# Patient Record
Sex: FEMALE | Race: WHITE | NOT HISPANIC OR LATINO | Employment: FULL TIME | URBAN - NONMETROPOLITAN AREA
[De-identification: names, ages, dates, MRNs, and addresses within clinical notes are randomized per-mention and may not be internally consistent; named-entity substitution may affect disease eponyms.]

---

## 2017-03-27 ENCOUNTER — OFFICE VISIT (OUTPATIENT)
Dept: RETAIL CLINIC | Facility: CLINIC | Age: 47
End: 2017-03-27

## 2017-03-27 VITALS
OXYGEN SATURATION: 99 % | RESPIRATION RATE: 16 BRPM | HEIGHT: 63 IN | TEMPERATURE: 98.6 F | BODY MASS INDEX: 40.22 KG/M2 | HEART RATE: 92 BPM | WEIGHT: 227 LBS

## 2017-03-27 DIAGNOSIS — Z20.828 EXPOSURE TO INFLUENZA: ICD-10-CM

## 2017-03-27 DIAGNOSIS — J01.00 ACUTE MAXILLARY SINUSITIS, RECURRENCE NOT SPECIFIED: Primary | ICD-10-CM

## 2017-03-27 DIAGNOSIS — R05.9 COUGH: ICD-10-CM

## 2017-03-27 DIAGNOSIS — J10.1 INFLUENZA A: ICD-10-CM

## 2017-03-27 LAB
EXPIRATION DATE: ABNORMAL
FLUAV AG NPH QL: POSITIVE
FLUBV AG NPH QL: NEGATIVE
INTERNAL CONTROL: ABNORMAL
Lab: ABNORMAL

## 2017-03-27 PROCEDURE — 87804 INFLUENZA ASSAY W/OPTIC: CPT | Performed by: NURSE PRACTITIONER

## 2017-03-27 PROCEDURE — 99203 OFFICE O/P NEW LOW 30 MIN: CPT | Performed by: NURSE PRACTITIONER

## 2017-03-27 RX ORDER — TRAMADOL HYDROCHLORIDE 50 MG/1
50 TABLET ORAL EVERY 12 HOURS
COMMUNITY
End: 2019-05-01

## 2017-03-27 RX ORDER — NADOLOL 20 MG/1
20 TABLET ORAL DAILY
COMMUNITY
End: 2018-04-20 | Stop reason: SDUPTHER

## 2017-03-27 RX ORDER — FLUCONAZOLE 150 MG/1
150 TABLET ORAL ONCE
Qty: 2 TABLET | Refills: 0 | Status: SHIPPED | OUTPATIENT
Start: 2017-03-27 | End: 2017-03-27

## 2017-03-27 RX ORDER — DEXTROMETHORPHAN HYDROBROMIDE AND PROMETHAZINE HYDROCHLORIDE 15; 6.25 MG/5ML; MG/5ML
5 SYRUP ORAL 2 TIMES DAILY PRN
Qty: 120 ML | Refills: 0 | OUTPATIENT
Start: 2017-03-27 | End: 2019-04-05

## 2017-03-27 RX ORDER — BENZONATATE 100 MG/1
200 CAPSULE ORAL 3 TIMES DAILY PRN
Qty: 20 CAPSULE | Refills: 0 | OUTPATIENT
Start: 2017-03-27 | End: 2019-04-05

## 2017-03-27 RX ORDER — DULOXETIN HYDROCHLORIDE 60 MG/1
60 CAPSULE, DELAYED RELEASE ORAL DAILY
COMMUNITY
End: 2018-04-20 | Stop reason: SDUPTHER

## 2017-03-27 RX ORDER — AMOXICILLIN AND CLAVULANATE POTASSIUM 875; 125 MG/1; MG/1
1 TABLET, FILM COATED ORAL 2 TIMES DAILY
Qty: 20 TABLET | Refills: 0 | OUTPATIENT
Start: 2017-03-27 | End: 2017-04-06

## 2017-03-27 NOTE — PROGRESS NOTES
HPI Comments: Mone presents to the clinic today c/o congestion which started three days ago. Associated symptoms include fever, chills and body aches.  She has tried Ibuprofen without adequate relief. She does report she was exposed to Influenza A two days prior to her symptoms beginning. She has had her seasonal Influenza vaccination. Refer to ROS for additional information.    URI    The current episode started in the past 7 days. The problem has been gradually worsening. Maximum temperature: Tactile. The fever has been present for 1 to 2 days. Associated symptoms include congestion, coughing, ear pain, headaches, rhinorrhea, sinus pain and a sore throat. Pertinent negatives include no chest pain, nausea, rash, vomiting or wheezing. She has tried NSAIDs for the symptoms. The treatment provided mild relief.      The following portions of the patient's history were reviewed and updated as appropriate: allergies, current medications, past family history, past medical history, past social history, past surgical history and problem list.    Review of Systems   Constitutional: Positive for appetite change, chills, fatigue and fever.   HENT: Positive for congestion, ear pain, postnasal drip, rhinorrhea, sinus pressure and sore throat.    Eyes: Negative for discharge and redness.   Respiratory: Positive for cough. Negative for chest tightness, shortness of breath and wheezing.    Cardiovascular: Negative for chest pain.   Gastrointestinal: Negative for nausea and vomiting.   Musculoskeletal: Positive for myalgias.   Skin: Negative for rash.   Neurological: Positive for headaches.   Hematological: Negative for adenopathy.   Psychiatric/Behavioral: Positive for sleep disturbance.   All other systems reviewed and are negative.    Physical Exam   Constitutional: She is oriented to person, place, and time. She appears well-developed and well-nourished. She appears ill. No distress.   HENT:   Head: Normocephalic.   Right  Ear: There is tenderness. Tympanic membrane is erythematous and bulging. A middle ear effusion is present.   Left Ear: There is tenderness. Tympanic membrane is erythematous and bulging.  No middle ear effusion.   Nose: Mucosal edema and rhinorrhea present. Right sinus exhibits maxillary sinus tenderness. Left sinus exhibits maxillary sinus tenderness.   Mouth/Throat: Posterior oropharyngeal erythema present. No oropharyngeal exudate or posterior oropharyngeal edema.   Eyes: Conjunctivae and lids are normal. Pupils are equal, round, and reactive to light. Right eye exhibits no discharge. Left eye exhibits no discharge. No scleral icterus. Right eye exhibits normal extraocular motion and no nystagmus. Left eye exhibits normal extraocular motion and no nystagmus.   Neck: Neck supple. No tracheal tenderness present. No thyromegaly present.   Cardiovascular: Normal rate, regular rhythm, normal heart sounds and intact distal pulses.    No murmur heard.  Pulmonary/Chest: Effort normal and breath sounds normal. She has no wheezes. She has no rales.   Abdominal: There is no hepatosplenomegaly.   Lymphadenopathy:     She has no cervical adenopathy.   Neurological: She is alert and oriented to person, place, and time. She has normal strength. She displays no tremor. No sensory deficit. Gait normal.   Skin: Skin is warm and dry.   Psychiatric: She has a normal mood and affect. Her behavior is normal. Judgment and thought content normal.   Vitals reviewed.    Assessment/Plan   Mone was seen today for uri and flu symptoms.    Diagnoses and all orders for this visit:    Acute maxillary sinusitis, recurrence not specified  Comments:  Findings and recommendations discussed with Mone. Supportive care measures reviewed,  Orders:  -     amoxicillin-clavulanate (AUGMENTIN) 875-125 MG per tablet; Take 1 tablet by mouth 2 (Two) Times a Day for 10 days.    Exposure to influenza  Comments:  Reviewed results of ther Influenza A&B tests  which was positive for Influenza A.  Orders:  -     POC Influenza A / B    Cough  Comments:  Supportive care measures reviewed.   Orders:  -     benzonatate (TESSALON PERLES) 100 MG capsule; Take 2 capsules by mouth 3 (Three) Times a Day As Needed for Cough for up to 20 doses.  -     promethazine-dextromethorphan (PROMETHAZINE-DM) 6.25-15 MG/5ML syrup; Take 5 mL by mouth 2 (Two) Times a Day As Needed for Cough for up to 20 doses.    Influenza A  Comments:  Reviewed supportive care and infection control measures with Mone.     Other orders  -     fluconazole (DIFLUCAN) 150 MG tablet; Take 1 tablet by mouth 1 (One) Time for 1 dose.    Findings and recommendations discussed with Mone. Supportive care measures reviewed,Encouraged Mone to seek further medical evaluation if symptoms worsen or if symptoms do not improve within 48-72 hours.  Lab Results   Component Value Date    RAPFLUA Positive 03/27/2017    RAPFLUB Negative 03/27/2017

## 2018-01-09 ENCOUNTER — TRANSCRIBE ORDERS (OUTPATIENT)
Dept: ADMINISTRATIVE | Facility: HOSPITAL | Age: 48
End: 2018-01-09

## 2018-01-09 DIAGNOSIS — Z12.31 VISIT FOR SCREENING MAMMOGRAM: Primary | ICD-10-CM

## 2018-02-02 ENCOUNTER — HOSPITAL ENCOUNTER (OUTPATIENT)
Dept: MAMMOGRAPHY | Facility: HOSPITAL | Age: 48
Discharge: HOME | End: 2018-02-02
Attending: FAMILY MEDICINE | Admitting: FAMILY MEDICINE

## 2018-02-02 DIAGNOSIS — Z12.31 VISIT FOR SCREENING MAMMOGRAM: ICD-10-CM

## 2018-02-02 PROCEDURE — 77067 SCR MAMMO BI INCL CAD: CPT | Performed by: RADIOLOGY

## 2018-02-02 PROCEDURE — 77063 BREAST TOMOSYNTHESIS BI: CPT

## 2018-02-02 PROCEDURE — 77063 BREAST TOMOSYNTHESIS BI: CPT | Performed by: RADIOLOGY

## 2018-02-02 PROCEDURE — 77067 SCR MAMMO BI INCL CAD: CPT

## 2018-03-19 ENCOUNTER — TRANSCRIBE ORDERS (OUTPATIENT)
Dept: ADMINISTRATIVE | Facility: HOSPITAL | Age: 48
End: 2018-03-19

## 2018-03-19 DIAGNOSIS — R20.0 RIGHT FACIAL NUMBNESS: Primary | ICD-10-CM

## 2018-03-21 ENCOUNTER — HOSPITAL ENCOUNTER (OUTPATIENT)
Dept: MRI IMAGING | Facility: HOSPITAL | Age: 48
Discharge: HOME | End: 2018-03-21
Admitting: FAMILY MEDICINE

## 2018-03-21 DIAGNOSIS — R20.0 RIGHT FACIAL NUMBNESS: ICD-10-CM

## 2018-03-21 PROCEDURE — 70551 MRI BRAIN STEM W/O DYE: CPT | Performed by: RADIOLOGY

## 2018-03-21 PROCEDURE — 70551 MRI BRAIN STEM W/O DYE: CPT

## 2018-04-20 ENCOUNTER — OFFICE VISIT (OUTPATIENT)
Dept: FAMILY MEDICINE CLINIC | Facility: CLINIC | Age: 48
End: 2018-04-20

## 2018-04-20 VITALS
OXYGEN SATURATION: 96 % | SYSTOLIC BLOOD PRESSURE: 93 MMHG | HEIGHT: 63 IN | HEART RATE: 89 BPM | DIASTOLIC BLOOD PRESSURE: 69 MMHG | WEIGHT: 219 LBS | BODY MASS INDEX: 38.8 KG/M2

## 2018-04-20 DIAGNOSIS — F41.9 ANXIETY: ICD-10-CM

## 2018-04-20 DIAGNOSIS — M79.7 FIBROMYALGIA: Primary | ICD-10-CM

## 2018-04-20 DIAGNOSIS — I10 ESSENTIAL HYPERTENSION: ICD-10-CM

## 2018-04-20 DIAGNOSIS — F51.01 PRIMARY INSOMNIA: ICD-10-CM

## 2018-04-20 LAB
ALBUMIN SERPL-MCNC: 4.6 G/DL (ref 3.5–5)
ALBUMIN/GLOB SERPL: 1.3 G/DL (ref 1.5–2.5)
ALP SERPL-CCNC: 76 U/L (ref 35–104)
ALT SERPL W P-5'-P-CCNC: 36 U/L (ref 10–36)
ANION GAP SERPL CALCULATED.3IONS-SCNC: 10.1 MMOL/L (ref 3.6–11.2)
AST SERPL-CCNC: 30 U/L (ref 10–30)
BASOPHILS # BLD AUTO: 0.02 10*3/MM3 (ref 0–0.3)
BASOPHILS NFR BLD AUTO: 0.3 % (ref 0–2)
BILIRUB SERPL-MCNC: 0.5 MG/DL (ref 0.2–1.8)
BUN BLD-MCNC: 19 MG/DL (ref 7–21)
BUN/CREAT SERPL: 19.4 (ref 7–25)
CALCIUM SPEC-SCNC: 10.2 MG/DL (ref 7.7–10)
CHLORIDE SERPL-SCNC: 98 MMOL/L (ref 99–112)
CO2 SERPL-SCNC: 28.9 MMOL/L (ref 24.3–31.9)
CREAT BLD-MCNC: 0.98 MG/DL (ref 0.43–1.29)
CRP SERPL-MCNC: <0.5 MG/DL (ref 0–0.99)
DEPRECATED RDW RBC AUTO: 42 FL (ref 37–54)
EOSINOPHIL # BLD AUTO: 0.05 10*3/MM3 (ref 0–0.7)
EOSINOPHIL NFR BLD AUTO: 0.7 % (ref 0–5)
ERYTHROCYTE [DISTWIDTH] IN BLOOD BY AUTOMATED COUNT: 13.4 % (ref 11.5–14.5)
ERYTHROCYTE [SEDIMENTATION RATE] IN BLOOD: 3 MM/HR (ref 0–20)
GFR SERPL CREATININE-BSD FRML MDRD: 61 ML/MIN/1.73
GLOBULIN UR ELPH-MCNC: 3.5 GM/DL
GLUCOSE BLD-MCNC: 106 MG/DL (ref 70–110)
HCT VFR BLD AUTO: 46.2 % (ref 37–47)
HGB BLD-MCNC: 15.9 G/DL (ref 12–16)
IMM GRANULOCYTES # BLD: 0.01 10*3/MM3 (ref 0–0.03)
IMM GRANULOCYTES NFR BLD: 0.1 % (ref 0–0.5)
LYMPHOCYTES # BLD AUTO: 2.1 10*3/MM3 (ref 1–3)
LYMPHOCYTES NFR BLD AUTO: 28.3 % (ref 21–51)
MCH RBC QN AUTO: 29.8 PG (ref 27–33)
MCHC RBC AUTO-ENTMCNC: 34.4 G/DL (ref 33–37)
MCV RBC AUTO: 86.7 FL (ref 80–94)
MONOCYTES # BLD AUTO: 0.46 10*3/MM3 (ref 0.1–0.9)
MONOCYTES NFR BLD AUTO: 6.2 % (ref 0–10)
NEUTROPHILS # BLD AUTO: 4.78 10*3/MM3 (ref 1.4–6.5)
NEUTROPHILS NFR BLD AUTO: 64.4 % (ref 30–70)
OSMOLALITY SERPL CALC.SUM OF ELEC: 276.5 MOSM/KG (ref 273–305)
PLATELET # BLD AUTO: 388 10*3/MM3 (ref 130–400)
PMV BLD AUTO: 10.4 FL (ref 6–10)
POTASSIUM BLD-SCNC: 3.6 MMOL/L (ref 3.5–5.3)
PROT SERPL-MCNC: 8.1 G/DL (ref 6–8)
RBC # BLD AUTO: 5.33 10*6/MM3 (ref 4.2–5.4)
SODIUM BLD-SCNC: 137 MMOL/L (ref 135–153)
TSH SERPL DL<=0.05 MIU/L-ACNC: 1.66 MIU/ML (ref 0.55–4.78)
WBC NRBC COR # BLD: 7.42 10*3/MM3 (ref 4.5–12.5)

## 2018-04-20 PROCEDURE — 80053 COMPREHEN METABOLIC PANEL: CPT | Performed by: FAMILY MEDICINE

## 2018-04-20 PROCEDURE — 86140 C-REACTIVE PROTEIN: CPT | Performed by: FAMILY MEDICINE

## 2018-04-20 PROCEDURE — 85025 COMPLETE CBC W/AUTO DIFF WBC: CPT | Performed by: FAMILY MEDICINE

## 2018-04-20 PROCEDURE — 93000 ELECTROCARDIOGRAM COMPLETE: CPT | Performed by: FAMILY MEDICINE

## 2018-04-20 PROCEDURE — 85652 RBC SED RATE AUTOMATED: CPT | Performed by: FAMILY MEDICINE

## 2018-04-20 PROCEDURE — 84443 ASSAY THYROID STIM HORMONE: CPT | Performed by: FAMILY MEDICINE

## 2018-04-20 PROCEDURE — 36415 COLL VENOUS BLD VENIPUNCTURE: CPT | Performed by: FAMILY MEDICINE

## 2018-04-20 PROCEDURE — 99204 OFFICE O/P NEW MOD 45 MIN: CPT | Performed by: FAMILY MEDICINE

## 2018-04-20 RX ORDER — ESZOPICLONE 1 MG/1
1 TABLET, FILM COATED ORAL NIGHTLY
Qty: 30 TABLET | Refills: 0 | Status: SHIPPED | OUTPATIENT
Start: 2018-04-20 | End: 2019-05-01

## 2018-04-23 ENCOUNTER — TELEPHONE (OUTPATIENT)
Dept: FAMILY MEDICINE CLINIC | Facility: CLINIC | Age: 48
End: 2018-04-23

## 2018-04-23 NOTE — TELEPHONE ENCOUNTER
----- Message from Loyda Washington MD sent at 4/23/2018  8:43 AM EDT -----  Labs stable. Okay to either call or send letter to patient. Thanks. Will monitor calcium.      Stable letter mailed.

## 2018-05-04 NOTE — PROGRESS NOTES
"Mone Amador     VITALS: Blood pressure 93/69, pulse 89, height 160 cm (63\"), weight 99.3 kg (219 lb), SpO2 96 %.    Subjective  Chief Complaint:   Chief Complaint   Patient presents with   • Establish Care        History of Present Illness:  Patient is a 47 year old female with a medical history significant for hypertension and insomnia who presents to clinic secondary to establishment of care. Used to see Dr. Wu. Denies any new or acute concerns.     Patient has hypertension and is on HCTZ 25 mg daily and nadolol 40 mg orally BID along with lasix 20 mg orally daily. Denies any side effects of the medications. Denies any dizziness, lightheadedness, blurry vision, chest pain, or edema.   Home blood pressure: No  Low salt diet: Yes    Patient has insomnia with trouble falling asleep and also staying asleep. Patient states that she usually goes to bed at approximately 10 PM and will take several hours to fall asleep, sleep for several hours, but will suddenly wake up and will be unable to fall back asleep. Patient continues to then have fatigue during the day. She denies any inability to get out of bed and do daily activities. Patient is currently on ambien 10 mg at night and lorazepam 0.5 mg orally BID.    Patient has depression with anxiety and is currently on cymbalta 60 mg orally BID and lorazepam 0.5 mg orally BID as needed. Patient states that these medications are not working. Denies any side effects of the medication. Patient states that she is sleeping fairly well and can get out of bed daily to accomplish daily activities. Patient has no anhedonia. Mood is stable. Has no feelings of guilt. Has fair concentration and memory ability. Has no energy. Is able to make goals. Is not crying a lot. Appetite is fair. Denies any suicidal or homicidal ideations. Does not have any auditory or visual hallucinations.    Patient has chronic pain that has been diagnosed as fibromyalgia and is currently on duloxetine 60 " mg orally BID and tramadol 50 mg orally BID. Patient states that these medications are working. Denies any side effects of the medication. States that the medications control the pain enough so that she can accomplish daily activities. Movement and ambulation are improved. Denies any sedation from the medications. Some controlled medication seeking behavior. TOÑO has no record of controlled medication seeking behavior. (Toño number: [id number]) . Last UDS was done today.    The following portions of the patient's history were reviewed and updated as appropriate: allergies, current medications, past family history, past medical history, past social history, past surgical history and problem list.    Past Medical History  Past Medical History:   Diagnosis Date   • Anxiety    • Cholecystolithiasis    • Fibromyalgia    • Headache    • Hypertension        Review of Systems  Constitutional: Denies any recent history of HAs, dizziness, fevers, chills, itching.  Eyes: Denies any changes in vision. Denies any blurry vision or diplopia.  Ears, Nose, Mouth, Throat: Denies any sore throat, rhinorrhea, or cough.  Cardiovascular: Denies any chest pain, pressure, or palpitations.  Respiratory: Denies any shortness of breath or wheezing.  Gastrointestinal: Denies any abdominal pain, nausea, vomiting, diarrhea, or constipation.  Genitourinary: Denies any changes in urination.  Skin and/or breasts: Denies any rashes.  Neurological: Denies any changes in balance or gait.  Psychiatric: Denies any anxiety, depression, or insomnia. Denies any suicidal or homicidal ideations.  Endocrine: Denies any heat or cold intolerance. Denies any voice changes, polydipsia, or polyuria.  Hematologic/Lymphatic: Denies any anemia or easy bruising.    Surgical History  Past Surgical History:   Procedure Laterality Date   •  SECTION     • CHOLECYSTECTOMY     • HYSTERECTOMY      32       Family History  Family History   Problem Relation Age of  Onset   • Heart disease Mother    • Hypertension Mother    • Stroke Mother    • Anxiety disorder Mother    • Thyroid disease Father    • Glaucoma Father    • Multiple sclerosis Sister    • No Known Problems Brother    • No Known Problems Son    • No Known Problems Sister    • No Known Problems Son    • Breast cancer Neg Hx        Social History  Social History     Social History   • Marital status:      Spouse name: N/A   • Number of children: N/A   • Years of education: N/A     Occupational History   • Not on file.     Social History Main Topics   • Smoking status: Never Smoker   • Smokeless tobacco: Never Used   • Alcohol use No   • Drug use: No   • Sexual activity: Not on file     Other Topics Concern   • Not on file     Social History Narrative   • No narrative on file       Objective  Physical Exam  Gen: Patient in NAD. Pleasant and answers appropriately. A&Ox3.    Skin: Warm and dry with normal turgor. No purpura, rashes, or unusual pigmentation noted. Hair is normal in appearance and distribution.    HEENT: NC/AT. No lesions noted. Conjunctiva clear, sclera nonicteric. PERRL. EOMI without nystagmus or strabismus. Fundi appear benign. No hemorrhages or exudates of eyes. Auditory canals are patent bilaterally without lesions. TMs intact,  nonerythematous, nonbulging without lesions. Nasal mucosa pink, nonerythematous, and nonedematous. Frontal and maxillary sinuses are nontender. O/P nonerythematous and moist without exudate.    Neck: Supple without lymph nodes palpated. FROM.     Lungs: CTA B/L without rales, rhonchi, crackles, or wheezes.    Heart: RRR. S1 and S2 normal. No S3 or S4. No MRGT.    Abd: Soft, nontender,nondistended. (+)BSx4 quadrants.     Extrem: No CCE. Radial pulses 2+/4 and equal B/L. FROMx4. No bone, joint, or muscle tenderness noted.    Neuro: No focal motor/sensory deficits.      ECG 12 Lead  Date/Time: 4/20/2018 4:52 PM  Performed by: NELSON ANDRADE  Authorized by: LUPE  NELSON XIE   Comparison: not compared with previous ECG   Previous ECG: no previous ECG available  Rhythm: sinus rhythm  Rate: normal  Conduction: conduction normal  ST Segments: ST segments normal  T Waves: T waves normal  QRS axis: normal  Other: no other findings  Clinical impression: non-specific ECG  Comments: NSR without any ST or T acute changes. Low QRS voltage.             Assessment/Plan  Mone Amador is a 47 y.o. here for establishment of care.  Diagnoses and all orders for this visit:    Fibromyalgia  -     CBC & Differential  -     Comprehensive Metabolic Panel  -     TSH  -     Sedimentation Rate  -     C-reactive Protein  -     ECG 12 Lead  -     CBC Auto Differential  -     Osmolality, Calculated; Future  -     Osmolality, Calculated  Labs today. Continue on cymbalta 60 mg orally BID. Discussed with patient that we will not be filling tramadol, but we would work with her on finding a more appropriate fibromyalgia medication for her pain.     Insomnia  -     eszopiclone (LUNESTA) 1 MG tablet; Take 1 tablet by mouth Every Night. Take immediately before bedtime  Discussed with patient that secondary to recent discoveries about ambien, there are better medications than ambien to utilize for sleep. Will try lunesta 1 mg orally at night.     Hypertension   Stable. Continue HCTZ 25 mg daily and nadolol 40 mg orally BID along with lasix 20 mg orally daily.    Depression with anxiety   Continue cymbalta 60 mg orally BID. We will be planning on discontinuing lorazepam 0.5 mg orally BID. Patient declines any current alternatives.     Preventative Medicine  Last mammogram 2/2018. Will need to discuss pap. Will need to discuss lipids.     Findings and plans discussed with patient who verbalizes understanding and agreement. Will followup with patient once results are in. Patient to followup at clinic PRN or in one month for further medical followup.    Nelson Washington MD

## 2018-06-11 ENCOUNTER — TRANSCRIBE ORDERS (OUTPATIENT)
Dept: ADMINISTRATIVE | Facility: HOSPITAL | Age: 48
End: 2018-06-11

## 2018-06-11 DIAGNOSIS — M54.42 LOW BACK PAIN WITH BILATERAL SCIATICA, UNSPECIFIED BACK PAIN LATERALITY, UNSPECIFIED CHRONICITY: Primary | ICD-10-CM

## 2018-06-11 DIAGNOSIS — M54.41 LOW BACK PAIN WITH BILATERAL SCIATICA, UNSPECIFIED BACK PAIN LATERALITY, UNSPECIFIED CHRONICITY: Primary | ICD-10-CM

## 2018-06-13 ENCOUNTER — HOSPITAL ENCOUNTER (OUTPATIENT)
Dept: MRI IMAGING | Facility: HOSPITAL | Age: 48
Discharge: HOME | End: 2018-06-13
Admitting: FAMILY MEDICINE

## 2018-06-13 DIAGNOSIS — M54.42 LOW BACK PAIN WITH BILATERAL SCIATICA, UNSPECIFIED BACK PAIN LATERALITY, UNSPECIFIED CHRONICITY: ICD-10-CM

## 2018-06-13 DIAGNOSIS — M54.41 LOW BACK PAIN WITH BILATERAL SCIATICA, UNSPECIFIED BACK PAIN LATERALITY, UNSPECIFIED CHRONICITY: ICD-10-CM

## 2018-06-13 PROCEDURE — 72148 MRI LUMBAR SPINE W/O DYE: CPT | Performed by: RADIOLOGY

## 2018-06-13 PROCEDURE — 72148 MRI LUMBAR SPINE W/O DYE: CPT

## 2018-07-17 ENCOUNTER — OFFICE VISIT (OUTPATIENT)
Dept: NEUROSURGERY | Facility: CLINIC | Age: 48
End: 2018-07-17

## 2018-07-17 VITALS — HEIGHT: 63 IN

## 2018-07-17 DIAGNOSIS — M47.816 FACET ARTHRITIS OF LUMBAR REGION: Primary | ICD-10-CM

## 2018-07-17 PROCEDURE — 99243 OFF/OP CNSLTJ NEW/EST LOW 30: CPT | Performed by: NEUROLOGICAL SURGERY

## 2018-07-17 NOTE — PROGRESS NOTES
Subjective   Patient ID: Mone Amador is a 47 y.o. 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  There was no trauma at onset.  It was worsened by walking or standing for long periods, as well as bending.  She has continued to work.  She had some chiropractic and physical therapy.  She finds her right leg jerks at night when she is trying to rest, even though resting is what gives her the best relief.  She has a past history of fibromyalgia.  She has listed medications that include metal all 40 mg twice a day, lorazepam 0.5 mg twice a day, Cymbalta 60 mg twice a day, and tramadol 50 mg every 12 hours when necessary.    Review of Radiographic Studies:  Lumbar MRI scan shows facet joint widening at L4-5 as the only abnormal finding.  There is no spondylolisthesis or stenosis, and no evidence of a herniated disc.    The following portions of the patient's history were reviewed, updated as appropriate and approved: allergies, current medications, past family history, past medical history, past social history, past surgical history, review of systems and problem list.  Review of Systems   Constitutional: Negative for activity change, appetite change, chills, diaphoresis, fatigue, fever and unexpected weight change.   HENT: Negative for congestion, dental problem, drooling, ear discharge, ear pain, facial swelling, hearing loss, mouth sores, nosebleeds, postnasal drip, rhinorrhea, sinus pressure, sneezing, sore throat, tinnitus, trouble swallowing and voice change.    Eyes: Negative for photophobia, pain, discharge, redness, itching and visual disturbance.   Respiratory: Negative for apnea, cough, choking, chest tightness, shortness  of breath, wheezing and stridor.    Cardiovascular: Negative for chest pain, palpitations and leg swelling.   Gastrointestinal: Negative for abdominal distention, abdominal pain, anal bleeding, blood in stool, constipation, diarrhea, nausea, rectal pain and vomiting.   Endocrine: Negative for cold intolerance, heat intolerance, polydipsia, polyphagia and polyuria.   Genitourinary: Negative for decreased urine volume, difficulty urinating, dysuria, enuresis, flank pain, frequency, genital sores, hematuria and urgency.   Musculoskeletal: Positive for back pain. Negative for arthralgias, gait problem, joint swelling, myalgias, neck pain and neck stiffness.   Skin: Negative for color change, pallor, rash and wound.   Allergic/Immunologic: Negative for environmental allergies, food allergies and immunocompromised state.   Neurological: Positive for headaches. Negative for dizziness, tremors, seizures, syncope, facial asymmetry, speech difficulty, weakness, light-headedness and numbness.   Hematological: Negative for adenopathy. Does not bruise/bleed easily.   Psychiatric/Behavioral: Negative for agitation, behavioral problems, confusion, decreased concentration, dysphoric mood, hallucinations, self-injury, sleep disturbance and suicidal ideas. The patient is not nervous/anxious and is not hyperactive.        Objective     NEUROLOGICAL EXAMINATION:      MENTAL STATUS:  Alert and oriented.  Speech intact.  Recent and remote memory intact.      CRANIAL NERVES:  Cranial nerve II:  Visual fields are full.  Cranial nerves III, IV and VI:  PERRLADC.  Extraocular movements are intact.  Nystagmus is not present.  Cranial nerve V:  Facial sensation is intact.  Cranial nerve VII:  Muscles of facial expression reveal no asymmetry.  Cranial nerve VIII:  Hearing is intact.  Cranial nerves IX and X:  Palate elevates symmetrically.  Cranial nerve XI:  Shoulder shrug is intact.  Cranial nerve XII:  Tongue is midline without evidence of  atrophy or fasciculation.    MUSCULOSKELETAL:  SLR negative. Pastor's test negative.    MOTOR:  Normal motor strength in knee extension, ankle dorsiflexion, and plantar flexion.    SENSATION: No sensory loss, although there has been some sensory alteration over the right anterior knee and medial shin.    REFLEXES:  DTR 1+ and equal bilaterally in the lower extremities.    Assessment   Right lumbar radicular pain syndrome associated with facet joint widening at L4-5, no stenosis or spondylolisthesis.       Plan   No different treatment recommended at this point, other than perhaps a muscle relaxant at night if she keeps having leg jerking symptoms.  The facet joint widening at L4-5 may be a precursor to later spondylolisthesis, but she has excellent alignment at present, and therefore conservative management, just as she has received, it is the best choice.       Leobardo Albarado MD

## 2018-08-20 ENCOUNTER — LAB (OUTPATIENT)
Dept: UROLOGY | Facility: CLINIC | Age: 48
End: 2018-08-20

## 2018-08-20 DIAGNOSIS — I10 HYPERTENSION, UNSPECIFIED TYPE: Primary | ICD-10-CM

## 2018-08-20 LAB
ALBUMIN SERPL-MCNC: 4.2 G/DL (ref 3.5–5)
ALBUMIN/GLOB SERPL: 1.5 G/DL (ref 1.5–2.5)
ALP SERPL-CCNC: 65 U/L (ref 35–104)
ALT SERPL W P-5'-P-CCNC: 28 U/L (ref 10–36)
ANION GAP SERPL CALCULATED.3IONS-SCNC: 5.6 MMOL/L (ref 3.6–11.2)
AST SERPL-CCNC: 24 U/L (ref 10–30)
BILIRUB SERPL-MCNC: 0.4 MG/DL (ref 0.2–1.8)
BUN BLD-MCNC: 12 MG/DL (ref 7–21)
BUN/CREAT SERPL: 16.4 (ref 7–25)
CALCIUM SPEC-SCNC: 9.5 MG/DL (ref 7.7–10)
CHLORIDE SERPL-SCNC: 109 MMOL/L (ref 99–112)
CHOLEST SERPL-MCNC: 230 MG/DL (ref 0–200)
CO2 SERPL-SCNC: 25.4 MMOL/L (ref 24.3–31.9)
CREAT BLD-MCNC: 0.73 MG/DL (ref 0.43–1.29)
DEPRECATED RDW RBC AUTO: 46.1 FL (ref 37–54)
ERYTHROCYTE [DISTWIDTH] IN BLOOD BY AUTOMATED COUNT: 14 % (ref 11.5–14.5)
GFR SERPL CREATININE-BSD FRML MDRD: 85 ML/MIN/1.73
GLOBULIN UR ELPH-MCNC: 2.8 GM/DL
GLUCOSE BLD-MCNC: 93 MG/DL (ref 70–110)
HCT VFR BLD AUTO: 40 % (ref 37–47)
HDLC SERPL-MCNC: 44 MG/DL (ref 60–100)
HGB BLD-MCNC: 13.1 G/DL (ref 12–16)
LDLC SERPL CALC-MCNC: 156 MG/DL (ref 0–100)
LDLC/HDLC SERPL: 3.55 {RATIO}
MCH RBC QN AUTO: 29.8 PG (ref 27–33)
MCHC RBC AUTO-ENTMCNC: 32.8 G/DL (ref 33–37)
MCV RBC AUTO: 91.1 FL (ref 80–94)
OSMOLALITY SERPL CALC.SUM OF ELEC: 278.9 MOSM/KG (ref 273–305)
PLATELET # BLD AUTO: 344 10*3/MM3 (ref 130–400)
PMV BLD AUTO: 10.3 FL (ref 6–10)
POTASSIUM BLD-SCNC: 4.2 MMOL/L (ref 3.5–5.3)
PROT SERPL-MCNC: 7 G/DL (ref 6–8)
RBC # BLD AUTO: 4.39 10*6/MM3 (ref 4.2–5.4)
SODIUM BLD-SCNC: 140 MMOL/L (ref 135–153)
TRIGL SERPL-MCNC: 148 MG/DL (ref 0–150)
VLDLC SERPL-MCNC: 29.6 MG/DL
WBC NRBC COR # BLD: 5.54 10*3/MM3 (ref 4.5–12.5)

## 2018-08-20 PROCEDURE — 80061 LIPID PANEL: CPT | Performed by: UROLOGY

## 2018-08-20 PROCEDURE — 80053 COMPREHEN METABOLIC PANEL: CPT | Performed by: UROLOGY

## 2018-08-20 PROCEDURE — 85027 COMPLETE CBC AUTOMATED: CPT | Performed by: UROLOGY

## 2018-08-20 PROCEDURE — 36415 COLL VENOUS BLD VENIPUNCTURE: CPT | Performed by: UROLOGY

## 2018-10-04 ENCOUNTER — LAB (OUTPATIENT)
Dept: UROLOGY | Facility: CLINIC | Age: 48
End: 2018-10-04

## 2018-10-04 DIAGNOSIS — I10 HYPERTENSION, UNSPECIFIED TYPE: ICD-10-CM

## 2018-10-04 DIAGNOSIS — Z83.49 FAMILY HISTORY OF THYROID DISEASE: Primary | ICD-10-CM

## 2018-10-04 DIAGNOSIS — R61 NIGHT SWEATS: ICD-10-CM

## 2018-10-04 LAB
ALBUMIN SERPL-MCNC: 4.5 G/DL (ref 3.5–5)
ALBUMIN/GLOB SERPL: 1.4 G/DL (ref 1.5–2.5)
ALP SERPL-CCNC: 87 U/L (ref 35–104)
ALT SERPL W P-5'-P-CCNC: 24 U/L (ref 10–36)
ANION GAP SERPL CALCULATED.3IONS-SCNC: 17.5 MMOL/L (ref 3.6–11.2)
AST SERPL-CCNC: 19 U/L (ref 10–30)
BASOPHILS # BLD AUTO: 0.03 10*3/MM3 (ref 0–0.3)
BASOPHILS NFR BLD AUTO: 0.4 % (ref 0–2)
BILIRUB SERPL-MCNC: 0.4 MG/DL (ref 0.2–1.8)
BUN BLD-MCNC: 22 MG/DL (ref 7–21)
BUN/CREAT SERPL: 20.8 (ref 7–25)
CALCIUM SPEC-SCNC: 9.8 MG/DL (ref 7.7–10)
CHLORIDE SERPL-SCNC: 103 MMOL/L (ref 99–112)
CO2 SERPL-SCNC: 15.5 MMOL/L (ref 24.3–31.9)
CREAT BLD-MCNC: 1.06 MG/DL (ref 0.43–1.29)
DEPRECATED RDW RBC AUTO: 43 FL (ref 37–54)
EOSINOPHIL # BLD AUTO: 0.12 10*3/MM3 (ref 0–0.7)
EOSINOPHIL NFR BLD AUTO: 1.7 % (ref 0–5)
ERYTHROCYTE [DISTWIDTH] IN BLOOD BY AUTOMATED COUNT: 13.5 % (ref 11.5–14.5)
GFR SERPL CREATININE-BSD FRML MDRD: 56 ML/MIN/1.73
GLOBULIN UR ELPH-MCNC: 3.3 GM/DL
GLUCOSE BLD-MCNC: 110 MG/DL (ref 70–110)
HCT VFR BLD AUTO: 45.2 % (ref 37–47)
HGB BLD-MCNC: 15 G/DL (ref 12–16)
IMM GRANULOCYTES # BLD: 0.03 10*3/MM3 (ref 0–0.03)
IMM GRANULOCYTES NFR BLD: 0.4 % (ref 0–0.5)
LYMPHOCYTES # BLD AUTO: 1.39 10*3/MM3 (ref 1–3)
LYMPHOCYTES NFR BLD AUTO: 19.2 % (ref 21–51)
MCH RBC QN AUTO: 29.7 PG (ref 27–33)
MCHC RBC AUTO-ENTMCNC: 33.2 G/DL (ref 33–37)
MCV RBC AUTO: 89.5 FL (ref 80–94)
MONOCYTES # BLD AUTO: 0.28 10*3/MM3 (ref 0.1–0.9)
MONOCYTES NFR BLD AUTO: 3.9 % (ref 0–10)
NEUTROPHILS # BLD AUTO: 5.4 10*3/MM3 (ref 1.4–6.5)
NEUTROPHILS NFR BLD AUTO: 74.4 % (ref 30–70)
OSMOLALITY SERPL CALC.SUM OF ELEC: 275.9 MOSM/KG (ref 273–305)
PLATELET # BLD AUTO: 386 10*3/MM3 (ref 130–400)
PMV BLD AUTO: 10 FL (ref 6–10)
POTASSIUM BLD-SCNC: 3.5 MMOL/L (ref 3.5–5.3)
PROT SERPL-MCNC: 7.8 G/DL (ref 6–8)
RBC # BLD AUTO: 5.05 10*6/MM3 (ref 4.2–5.4)
SODIUM BLD-SCNC: 136 MMOL/L (ref 135–153)
T4 SERPL-MCNC: 8.8 MCG/DL (ref 4.5–10.9)
TSH SERPL DL<=0.05 MIU/L-ACNC: 2.92 MIU/ML (ref 0.55–4.78)
WBC NRBC COR # BLD: 7.25 10*3/MM3 (ref 4.5–12.5)

## 2018-10-04 PROCEDURE — 36415 COLL VENOUS BLD VENIPUNCTURE: CPT | Performed by: UROLOGY

## 2018-10-04 PROCEDURE — 84443 ASSAY THYROID STIM HORMONE: CPT | Performed by: UROLOGY

## 2018-10-04 PROCEDURE — 85025 COMPLETE CBC W/AUTO DIFF WBC: CPT | Performed by: UROLOGY

## 2018-10-04 PROCEDURE — 84436 ASSAY OF TOTAL THYROXINE: CPT | Performed by: UROLOGY

## 2018-10-04 PROCEDURE — 80053 COMPREHEN METABOLIC PANEL: CPT | Performed by: UROLOGY

## 2018-10-29 PROCEDURE — 83036 HEMOGLOBIN GLYCOSYLATED A1C: CPT | Performed by: UROLOGY

## 2019-02-04 ENCOUNTER — TRANSCRIBE ORDERS (OUTPATIENT)
Dept: PAIN MEDICINE | Facility: CLINIC | Age: 49
End: 2019-02-04

## 2019-02-04 DIAGNOSIS — M54.5 LOW BACK PAIN, UNSPECIFIED BACK PAIN LATERALITY, UNSPECIFIED CHRONICITY, WITH SCIATICA PRESENCE UNSPECIFIED: Primary | ICD-10-CM

## 2019-02-15 ENCOUNTER — TELEPHONE (OUTPATIENT)
Dept: PAIN MEDICINE | Facility: CLINIC | Age: 49
End: 2019-02-15

## 2019-02-15 NOTE — TELEPHONE ENCOUNTER
[facility] Report #[id number]  MRI LUMBAR SPINE 06/13/18    FINDINGS:   Vertebral body height and alignment are preserved throughout.     Marrow signal is unremarkable.     Spinal CORD terminates appropriately at the L1 level with normal signal  present within the conus.     Shortening of the pedicles of mainly of the lower levels contributes to  mild generalized stenosis.     The visualized paravertebral soft tissues are unremarkable.     L1/2: Unremarkable.     L2/3: Unremarkable.     L3/4: Unremarkable.     L4/5: Advanced hypertrophic facet arthropathy with periarticular  inflammation noted. Underlying annular type disc bulge eccentric to  left. There is mild central canal and lateral recess stenosis. There is  severe left and moderate right neural foraminal stenosis with mass  effect on exiting left L4 nerve root laterally.     L5/S1: Mild disc bulge. Moderate hypertrophic facet arthropathy. Mild  left-sided lateral recess stenosis. Moderate left neural foraminal  stenosis and mild right neural foraminal stenosis.

## 2019-02-15 NOTE — PROGRESS NOTES
"Chief Complaint: \"Pain in my lower back.\"       History of Present Illness:   Patient: Ms. Mone Amador, 48 y.o. female   Referring physician: Dr. Christopher Wu   Reason for referral: Consultation for intractable chronic lower back pain.   Pain history: Patient reports a 9-month history of lower back pain, which began without incident. Pain has progressed in intensity over the past 6 months.   Pain description: constant pain with intermittent exacerbation, described as stabbing and stinging sensation.   Radiation of pain: The lower back pain radiates on occassions into her calves. The back pain is more constant and severe.   Pain intensity today: 5/10  Average pain intensity last week: 7/10  Pain intensity ranges from: 2/10 to 10/10  Aggravating factors: Pain increases with bending and ambulating more than 100 yards  Alleviating factors: Pain decreases with sittting and lying down.     Associated symptoms:   Patient reports pain but denies numbness or weakness in the right lower extremities  Patient reports any new bladder or bowel problems.   Patient denies difficulties with her balance or recent falls.      Review of previous therapies and additional medical records:  Mone Amador has already failed the following measures, including:   Conservative measures: oral analgesics, physical therapy and chiropractic therapy   Interventional measures: None  Surgical measures: No history of lumbar spine surgery  Patient underwent neurosurgical consultation with Dr Leobardo Albarado on 07/17/2018 and was found not to be a surgical candidate at that time  Mone Amador presents with significant comorbidities including anxiety, depression and insomnia, engaged in treatment, fibromyalgia, migraine headaches, moderate obesity, engaged in treatment.  In terms of current analgesics, Mone Amador takes: Cymbalta, Norco, tramadol, Topamax. Patient also takes lorazepam and Ambien  I have reviewed [facility] Report #[id number] consistent " to medication reconciliation.     Global Pain Scale 01-21 2019                  Pain  13                 Feelings  4                 Clinical outcomes  12                 Activities  14                 GPS Total:  43                    Review of Diagnostic Studies:    MRI LUMBAR SPINE 06/13/18: Vertebral body height and alignment are preserved throughout. Marrow signal is unremarkable. Spinal cord terminates appropriately at the L1 level with normal signal present within the conus. Shortening of the pedicles of mainly of the lower levels contributes to mild generalized stenosis. The visualized paravertebral soft tissues are unremarkable.  L1/L2: Unremarkable.  L2/L3: Unremarkable.  L3/L4: Unremarkable.  L4/L5: Advanced hypertrophic facet arthropathy with periarticular inflammation. Underlying annular type disc bulge eccentric to left. There is mild central canal and lateral recess stenosis. There is severe left and moderate right neural foraminal stenosis with mass effect on exiting left L4 nerve root laterally.   L5/S1: Mild disc bulge. Moderate hypertrophic facet arthropathy. Mild left-sided lateral recess stenosis. Moderate left neural foraminal stenosis and mild right neural foraminal stenosis.    Review of Systems   Constitutional: Positive for fatigue. Negative for fever.   Cardiovascular: Negative for chest pain.   Gastrointestinal: Positive for constipation. Negative for abdominal pain.   Endocrine: Positive for cold intolerance and polydipsia.   Genitourinary: Negative for dysuria and pelvic pain.   Musculoskeletal: Positive for arthralgias, back pain, myalgias and neck pain.   Neurological: Positive for headaches. Negative for weakness and numbness.   Psychiatric/Behavioral: The patient is nervous/anxious.    All other systems reviewed and are negative.        Patient Active Problem List   Diagnosis   • Anxiety   • Fibromyalgia   • Hypertension   • Facet arthritis of lumbar region (CMS/HCC)   •  Intractable chronic migraine without aura and without status migrainosus   • Morbid obesity due to excess calories (CMS/HCC)   • Anxiety and depression   • Lumbar stenosis with neurogenic claudication       Past Medical History:   Diagnosis Date   • Anxiety    • Cholecystolithiasis    • Fibromyalgia    • Headache    • Hypertension          Past Surgical History:   Procedure Laterality Date   •  SECTION      ,    • CHOLECYSTECTOMY     • HYSTERECTOMY      32         Family History   Problem Relation Age of Onset   • Heart disease Mother    • Hypertension Mother    • Stroke Mother    • Anxiety disorder Mother    • Thyroid disease Father    • Glaucoma Father    • Multiple sclerosis Sister    • No Known Problems Brother    • No Known Problems Son    • No Known Problems Sister    • No Known Problems Son    • Breast cancer Neg Hx          Social History     Socioeconomic History   • Marital status:      Spouse name: Not on file   • Number of children: Not on file   • Years of education: Not on file   • Highest education level: Not on file   Tobacco Use   • Smoking status: Never Smoker   • Smokeless tobacco: Never Used   Substance and Sexual Activity   • Alcohol use: No   • Drug use: No           Current Outpatient Medications:   •  benzonatate (TESSALON PERLES) 100 MG capsule, Take 2 capsules by mouth 3 (Three) Times a Day As Needed for Cough for up to 20 doses., Disp: 20 capsule, Rfl: 0  •  DULoxetine (CYMBALTA) 60 MG capsule, Take 1 capsule by mouth 2 (Two) Times a Day., Disp: 60 capsule, Rfl: 6  •  eszopiclone (LUNESTA) 1 MG tablet, Take 1 tablet by mouth Every Night. Take immediately before bedtime, Disp: 30 tablet, Rfl: 0  •  furosemide (LASIX) 20 MG tablet, Take 1 tablet by mouth Every Morning as needed for swelling., Disp: 30 tablet, Rfl: 1  •  hydrochlorothiazide (HYDRODIURIL) 25 MG tablet, Take 1 tablet by mouth Daily., Disp: 30 tablet, Rfl: 5  •  HYDROcodone-acetaminophen (NORCO)  "5-325 MG per tablet, Take 1 tablet by mouth Every 4 (Four) Hours As Needed for pain., Disp: 15 tablet, Rfl: 0  •  LORazepam (ATIVAN) 0.5 MG tablet, Take 1 tablet by mouth 2 (Two) Times a Day As Needed., Disp: 60 tablet, Rfl: 1  •  MethylPREDNISolone (MEDROL, VLADISLAV,) 4 MG tablet, Take as directed on package, Disp: 21 tablet, Rfl: 0  •  nadolol (CORGARD) 40 MG tablet, Take 1 tablet by mouth 2 times daily, Disp: 60 tablet, Rfl: 5  •  promethazine-dextromethorphan (PROMETHAZINE-DM) 6.25-15 MG/5ML syrup, Take 5 mL by mouth 2 (Two) Times a Day As Needed for Cough for up to 20 doses., Disp: 120 mL, Rfl: 0  •  topiramate (TOPAMAX) 200 MG tablet, Take 1 tablet by mouth 2 (Two) Times a Day., Disp: 60 tablet, Rfl: 4  •  traMADol (ULTRAM) 50 MG tablet, Take 50 mg by mouth Every 12 (Twelve) Hours., Disp: , Rfl:   •  traMADol (ULTRAM) 50 MG tablet, Take 1 tablet by mouth Every 12 (Twelve) Hours As Needed., Disp: 60 tablet, Rfl: 2  •  zolpidem (AMBIEN) 10 MG tablet, Take 1 tablet by mouth every night at bedtime., Disp: 30 tablet, Rfl: 1      Allergies   Allergen Reactions   • Morphine And Related Itching and Nausea Only   • Latex Itching and Rash         /90 (BP Location: Right arm, Patient Position: Sitting)   Temp 99.8 °F (37.7 °C) (Temporal)   Ht 160 cm (62.99\")   Wt 103 kg (228 lb)   BMI 40.40 kg/m²       Physical Exam:  Constitutional: Patient is oriented to person, place, and time. Patient appears well-developed and well-nourished.   HEENT: Head: Normocephalic and atraumatic. Eyes: Conjunctivae and lids are normal. Pupils: Equal, round, reactive to light.   Neck: Trachea normal. Neck supple. No JVD present.   Pulmonary Respiratory effort: No increased work of breathing or signs of respiratory distress. Auscultation of lungs: Clear to auscultation.   Cardiovascular Auscultation of heart: Normal rate and rhythm, normal S1 and S2, no murmurs.   Gait and station: Gait evaluation demonstrated a normal gait   Lumbar spine: " Passive and active range of motion are limited secondary to pain. Extension, lateral flexion, rotation of the lumbar spine increased and reproduced pain. Lumbar facet joint loading maneuvers are positive.  Pastor and Gaenslen's tests are negative   Piriformis maneuvers are negative   Palpation of the bilateral ischial tuberosities, unrevealing   Palpation of the bilateral greater trochanter, unrevealing   Examination of the Iliotibial band: unrevealing   Hip joints: The range of motion of the hip joints is full and without pain   Neurological: Patient is alert and oriented to person, place, and time. Speech: speech is normal. Cortical function: Normal mental status.   Cranial nerves: Cranial nerves 2-12 intact.   Reflex Scores:  Right patellar: 1+  Left patellar: 1+  Right Achilles: 1+  Left Achilles: 1+  Motor strength: 5/5  Motor Tone: normal tone.   Involuntary movements: none.   Superficial/Primitive Reflexes: primitive reflexes were absent.   Right Sands: absent  Left Sands: absent  Right ankle clonus: absent  Left ankle clonus: absent   Negative long tract signs. Straight leg raising test is equivocal. Femoral stretch sign is negative.   Sensation: No sensory loss. Sensory exam: intact to light touch, intact pain and temperature sensation, intact vibration sensation and normal proprioception.   Coordination: Normal finger to nose and heel to shin. Normal balance and negative Romberg's sign   Skin and subcutaneous tissue: Skin is warm and intact. No rash noted. No cyanosis.   Psychiatric: Judgment and insight: Normal. Orientation to person, place and time: Normal. Recent and remote memory: Intact. Mood and affect: Normal.     ASSESSMENT:   1. Lumbar stenosis with neurogenic claudication    2. Facet arthritis of lumbar region (CMS/HCC)    3. Fibromyalgia    4. Intractable chronic migraine without aura and without status migrainosus    5. Morbid obesity due to excess calories (CMS/[facility])    6. Anxiety and  depression        PLAN/MEDICAL DECISION MAKING: I had a lengthy conversation with Ms. Mone Amador regarding her chronic pain condition and potential therapeutic options including risks, benefits, alternative therapies, to name a few. Patient presents with unrelenting lower back pain. Lumbar MRI revealed facet joint widening at L4-L5 with some stenosis. Patient has failed to obtain pain relief with conservative measures, as referenced above. I have reviewed all available patient's medical records as well as previous therapies as referenced above.  Therefore, I have proposed the following plan:  1. Diagnostic studies: Flexion and extension X-rays of the lumbar spine  2. Pharmacological measures: Reviewed. Discussed.   A. Patient takes Cymbalta, Norco, tramadol, Topamax. Patient also takes lorazepam and Ambien  B. Trial with Rheumate one tablet twice daily (samples), then, continue once daily  C. Start pyridoxine 25 mg one tablet by mouth three times daily  3. Interventional pain management measures: Patient will be scheduled for diagnostic and therapeutic bilateral L4-L5 transforaminal epidural steroid injections. We may repeat another epidural depending on patient's outcome. We have also discussed the possibility of diagnostic and therapeutic lumbar facet joint injections bilateral L4-L5, bilateral L5-S1 versus diagnostic bilateral lumbar medial branch blocks at L3, L4, L5; for bilateral lumbar facet joints at L4-L5, L5-S1 to clarify the origin of chronic refractory mechanical lower back pain. If patient experiences more than 80% relief along with significant improvement the range of motion of the lumbar spine, then, patient will be scheduled for a second set of diagnostic bilateral lumbar medial branch blocks, to then, proceed with bilateral lumbar medial branch rhizotomies. Patient will follow-up with Dr. Albarado thereafter.  4. Long-term rehabilitation efforts:  A. The patient does not have a history of falls. I  did complete a risk assessment for falls.   B. Patient will start a comprehensive physical therapy program for water therapy, therapeutic exercise, core strengthening, gait and balance training, neurodynamics, myofascial release, cupping and dry needling   C. Start an exercise program such as yoga and water therapy  D. Contrast therapy: Apply ice-packs for 15-20 minutes, followed by heating pads for 15-20 minutes to affected area   E. Patient has declined a referral to Dr. Regina Merino for cognitive behavioral therapy.  F. Referral to [facility]  5.  The patient and her family have been instructed to contact my office with any questions or difficulties. The patient understands the plan and agrees to proceed accordingly.         Patient Care Team:  Christopher Wu MD as PCP - General (Family Medicine)  Loyda Washington MD as Consulting Physician (Family Medicine)  Leobardo Albarado MD as Consulting Physician (Neurosurgery)  Santos Betancourt MD as Consulting Physician (Urology)     No orders of the defined types were placed in this encounter.        No future appointments.      Dinh Amador MD     EMR Dragon/Transcription disclaimer:  Much of this encounter note is an electronic transcription of spoken language to printed text. Electronic transcription of spoken language may permit erroneous, or at times, nonsensical words or phrases to be inadvertently transcribed. Although I have reviewed the note for such errors, some may still exist.

## 2019-02-19 PROBLEM — G43.719 INTRACTABLE CHRONIC MIGRAINE WITHOUT AURA AND WITHOUT STATUS MIGRAINOSUS: Status: ACTIVE | Noted: 2019-02-19

## 2019-02-19 PROBLEM — M47.26 OSTEOARTHRITIS OF SPINE WITH RADICULOPATHY, LUMBAR REGION: Status: ACTIVE | Noted: 2019-02-19

## 2019-02-21 ENCOUNTER — OFFICE VISIT (OUTPATIENT)
Dept: PAIN MEDICINE | Facility: CLINIC | Age: 49
End: 2019-02-21

## 2019-02-21 VITALS
WEIGHT: 228 LBS | SYSTOLIC BLOOD PRESSURE: 124 MMHG | DIASTOLIC BLOOD PRESSURE: 90 MMHG | HEIGHT: 63 IN | BODY MASS INDEX: 40.4 KG/M2 | TEMPERATURE: 99.8 F

## 2019-02-21 DIAGNOSIS — M79.7 FIBROMYALGIA: ICD-10-CM

## 2019-02-21 DIAGNOSIS — F41.9 ANXIETY AND DEPRESSION: ICD-10-CM

## 2019-02-21 DIAGNOSIS — F32.A ANXIETY AND DEPRESSION: ICD-10-CM

## 2019-02-21 DIAGNOSIS — M48.062 LUMBAR STENOSIS WITH NEUROGENIC CLAUDICATION: Primary | ICD-10-CM

## 2019-02-21 DIAGNOSIS — M47.816 FACET ARTHRITIS OF LUMBAR REGION: ICD-10-CM

## 2019-02-21 DIAGNOSIS — E66.01 MORBID OBESITY DUE TO EXCESS CALORIES (HCC): ICD-10-CM

## 2019-02-21 DIAGNOSIS — M48.062 LUMBAR STENOSIS WITH NEUROGENIC CLAUDICATION: ICD-10-CM

## 2019-02-21 DIAGNOSIS — G43.719 INTRACTABLE CHRONIC MIGRAINE WITHOUT AURA AND WITHOUT STATUS MIGRAINOSUS: ICD-10-CM

## 2019-02-21 PROCEDURE — 99204 OFFICE O/P NEW MOD 45 MIN: CPT | Performed by: ANESTHESIOLOGY

## 2019-02-21 RX ORDER — PYRIDOXINE HCL (VITAMIN B6) 25 MG
25 TABLET ORAL 3 TIMES DAILY
Qty: 90 TABLET | Refills: 1 | Status: SHIPPED | OUTPATIENT
Start: 2019-02-21 | End: 2019-08-09 | Stop reason: SDUPTHER

## 2019-02-21 RX ORDER — ME-TETRAHYDROFOLATE/B12/HRB236 1-1-500 MG
1 CAPSULE ORAL DAILY
Qty: 180 CAPSULE | Refills: 4 | Status: SHIPPED | OUTPATIENT
Start: 2019-02-21 | End: 2020-02-26

## 2019-02-25 ENCOUNTER — HOSPITAL ENCOUNTER (OUTPATIENT)
Dept: GENERAL RADIOLOGY | Facility: HOSPITAL | Age: 49
Discharge: HOME | End: 2019-02-25
Admitting: ANESTHESIOLOGY

## 2019-02-25 DIAGNOSIS — M48.062 LUMBAR STENOSIS WITH NEUROGENIC CLAUDICATION: ICD-10-CM

## 2019-02-25 PROCEDURE — 72114 X-RAY EXAM L-S SPINE BENDING: CPT | Performed by: RADIOLOGY

## 2019-02-25 PROCEDURE — 72114 X-RAY EXAM L-S SPINE BENDING: CPT

## 2019-03-12 DIAGNOSIS — M48.062 LUMBAR STENOSIS WITH NEUROGENIC CLAUDICATION: Primary | ICD-10-CM

## 2019-03-12 DIAGNOSIS — E66.01 MORBID OBESITY DUE TO EXCESS CALORIES (HCC): ICD-10-CM

## 2019-03-12 DIAGNOSIS — M47.816 FACET ARTHRITIS OF LUMBAR REGION: ICD-10-CM

## 2019-03-12 DIAGNOSIS — M79.7 FIBROMYALGIA: ICD-10-CM

## 2019-03-12 DIAGNOSIS — G43.719 INTRACTABLE CHRONIC MIGRAINE WITHOUT AURA AND WITHOUT STATUS MIGRAINOSUS: ICD-10-CM

## 2019-03-13 ENCOUNTER — OUTSIDE FACILITY SERVICE (OUTPATIENT)
Dept: PAIN MEDICINE | Facility: CLINIC | Age: 49
End: 2019-03-13

## 2019-03-13 PROCEDURE — 99152 MOD SED SAME PHYS/QHP 5/>YRS: CPT | Performed by: ANESTHESIOLOGY

## 2019-03-13 PROCEDURE — 64483 NJX AA&/STRD TFRM EPI L/S 1: CPT | Performed by: ANESTHESIOLOGY

## 2019-03-14 ENCOUNTER — TELEPHONE (OUTPATIENT)
Dept: PAIN MEDICINE | Facility: CLINIC | Age: 49
End: 2019-03-14

## 2019-03-14 NOTE — TELEPHONE ENCOUNTER
Patient had dx/tx bilateral L4-L5 TFESI on 03/13/19. Called patient to f/u post procedure. Reached voicemail. Left message for return call

## 2019-03-31 RX ORDER — NADOLOL 40 MG/1
40 TABLET ORAL
Qty: 60 TABLET | Refills: 5 | Status: CANCELLED | OUTPATIENT
Start: 2019-03-31

## 2019-04-03 NOTE — PROGRESS NOTES
"Chief Complaint: \"The injections helped.\"     History of Present Illness:   Patient: Ms. Mone Amador, 48 y.o. female, with a history of chronic lower back pain, which began without incident.  She returns today for post-procedure follow-up.  Patient was last seen on 03/13/2019 for bilateral L4-L5 transforaminal epidural steroid injection and experienced 100% relief that is ongoing.  Patient did not participate in Physical Therapy, as medically advised.  Pain description: Previously constant pain with intermittent exacerbation, described as stabbing and stinging sensation.   Radiation of pain: The lower back pain radiated on occassions into her calves.  The back pain was more constant and severe.   Pain intensity today: 1/10  Average pain intensity last week: 1/10  Pain intensity ranges from: 1/10 to 7/10  Aggravating factors: Pain increased with bending and ambulating more than 100 yards  Alleviating factors: Pain decreases with sittting and lying down.     Associated symptoms:   Patient reports pain but denies numbness or weakness in the right lower extremities  Patient denies any new bladder or bowel problems.   Patient denies difficulties with her balance or recent falls.      Review of previous therapies and additional medical records:  Mone Amador has already failed the following measures, including:   Conservative measures: oral analgesics, physical therapy and chiropractic therapy   Interventional measures: As referenced above.  Surgical measures: No history of lumbar spine surgery  Patient underwent neurosurgical consultation with Dr Leobardo Albarado on 07/17/2018 and was found not to be a surgical candidate at that time.  Mone Amador presents with significant comorbidities including anxiety, depression and insomnia, engaged in treatment, fibromyalgia, migraine headaches, moderate obesity, engaged in treatment.  In terms of current analgesics, Mone Amador takes: Cymbalta, Norco, tramadol  I have " reviewed [facility] Report #[id number] consistent to medication reconciliation.     Global Pain Scale                Pain  13  6               Feelings  4  4               Clinical outcomes  12  4               Activities  14  0               GPS Total:  43  14                  Review of Diagnostic Studies:    XR SPINE LUMBAR COMPLETE W FLEX EXT- 2019: Flexion and extension views show stable posterior spondylolisthesis of L2 on L3 vertebral body and grossly stable anterior spondylolisthesis of L4 vertebral body. Advanced facet arthropathy noted. L1/L2 advanced disc desiccation and endplate change.  MRI LUMBAR SPINE- 18:   L4/L5: Advanced hypertrophic facet arthropathy with periarticular inflammation. Underlying annular type disc bulge eccentric to left. There is mild central canal and lateral recess stenosis. There is severe left and moderate right neural foraminal stenosis with mass effect on exiting left L4 nerve root laterally.   L5/S1: Mild disc bulge. Moderate hypertrophic facet arthropathy. Mild left-sided lateral recess stenosis. Moderate left neural foraminal stenosis and mild right neural foraminal stenosis.    Review of Systems   Constitutional: Positive for fever.   Musculoskeletal: Positive for back pain, myalgias and neck pain.   All other systems reviewed and are negative.        Patient Active Problem List   Diagnosis   • Anxiety   • Fibromyalgia   • Hypertension   • Facet arthritis of lumbar region (CMS/HCC)   • Intractable chronic migraine without aura and without status migrainosus   • Morbid obesity due to excess calories (CMS/HCC)   • Anxiety and depression   • Lumbar stenosis with neurogenic claudication       Past Medical History:   Diagnosis Date   • Anxiety    • Cholecystolithiasis    • Fibromyalgia    • Headache    • Hypertension          Past Surgical History:   Procedure Laterality Date   •  SECTION      ,    • CHOLECYSTECTOMY     •  HYSTERECTOMY  2003    32         Family History   Problem Relation Age of Onset   • Heart disease Mother    • Hypertension Mother    • Stroke Mother    • Anxiety disorder Mother    • Thyroid disease Father    • Glaucoma Father    • Multiple sclerosis Sister    • No Known Problems Brother    • No Known Problems Son    • No Known Problems Sister    • No Known Problems Son    • Breast cancer Neg Hx          Social History     Socioeconomic History   • Marital status:      Spouse name: Not on file   • Number of children: Not on file   • Years of education: Not on file   • Highest education level: Not on file   Tobacco Use   • Smoking status: Never Smoker   • Smokeless tobacco: Never Used   Substance and Sexual Activity   • Alcohol use: No   • Drug use: No           Current Outpatient Medications:   •  Dietary Management Product (RHEUMATE) capsule, Take 1 tablet by mouth Daily., Disp: 180 capsule, Rfl: 4  •  DULoxetine (CYMBALTA) 60 MG capsule, Take 1 capsule by mouth 2 (Two) Times a Day., Disp: 60 capsule, Rfl: 6  •  eszopiclone (LUNESTA) 1 MG tablet, Take 1 tablet by mouth Every Night. Take immediately before bedtime, Disp: 30 tablet, Rfl: 0  •  furosemide (LASIX) 20 MG tablet, Take 1 tablet by mouth Every Morning as needed for swelling., Disp: 30 tablet, Rfl: 1  •  hydrochlorothiazide (HYDRODIURIL) 25 MG tablet, Take 1 tablet by mouth Daily., Disp: 30 tablet, Rfl: 5  •  HYDROcodone-acetaminophen (NORCO) 5-325 MG per tablet, Take 1 tablet by mouth Every 4 (Four) Hours As Needed for pain., Disp: 15 tablet, Rfl: 0  •  LORazepam (ATIVAN) 0.5 MG tablet, Take 1 tablet by mouth 2 (Two) Times a Day As Needed., Disp: 60 tablet, Rfl: 1  •  nadolol (CORGARD) 40 MG tablet, Take 1 tablet by mouth 2 times daily, Disp: 60 tablet, Rfl: 5  •  pyridoxine (VITAMIN B-6) 25 MG tablet, Take 1 tablet by mouth 3 (Three) Times a Day., Disp: 90 tablet, Rfl: 1  •  topiramate (TOPAMAX) 200 MG tablet, Take 1 tablet by mouth 2 (Two) Times a  "Day., Disp: 60 tablet, Rfl: 4  •  traMADol (ULTRAM) 50 MG tablet, Take 50 mg by mouth Every 12 (Twelve) Hours., Disp: , Rfl:   •  traMADol (ULTRAM) 50 MG tablet, Take 1 tablet by mouth Every 12 (Twelve) Hours As Needed., Disp: 60 tablet, Rfl: 2  •  zolpidem (AMBIEN) 10 MG tablet, Take 1 tablet by mouth every night at bedtime., Disp: 30 tablet, Rfl: 1      Allergies   Allergen Reactions   • Morphine And Related Itching and Nausea Only   • Latex Itching and Rash         /80   Pulse 69   Temp 98.3 °F (36.8 °C) (Temporal)   Resp 18   Ht 160 cm (63\")   Wt 104 kg (230 lb)   SpO2 98%   BMI 40.74 kg/m²       Physical Exam:  Constitutional: Patient is oriented to person, place, and time.  Appears well-developed and well-nourished.  Obese.   Head: Normocephalic and atraumatic. Eyes: Conjunctivae and lids are normal. Pupils: Equal, round, and reactive to light.   Neck: Trachea normal. Neck supple. No JVD present.   Pulmonary: No increased work of breathing or signs of respiratory distress.  Clear to auscultation bilaterally.   Cardiovascular: Normal rate and rhythm, normal S1 and S2, no murmurs.   Gait and station: Normal  Lumbar spine: The range of motion of the lumbar spine is full and without pain. Extension, flexion, lateral flexion, rotation of the lumbar spine did not increase or reproduce pain. Lumbar facet joint loading maneuvers are negative.   Pastor and Gaenslen's tests are negative   Hip joints: The range of motion of the hip joints is full and without pain   Neurological: Patient is alert and oriented to person, place, and time. Speech: speech is normal. Cortical function: Normal mental status.   Cranial nerves: Cranial nerves 2-12 intact.   Reflex Scores:  patellar: 1+ bilaterally  Achilles: 1+ bilaterally  Motor strength: 5/5  Motor Tone: normal tone.   Involuntary movements: none.   Right ankle clonus: absent  Left ankle clonus: absent   Negative long tract signs. Straight leg raising test is " negative.   Sensation: No sensory loss. Sensory exam: intact to light touch, intact pain and temperature sensation, intact vibration sensation and normal proprioception.   Coordination: Normal finger to nose and heel to shin. Normal balance and negative Romberg's sign   Skin and subcutaneous tissue: Skin is warm and intact. No rash noted. No cyanosis.   Psychiatric: Judgment and insight: Normal. Orientation to person, place and time: Normal. Recent and remote memory: Intact. Mood and affect: Normal.     ASSESSMENT:   1. Lumbar stenosis with neurogenic claudication    2. Facet arthritis of lumbar region (CMS/HCC)    3. Fibromyalgia    4. Intractable chronic migraine without aura and without status migrainosus    5. Morbid obesity due to excess calories (CMS/[facility])    6. Anxiety and depression        PLAN/MEDICAL DECISION MAKING: I have reviewed all available medical records as well as previous therapies as referenced above, and discussed the patient with Dr. Amador.  1. Interventional pain management measures: None indicated.  Patient could be scheduled for repeat bilateral L4-L5 transforaminal epidural steroid injections if pain recurs.  We may repeat another epidural depending on patient outcome.  Dr. Amador previously discussed the possibility of diagnostic and therapeutic lumbar facet joint injections bilateral L4-L5, bilateral L5-S1 versus diagnostic bilateral lumbar medial branch blocks at L3, L4, L5; for bilateral lumbar facet joints at L4-L5, L5-S1 to clarify the origin of chronic refractory mechanical lower back pain.  If patient experiences more than 80% relief along with significant improvement the range of motion of the lumbar spine, then she would be scheduled for a second set of diagnostic bilateral lumbar medial branch blocks in order to proceed with bilateral lumbar medial branch rhizotomies.  Patient will follow-up with Dr. Albarado thereafter.  2. Pharmacological measures: Reviewed and discussed.   JOSE L  Continue Rheumate one tablet twice daily (samples), then, continue once daily  B. Continue pyridoxine 25 mg one tablet by mouth three times daily  3. Long-term rehabilitation efforts:  A. Patient will start a comprehensive physical therapy program for water therapy, therapeutic exercise, core strengthening, gait and balance training, neurodynamics, myofascial release, cupping and dry needling   B. Start an exercise program such as yoga and water therapy  C. Contrast therapy: Apply ice-packs for 15-20 minutes, followed by heating pads for 15-20 minutes to affected area   4.  The patient and her family have been instructed to contact my office with any questions or difficulties. The patient understands the plan and agrees to proceed accordingly.         Patient Care Team:  Christopher Wu MD as PCP - General (Family Medicine)  Loyda Washington MD as Consulting Physician (Family Medicine)  Leobardo Albarado MD as Consulting Physician (Neurosurgery)  Santos Betancourt MD as Consulting Physician (Urology)     No orders of the defined types were placed in this encounter.        No future appointments.      TAWNYA Weaver/Transcription disclaimer:  Much of this encounter note is an electronic transcription of spoken language to printed text. Electronic transcription of spoken language may permit erroneous, or at times, nonsensical words or phrases to be inadvertently transcribed. Although I have reviewed the note for such errors, some may still exist.

## 2019-04-05 ENCOUNTER — OFFICE VISIT (OUTPATIENT)
Dept: PAIN MEDICINE | Facility: CLINIC | Age: 49
End: 2019-04-05

## 2019-04-05 VITALS
SYSTOLIC BLOOD PRESSURE: 120 MMHG | TEMPERATURE: 98.3 F | BODY MASS INDEX: 40.75 KG/M2 | HEART RATE: 69 BPM | WEIGHT: 230 LBS | HEIGHT: 63 IN | OXYGEN SATURATION: 98 % | RESPIRATION RATE: 18 BRPM | DIASTOLIC BLOOD PRESSURE: 80 MMHG

## 2019-04-05 DIAGNOSIS — M79.7 FIBROMYALGIA: ICD-10-CM

## 2019-04-05 DIAGNOSIS — E66.01 MORBID OBESITY DUE TO EXCESS CALORIES (HCC): ICD-10-CM

## 2019-04-05 DIAGNOSIS — F32.A ANXIETY AND DEPRESSION: ICD-10-CM

## 2019-04-05 DIAGNOSIS — G43.719 INTRACTABLE CHRONIC MIGRAINE WITHOUT AURA AND WITHOUT STATUS MIGRAINOSUS: ICD-10-CM

## 2019-04-05 DIAGNOSIS — F41.9 ANXIETY AND DEPRESSION: ICD-10-CM

## 2019-04-05 DIAGNOSIS — M47.816 FACET ARTHRITIS OF LUMBAR REGION: ICD-10-CM

## 2019-04-05 DIAGNOSIS — M48.062 LUMBAR STENOSIS WITH NEUROGENIC CLAUDICATION: Primary | ICD-10-CM

## 2019-04-05 PROCEDURE — 99213 OFFICE O/P EST LOW 20 MIN: CPT | Performed by: PHYSICIAN ASSISTANT

## 2019-05-01 ENCOUNTER — OFFICE VISIT (OUTPATIENT)
Dept: PAIN MEDICINE | Facility: CLINIC | Age: 49
End: 2019-05-01

## 2019-05-01 VITALS
OXYGEN SATURATION: 99 % | RESPIRATION RATE: 20 BRPM | HEIGHT: 63 IN | HEART RATE: 84 BPM | SYSTOLIC BLOOD PRESSURE: 144 MMHG | DIASTOLIC BLOOD PRESSURE: 84 MMHG | WEIGHT: 230 LBS | BODY MASS INDEX: 40.75 KG/M2 | TEMPERATURE: 97.8 F

## 2019-05-01 DIAGNOSIS — M47.816 FACET ARTHRITIS OF LUMBAR REGION: ICD-10-CM

## 2019-05-01 DIAGNOSIS — M48.062 LUMBAR STENOSIS WITH NEUROGENIC CLAUDICATION: Primary | ICD-10-CM

## 2019-05-01 DIAGNOSIS — F32.A ANXIETY AND DEPRESSION: ICD-10-CM

## 2019-05-01 DIAGNOSIS — E66.01 MORBID OBESITY DUE TO EXCESS CALORIES (HCC): ICD-10-CM

## 2019-05-01 DIAGNOSIS — F41.9 ANXIETY AND DEPRESSION: ICD-10-CM

## 2019-05-01 DIAGNOSIS — M79.7 FIBROMYALGIA: ICD-10-CM

## 2019-05-01 PROCEDURE — 99214 OFFICE O/P EST MOD 30 MIN: CPT | Performed by: PHYSICIAN ASSISTANT

## 2019-05-07 ENCOUNTER — TELEPHONE (OUTPATIENT)
Dept: PAIN MEDICINE | Facility: CLINIC | Age: 49
End: 2019-05-07

## 2019-05-07 NOTE — TELEPHONE ENCOUNTER
Returned call to patient. Advised patient that [facility] papers were complete and that she could  after the paperwork fee was paid. Patient verbalized understanding and will call back tomorrow to pay as she doesn't have her debit card today.

## 2019-05-08 ENCOUNTER — TELEPHONE (OUTPATIENT)
Dept: PAIN MEDICINE | Facility: CLINIC | Age: 49
End: 2019-05-08

## 2019-05-08 NOTE — TELEPHONE ENCOUNTER
[facility] papers faxed, success received. Copy of receipt for payment, [facility] papers and fax success sheet mailed to patient.

## 2019-05-22 ENCOUNTER — OUTSIDE FACILITY SERVICE (OUTPATIENT)
Dept: PAIN MEDICINE | Facility: CLINIC | Age: 49
End: 2019-05-22

## 2019-05-22 PROCEDURE — 99152 MOD SED SAME PHYS/QHP 5/>YRS: CPT | Performed by: ANESTHESIOLOGY

## 2019-05-22 PROCEDURE — 64483 NJX AA&/STRD TFRM EPI L/S 1: CPT | Performed by: ANESTHESIOLOGY

## 2019-05-23 ENCOUNTER — TELEPHONE (OUTPATIENT)
Dept: PAIN MEDICINE | Facility: CLINIC | Age: 49
End: 2019-05-23

## 2019-05-23 NOTE — TELEPHONE ENCOUNTER
Patient had repeat bilateral L4-L5 TFESI on 05/22/19. Called patient to f/u post procedure. Reached voicemail. Left message for return call

## 2019-05-29 RX ORDER — LORAZEPAM 0.5 MG/1
0.5 TABLET ORAL 2 TIMES DAILY PRN
Qty: 60 TABLET | Refills: 1 | Status: CANCELLED | OUTPATIENT
Start: 2019-05-29

## 2019-06-06 ENCOUNTER — HOSPITAL ENCOUNTER (OUTPATIENT)
Dept: PHYSICAL THERAPY | Facility: HOSPITAL | Age: 49
Setting detail: THERAPIES SERIES
Discharge: HOME | End: 2019-06-06

## 2019-06-06 DIAGNOSIS — M48.062 LUMBAR STENOSIS WITH NEUROGENIC CLAUDICATION: Primary | ICD-10-CM

## 2019-06-06 PROCEDURE — 97010 HOT OR COLD PACKS THERAPY: CPT

## 2019-06-06 PROCEDURE — G0283 ELEC STIM OTHER THAN WOUND: HCPCS

## 2019-06-06 PROCEDURE — 97163 PT EVAL HIGH COMPLEX 45 MIN: CPT

## 2019-06-06 NOTE — THERAPY EVALUATION
Outpatient Physical Therapy Ortho Initial Evaluation   [place]     Patient Name: Mone Amador  : 1970  MRN: [id number]  Today's Date: 2019      Visit Date: 2019    Patient Active Problem List   Diagnosis   • Anxiety   • Fibromyalgia   • Hypertension   • Facet arthritis of lumbar region (CMS/HCC)   • Intractable chronic migraine without aura and without status migrainosus   • Morbid obesity due to excess calories (CMS/HCC)   • Anxiety and depression   • Lumbar stenosis with neurogenic claudication        Past Medical History:   Diagnosis Date   • Anxiety    • Cholecystolithiasis    • Fibromyalgia    • Headache    • Hypertension         Past Surgical History:   Procedure Laterality Date   •  SECTION      ,    • CHOLECYSTECTOMY     • HYSTERECTOMY      32       Visit Dx:     ICD-10-CM ICD-9-CM   1. Lumbar stenosis with neurogenic claudication M48.062 724.03         Patient History     Row Name 19 0900             History    Chief Complaint  Pain;Difficulty with daily activities;Joint stiffness;Difficulty Walking  -RT      Type of Pain  Back pain;Hip pain left  -RT      Date Current Problem(s) Began  -- 7 months  -RT      Brief Description of Current Complaint  Pt reports around seven months ago she began to suffer from increased back pain with no known cause.  The pain later began to radiate into the left hip.  The patient was referred to the [facility] in 2019.  The patient has received two injections with little improvement.  The patient has now been referred to therapy for treatment of symptoms.  -RT      Patient/Caregiver Goals  Relieve pain;Return to prior level of function;Return to work;Improve mobility;Improve strength  -RT      Current Tobacco Use  no  -RT      Smoking Status  no  -RT      Patient's Rating of General Health  Good  -RT      Hand Dominance  right-handed  -RT      Patient seeing anyone else for  problem(s)?  no  -RT      How has patient tried to help current problem?  rest, ice, heat, meds  -RT      What clinical tests have you had for this problem?  MRI  -RT      Results of Clinical Tests  DDD at L4-L5 and L5-S1  -RT         Pain     Pain Location  Back;Hip  -RT      Pain at Present  3  -RT      Pain at Best  2  -RT      Pain at Worst  8  -RT      Pain Frequency  Constant/continuous  -RT      Pain Description  Aching;Stabbing  -RT      What Performance Factors Make the Current Problem(s) WORSE?  walking, standing, lifting,  -RT         Fall Risk Assessment    Any falls in the past year:  No  -RT         Daily Activities    Primary Language  English  -RT      Are you able to read  Yes  -RT      Are you able to write  Yes  -RT      How does patient learn best?  Listening;Reading;Demonstration;Pictures/Video  -RT         Safety    Are you being hurt, hit, or frightened by anyone at home or in your life?  No  -RT      Are you being neglected by a caregiver  No  -RT        User Key  (r) = Recorded By, (t) = Taken By, (c) = Cosigned By    Initials Name Provider Type    RT Jose Bar, PT Physical Therapist          PT Ortho     Row Name 06/06/19 0900       Posture/Observations    Posture/Observations Comments  slumped posture  -RT       DTR- Lower Quarter Clearing    Patellar tendon (L2-4)  Bilateral:;1- Minimal response  -RT       Neural Tension Signs- Lower Quarter Clearing    Slump  Left:;Positive  -RT       Sensory Screen for Light Touch- Lower Quarter Clearing    L1 (inguinal area)  Bilateral:;Intact  -RT    L2 (anterior mid thigh)  Bilateral:;Intact  -RT    L3 (distal anterior thigh)  Bilateral:;Intact  -RT    L4 (medial lower leg/foot)  Bilateral:;Intact  -RT    L5 (lateral lower leg/great toe)  Bilateral:;Intact  -RT    S1 (bottom of foot)  Bilateral:;Intact  -RT       Myotomal Screen- Lower Quarter Clearing    Hip flexion (L2)  Right:;4+ (Good +);Left:;4- (Good -)  -RT    Knee extension (L3)   Right:;4+ (Good +);Left:;4 (Good)  -RT    Ankle DF (L4)  Right:;4+ (Good +);Left:;4 (Good)  -RT    Ankle PF (S1)  Right:;4+ (Good +);Left:;4- (Good -)  -RT    Knee flexion (S2)  Right:;4+ (Good +);Left:;4 (Good)  -RT       Lumbar ROM Screen- Lower Quarter Clearing    Lumbar Flexion  Normal  -RT    Lumbar Extension  Normal  -RT    Lumbar Lateral Flexion  Normal  -RT    Lumbar Rotation  Normal  -RT       Lumbosacral Palpation    Hamstring  Left:;Tender  -RT    Greater Tuberosity  Left:;Tender  -RT       General ROM    GENERAL ROM COMMENTS  repeated lumbar flexion caused left hip and HS pain; repeated lumbar ext back pain only  -RT      User Key  (r) = Recorded By, (t) = Taken By, (c) = Cosigned By    Initials Name Provider Type    RT Jose Bar, PT Physical Therapist                      Therapy Education  Given: HEP, Symptoms/condition management, Pain management, Posture/body mechanics, Mobility training  Program: New  How Provided: Verbal, Demonstration, Written  Provided to: Patient  Level of Understanding: Teach back education performed, Verbalized, Demonstrated     PT OP Goals     Row Name 06/06/19 1500          PT Short Term Goals    STG Date to Achieve  06/20/19  -RT     STG 1  Pt will be instructed in a HEP.  -RT     STG 1 Progress  New  -RT     STG 2  Pt will be instructed in lifting mechanics.  -RT     STG 2 Progress  New  -RT        Long Term Goals    LTG Date to Achieve  07/04/19  -RT     LTG 1  Pt will report pain no greater than 2/10.  -RT     LTG 1 Progress  New  -RT     LTG 2  Pt will report no radicular symptoms.  -RT     LTG 2 Progress  New  -RT     LTG 3  Pt will demonstrate bilateral LE strength 4/5.  -RT     LTG 3 Progress  New  -RT        Time Calculation    PT Goal Re-Cert Due Date  07/04/19  -RT       User Key  (r) = Recorded By, (t) = Taken By, (c) = Cosigned By    Initials Name Provider Type    RT Jose Bar, PT Physical Therapist          PT Assessment/Plan     Row Name 06/06/19  1607          PT Assessment    Functional Limitations  Performance in work activities;Performance in self-care ADL  -RT     Impairments  Pain;Range of motion;Posture;Muscle strength;Poor body mechanics;Gait  -RT     Assessment Comments  Pt is a 49 y/o female referred to therapy for treatment of lumbar stenosis.  Pt presents with decreased mobility and reported peripheralization of pain into the hip with lumbar flexion activities.  Pt responded well with extension exercises today and will benefit from therapy services for improved mobility and function.  -RT     Please refer to paper survey for additional self-reported information  Yes  -RT     Rehab Potential  Good  -RT     Patient/caregiver participated in establishment of treatment plan and goals  Yes  -RT     Patient would benefit from skilled therapy intervention  Yes  -RT        PT Plan    PT Frequency  2x/week  -RT     Predicted Duration of Therapy Intervention (Therapy Eval)  4 weeks  -RT     Planned CPT's?  PT EVAL HIGH COMPLEXITY: 13211;PT RE-EVAL: 86312;PT THER PROC EA 15 MIN: 56013;PT THER ACT EA 15 MIN: 38545;PT MANUAL THERAPY EA 15 MIN: 89256;PT NEUROMUSC RE-EDUCATION EA 15 MIN: 94864;PT GAIT TRAINING EA 15 MIN: 95394;PT ELECTRICAL STIM UNATTEND: ;PT ULTRASOUND EA 15 MIN: 89716;PT TRACTION LUMBAR: 81075;PT HOT/COLD PACK WC NONMCARE: 95267  -RT     Physical Therapy Interventions (Optional Details)  bed mobility training;home exercise program;joint mobilization;lumbar stabilization;manual therapy techniques;modalities;neuromuscular re-education;patient/family education;postural re-education;ROM (Range of Motion);strengthening;stretching;transfer training  -RT     PT Plan Comments  Will follow for optimal gains.  -RT       User Key  (r) = Recorded By, (t) = Taken By, (c) = Cosigned By    Initials Name Provider Type    RT Jose Bar, PT Physical Therapist          Modalities     Row Name 06/06/19 1500             Moist Heat    MH Applied  Yes   -RT      Location  lumbar region  -RT      Rx Minutes  10 mins  -RT      MH S/P Rx  Yes  -RT         ELECTRICAL STIMULATION    Attended/Unattended  Unattended  -RT      Stimulation Type  IFC  -RT      Location/Electrode Placement/Other  lumbar region  -RT       PT E-Stim Unattended (Manual) Minutes  10  -RT        User Key  (r) = Recorded By, (t) = Taken By, (c) = Cosigned By    Initials Name Provider Type    RT Jose Bar, PT Physical Therapist                          Outcome Measure Options: Modifed Owestry  Modified Oswestry  Modified Oswestry Score/Comments: 20      Time Calculation:     Start Time: 0900  Stop Time: 1000  Time Calculation (min): 60 min     Therapy Charges for Today     Code Description Service Date Service Provider Modifiers Qty    00701112202 HC PT EVAL HIGH COMPLEXITY 3 6/6/2019 Jose Bar, PT GP 1    70868060377 HC PT HOT/COLD PACK WC NONMCARE 6/6/2019 Jose Bar, PT GP 1    89432656250 HC PT ELECTRICAL STIM UNATTENDED 6/6/2019 Jose Bar, PT  1          PT G-Codes  Outcome Measure Options: Modifed Owestry  Modified Oswestry Score/Comments: 20         Jose Bar, PT  6/6/2019

## 2019-06-10 ENCOUNTER — HOSPITAL ENCOUNTER (OUTPATIENT)
Dept: PHYSICAL THERAPY | Facility: HOSPITAL | Age: 49
Setting detail: THERAPIES SERIES
Discharge: HOME | End: 2019-06-10

## 2019-06-10 DIAGNOSIS — M48.062 LUMBAR STENOSIS WITH NEUROGENIC CLAUDICATION: Primary | ICD-10-CM

## 2019-06-10 PROCEDURE — G0283 ELEC STIM OTHER THAN WOUND: HCPCS

## 2019-06-10 PROCEDURE — 97110 THERAPEUTIC EXERCISES: CPT

## 2019-06-10 NOTE — THERAPY TREATMENT NOTE
Outpatient Physical Therapy Ortho Treatment Note   [facility]     Patient Name: Mone Amador  : 1970  MRN: [id number]  Today's Date: 6/10/2019      Visit Date: 06/10/2019    Visit Dx:    ICD-10-CM ICD-9-CM   1. Lumbar stenosis with neurogenic claudication M48.062 724.03       Patient Active Problem List   Diagnosis   • Anxiety   • Fibromyalgia   • Hypertension   • Facet arthritis of lumbar region (CMS/HCC)   • Intractable chronic migraine without aura and without status migrainosus   • Morbid obesity due to excess calories (CMS/HCC)   • Anxiety and depression   • Lumbar stenosis with neurogenic claudication        Past Medical History:   Diagnosis Date   • Anxiety    • Cholecystolithiasis    • Fibromyalgia    • Headache    • Hypertension         Past Surgical History:   Procedure Laterality Date   •  SECTION      ,    • CHOLECYSTECTOMY     • HYSTERECTOMY  2003       PT Ortho     Row Name 06/10/19 6888       Subjective Comments    Subjective Comments  Patient reports that she is having pain on the left side of her low back. Patient states that the home exercises went good.  -AC       Subjective Pain    Able to rate subjective pain?  yes  -AC    Pre-Treatment Pain Level  5  -AC    Post-Treatment Pain Level  4  -AC      User Key  (r) = Recorded By, (t) = Taken By, (c) = Cosigned By    Initials Name Provider Type    Carol Hardin, PTA Physical Therapy Assistant                      PT Assessment/Plan     Row Name 06/10/19 0024          PT Assessment    Assessment Comments  New ther-ex added per the patient's tolerance, patient demonstrated and understood new ther-ex with no increase in pain noted. Patient tolerated treatment session well with rest breaks taken as needed by the patient. Educated patient to perform ther-ex per her tolerance, patient verbalized understanding. No adverse reactions with modalities or treatment session. Patient responded good to extension  based exercises. Decrease in pain noted following treatment session.  -AC        PT Plan    PT Plan Comments  Continue per PT's POC, progress per the patient's tolerance.  -AC       User Key  (r) = Recorded By, (t) = Taken By, (c) = Cosigned By    Initials Name Provider Type    Carol Hardin PTA Physical Therapy Assistant          Modalities     Row Name 06/10/19 1700             Moist Heat    MH Applied  Yes No redness noted following moist heat  -AC      Location  lumbar region  -AC      Rx Minutes  15 mins  -AC      MH Prior to Rx  Yes  -AC         Ultrasound 62619    Location  L) lumbar musculature prone  -AC      Duty Cycle  50  -AC      Frequency  -- 3.3MHz  -AC      Intensity - Wts/cm  1.2  -AC      85600 - PT Ultrasound Minutes  8  -AC         ELECTRICAL STIMULATION    Attended/Unattended  Unattended No irritation noted following estim  -AC      Stimulation Type  IFC  -AC      Max mAmp  -- per the patient's tolerance, 15 minutes with MH  -AC      Location/Electrode Placement/Other  lumbar region  -AC       PT E-Stim Unattended (Manual) Minutes  15  -AC        User Key  (r) = Recorded By, (t) = Taken By, (c) = Cosigned By    Initials Name Provider Type    Carol Hardin PTA Physical Therapy Assistant        Exercises     Row Name 06/10/19 1700             Subjective Comments    Subjective Comments  Patient reports that she is having pain on the left side of her low back. Patient states that the home exercises went good.  -AC         Subjective Pain    Able to rate subjective pain?  yes  -AC      Pre-Treatment Pain Level  5  -AC      Post-Treatment Pain Level  4  -AC         Total Minutes    70382 - PT Therapeutic Exercise Minutes  25  -AC         Exercise 1    Exercise Name 1  LTR x15, PPT x10, supine clams x15, prone lying x4 min, prone press up on elbows x10, scap squeeze 10x2, st. back bends x15  -AC      Cueing 1  Verbal;Tactile;Demo  -AC      Time 1  25 minutes  -AC         User Key  (r) = Recorded By, (t) = Taken By, (c) = Cosigned By    Initials Name Provider Type    AC Carol Peters PTA Physical Therapy Assistant                           Therapy Education  Given: HEP, Symptoms/condition management, Pain management, Posture/body mechanics  Program: Reinforced, New  How Provided: Verbal, Demonstration  Provided to: Patient  Level of Understanding: Verbalized, Demonstrated              Time Calculation:   Start Time: 1555  Stop Time: 1655  Time Calculation (min): 60 min  Therapy Charges for Today     Code Description Service Date Service Provider Modifiers Qty    94460444034 HC PT THER PROC EA 15 MIN 6/10/2019 Carol Peters PTA GP 2    [id number]  PT ELECTRICAL STIM UNATTENDED 6/10/2019 Carol Peters PTA  1                    Carol Peters PTA  6/10/2019

## 2019-06-13 ENCOUNTER — HOSPITAL ENCOUNTER (OUTPATIENT)
Dept: PHYSICAL THERAPY | Facility: HOSPITAL | Age: 49
Setting detail: THERAPIES SERIES
Discharge: HOME | End: 2019-06-13

## 2019-06-13 DIAGNOSIS — M48.062 LUMBAR STENOSIS WITH NEUROGENIC CLAUDICATION: Primary | ICD-10-CM

## 2019-06-13 PROCEDURE — G0283 ELEC STIM OTHER THAN WOUND: HCPCS | Performed by: PHYSICAL THERAPIST

## 2019-06-13 PROCEDURE — 97140 MANUAL THERAPY 1/> REGIONS: CPT | Performed by: PHYSICAL THERAPIST

## 2019-06-13 PROCEDURE — 97010 HOT OR COLD PACKS THERAPY: CPT | Performed by: PHYSICAL THERAPIST

## 2019-06-13 PROCEDURE — 97110 THERAPEUTIC EXERCISES: CPT | Performed by: PHYSICAL THERAPIST

## 2019-06-13 NOTE — THERAPY TREATMENT NOTE
Outpatient Physical Therapy Ortho Treatment Note   [facility]     Patient Name: Mone Amador  : 1970  MRN: [id number]  Today's Date: 2019      Visit Date: 2019    Visit Dx:    ICD-10-CM ICD-9-CM   1. Lumbar stenosis with neurogenic claudication M48.062 724.03       Patient Active Problem List   Diagnosis   • Anxiety   • Fibromyalgia   • Hypertension   • Facet arthritis of lumbar region (CMS/HCC)   • Intractable chronic migraine without aura and without status migrainosus   • Morbid obesity due to excess calories (CMS/HCC)   • Anxiety and depression   • Lumbar stenosis with neurogenic claudication        Past Medical History:   Diagnosis Date   • Anxiety    • Cholecystolithiasis    • Fibromyalgia    • Headache    • Hypertension         Past Surgical History:   Procedure Laterality Date   •  SECTION      ,    • CHOLECYSTECTOMY     • HYSTERECTOMY  2003       PT Ortho     Row Name 19 0800       Subjective Comments    Subjective Comments  Pt reports improvement in low back and left hip pain prior to today's session.  -AD       Subjective Pain    Able to rate subjective pain?  yes  -AD    Pre-Treatment Pain Level  2  -AD    Post-Treatment Pain Level  0  -AD    Row Name 06/10/19 1700       Subjective Comments    Subjective Comments  Patient reports that she is having pain on the left side of her low back. Patient states that the home exercises went good.  -AC       Subjective Pain    Able to rate subjective pain?  yes  -AC    Pre-Treatment Pain Level  5  -AC    Post-Treatment Pain Level  4  -AC      User Key  (r) = Recorded By, (t) = Taken By, (c) = Cosigned By    Initials Name Provider Type    AD Dalton, Ashley Claudene, PT Physical Therapist    AC Carol Peters, PTA Physical Therapy Assistant                      PT Assessment/Plan     Row Name 19 0886          PT Assessment    Assessment Comments  Today's session was initiated with moist heat  combined with IFC to the lumbar and left gluteal region in the seated position. Therapeutic exercises were performed to address lumbar ROM, lumbar stability, and strength. She tolerated therapeutic exercises well, with no reports of increased pain. The session concluded with STM to bilateral lumbar paraspinals followed by therapeutic ultrasound with biofreeze to left lumbar paraspinals. No skin irritation was observed following modalities. She reported 0/10 pain following the treatment session. She will be progressed as tolerated.  -AD        PT Plan    PT Plan Comments  Progress astolerated per POC and MD order.  -AD       User Key  (r) = Recorded By, (t) = Taken By, (c) = Cosigned By    Initials Name Provider Type    AD Dalton, Ashley Claudene, PT Physical Therapist          Modalities     Row Name 06/13/19 0800             Moist Heat    MH Applied  Yes With IFC to left gluteal region, no skin irritation observed  -AD      Location  Lumbar  -AD      Rx Minutes  15 mins  -AD      MH Prior to Rx  Yes  -AD         Ultrasound 96646    Location  Left lumbar musculature With biofreeze, no skin irritation observed.  -AD      Duty Cycle  50  -AD      Frequency  1.0 MHz  -AD      Intensity - Wts/cm  1.2  -AD      13509 - PT Ultrasound Minutes  8  -AD         ELECTRICAL STIMULATION    Attended/Unattended  Unattended With MH, no skin irritation observed  -AD      Stimulation Type  IFC  -AD      Max mAmp  -- No skin irritation observed.  -AD      Location/Electrode Placement/Other  lumbar region  -AD       PT E-Stim Unattended (Manual) Minutes  15  -AD        User Key  (r) = Recorded By, (t) = Taken By, (c) = Cosigned By    Initials Name Provider Type    AD Dalton, Ashley Claudene, PT Physical Therapist        Exercises     Row Name 06/13/19 0800             Subjective Comments    Subjective Comments  Pt reports improvement in low back and left hip pain prior to today's session.  -AD         Subjective Pain    Able to  rate subjective pain?  yes  -AD      Pre-Treatment Pain Level  2  -AD      Post-Treatment Pain Level  0  -AD         Total Minutes    11881 - PT Therapeutic Exercise Minutes  15  -AD         Exercise 1    Exercise Name 1  LTR, PPT, supine clams, prone lying, prone press ups, scapular squeeze, standing back bends, bridges, SLR.  -AD      Cueing 1  Verbal;Tactile;Demo  -AD      Time 1  15 minutes  -AD        User Key  (r) = Recorded By, (t) = Taken By, (c) = Cosigned By    Initials Name Provider Type    AD Dalton, Ashley Claudene, PT Physical Therapist                      Manual Rx (last 36 hours)      Manual Treatments     Row Name 06/13/19 0700             Manual Rx 1    Manual Rx 1 Location  STM lumbar paraspinals  -AD      Manual Rx 1 Type  STM  -AD      Manual Rx 1 Grade  Per pt tolerance  -AD      Manual Rx 1 Duration  10  -AD        User Key  (r) = Recorded By, (t) = Taken By, (c) = Cosigned By    Initials Name Provider Type    AD Dalton, Ashley Claudene, PT Physical Therapist              Therapy Education  Given: HEP, Symptoms/condition management, Pain management, Posture/body mechanics  Program: Reinforced  How Provided: Verbal, Demonstration  Provided to: Patient  Level of Understanding: Verbalized, Demonstrated              Time Calculation:   Start Time: 0730  Stop Time: 0820  Time Calculation (min): 50 min  Therapy Charges for Today     Code Description Service Date Service Provider Modifiers Qty    33211986064 HC PT THER PROC EA 15 MIN 6/13/2019 Dalton, Ashley Claudene, PT GP 1    75861325111 HC PT ELECTRICAL STIM UNATTENDED 6/13/2019 Dalton, Ashley Claudene, PT  1    80342756816 HC PT MANUAL THERAPY EA 15 MIN 6/13/2019 Dalton, Ashley Claudene, PT GP 1    [id number]  PT HOT/COLD PACK WC NONMCARE 6/13/2019 Dalton, Ashley Claudene, PT GP 1                    Ashley Claudene Dalton, PT  6/13/2019

## 2019-06-17 NOTE — PROGRESS NOTES
"Chief Complaint: \"The injection decreased the pain.\"     History of Present Illness:   Patient: Ms. Mone Amador, 48 y.o. female, with a history of chronic lower back pain, which began without incident.  She returns today for post-procedure follow-up.  Patient was last seen on 05/22/2019 for repeat bilateral L4-L5 transforaminal epidural steroid injection and experienced 75% relief that is ongoing.  Patient is participating in Physical Therapy.  Pain description: previously constant pain with intermittent exacerbation, described as stabbing and stinging sensation.   Radiation of pain: The lower back pain radiates on occassions into her calves.     Pain intensity today: 3/10  Average pain intensity last week: 3/10  Pain intensity ranges from: 3/10 to 5/10  Aggravating factors: Pain increased with bending and ambulating more than 100 yards.  Alleviating factors: Pain decreases with sitting and lying down.     Associated symptoms:   Patient reports pain but denies numbness or weakness in the lower extremities  Patient denies any new bladder or bowel problems.   Patient denies difficulties with her balance or recent falls.      Review of previous therapies and additional medical records:  Mone Amador has already failed the following measures, including:   Conservative measures: oral analgesics, physical therapy and chiropractic therapy   Interventional measures: As referenced above.  Bilateral L4-L5 TESI on 03/13/2019.  Surgical measures: No history of lumbar spine surgery  Patient underwent neurosurgical consultation with Dr Leobardo Albarado on 07/17/2018 and was found not to be a surgical candidate at that time.  Mone Amador presents with significant comorbidities including anxiety, depression and insomnia, engaged in treatment, fibromyalgia, migraine headaches, moderate obesity, engaged in treatment.  In terms of current analgesics, Mone Amador takes: Cymbalta  I have reviewed [facility] Report #[id number], " consistent to medication reconciliation.     Global Pain Scale            Pain  13  6  21  10           Feelings  4  4  5  6           Clinical outcomes  12  4  18  7           Activities  14  0  24  13           GPS Total:  43  14  68  36              Review of Diagnostic Studies:    XR SPINE LUMBAR COMPLETE W FLEX EXT- 2019: Flexion and extension views show stable posterior spondylolisthesis of L2 on L3 vertebral body and grossly stable anterior spondylolisthesis of L4 vertebral body. Advanced facet arthropathy noted. L1/L2 advanced disc desiccation and endplate change.  MRI LUMBAR SPINE- 18:   L4/L5: Advanced hypertrophic facet arthropathy with periarticular inflammation. Underlying annular type disc bulge eccentric to left. There is mild central canal and lateral recess stenosis. There is severe left and moderate right neural foraminal stenosis with mass effect on exiting left L4 nerve root laterally.   L5/S1: Mild disc bulge. Moderate hypertrophic facet arthropathy. Mild left-sided lateral recess stenosis. Moderate left neural foraminal stenosis and mild right neural foraminal stenosis.    Review of Systems   Musculoskeletal: Positive for arthralgias and back pain.   All other systems reviewed and are negative.        Patient Active Problem List   Diagnosis   • Anxiety   • Fibromyalgia   • Hypertension   • Facet arthritis of lumbar region (CMS/HCC)   • Intractable chronic migraine without aura and without status migrainosus   • Morbid obesity due to excess calories (CMS/HCC)   • Anxiety and depression   • Lumbar stenosis with neurogenic claudication       Past Medical History:   Diagnosis Date   • Anxiety    • Cholecystolithiasis    • Fibromyalgia    • Headache    • Hypertension          Past Surgical History:   Procedure Laterality Date   •  SECTION      ,    • CHOLECYSTECTOMY     • HYSTERECTOMY      32         Family  History   Problem Relation Age of Onset   • Heart disease Mother    • Hypertension Mother    • Stroke Mother    • Anxiety disorder Mother    • Thyroid disease Father    • Glaucoma Father    • Multiple sclerosis Sister    • No Known Problems Brother    • No Known Problems Son    • No Known Problems Sister    • No Known Problems Son    • Breast cancer Neg Hx          Social History     Socioeconomic History   • Marital status:      Spouse name: Not on file   • Number of children: Not on file   • Years of education: Not on file   • Highest education level: Not on file   Tobacco Use   • Smoking status: Never Smoker   • Smokeless tobacco: Never Used   Substance and Sexual Activity   • Alcohol use: No   • Drug use: No           Current Outpatient Medications:   •  Dietary Management Product (RHEUMATE) capsule, Take 1 tablet by mouth Daily., Disp: 180 capsule, Rfl: 4  •  DULoxetine (CYMBALTA) 60 MG capsule, Take 1 capsule by mouth 2 (Two) Times a Day., Disp: 60 capsule, Rfl: 6  •  hydrochlorothiazide (HYDRODIURIL) 25 MG tablet, Take 1 tablet by mouth Daily., Disp: 30 tablet, Rfl: 5  •  LORazepam (ATIVAN) 0.5 MG tablet, Take 1 tablet by mouth 2 (Two) Times a Day As Needed., Disp: 60 tablet, Rfl: 1  •  nadolol (CORGARD) 40 MG tablet, Take 1 tablet by mouth 2 times daily, Disp: 60 tablet, Rfl: 5  •  nadolol (CORGARD) 40 MG tablet, Take 1 tablet by mouth twice daily., Disp: 60 tablet, Rfl: 6  •  pyridoxine (VITAMIN B-6) 25 MG tablet, Take 1 tablet by mouth 3 (Three) Times a Day., Disp: 90 tablet, Rfl: 1  •  traMADol (ULTRAM) 50 MG tablet, Take 1 tablet by mouth Every 12 (Twelve) Hours As Needed., Disp: 60 tablet, Rfl: 2  •  zolpidem (AMBIEN) 10 MG tablet, Take 1 tablet by mouth every night at bedtime., Disp: 30 tablet, Rfl: 1  •  zolpidem (AMBIEN) 10 MG tablet, TAKE ONE TABLET BY MOUTH EVERY NIGHT AT BEDTIME., Disp: 30 tablet, Rfl: 2      Allergies   Allergen Reactions   • Morphine And Related Itching and Nausea Only  "  • Latex Itching and Rash         /68   Pulse 84   Temp 99 °F (37.2 °C) (Temporal)   Resp 16   Ht 160 cm (63\")   Wt 107 kg (235 lb)   SpO2 99%   BMI 41.63 kg/m²       Physical Exam:  Constitutional: Patient is oriented to person, place, and time.  Appears well-developed and well-nourished.  Obese.   Head: Normocephalic and atraumatic. Eyes: Conjunctivae and lids are normal. Pupils: Equal, round, and reactive to light.   Neck: Trachea normal. Neck supple. No JVD present.   Pulmonary: No increased work of breathing or signs of respiratory distress.  Clear to auscultation bilaterally.   Cardiovascular: Normal rate and rhythm, normal S1 and S2, no murmurs.   Gait and station: Normal  Lumbar spine: Passive and active range of motion are almost full. Extension and rotation of the lumbar spine reproduced pain. Lumbar facet joint loading maneuvers are positive.  Pastor and Gaenslen's tests are negative   Hip joints: The range of motion of the hip joints is full and without pain   Neurological: Patient is alert and oriented to person, place, and time. Speech: speech is normal. Cortical function: Normal mental status.   Cranial nerves: Cranial nerves 2-12 intact.   Reflex Scores:  patellar: 1+ bilaterally  Achilles: 1+ bilaterally  Motor strength: 5/5  Motor Tone: normal tone.   Involuntary movements: none.   Right ankle clonus: absent  Left ankle clonus: absent   Negative long tract signs. Straight leg raising test is negative.   Sensation: No sensory loss. Sensory exam: intact to light touch, intact pain and temperature sensation, intact vibration sensation and normal proprioception.   Coordination: Normal finger to nose and heel to shin. Normal balance and negative Romberg's sign   Skin and subcutaneous tissue: Skin is warm and intact. No rash noted. No cyanosis.   Psychiatric: Judgment and insight: Normal. Orientation to person, place and time: Normal. Recent and remote memory: Intact. Mood and affect: " Normal.     ASSESSMENT:   1. Lumbar stenosis with neurogenic claudication    2. Facet arthritis of lumbar region (CMS/HCC)    3. Fibromyalgia    4. Morbid obesity due to excess calories (CMS/HCC)    5. Anxiety and depression        PLAN/MEDICAL DECISION MAKING: I have reviewed all available medical records as well as previous therapies as referenced above, and discussed the patient with Dr. Amador.  1. Interventional pain management measures: None indicated.  Patient could be scheduled for repeat bilateral L4-L5 transforaminal epidural steroid injections if pain recurs.  Dr. Amador previously discussed the possibility of diagnostic and therapeutic lumbar facet joint injections bilateral L4-L5, bilateral L5-S1 versus diagnostic bilateral lumbar medial branch blocks at L3, L4, L5; for bilateral lumbar facet joints at L4-L5, L5-S1 to clarify the origin of chronic refractory mechanical lower back pain.  If patient experiences more than 80% relief along with significant improvement the range of motion of the lumbar spine, then she would be scheduled for a second set of diagnostic bilateral lumbar medial branch blocks in order to proceed with bilateral lumbar medial branch rhizotomies.  Patient will follow-up with Dr. Albarado thereafter.  2. Pharmacological measures: Reviewed and discussed.   A. Continue Rheumate one tablet daily   B. Continue pyridoxine 25 mg one tablet by mouth three times daily  3. Long-term rehabilitation efforts:  A. Patient will continue a comprehensive physical therapy program for water therapy, therapeutic exercise, core strengthening, gait and balance training, neurodynamics, myofascial release, cupping and dry needling   B. Start an exercise program such as yoga and water therapy  C. Contrast therapy: Apply ice-packs for 15-20 minutes, followed by heating pads for 15-20 minutes to affected area   4.  The patient and her family have been instructed to contact my office with any questions or  difficulties. The patient understands the plan and agrees to proceed accordingly.         Patient Care Team:  Christopher Wu MD as PCP - General (Family Medicine)  Loyda Washington MD as Consulting Physician (Family Medicine)  Leobardo Albarado MD as Consulting Physician (Neurosurgery)  Santos Betancourt MD as Consulting Physician (Urology)  Dinh Amador MD as Consulting Physician (Pain Medicine)  Kg Lynne PA-C as Physician Assistant (Physician Assistant)     No orders of the defined types were placed in this encounter.        Future Appointments   Date Time Provider Department Center   6/20/2019  7:30 AM Dalton, Ashley Claudene, PT [facility]         Kg Lynne PA-C     EMR Dragon/Transcription disclaimer:  Much of this encounter note is an electronic transcription of spoken language to printed text. Electronic transcription of spoken language may permit erroneous, or at times, nonsensical words or phrases to be inadvertently transcribed. Although I have reviewed the note for such errors, some may still exist.

## 2019-06-19 ENCOUNTER — OFFICE VISIT (OUTPATIENT)
Dept: PAIN MEDICINE | Facility: CLINIC | Age: 49
End: 2019-06-19

## 2019-06-19 VITALS
WEIGHT: 235 LBS | SYSTOLIC BLOOD PRESSURE: 118 MMHG | OXYGEN SATURATION: 99 % | HEIGHT: 63 IN | TEMPERATURE: 99 F | HEART RATE: 84 BPM | BODY MASS INDEX: 41.64 KG/M2 | DIASTOLIC BLOOD PRESSURE: 68 MMHG | RESPIRATION RATE: 16 BRPM

## 2019-06-19 DIAGNOSIS — M47.816 FACET ARTHRITIS OF LUMBAR REGION: ICD-10-CM

## 2019-06-19 DIAGNOSIS — M79.7 FIBROMYALGIA: ICD-10-CM

## 2019-06-19 DIAGNOSIS — M48.062 LUMBAR STENOSIS WITH NEUROGENIC CLAUDICATION: Primary | ICD-10-CM

## 2019-06-19 DIAGNOSIS — F41.9 ANXIETY AND DEPRESSION: ICD-10-CM

## 2019-06-19 DIAGNOSIS — F32.A ANXIETY AND DEPRESSION: ICD-10-CM

## 2019-06-19 DIAGNOSIS — E66.01 MORBID OBESITY DUE TO EXCESS CALORIES (HCC): ICD-10-CM

## 2019-06-19 PROCEDURE — 99213 OFFICE O/P EST LOW 20 MIN: CPT | Performed by: PHYSICIAN ASSISTANT

## 2019-07-11 PROCEDURE — 87077 CULTURE AEROBIC IDENTIFY: CPT | Performed by: UROLOGY

## 2019-07-11 PROCEDURE — 87086 URINE CULTURE/COLONY COUNT: CPT | Performed by: UROLOGY

## 2019-07-11 PROCEDURE — 87186 SC STD MICRODIL/AGAR DIL: CPT | Performed by: UROLOGY

## 2019-07-12 ENCOUNTER — HOSPITAL ENCOUNTER (OUTPATIENT)
Dept: GENERAL RADIOLOGY | Facility: HOSPITAL | Age: 49
Discharge: HOME | End: 2019-07-12
Admitting: UROLOGY

## 2019-07-12 DIAGNOSIS — R82.79 POSITIVE URINE CULTURE: Primary | ICD-10-CM

## 2019-07-12 PROCEDURE — 74018 RADEX ABDOMEN 1 VIEW: CPT

## 2019-07-12 PROCEDURE — 74018 RADEX ABDOMEN 1 VIEW: CPT | Performed by: RADIOLOGY

## 2019-07-12 RX ORDER — NITROFURANTOIN 25; 75 MG/1; MG/1
100 CAPSULE ORAL 2 TIMES DAILY
Qty: 20 CAPSULE | Refills: 0 | Status: SHIPPED | OUTPATIENT
Start: 2019-07-12 | End: 2019-07-22

## 2019-07-12 RX ORDER — PHENAZOPYRIDINE HYDROCHLORIDE 100 MG/1
100 TABLET, FILM COATED ORAL 3 TIMES DAILY PRN
Qty: 20 TABLET | Refills: 0 | Status: SHIPPED | OUTPATIENT
Start: 2019-07-12 | End: 2020-02-26

## 2019-08-08 ENCOUNTER — DOCUMENTATION (OUTPATIENT)
Dept: PHYSICAL THERAPY | Facility: HOSPITAL | Age: 49
End: 2019-08-08

## 2019-08-08 DIAGNOSIS — M48.062 LUMBAR STENOSIS WITH NEUROGENIC CLAUDICATION: Primary | ICD-10-CM

## 2019-08-08 NOTE — THERAPY DISCHARGE NOTE
Outpatient Physical Therapy Discharge Summary         Patient Name: Mone Amador  : 1970  MRN: [id number]    Today's Date: 2019    Visit Dx:    ICD-10-CM ICD-9-CM   1. Lumbar stenosis with neurogenic claudication M48.062 724.03           OP PT Discharge Summary  Date of Discharge: 19  Reason for Discharge: other (comment)  Outcomes Achieved: Other(unknown)  Discharge Destination: Unknown  Discharge Instructions/Additional Comments: Patient attended 3 visits including eval from 19 to 19. Patient no showed on 19 and was attempted to be called on 19 to schedule more appointments with no answer, patient didn't call back. Outcomes achieved and discharge destination unknown secondary to patient not attending last treatment session. Thank you for your referral.       Time Calculation:                    Carol Peters, PTA  2019

## 2019-08-09 DIAGNOSIS — M79.7 FIBROMYALGIA: ICD-10-CM

## 2019-08-12 RX ORDER — PYRIDOXINE HCL (VITAMIN B6) 25 MG
25 TABLET ORAL 3 TIMES DAILY
Qty: 90 TABLET | Refills: 1 | Status: SHIPPED | OUTPATIENT
Start: 2019-08-12 | End: 2020-02-26

## 2019-08-20 PROCEDURE — 86200 CCP ANTIBODY: CPT | Performed by: UROLOGY

## 2019-08-20 PROCEDURE — 86431 RHEUMATOID FACTOR QUANT: CPT | Performed by: UROLOGY

## 2019-08-20 PROCEDURE — 85027 COMPLETE CBC AUTOMATED: CPT | Performed by: UROLOGY

## 2019-08-20 PROCEDURE — 80053 COMPREHEN METABOLIC PANEL: CPT | Performed by: UROLOGY

## 2019-08-20 PROCEDURE — 86038 ANTINUCLEAR ANTIBODIES: CPT | Performed by: UROLOGY

## 2019-09-06 ENCOUNTER — TRANSCRIBE ORDERS (OUTPATIENT)
Dept: OTHER | Facility: OTHER | Age: 49
End: 2019-09-06

## 2019-09-06 ENCOUNTER — HOSPITAL ENCOUNTER (OUTPATIENT)
Dept: GENERAL RADIOLOGY | Facility: HOSPITAL | Age: 49
Discharge: HOME | End: 2019-09-06
Admitting: FAMILY MEDICINE

## 2019-09-06 DIAGNOSIS — M25.562 ACUTE BILATERAL KNEE PAIN: Primary | ICD-10-CM

## 2019-09-06 DIAGNOSIS — M25.562 ACUTE BILATERAL KNEE PAIN: ICD-10-CM

## 2019-09-06 DIAGNOSIS — M25.561 ACUTE BILATERAL KNEE PAIN: Primary | ICD-10-CM

## 2019-09-06 DIAGNOSIS — M25.561 ACUTE BILATERAL KNEE PAIN: ICD-10-CM

## 2019-09-06 PROCEDURE — 73560 X-RAY EXAM OF KNEE 1 OR 2: CPT

## 2019-09-06 PROCEDURE — 73560 X-RAY EXAM OF KNEE 1 OR 2: CPT | Performed by: RADIOLOGY

## 2019-09-12 PROCEDURE — 83970 ASSAY OF PARATHORMONE: CPT | Performed by: UROLOGY

## 2019-09-12 PROCEDURE — 84479 ASSAY OF THYROID (T3 OR T4): CPT | Performed by: UROLOGY

## 2019-09-12 PROCEDURE — 83001 ASSAY OF GONADOTROPIN (FSH): CPT | Performed by: UROLOGY

## 2019-09-12 PROCEDURE — 83002 ASSAY OF GONADOTROPIN (LH): CPT | Performed by: UROLOGY

## 2019-09-12 PROCEDURE — 84436 ASSAY OF TOTAL THYROXINE: CPT | Performed by: UROLOGY

## 2019-09-12 PROCEDURE — 84443 ASSAY THYROID STIM HORMONE: CPT | Performed by: UROLOGY

## 2019-09-12 PROCEDURE — 84403 ASSAY OF TOTAL TESTOSTERONE: CPT | Performed by: UROLOGY

## 2019-09-12 PROCEDURE — 82670 ASSAY OF TOTAL ESTRADIOL: CPT | Performed by: UROLOGY

## 2019-09-23 ENCOUNTER — TELEPHONE (OUTPATIENT)
Dept: INTERNAL MEDICINE | Facility: CLINIC | Age: 49
End: 2019-09-23

## 2019-10-30 DIAGNOSIS — N30.00 ACUTE CYSTITIS WITHOUT HEMATURIA: Primary | ICD-10-CM

## 2019-10-30 PROCEDURE — 87086 URINE CULTURE/COLONY COUNT: CPT | Performed by: UROLOGY

## 2019-10-30 RX ORDER — NITROFURANTOIN 25; 75 MG/1; MG/1
100 CAPSULE ORAL 2 TIMES DAILY
Qty: 14 CAPSULE | Refills: 0 | Status: SHIPPED | OUTPATIENT
Start: 2019-10-30 | End: 2020-02-26

## 2019-11-04 DIAGNOSIS — R11.0 NAUSEA WITHOUT VOMITING: Primary | ICD-10-CM

## 2019-11-04 RX ORDER — ONDANSETRON 4 MG/1
4 TABLET, FILM COATED ORAL DAILY PRN
Qty: 30 TABLET | Refills: 3 | Status: SHIPPED | OUTPATIENT
Start: 2019-11-04 | End: 2020-02-26

## 2019-11-21 ENCOUNTER — PATIENT MESSAGE (OUTPATIENT)
Dept: INTERNAL MEDICINE | Facility: CLINIC | Age: 49
End: 2019-11-21

## 2020-01-06 DIAGNOSIS — R11.0 NAUSEA WITHOUT VOMITING: Primary | ICD-10-CM

## 2020-01-06 RX ORDER — ONDANSETRON 4 MG/1
4 TABLET, FILM COATED ORAL DAILY PRN
Qty: 30 TABLET | Refills: 3 | Status: SHIPPED | OUTPATIENT
Start: 2020-01-06 | End: 2020-01-20 | Stop reason: SDUPTHER

## 2020-01-20 DIAGNOSIS — R11.0 NAUSEA WITHOUT VOMITING: ICD-10-CM

## 2020-01-20 RX ORDER — ONDANSETRON 4 MG/1
4 TABLET, FILM COATED ORAL DAILY PRN
Qty: 30 TABLET | Refills: 3 | Status: ON HOLD | OUTPATIENT
Start: 2020-01-20 | End: 2021-01-18

## 2020-01-29 PROCEDURE — 84443 ASSAY THYROID STIM HORMONE: CPT | Performed by: UROLOGY

## 2020-01-29 PROCEDURE — 85027 COMPLETE CBC AUTOMATED: CPT | Performed by: UROLOGY

## 2020-01-29 PROCEDURE — 82607 VITAMIN B-12: CPT | Performed by: UROLOGY

## 2020-01-29 PROCEDURE — 80053 COMPREHEN METABOLIC PANEL: CPT | Performed by: UROLOGY

## 2020-01-29 PROCEDURE — 82652 VIT D 1 25-DIHYDROXY: CPT | Performed by: UROLOGY

## 2020-01-29 PROCEDURE — 84439 ASSAY OF FREE THYROXINE: CPT | Performed by: UROLOGY

## 2020-02-03 DIAGNOSIS — E66.01 CLASS 3 SEVERE OBESITY DUE TO EXCESS CALORIES IN ADULT, UNSPECIFIED BMI, UNSPECIFIED WHETHER SERIOUS COMORBIDITY PRESENT (HCC): Primary | ICD-10-CM

## 2020-02-03 RX ORDER — PEN NEEDLE, DIABETIC 30 GX3/16"
30 NEEDLE, DISPOSABLE MISCELLANEOUS DAILY
Qty: 31 EACH | Refills: 6 | Status: SHIPPED | OUTPATIENT
Start: 2020-02-03 | End: 2020-02-26

## 2020-02-26 ENCOUNTER — OFFICE VISIT (OUTPATIENT)
Dept: PAIN MEDICINE | Facility: CLINIC | Age: 50
End: 2020-02-26

## 2020-02-26 VITALS — WEIGHT: 235 LBS | BODY MASS INDEX: 41.64 KG/M2 | HEIGHT: 63 IN

## 2020-02-26 DIAGNOSIS — E66.01 MORBID OBESITY DUE TO EXCESS CALORIES (HCC): ICD-10-CM

## 2020-02-26 DIAGNOSIS — F32.A ANXIETY AND DEPRESSION: ICD-10-CM

## 2020-02-26 DIAGNOSIS — M47.816 FACET ARTHRITIS OF LUMBAR REGION: ICD-10-CM

## 2020-02-26 DIAGNOSIS — G43.719 INTRACTABLE CHRONIC MIGRAINE WITHOUT AURA AND WITHOUT STATUS MIGRAINOSUS: ICD-10-CM

## 2020-02-26 DIAGNOSIS — F41.9 ANXIETY AND DEPRESSION: ICD-10-CM

## 2020-02-26 DIAGNOSIS — M48.062 LUMBAR STENOSIS WITH NEUROGENIC CLAUDICATION: ICD-10-CM

## 2020-02-26 DIAGNOSIS — M48.062 LUMBAR STENOSIS WITH NEUROGENIC CLAUDICATION: Primary | ICD-10-CM

## 2020-02-26 DIAGNOSIS — M47.816 SPONDYLOSIS OF LUMBAR REGION WITHOUT MYELOPATHY OR RADICULOPATHY: ICD-10-CM

## 2020-02-26 PROCEDURE — 99214 OFFICE O/P EST MOD 30 MIN: CPT | Performed by: NURSE PRACTITIONER

## 2020-02-26 RX ORDER — CYCLOBENZAPRINE HCL 10 MG
10 TABLET ORAL 3 TIMES DAILY PRN
COMMUNITY
Start: 2020-02-17 | End: 2020-07-27

## 2020-02-26 RX ORDER — HYDROCODONE BITARTRATE AND ACETAMINOPHEN 5; 325 MG/1; MG/1
1 TABLET ORAL EVERY 12 HOURS PRN
Status: ON HOLD | COMMUNITY
Start: 2020-02-17 | End: 2021-01-18

## 2020-02-26 NOTE — PROGRESS NOTES
"Chief Complaint: \"Pain in my lower back.\"       History of Present Illness:   Patient: Ms. Mone Amador, 49 y.o. female originally referred by Dr. Christopher Wu in consultation for intractable chronic lower back pain. Patient reports a now almost 2 year history of lower back pain, which began without incident.  She was last seen on June 19, 2019, by Kg Lynne PA-C, for postprocedure follow-up and evaluation, from undergoing on 05/22/2019 bilateral L4-L5 transforaminal epidural steroid injection.  At the time of her follow-up visit she reported experiencing 75% relief that was ongoing. The pain began recurring around October .  She did participate in physical therapy thereafter.  She returns today for postprocedure follow-up and evaluation.  She denies any pertinent changes in her medical history since she was last seen.  She has not had any recent imaging of her lower back.  Pain description: constant pain with intermittent exacerbation, described as stabbing and stinging sensation.   Radiation of pain: The lower back pain radiated on occassions into her calves, today she reports her lower back pain does not radiate. The back pain is more constant and severe.   Pain intensity today: 7/10  Average pain intensity last week: 7/10  Pain intensity ranges from: 3/10 to 10/10  Aggravating factors: Pain increases with bending and ambulating more than 100 yards, standing longer 5 minutes.  Alleviating factors: Pain decreases with sittting and lying down.     Associated symptoms:   Patient reports pain but denies numbness or weakness in the lower extremities. She does report some \"tingling\" to her anterior thighs associated with standing for long periods.  Patient denies any new bladder or bowel problems.   Patient denies difficulties with her balance or recent falls.      Review of previous therapies and additional medical records:  Mone Amador has already failed the following measures, including:   Conservative " measures: oral analgesics, physical therapy and chiropractic therapy   Interventional measures: 03/13/2019: DxTx bilateral L4-L5 TESI  05/22/2019: bilateral L4-L5 TESI  Surgical measures: No history of lumbar spine surgery  Patient underwent neurosurgical consultation with Dr Leobardo Albarado on 07/17/2018 and was found not to be a surgical candidate at that time.   Mone Amador presents with significant comorbidities including anxiety, depression and insomnia, engaged in treatment, fibromyalgia, migraine headaches, moderate obesity, engaged in treatment.  In terms of current analgesics, Mone Amador takes: Cymbalta, Norco (PRN), Flexeril, ibuprofen, and Maxalt. Patient also takes trazodone, lorazepam and Ambien  I have reviewed [facility] Report #[id number] consistent to medication reconciliation.    Global Pain Scale 01-21 2019 04-04 2019 05-01 2019 06-19 2019 02-26 2020         Pain  13  6  21  10  17         Feelings  4  4  5  6  5         Clinical outcomes  12  4  18  7  11         Activities  14  0  24  13  5         GPS Total:  43  14  68  36  38              Review of Diagnostic Studies:  XR SPINE LUMBAR COMPLETE W FLEX EXT- 02/25/2019: Flexion and extension views show stable posterior spondylolisthesis of L2 on L3 vertebral body and grossly stable anterior spondylolisthesis of L4 vertebral body. Advanced facet arthropathy noted. L1/L2 advanced disc desiccation and endplate change.  MRI LUMBAR SPINE 06/13/18: Vertebral body height and alignment are preserved throughout. Marrow signal is unremarkable. Spinal cord terminates appropriately at the L1 level with normal signal present within the conus. Shortening of the pedicles of mainly of the lower levels contributes to mild generalized stenosis. The visualized paravertebral soft tissues are unremarkable.  L1/L2: Unremarkable.  L2/L3: Unremarkable.  L3/L4: Unremarkable.  L4/L5: Advanced hypertrophic facet arthropathy with periarticular inflammation.  Underlying annular type disc bulge eccentric to left. There is mild central canal and lateral recess stenosis. There is severe left and moderate right neural foraminal stenosis with mass effect on exiting left L4 nerve root laterally.   L5/S1: Mild disc bulge. Moderate hypertrophic facet arthropathy. Mild left-sided lateral recess stenosis. Moderate left neural foraminal stenosis and mild right neural foraminal stenosis.    Review of Systems   Musculoskeletal: Positive for back pain.   All other systems reviewed and are negative.        Patient Active Problem List   Diagnosis   • Anxiety   • Fibromyalgia   • Hypertension   • Facet arthritis of lumbar region   • Intractable chronic migraine without aura and without status migrainosus   • Morbid obesity due to excess calories (CMS/[facility])   • Anxiety and depression   • Lumbar stenosis with neurogenic claudication       Past Medical History:   Diagnosis Date   • Anxiety    • Cholecystolithiasis    • Fibromyalgia    • Headache    • Hypertension          Past Surgical History:   Procedure Laterality Date   •  SECTION      ,    • CHOLECYSTECTOMY     • HYSTERECTOMY      32         Family History   Problem Relation Age of Onset   • Heart disease Mother    • Hypertension Mother    • Stroke Mother    • Anxiety disorder Mother    • Thyroid disease Father    • Glaucoma Father    • Multiple sclerosis Sister    • No Known Problems Brother    • No Known Problems Son    • No Known Problems Sister    • No Known Problems Son    • Breast cancer Neg Hx          Social History     Socioeconomic History   • Marital status:      Spouse name: Not on file   • Number of children: Not on file   • Years of education: Not on file   • Highest education level: Not on file   Tobacco Use   • Smoking status: Never Smoker   • Smokeless tobacco: Never Used   Substance and Sexual Activity   • Alcohol use: No   • Drug use: No           Current Outpatient Medications:   •   "DULoxetine (CYMBALTA) 60 MG capsule, Take 1 capsule by mouth 2 (Two) Times a Day., Disp: 60 capsule, Rfl: 6  •  hydroCHLOROthiazide (HYDRODIURIL) 25 MG tablet, Take 1 tablet by mouth Daily., Disp: 30 tablet, Rfl: 5  •  ibuprofen (ADVIL,MOTRIN) 800 MG tablet, Take 1 tablet by mouth 3 (Three) Times a Day With Meals. (Patient taking differently: Take 800 mg by mouth 3 (Three) Times a Day As Needed.), Disp: 90 tablet, Rfl: 2  •  loratadine (CLARITIN) 10 MG tablet, Take 1 tablet by mouth Daily., Disp: 30 tablet, Rfl: 3  •  LORazepam (ATIVAN) 0.5 MG tablet, Take 1 tablet by mouth 2 (Two) Times a Day As Needed., Disp: 60 tablet, Rfl: 0  •  nadolol (CORGARD) 40 MG tablet, Take 1 tablet by mouth twice daily., Disp: 60 tablet, Rfl: 6  •  ondansetron (ZOFRAN) 4 MG tablet, Take 1 tablet by mouth Daily As Needed for Nausea or Vomiting., Disp: 30 tablet, Rfl: 3  •  rizatriptan (MAXALT) 10 MG tablet, Take 1 tablet by mouth once, may repeat at 2 hour intervals; do not exceed 30 mg in 24 hours, Disp: 12 tablet, Rfl: 5  •  traZODone (DESYREL) 100 MG tablet, Take 1 tablet by mouth every Evening after meal for Sleep., Disp: 30 tablet, Rfl: 3  •  zolpidem (AMBIEN) 10 MG tablet, Take 1 tablet by mouth every night at bedtime., Disp: 30 tablet, Rfl: 2  •  cyclobenzaprine (FLEXERIL) 10 MG tablet, Take 10 mg by mouth 3 (Three) Times a Day As Needed., Disp: , Rfl:   •  HYDROcodone-acetaminophen (NORCO) 5-325 MG per tablet, Take 1 tablet by mouth Every 12 (Twelve) Hours As Needed., Disp: , Rfl:       Allergies   Allergen Reactions   • Morphine And Related Itching and Nausea Only   • Latex Itching and Rash         Ht 160 cm (63\")   Wt 107 kg (235 lb)   BMI 41.63 kg/m²       Physical Exam:  Constitutional: Patient is oriented to person, place, and time. Patient appears well-developed and well-nourished.   HEENT: Head: Normocephalic and atraumatic. Eyes: Conjunctivae and lids are normal. Pupils: Equal, round, reactive to light.   Neck: Trachea " normal. Neck supple. No JVD present.   Pulmonary Respiratory effort: No increased work of breathing or signs of respiratory distress. Auscultation of lungs: Clear to auscultation.   Cardiovascular Auscultation of heart: Normal rate and rhythm, normal S1 and S2, no murmurs.   Gait and station: Gait evaluation demonstrated a normal gait   Lumbar spine: Passive and active range of motion are limited secondary to pain. Extension, lateral flexion, flexion, and rotation of the lumbar spine increased and reproduced pain. Lumbar facet joint loading maneuvers are positive.  Pastor and Gaenslen's tests are negative   Piriformis maneuvers are negative   Palpation of the bilateral ischial tuberosities, unrevealing   Palpation of the bilateral greater trochanter, unrevealing   Examination of the Iliotibial band: unrevealing   Hip joints: The range of motion of the hip joints is full and without pain   Neurological: Patient is alert and oriented to person, place, and time. Speech: speech is normal. Cortical function: Normal mental status.   Cranial nerves: Cranial nerves 2-12 intact.   Reflex Scores:  Right patellar: 1+  Left patellar: 1+  Right Achilles: 1+  Left Achilles: 1+  Motor strength: 5/5  Motor Tone: normal tone.   Involuntary movements: none.   Superficial/Primitive Reflexes: primitive reflexes were absent.   Right Sands: absent  Left Sands: absent  Right ankle clonus: absent  Left ankle clonus: absent   Negative long tract signs. Straight leg raising test equivocal. Femoral stretch sign is negative.   Sensation: No sensory loss. Sensory exam: intact to light touch, intact pain and temperature sensation, intact vibration sensation and normal proprioception.   Coordination: Normal finger to nose and heel to shin. Normal balance and negative Romberg's sign   Skin and subcutaneous tissue: Skin is warm and intact. No rash noted. No cyanosis.   Psychiatric: Judgment and insight: Normal. Orientation to person, place and  time: Normal. Recent and remote memory: Intact. Mood and affect: Normal.     ASSESSMENT:   1. Spondylosis of lumbar region without myelopathy or radiculopathy    2. Lumbar stenosis with neurogenic claudication    3. Facet arthritis of lumbar region    4. Intractable chronic migraine without aura and without status migrainosus    5. Morbid obesity due to excess calories (CMS/[facility])    6. Anxiety and depression        PLAN/MEDICAL DECISION MAKING: I had a lengthy conversation with Ms. Mone Amador regarding her chronic pain condition and potential therapeutic options including risks, benefits, alternative therapies, to name a few. Patient presents with unrelenting lower back pain. Lumbar MRI revealed facet joint widening at L4-L5 with some stenosis. Patient has failed to obtain pain relief with conservative measures, as referenced above. I have reviewed all available patient's medical records as well as previous therapies as referenced above.  Therefore, I have proposed the following plan:  1. Pharmacological measures: Reviewed. Discussed.   A. Patient takes Cymbalta, Norco (PRN), Flexeril, ibuprofen, and Maxalt. Patient also takes trazodone, lorazepam and Ambien  2. Interventional pain management measures: Patient will be scheduled for bilateral lumbar medial branch blocks at L3, L4, L5; for bilateral lumbar facet joints at L4-L5, L5-S1 to clarify the origin of chronic refractory mechanical lower back pain. If patient experiences more than 80% relief along with significant improvement the range of motion of the lumbar spine, then, patient will be scheduled for a second set of diagnostic bilateral lumbar medial branch blocks, to then, proceed with bilateral lumbar medial branch rhizotomies. Patient will follow-up with Dr. Albarado thereafter.  3. Diagnostics: I have discussed with the patient if she continues to struggle with pain we could possibly order a new MRI of the lumbar spine  4. Long-term rehabilitation  efforts:  A. The patient does not have a history of falls. I did complete a risk assessment for falls.   B. Patient will start a comprehensive physical therapy program for water therapy, therapeutic exercise, core strengthening, gait and balance training, neurodynamics, myofascial release, cupping and dry needling   C. Start an exercise program such as yoga and water therapy  D. Contrast therapy: Apply ice-packs for 15-20 minutes, followed by heating pads for 15-20 minutes to affected area   5.  The patient and her family have been instructed to contact my office with any questions or difficulties. The patient understands the plan and agrees to proceed accordingly.         Patient Care Team:  Christopher Wu MD as PCP - General (Family Medicine)  Loyda Washington MD as Consulting Physician (Family Medicine)  Leobardo Albarado MD as Consulting Physician (Neurosurgery)  Santos Betancourt MD as Consulting Physician (Urology)  Dinh Amador MD as Consulting Physician (Pain Medicine)  Maryann Saldana APRN as Nurse Practitioner (Nurse Practitioner)     No orders of the defined types were placed in this encounter.        Future Appointments   Date Time Provider Department Center   3/18/2020 11:30 AM Dinh Amador MD [facility] None         [facility]     EMR Dragon/Transcription disclaimer:  Much of this encounter note is an electronic transcription of spoken language to printed text. Electronic transcription of spoken language may permit erroneous, or at times, nonsensical words or phrases to be inadvertently transcribed. Although I have reviewed the note for such errors, some may still exist.

## 2020-03-11 DIAGNOSIS — M47.816 SPONDYLOSIS OF LUMBAR REGION WITHOUT MYELOPATHY OR RADICULOPATHY: Primary | ICD-10-CM

## 2020-03-18 ENCOUNTER — OUTSIDE FACILITY SERVICE (OUTPATIENT)
Dept: PAIN MEDICINE | Facility: CLINIC | Age: 50
End: 2020-03-18

## 2020-03-18 PROCEDURE — 64493 INJ PARAVERT F JNT L/S 1 LEV: CPT | Performed by: ANESTHESIOLOGY

## 2020-03-18 PROCEDURE — 99152 MOD SED SAME PHYS/QHP 5/>YRS: CPT | Performed by: ANESTHESIOLOGY

## 2020-03-18 PROCEDURE — 64494 INJ PARAVERT F JNT L/S 2 LEV: CPT | Performed by: ANESTHESIOLOGY

## 2020-03-19 ENCOUNTER — TELEPHONE (OUTPATIENT)
Dept: PAIN MEDICINE | Facility: CLINIC | Age: 50
End: 2020-03-19

## 2020-03-19 NOTE — TELEPHONE ENCOUNTER
1ST SET DX [facility] LUMBAR [facility] 03/18/2020.    Spoke with patient and she reports she is doing good. No questions/concerns at this time.  Patient aware of office visit scheduled for 04/16/2020

## 2020-04-30 DIAGNOSIS — H66.90 EAR INFECTION: Primary | ICD-10-CM

## 2020-04-30 RX ORDER — AZITHROMYCIN 250 MG/1
TABLET, FILM COATED ORAL
Qty: 7 TABLET | Refills: 3 | Status: SHIPPED | OUTPATIENT
Start: 2020-04-30 | End: 2020-05-05

## 2020-05-19 NOTE — PROGRESS NOTES
"This consultation was provided with the use of interactive audio and video telecommunications system that permits real time communication with the patient, as patient is unable to attend an in-office appointment due to the COVID-19 crisis. Consent for assessment and treatment was provided by the patient.    You have chosen to receive care through a telehealth visit.  Do you consent to use a video/audio connection for your medical care today?    Yes     Chief Complaint: \"Pain in my lower back.\"     History of Present Illness:   Patient: Ms. Mone Amador, 49 y.o. female originally referred by Dr. Christopher Wu in consultation for intractable chronic lower back pain. Patient reports a now almost 2 year history of lower back pain, which began without incident.  Patient was last seen on March 18, 2020, when she underwent diagnostic bilateral lumbar medial branch blocks at L3, L4, and L5, from which she reports experiencing almost complete pain relief lasting for one day.  She requests consultation today for postprocedure follow-up and evaluation and to move forward with her next procedure.  She denies any pertinent changes in her medical history since she was last seen.  She has not had any recent imaging of her lower back.  Pain description: constant pain with intermittent exacerbation, described as stabbing and stinging sensation.   Radiation of pain: The lower back pain radiated on occassions into her calves, today she reports her lower back pain does not radiate. The back pain is more constant and severe.   Pain intensity today: 7/10  Average pain intensity last week: 7/10  Pain intensity ranges from: 3/10 to 10/10  Aggravating factors: Pain increases with bending and ambulating more than 100 yards, standing longer 5 minutes.  Alleviating factors: Pain decreases with sittting and lying down.     Associated symptoms:   Patient reports pain but denies numbness or weakness in the lower extremities. She does report " "some \"tingling\" to her anterior thighs associated with standing for long periods.  Patient denies any new bladder or bowel problems.   Patient denies difficulties with her balance or recent falls.      Review of previous therapies and additional medical records:  Mone Amador has already failed the following measures, including:   Conservative measures: oral analgesics, physical therapy and chiropractic therapy   Interventional measures: 03/13/2019: DxTx bilateral L4-L5 TESI  05/22/2019: bilateral L4-L5 TESI  Surgical measures: No history of lumbar spine surgery  Patient underwent neurosurgical consultation with Dr Leobardo Albarado on 07/17/2018 and was found not to be a surgical candidate at that time.   Mone Amador presents with significant comorbidities including anxiety, depression and insomnia, engaged in treatment, fibromyalgia, migraine headaches, moderate obesity, engaged in treatment.  In terms of current analgesics, Mone Amador takes: Cymbalta, Norco (PRN), Flexeril, ibuprofen, and Maxalt. Patient also takes trazodone, lorazepam and Ambien  I have reviewed [facility] Report #[id number] consistent to medication reconciliation.    Global Pain Scale 01-21 2019 04-04 2019 05-01 2019 06-19 2019 02-26 2020         Pain  13  6  21  10  17         Feelings  4  4  5  6  5         Clinical outcomes  12  4  18  7  11         Activities  14  0  24  13  5         GPS Total:  43  14  68  36  38           Review of Diagnostic Studies:  XR SPINE LUMBAR COMPLETE W FLEX EXT- 02/25/2019: Flexion and extension views show stable posterior spondylolisthesis of L2 on L3 vertebral body and grossly stable anterior spondylolisthesis of L4 vertebral body. Advanced facet arthropathy noted. L1/L2 advanced disc desiccation and endplate change.  MRI LUMBAR SPINE 06/13/18: Vertebral body height and alignment are preserved throughout. Marrow signal is unremarkable. Spinal cord terminates appropriately at the L1 level with normal " signal present within the conus. Shortening of the pedicles of mainly of the lower levels contributes to mild generalized stenosis. The visualized paravertebral soft tissues are unremarkable.  L1/L2: Unremarkable.  L2/L3: Unremarkable.  L3/L4: Unremarkable.  L4/L5: Advanced hypertrophic facet arthropathy with periarticular inflammation. Underlying annular type disc bulge eccentric to left. There is mild central canal and lateral recess stenosis. There is severe left and moderate right neural foraminal stenosis with mass effect on exiting left L4 nerve root laterally.   L5/S1: Mild disc bulge. Moderate hypertrophic facet arthropathy. Mild left-sided lateral recess stenosis. Moderate left neural foraminal stenosis and mild right neural foraminal stenosis.    Review of Systems   Musculoskeletal: Positive for back pain.   All other systems reviewed and are negative.        Patient Active Problem List   Diagnosis   • Anxiety   • Fibromyalgia   • Hypertension   • Facet arthritis of lumbar region   • Intractable chronic migraine without aura and without status migrainosus   • Morbid obesity due to excess calories (CMS/HCC)   • Anxiety and depression   • Lumbar stenosis with neurogenic claudication       Past Medical History:   Diagnosis Date   • Anxiety    • Cholecystolithiasis    • Fibromyalgia    • Headache    • Hypertension          Past Surgical History:   Procedure Laterality Date   •  SECTION      ,    • CHOLECYSTECTOMY     • HYSTERECTOMY      32         Family History   Problem Relation Age of Onset   • Heart disease Mother    • Hypertension Mother    • Stroke Mother    • Anxiety disorder Mother    • Thyroid disease Father    • Glaucoma Father    • Multiple sclerosis Sister    • No Known Problems Brother    • No Known Problems Son    • No Known Problems Sister    • No Known Problems Son    • Breast cancer Neg Hx          Social History     Socioeconomic History   • Marital status:       Spouse name: Not on file   • Number of children: Not on file   • Years of education: Not on file   • Highest education level: Not on file   Tobacco Use   • Smoking status: Never Smoker   • Smokeless tobacco: Never Used   Substance and Sexual Activity   • Alcohol use: No   • Drug use: No           Current Outpatient Medications:   •  cyclobenzaprine (FLEXERIL) 10 MG tablet, Take 10 mg by mouth 3 (Three) Times a Day As Needed., Disp: , Rfl:   •  DULoxetine (CYMBALTA) 60 MG capsule, Take 1 capsule by mouth 2 (Two) Times a Day., Disp: 60 capsule, Rfl: 6  •  hydroCHLOROthiazide (HYDRODIURIL) 25 MG tablet, Take 1 tablet by mouth Daily., Disp: 30 tablet, Rfl: 5  •  HYDROcodone-acetaminophen (NORCO) 5-325 MG per tablet, Take 1 tablet by mouth Every 12 (Twelve) Hours As Needed., Disp: , Rfl:   •  ibuprofen (ADVIL,MOTRIN) 800 MG tablet, Take 1 tablet by mouth 3 (Three) Times a Day With Meals. (Patient taking differently: Take 800 mg by mouth 3 (Three) Times a Day As Needed.), Disp: 90 tablet, Rfl: 2  •  loratadine (CLARITIN) 10 MG tablet, Take 1 tablet by mouth Daily., Disp: 30 tablet, Rfl: 3  •  LORazepam (ATIVAN) 0.5 MG tablet, Take 1 tablet by mouth 2 (Two) Times a Day As Needed., Disp: 60 tablet, Rfl: 0  •  nadolol (CORGARD) 40 MG tablet, Take 1 tablet by mouth twice daily., Disp: 60 tablet, Rfl: 6  •  ondansetron (Zofran) 4 MG tablet, Take 1 tablet by mouth Daily As Needed for Nausea or Vomiting., Disp: 30 tablet, Rfl: 3  •  rizatriptan (MAXALT) 10 MG tablet, Take 1 tablet by mouth once, may repeat at 2 hour intervals; do not exceed 30 mg in 24 hours, Disp: 12 tablet, Rfl: 5  •  traZODone (DESYREL) 100 MG tablet, Take 1 tablet by mouth every Evening after meal for Sleep., Disp: 30 tablet, Rfl: 3  •  zolpidem (AMBIEN) 10 MG tablet, Take 1 tablet by mouth every night at bedtime., Disp: 30 tablet, Rfl: 2      Allergies   Allergen Reactions   • Morphine And Related Itching and Nausea Only   • Latex Itching and Rash          There were no vitals taken for this visit.     Due to the constraints of the telemedicine format, no physical exam was performed   Constitutional: Patient is oriented to person, place, and time. Patient appears well-developed and well-nourished.     Physical Exam: (Previous PE from last office visit)  HEENT: Head: Normocephalic and atraumatic. Eyes: Conjunctivae and lids are normal. Pupils: Equal, round, reactive to light.   Neck: Trachea normal. Neck supple. No JVD present.   Pulmonary Respiratory effort: No increased work of breathing or signs of respiratory distress. Auscultation of lungs: Clear to auscultation.   Cardiovascular Auscultation of heart: Normal rate and rhythm, normal S1 and S2, no murmurs.   Gait and station: Gait evaluation demonstrated a normal gait   Lumbar spine: Passive and active range of motion are limited secondary to pain. Extension, lateral flexion, flexion, and rotation of the lumbar spine increased and reproduced pain. Lumbar facet joint loading maneuvers are positive.  Pastor and Gaenslen's tests are negative   Piriformis maneuvers are negative   Palpation of the bilateral ischial tuberosities, unrevealing   Palpation of the bilateral greater trochanter, unrevealing   Examination of the Iliotibial band: unrevealing   Hip joints: The range of motion of the hip joints is full and without pain   Neurological: Patient is alert and oriented to person, place, and time. Speech: speech is normal. Cortical function: Normal mental status.   Cranial nerves: Cranial nerves 2-12 intact.   Reflex Scores:  Right patellar: 1+  Left patellar: 1+  Right Achilles: 1+  Left Achilles: 1+  Motor strength: 5/5  Motor Tone: normal tone.   Involuntary movements: none.   Superficial/Primitive Reflexes: primitive reflexes were absent.   Right Sands: absent  Left Sands: absent  Right ankle clonus: absent  Left ankle clonus: absent   Negative long tract signs. Straight leg raising test equivocal. Femoral  stretch sign is negative.   Sensation: No sensory loss. Sensory exam: intact to light touch, intact pain and temperature sensation, intact vibration sensation and normal proprioception.   Coordination: Normal finger to nose and heel to shin. Normal balance and negative Romberg's sign   Skin and subcutaneous tissue: Skin is warm and intact. No rash noted. No cyanosis.   Psychiatric: Judgment and insight: Normal. Orientation to person, place and time: Normal. Recent and remote memory: Intact. Mood and affect: Normal.     ASSESSMENT:   1. Spondylosis of lumbar region without myelopathy or radiculopathy    2. Facet arthritis of lumbar region    3. Lumbar stenosis with neurogenic claudication    4. Fibromyalgia    5. Morbid obesity due to excess calories (CMS/[facility])    6. Anxiety and depression        PLAN/MEDICAL DECISION MAKING: I had a lengthy conversation with Ms. Mone Amador regarding her chronic pain condition and potential therapeutic options including risks, benefits, alternative therapies, to name a few. Patient presents with unrelenting lower back pain. Lumbar MRI revealed facet joint widening at L4-L5 with some stenosis. Patient has failed to obtain pain relief with conservative measures, as referenced above. I have reviewed all available patient's medical records as well as previous therapies as referenced above.  Therefore, I have proposed the following plan:  1. Pharmacological measures: Reviewed. Discussed.   A. Patient takes Cymbalta, Norco (PRN), Flexeril, ibuprofen, and Maxalt. Patient also takes trazodone, lorazepam and Ambien  2. Interventional pain management measures: Patient will be scheduled for a second set of bilateral lumbar medial branch blocks at L3, L4, L5; for bilateral lumbar facet joints at L4-L5, L5-S1 to clarify the origin of chronic refractory mechanical lower back pain. If patient experiences more than 80% relief along with significant improvement the range of motion of the lumbar  spine, then, we will proceed with bilateral lumbar medial branch rhizotomies.  Otherwise, we may repeat bilateral L4-L5 transforaminal epidural steroid injections.  We may repeat epidural depending on patient's outcome.  Patient will follow-up with Dr. Albarado thereafter.  3. Diagnostics: I have discussed with the patient if she continues to struggle with pain we could possibly order a new MRI of the lumbar spine  4. Long-term rehabilitation efforts:  A. The patient does not have a history of falls. I did complete a risk assessment for falls.   B. Patient will start a comprehensive physical therapy program for water therapy, therapeutic exercise, core strengthening, gait and balance training, neurodynamics, myofascial release, cupping and dry needling   C. Start an exercise program such as yoga and water therapy  D. Contrast therapy: Apply ice-packs for 15-20 minutes, followed by heating pads for 15-20 minutes to affected area   E. Referral to [facility]  5.  The patient has been instructed to contact my office with any questions or difficulties. The patient understands the plan and agrees to proceed accordingly.    Patient has been made aware that patient will continue to have access to telephone call, tele-health or e-visit, or in office visit if an emergent or urgent issue arises.     This visit was performed via Telehealth. Patient was advised to call us back or contact a primary care provider, Urgent Care or the Emergency Department if symptoms worsen or treatment provided does not resolve symptoms. Patient verbalizes understanding of medication dosage, comfort measures, instructions for treatment, and follow-up.    I spent 15 minutes with the patient, of which 15 minutes of the time were spent counseling regarding evaluation, diagnosis, risk and benefits of different interventions and alternative therapies        Patient Care Team:  Christopher Wu MD as PCP - General (Family Medicine)  Padmini  Loyda XIE MD as Consulting Physician (Family Medicine)  Leobardo Albarado MD as Consulting Physician (Neurosurgery)  Santos Betancourt MD as Consulting Physician (Urology)  Dinh Amador MD as Consulting Physician (Pain Medicine)  Maryann Saldana APRN as Nurse Practitioner (Nurse Practitioner)     No orders of the defined types were placed in this encounter.        Future Appointments   Date Time Provider Department Center   5/20/2020  2:00 PM Maryann Saldana APRN [facility] None         [facility]     EMR Dragon/Transcription disclaimer:  Much of this encounter note is an electronic transcription of spoken language to printed text. Electronic transcription of spoken language may permit erroneous, or at times, nonsensical words or phrases to be inadvertently transcribed. Although I have reviewed the note for such errors, some may still exist.

## 2020-05-20 ENCOUNTER — TELEMEDICINE (OUTPATIENT)
Dept: PAIN MEDICINE | Facility: CLINIC | Age: 50
End: 2020-05-20

## 2020-05-20 DIAGNOSIS — M48.062 LUMBAR STENOSIS WITH NEUROGENIC CLAUDICATION: ICD-10-CM

## 2020-05-20 DIAGNOSIS — F41.9 ANXIETY AND DEPRESSION: ICD-10-CM

## 2020-05-20 DIAGNOSIS — M47.816 SPONDYLOSIS OF LUMBAR REGION WITHOUT MYELOPATHY OR RADICULOPATHY: ICD-10-CM

## 2020-05-20 DIAGNOSIS — F32.A ANXIETY AND DEPRESSION: ICD-10-CM

## 2020-05-20 DIAGNOSIS — M79.7 FIBROMYALGIA: ICD-10-CM

## 2020-05-20 DIAGNOSIS — M47.816 FACET ARTHRITIS OF LUMBAR REGION: ICD-10-CM

## 2020-05-20 DIAGNOSIS — E66.01 MORBID OBESITY DUE TO EXCESS CALORIES (HCC): ICD-10-CM

## 2020-05-20 PROCEDURE — 99213 OFFICE O/P EST LOW 20 MIN: CPT | Performed by: NURSE PRACTITIONER

## 2020-05-26 DIAGNOSIS — M47.816 SPONDYLOSIS OF LUMBAR REGION WITHOUT MYELOPATHY OR RADICULOPATHY: Primary | ICD-10-CM

## 2020-05-28 RX ORDER — ZOLPIDEM TARTRATE 10 MG/1
10 TABLET ORAL
Qty: 30 TABLET | Refills: 2 | Status: CANCELLED | OUTPATIENT
Start: 2020-05-27

## 2020-06-04 ENCOUNTER — TELEPHONE (OUTPATIENT)
Dept: PAIN MEDICINE | Facility: CLINIC | Age: 50
End: 2020-06-04

## 2020-07-01 ENCOUNTER — OFFICE VISIT (OUTPATIENT)
Dept: PAIN MEDICINE | Facility: CLINIC | Age: 50
End: 2020-07-01

## 2020-07-01 DIAGNOSIS — M47.816 SPONDYLOSIS OF LUMBAR REGION WITHOUT MYELOPATHY OR RADICULOPATHY: ICD-10-CM

## 2020-07-01 DIAGNOSIS — M48.062 LUMBAR STENOSIS WITH NEUROGENIC CLAUDICATION: ICD-10-CM

## 2020-07-01 DIAGNOSIS — F32.A ANXIETY AND DEPRESSION: ICD-10-CM

## 2020-07-01 DIAGNOSIS — M79.7 FIBROMYALGIA: ICD-10-CM

## 2020-07-01 DIAGNOSIS — M47.816 FACET ARTHRITIS OF LUMBAR REGION: ICD-10-CM

## 2020-07-01 DIAGNOSIS — E66.01 MORBID OBESITY DUE TO EXCESS CALORIES (HCC): ICD-10-CM

## 2020-07-01 DIAGNOSIS — F41.9 ANXIETY AND DEPRESSION: ICD-10-CM

## 2020-07-01 PROCEDURE — 99441 PR PHYS/QHP TELEPHONE EVALUATION 5-10 MIN: CPT | Performed by: NURSE PRACTITIONER

## 2020-07-08 ENCOUNTER — TRANSCRIBE ORDERS (OUTPATIENT)
Dept: ADMINISTRATIVE | Facility: HOSPITAL | Age: 50
End: 2020-07-08

## 2020-07-08 DIAGNOSIS — Z01.818 PRE-OPERATIVE CLEARANCE: Primary | ICD-10-CM

## 2020-07-09 DIAGNOSIS — M47.816 SPONDYLOSIS OF LUMBAR REGION WITHOUT MYELOPATHY OR RADICULOPATHY: Primary | ICD-10-CM

## 2020-07-10 ENCOUNTER — LAB (OUTPATIENT)
Dept: LAB | Facility: HOSPITAL | Age: 50
End: 2020-07-10

## 2020-07-10 DIAGNOSIS — Z01.818 PRE-OPERATIVE CLEARANCE: ICD-10-CM

## 2020-07-10 PROCEDURE — C9803 HOPD COVID-19 SPEC COLLECT: HCPCS

## 2020-07-10 PROCEDURE — U0002 COVID-19 LAB TEST NON-CDC: HCPCS

## 2020-07-10 PROCEDURE — U0004 COV-19 TEST NON-CDC HGH THRU: HCPCS

## 2020-07-11 LAB
REF LAB TEST METHOD: NORMAL
SARS-COV-2 RNA RESP QL NAA+PROBE: NOT DETECTED

## 2020-07-13 ENCOUNTER — OUTSIDE FACILITY SERVICE (OUTPATIENT)
Dept: PAIN MEDICINE | Facility: CLINIC | Age: 50
End: 2020-07-13

## 2020-07-13 DIAGNOSIS — M47.816 SPONDYLOSIS OF LUMBAR REGION WITHOUT MYELOPATHY OR RADICULOPATHY: Primary | ICD-10-CM

## 2020-07-13 PROCEDURE — 64494 INJ PARAVERT F JNT L/S 2 LEV: CPT | Performed by: ANESTHESIOLOGY

## 2020-07-13 PROCEDURE — 64493 INJ PARAVERT F JNT L/S 1 LEV: CPT | Performed by: ANESTHESIOLOGY

## 2020-07-13 PROCEDURE — 99152 MOD SED SAME PHYS/QHP 5/>YRS: CPT | Performed by: ANESTHESIOLOGY

## 2020-07-14 ENCOUNTER — TELEPHONE (OUTPATIENT)
Dept: PAIN MEDICINE | Facility: CLINIC | Age: 50
End: 2020-07-14

## 2020-07-24 NOTE — PROGRESS NOTES
"This consultation was provided with the use of interactive through telephone that permits real time communication with the patient, as patient is unable to attend an in-office appointment due to the COVID-19 crisis. Consent for assessment and treatment was provided by the patient.    You have chosen to receive care through a telephone visit today. Do you consent to use a telephone visit for your medical care today?   Yes     Chief Complaint: \"Pain in my lower back. My last block gave me great relief.\"     History of Present Illness:   Patient: Ms. Mone Amador, 49 y.o. female originally referred by Dr. Christopher Wu in consultation for intractable chronic lower back pain. Patient reports a now almost 2 year history of lower back pain, which began without incident. Patient was last seen on July 13, 2020 when she underwent a second set of diagnostic bilateral lumbar medial branch blocks at L3, L4, and L5, from which she reported experiencing almost complete pain relief lasting for several days.  She requests consultation today for post procedure follow up and evaluation.   Pain description: constant pain with intermittent exacerbation, described as stabbing and stinging sensation.   Radiation of pain: The lower back pain radiated on occassions into her calves, today she reports her lower back pain does not radiate. The back pain is more constant and severe.   Pain intensity today: 5/10  Average pain intensity last week: 7/10  Pain intensity ranges from: 3/10 to 10/10  Aggravating factors: Pain increases with bending and ambulating more than 100 yards, standing longer 5 minutes.  Alleviating factors: Pain decreases with sittting and lying down.     Associated symptoms:   Patient reports pain but denies numbness or weakness in the lower extremities. She does report some \"tingling\" to her anterior thighs associated with standing for long periods.  Patient denies any new bladder or bowel problems.   Patient denies " difficulties with her balance or recent falls.      Review of previous therapies and additional medical records:  Mone Amador has already failed the following measures, including:   Conservative measures: oral analgesics, physical therapy and chiropractic therapy   Interventional measures: 03/13/2019: DxTx bilateral L4-L5 TESI  05/22/2019: bilateral L4-L5 TESI  Surgical measures: No history of lumbar spine surgery  Patient underwent neurosurgical consultation with Dr Leobardo Albarado on 07/17/2018 and was found not to be a surgical candidate at that time.   Mone Amador presents with significant comorbidities including anxiety, depression and insomnia, engaged in treatment, fibromyalgia, migraine headaches, moderate obesity, engaged in treatment.  In terms of current analgesics, Mone Amador takes: Cymbalta, Norco (PRN), Flexeril, ibuprofen, and Maxalt. Patient also takes trazodone, lorazepam and Ambien  I have reviewed [facility] Report #[id number] consistent to medication reconciliation.    Global Pain Scale 01-21 2019 04-04 2019 05-01 2019 06-19 2019 02-26 2020         Pain  13  6  21  10  17         Feelings  4  4  5  6  5         Clinical outcomes  12  4  18  7  11         Activities  14  0  24  13  5         GPS Total:  43  14  68  36  38           Review of Diagnostic Studies:  XR SPINE LUMBAR COMPLETE W FLEX EXT- 02/25/2019: Flexion and extension views show stable posterior spondylolisthesis of L2 on L3 vertebral body and grossly stable anterior spondylolisthesis of L4 vertebral body. Advanced facet arthropathy noted. L1/L2 advanced disc desiccation and endplate change.  MRI LUMBAR SPINE 06/13/18: Vertebral body height and alignment are preserved throughout. Marrow signal is unremarkable. Spinal cord terminates appropriately at the L1 level with normal signal present within the conus. Shortening of the pedicles of mainly of the lower levels contributes to mild generalized stenosis. The visualized  paravertebral soft tissues are unremarkable.  L1/L2: Unremarkable.  L2/L3: Unremarkable.  L3/L4: Unremarkable.  L4/L5: Advanced hypertrophic facet arthropathy with periarticular inflammation. Underlying annular type disc bulge eccentric to left. There is mild central canal and lateral recess stenosis. There is severe left and moderate right neural foraminal stenosis with mass effect on exiting left L4 nerve root laterally.   L5/S1: Mild disc bulge. Moderate hypertrophic facet arthropathy. Mild left-sided lateral recess stenosis. Moderate left neural foraminal stenosis and mild right neural foraminal stenosis.    Review of Systems   Musculoskeletal: Positive for back pain.   All other systems reviewed and are negative.        Patient Active Problem List   Diagnosis   • Anxiety   • Fibromyalgia   • Hypertension   • Facet arthritis of lumbar region   • Intractable chronic migraine without aura and without status migrainosus   • Morbid obesity due to excess calories (CMS/HCC)   • Anxiety and depression   • Lumbar stenosis with neurogenic claudication       Past Medical History:   Diagnosis Date   • Anxiety    • Cholecystolithiasis    • Fibromyalgia    • Headache    • Hypertension          Past Surgical History:   Procedure Laterality Date   •  SECTION      ,    • CHOLECYSTECTOMY     • HYSTERECTOMY      32         Family History   Problem Relation Age of Onset   • Heart disease Mother    • Hypertension Mother    • Stroke Mother    • Anxiety disorder Mother    • Thyroid disease Father    • Glaucoma Father    • Multiple sclerosis Sister    • No Known Problems Brother    • No Known Problems Son    • No Known Problems Sister    • No Known Problems Son    • Breast cancer Neg Hx          Social History     Socioeconomic History   • Marital status:      Spouse name: Not on file   • Number of children: Not on file   • Years of education: Not on file   • Highest education level: Not on file    Tobacco Use   • Smoking status: Never Smoker   • Smokeless tobacco: Never Used   Substance and Sexual Activity   • Alcohol use: No   • Drug use: No           Current Outpatient Medications:   •  cyclobenzaprine (FLEXERIL) 10 MG tablet, Take 10 mg by mouth 3 (Three) Times a Day As Needed., Disp: , Rfl:   •  DULoxetine (CYMBALTA) 60 MG capsule, Take 1 capsule by mouth 2 (Two) Times a Day., Disp: 60 capsule, Rfl: 6  •  DULoxetine (CYMBALTA) 60 MG capsule, Take 1 capsule by mouth 2 (two) times a day., Disp: 60 capsule, Rfl: 7  •  furosemide (LASIX) 20 MG tablet, Take 1 tablet by mouth Every Morning as needed for Swelling., Disp: 30 tablet, Rfl: 1  •  hydroCHLOROthiazide (HYDRODIURIL) 25 MG tablet, Take 1 tablet by mouth Daily., Disp: 30 tablet, Rfl: 6  •  HYDROcodone-acetaminophen (NORCO) 5-325 MG per tablet, Take 1 tablet by mouth Every 12 (Twelve) Hours As Needed., Disp: , Rfl:   •  ibuprofen (ADVIL,MOTRIN) 800 MG tablet, Take 1 tablet by mouth 3 (Three) Times a Day With Meals., Disp: 90 tablet, Rfl: 2  •  loratadine (CLARITIN) 10 MG tablet, Take 1 tablet by mouth Daily., Disp: 30 tablet, Rfl: 3  •  loratadine (CLARITIN) 10 MG tablet, Take 1 tablet by mouth Daily., Disp: 30 tablet, Rfl: 3  •  LORazepam (ATIVAN) 0.5 MG tablet, Take 1 tablet by mouth 2 (Two) Times a Day As Needed., Disp: 60 tablet, Rfl: 0  •  nadolol (CORGARD) 40 MG tablet, Take 1 tablet by mouth twice daily., Disp: 60 tablet, Rfl: 6  •  nadolol (CORGARD) 40 MG tablet, Take 1 tablet by mouth Twice a Day., Disp: 60 tablet, Rfl: 6  •  ondansetron (Zofran) 4 MG tablet, Take 1 tablet by mouth Daily As Needed for Nausea or Vomiting., Disp: 30 tablet, Rfl: 3  •  rizatriptan (MAXALT) 10 MG tablet, Take 1 tablet by mouth once, may repeat at 2 hour intervals; do not exceed 30 mg in 24 hours, Disp: 12 tablet, Rfl: 5  •  rizatriptan (MAXALT) 10 MG tablet, Take 1 tablet by mouth at the Onset of Headache. May repeat in 2 hours if needed. Do not exceed Three  tablets in 24 Hours., Disp: 12 tablet, Rfl: 5  •  traZODone (DESYREL) 100 MG tablet, Take 1 tablet by mouth Every Night after Meal for Sleep., Disp: 30 tablet, Rfl: 3  •  zolpidem (AMBIEN) 10 MG tablet, Take 1 tablet by mouth every night at bedtime, Disp: 30 tablet, Rfl: 1      Allergies   Allergen Reactions   • Morphine And Related Itching and Nausea Only   • Latex Itching and Rash         There were no vitals taken for this visit.     Due to the constraints of the telemedicine format, no physical exam was performed     Physical Exam: (Previous PE from last office visit)  Constitutional: Patient is oriented to person, place, and time. Patient appears well-developed and well-nourished.   HEENT: Head: Normocephalic and atraumatic. Eyes: Conjunctivae and lids are normal. Pupils: Equal, round, reactive to light.   Neck: Trachea normal. Neck supple. No JVD present.   Pulmonary Respiratory effort: No increased work of breathing or signs of respiratory distress. Auscultation of lungs: Clear to auscultation.   Cardiovascular Auscultation of heart: Normal rate and rhythm, normal S1 and S2, no murmurs.   Gait and station: Gait evaluation demonstrated a normal gait   Lumbar spine: Passive and active range of motion are limited secondary to pain. Extension, lateral flexion, flexion, and rotation of the lumbar spine increased and reproduced pain. Lumbar facet joint loading maneuvers are positive.  Pastor and Gaenslen's tests are negative   Piriformis maneuvers are negative   Palpation of the bilateral ischial tuberosities, unrevealing   Palpation of the bilateral greater trochanter, unrevealing   Examination of the Iliotibial band: unrevealing   Hip joints: The range of motion of the hip joints is full and without pain   Neurological: Patient is alert and oriented to person, place, and time. Speech: speech is normal. Cortical function: Normal mental status.   Cranial nerves: Cranial nerves 2-12 intact.   Reflex Scores:  Right  patellar: 1+  Left patellar: 1+  Right Achilles: 1+  Left Achilles: 1+  Motor strength: 5/5  Motor Tone: normal tone.   Involuntary movements: none.   Superficial/Primitive Reflexes: primitive reflexes were absent.   Right Sands: absent  Left Sands: absent  Right ankle clonus: absent  Left ankle clonus: absent   Negative long tract signs. Straight leg raising test equivocal. Femoral stretch sign is negative.   Sensation: No sensory loss. Sensory exam: intact to light touch, intact pain and temperature sensation, intact vibration sensation and normal proprioception.   Coordination: Normal finger to nose and heel to shin. Normal balance and negative Romberg's sign   Skin and subcutaneous tissue: Skin is warm and intact. No rash noted. No cyanosis.   Psychiatric: Judgment and insight: Normal. Orientation to person, place and time: Normal. Recent and remote memory: Intact. Mood and affect: Normal.     ASSESSMENT:   1. Spondylosis of lumbar region without myelopathy or radiculopathy    2. Lumbar stenosis with neurogenic claudication    3. Morbid obesity due to excess calories (CMS/HCC)    4. Fibromyalgia    5. Anxiety and depression    6. Intractable chronic migraine without aura and without status migrainosus        PLAN/MEDICAL DECISION MAKING: I had a lengthy conversation with Ms. Mone Amador regarding her chronic pain condition and potential therapeutic options including risks, benefits, alternative therapies, to name a few. Patient presents with unrelenting lower back pain. Lumbar MRI revealed facet joint widening at L4-L5 with some stenosis. Patient has failed to obtain pain relief with conservative measures, as referenced above. I have reviewed all available patient's medical records as well as previous therapies as referenced above.  Therefore, I have proposed the following plan:  1. Pharmacological measures: Reviewed. Discussed.   A. Patient takes Cymbalta, Norco (PRN), Flexeril, ibuprofen, and Maxalt.  Patient also takes trazodone, lorazepam and Ambien  2. Interventional pain management measures: Patient will be scheduled for bilateral lumbar medial branch rhizotomies at L3, L4, L5; for bilateral lumbar facet joints at L4-L5, L5-S1.  Otherwise, we may repeat bilateral L4-L5 transforaminal epidural steroid injections. We may repeat epidural depending on patient's outcome.  Patient will follow-up with Dr. Albarado thereafter.  3. Diagnostics: I have discussed with the patient if she continues to struggle with pain we could possibly order a new MRI of the lumbar spine  4. Long-term rehabilitation efforts:  A. The patient does not have a history of falls. I did complete a risk assessment for falls.   B. Patient will start a comprehensive physical therapy program for water therapy, therapeutic exercise, core strengthening, gait and balance training, neurodynamics, myofascial release, cupping and dry needling   C. Start an exercise program such as yoga and water therapy  D. Contrast therapy: Apply ice-packs for 15-20 minutes, followed by heating pads for 15-20 minutes to affected area   E. Referral to [facility]  5.  The patient has been instructed to contact my office with any questions or difficulties. The patient understands the plan and agrees to proceed accordingly.    Patient has been made aware that patient will continue to have access to telephone call, tele-health or e-visit, or in office visit if an emergent or urgent issue arises.     This visit was performed via Telehealth. Patient was advised to call us back or contact a primary care provider, Urgent Care or the Emergency Department if symptoms worsen or treatment provided does not resolve symptoms. Patient verbalizes understanding of medication dosage, comfort measures, instructions for treatment, and follow-up.    This visit has been scheduled as a phone visit to comply with patient safety concerns in accordance with CDC recommendations. Total  time of discussion was 5 minutes.         Patient Care Team:  Christopher Wu MD as PCP - General (Family Medicine)  Loyda Washington MD as Consulting Physician (Family Medicine)  Leobardo Albarado MD as Consulting Physician (Neurosurgery)  Santos Betancourt MD as Consulting Physician (Urology)  Dinh Amador MD as Consulting Physician (Pain Medicine)  Maryann Saldana APRN as Nurse Practitioner (Nurse Practitioner)     No orders of the defined types were placed in this encounter.        Future Appointments   Date Time Provider Department Center   7/27/2020  8:30 AM Maryann Saldana APRN [facility] None   8/3/2020 11:15 AM Dinh Amador MD [facility] None         [facility]     EMR Dragon/Transcription disclaimer:  Much of this encounter note is an electronic transcription of spoken language to printed text. Electronic transcription of spoken language may permit erroneous, or at times, nonsensical words or phrases to be inadvertently transcribed. Although I have reviewed the note for such errors, some may still exist.

## 2020-07-27 ENCOUNTER — OFFICE VISIT (OUTPATIENT)
Dept: PAIN MEDICINE | Facility: CLINIC | Age: 50
End: 2020-07-27

## 2020-07-27 DIAGNOSIS — M48.062 LUMBAR STENOSIS WITH NEUROGENIC CLAUDICATION: ICD-10-CM

## 2020-07-27 DIAGNOSIS — F41.9 ANXIETY AND DEPRESSION: ICD-10-CM

## 2020-07-27 DIAGNOSIS — F32.A ANXIETY AND DEPRESSION: ICD-10-CM

## 2020-07-27 DIAGNOSIS — M79.7 FIBROMYALGIA: ICD-10-CM

## 2020-07-27 DIAGNOSIS — E66.01 MORBID OBESITY DUE TO EXCESS CALORIES (HCC): ICD-10-CM

## 2020-07-27 DIAGNOSIS — M47.816 SPONDYLOSIS OF LUMBAR REGION WITHOUT MYELOPATHY OR RADICULOPATHY: ICD-10-CM

## 2020-07-27 DIAGNOSIS — G43.719 INTRACTABLE CHRONIC MIGRAINE WITHOUT AURA AND WITHOUT STATUS MIGRAINOSUS: ICD-10-CM

## 2020-07-27 PROCEDURE — 99441 PR PHYS/QHP TELEPHONE EVALUATION 5-10 MIN: CPT | Performed by: NURSE PRACTITIONER

## 2020-07-29 ENCOUNTER — TRANSCRIBE ORDERS (OUTPATIENT)
Dept: PAIN MEDICINE | Facility: CLINIC | Age: 50
End: 2020-07-29

## 2020-07-29 DIAGNOSIS — Z01.818 PRE-OPERATIVE CLEARANCE: Primary | ICD-10-CM

## 2020-07-30 DIAGNOSIS — M47.816 SPONDYLOSIS OF LUMBAR REGION WITHOUT MYELOPATHY OR RADICULOPATHY: Primary | ICD-10-CM

## 2020-07-31 ENCOUNTER — LAB (OUTPATIENT)
Dept: LAB | Facility: HOSPITAL | Age: 50
End: 2020-07-31

## 2020-07-31 DIAGNOSIS — Z01.818 PRE-OPERATIVE CLEARANCE: ICD-10-CM

## 2020-07-31 PROCEDURE — U0002 COVID-19 LAB TEST NON-CDC: HCPCS

## 2020-07-31 PROCEDURE — U0004 COV-19 TEST NON-CDC HGH THRU: HCPCS

## 2020-07-31 PROCEDURE — C9803 HOPD COVID-19 SPEC COLLECT: HCPCS

## 2020-08-01 LAB
REF LAB TEST METHOD: NORMAL
SARS-COV-2 RNA RESP QL NAA+PROBE: NOT DETECTED

## 2020-08-03 ENCOUNTER — OUTSIDE FACILITY SERVICE (OUTPATIENT)
Dept: PAIN MEDICINE | Facility: CLINIC | Age: 50
End: 2020-08-03

## 2020-08-03 PROCEDURE — 64636 DESTROY L/S FACET JNT ADDL: CPT | Performed by: ANESTHESIOLOGY

## 2020-08-03 PROCEDURE — 64635 DESTROY LUMB/SAC FACET JNT: CPT | Performed by: ANESTHESIOLOGY

## 2020-08-03 PROCEDURE — 99152 MOD SED SAME PHYS/QHP 5/>YRS: CPT | Performed by: ANESTHESIOLOGY

## 2020-08-04 ENCOUNTER — TELEPHONE (OUTPATIENT)
Dept: PAIN MEDICINE | Facility: CLINIC | Age: 50
End: 2020-08-04

## 2020-08-04 NOTE — TELEPHONE ENCOUNTER
bilateral lumbar medial branch rhizotomies at L3, L4, L5 08/03/2020.    Attempted to contact patient, no answer.   Left message for patient to return my call if she had any questions or concerns.   Appointment card, PT order placed in outgoing mail.

## 2020-08-20 ENCOUNTER — PATIENT MESSAGE (OUTPATIENT)
Dept: PAIN MEDICINE | Facility: CLINIC | Age: 50
End: 2020-08-20

## 2020-11-06 ENCOUNTER — APPOINTMENT (OUTPATIENT)
Dept: MAMMOGRAPHY | Facility: HOSPITAL | Age: 50
End: 2020-11-06

## 2020-12-08 ENCOUNTER — TELEPHONE (OUTPATIENT)
Dept: GASTROENTEROLOGY | Facility: CLINIC | Age: 50
End: 2020-12-08

## 2020-12-08 DIAGNOSIS — Z01.818 PREOPERATIVE CLEARANCE: Primary | ICD-10-CM

## 2020-12-08 DIAGNOSIS — K21.9 GASTROESOPHAGEAL REFLUX DISEASE, UNSPECIFIED WHETHER ESOPHAGITIS PRESENT: ICD-10-CM

## 2020-12-08 DIAGNOSIS — K21.9 GASTROESOPHAGEAL REFLUX DISEASE, UNSPECIFIED WHETHER ESOPHAGITIS PRESENT: Primary | ICD-10-CM

## 2020-12-08 DIAGNOSIS — Z12.12 ENCOUNTER FOR COLORECTAL CANCER SCREENING: ICD-10-CM

## 2020-12-08 DIAGNOSIS — Z12.11 ENCOUNTER FOR COLORECTAL CANCER SCREENING: ICD-10-CM

## 2020-12-08 NOTE — TELEPHONE ENCOUNTER
Hortensia, please give pt prep instructions for Plenvue and arrange E/C with Dr. Car. I already gave her plenvue sample. CR has been placed. Thanks.

## 2020-12-09 ENCOUNTER — LAB (OUTPATIENT)
Dept: UROLOGY | Facility: CLINIC | Age: 50
End: 2020-12-09

## 2020-12-09 DIAGNOSIS — M25.50 ARTHRALGIA, UNSPECIFIED JOINT: ICD-10-CM

## 2020-12-09 DIAGNOSIS — R63.5 WEIGHT GAIN: Primary | ICD-10-CM

## 2020-12-09 PROCEDURE — 86431 RHEUMATOID FACTOR QUANT: CPT | Performed by: NURSE PRACTITIONER

## 2020-12-09 PROCEDURE — 84443 ASSAY THYROID STIM HORMONE: CPT | Performed by: NURSE PRACTITIONER

## 2020-12-09 PROCEDURE — 86140 C-REACTIVE PROTEIN: CPT | Performed by: NURSE PRACTITIONER

## 2020-12-09 PROCEDURE — 85027 COMPLETE CBC AUTOMATED: CPT | Performed by: NURSE PRACTITIONER

## 2020-12-09 PROCEDURE — 80053 COMPREHEN METABOLIC PANEL: CPT | Performed by: NURSE PRACTITIONER

## 2020-12-09 PROCEDURE — 86038 ANTINUCLEAR ANTIBODIES: CPT | Performed by: NURSE PRACTITIONER

## 2020-12-09 PROCEDURE — 36415 COLL VENOUS BLD VENIPUNCTURE: CPT | Performed by: NURSE PRACTITIONER

## 2020-12-09 PROCEDURE — 86200 CCP ANTIBODY: CPT | Performed by: NURSE PRACTITIONER

## 2020-12-09 PROCEDURE — 82550 ASSAY OF CK (CPK): CPT | Performed by: NURSE PRACTITIONER

## 2020-12-10 LAB
ALBUMIN SERPL-MCNC: 4.2 G/DL (ref 3.5–5.2)
ALBUMIN/GLOB SERPL: 1.3 G/DL
ALP SERPL-CCNC: 75 U/L (ref 39–117)
ALT SERPL W P-5'-P-CCNC: 25 U/L (ref 1–33)
ANION GAP SERPL CALCULATED.3IONS-SCNC: 8.7 MMOL/L (ref 5–15)
AST SERPL-CCNC: 23 U/L (ref 1–32)
BILIRUB SERPL-MCNC: 0.4 MG/DL (ref 0–1.2)
BUN SERPL-MCNC: 9 MG/DL (ref 6–20)
BUN/CREAT SERPL: 10.5 (ref 7–25)
CALCIUM SPEC-SCNC: 9.7 MG/DL (ref 8.6–10.5)
CHLORIDE SERPL-SCNC: 96 MMOL/L (ref 98–107)
CHROMATIN AB SERPL-ACNC: <10 IU/ML (ref 0–14)
CK SERPL-CCNC: 86 U/L (ref 20–180)
CO2 SERPL-SCNC: 26.3 MMOL/L (ref 22–29)
CREAT SERPL-MCNC: 0.86 MG/DL (ref 0.57–1)
CRP SERPL-MCNC: 0.54 MG/DL (ref 0–0.5)
DEPRECATED RDW RBC AUTO: 40.5 FL (ref 37–54)
ERYTHROCYTE [DISTWIDTH] IN BLOOD BY AUTOMATED COUNT: 13.1 % (ref 12.3–15.4)
GFR SERPL CREATININE-BSD FRML MDRD: 70 ML/MIN/1.73
GLOBULIN UR ELPH-MCNC: 3.2 GM/DL
GLUCOSE SERPL-MCNC: 97 MG/DL (ref 65–99)
HCT VFR BLD AUTO: 42.9 % (ref 34–46.6)
HGB BLD-MCNC: 14.6 G/DL (ref 12–15.9)
MCH RBC QN AUTO: 29.1 PG (ref 26.6–33)
MCHC RBC AUTO-ENTMCNC: 34 G/DL (ref 31.5–35.7)
MCV RBC AUTO: 85.6 FL (ref 79–97)
PLATELET # BLD AUTO: 380 10*3/MM3 (ref 140–450)
PMV BLD AUTO: 10.4 FL (ref 6–12)
POTASSIUM SERPL-SCNC: 3.7 MMOL/L (ref 3.5–5.2)
PROT SERPL-MCNC: 7.4 G/DL (ref 6–8.5)
RBC # BLD AUTO: 5.01 10*6/MM3 (ref 3.77–5.28)
SODIUM SERPL-SCNC: 131 MMOL/L (ref 136–145)
TSH SERPL DL<=0.05 MIU/L-ACNC: 2.35 UIU/ML (ref 0.27–4.2)
WBC # BLD AUTO: 7.76 10*3/MM3 (ref 3.4–10.8)

## 2020-12-11 LAB — ANA SER QL: NEGATIVE

## 2020-12-12 LAB — CCP IGA+IGG SERPL IA-ACNC: 12 UNITS (ref 0–19)

## 2021-01-12 PROBLEM — K21.9 GASTROESOPHAGEAL REFLUX DISEASE: Status: ACTIVE | Noted: 2021-01-12

## 2021-01-12 PROBLEM — Z12.11 ENCOUNTER FOR COLORECTAL CANCER SCREENING: Status: ACTIVE | Noted: 2021-01-12

## 2021-01-12 PROBLEM — Z12.12 ENCOUNTER FOR COLORECTAL CANCER SCREENING: Status: ACTIVE | Noted: 2021-01-12

## 2021-01-15 ENCOUNTER — LAB (OUTPATIENT)
Dept: LAB | Facility: HOSPITAL | Age: 51
End: 2021-01-15

## 2021-01-15 DIAGNOSIS — Z12.11 ENCOUNTER FOR COLORECTAL CANCER SCREENING: ICD-10-CM

## 2021-01-15 DIAGNOSIS — K21.9 GASTROESOPHAGEAL REFLUX DISEASE, UNSPECIFIED WHETHER ESOPHAGITIS PRESENT: ICD-10-CM

## 2021-01-15 DIAGNOSIS — Z12.12 ENCOUNTER FOR COLORECTAL CANCER SCREENING: ICD-10-CM

## 2021-01-15 DIAGNOSIS — Z01.818 PREOPERATIVE CLEARANCE: ICD-10-CM

## 2021-01-15 PROCEDURE — C9803 HOPD COVID-19 SPEC COLLECT: HCPCS

## 2021-01-15 PROCEDURE — U0004 COV-19 TEST NON-CDC HGH THRU: HCPCS | Performed by: PHYSICIAN ASSISTANT

## 2021-01-17 LAB — SARS-COV-2 RNA RESP QL NAA+PROBE: NOT DETECTED

## 2021-01-18 ENCOUNTER — HOSPITAL ENCOUNTER (OUTPATIENT)
Facility: HOSPITAL | Age: 51
Setting detail: HOSPITAL OUTPATIENT SURGERY
Discharge: HOME | End: 2021-01-18
Attending: INTERNAL MEDICINE | Admitting: INTERNAL MEDICINE

## 2021-01-18 ENCOUNTER — ANESTHESIA EVENT (OUTPATIENT)
Dept: PERIOP | Facility: HOSPITAL | Age: 51
End: 2021-01-18

## 2021-01-18 ENCOUNTER — ANESTHESIA (OUTPATIENT)
Dept: PERIOP | Facility: HOSPITAL | Age: 51
End: 2021-01-18

## 2021-01-18 VITALS
HEIGHT: 62 IN | DIASTOLIC BLOOD PRESSURE: 82 MMHG | HEART RATE: 70 BPM | BODY MASS INDEX: 43.43 KG/M2 | TEMPERATURE: 97.2 F | WEIGHT: 236 LBS | RESPIRATION RATE: 18 BRPM | SYSTOLIC BLOOD PRESSURE: 126 MMHG | OXYGEN SATURATION: 96 %

## 2021-01-18 DIAGNOSIS — K21.9 GASTROESOPHAGEAL REFLUX DISEASE, UNSPECIFIED WHETHER ESOPHAGITIS PRESENT: ICD-10-CM

## 2021-01-18 DIAGNOSIS — Z12.12 ENCOUNTER FOR COLORECTAL CANCER SCREENING: ICD-10-CM

## 2021-01-18 DIAGNOSIS — Z12.11 ENCOUNTER FOR COLORECTAL CANCER SCREENING: ICD-10-CM

## 2021-01-18 PROCEDURE — 25010000002 PROPOFOL 10 MG/ML EMULSION: Performed by: NURSE ANESTHETIST, CERTIFIED REGISTERED

## 2021-01-18 PROCEDURE — 25010000002 MIDAZOLAM PER 1 MG: Performed by: NURSE ANESTHETIST, CERTIFIED REGISTERED

## 2021-01-18 PROCEDURE — 43239 EGD BIOPSY SINGLE/MULTIPLE: CPT | Performed by: INTERNAL MEDICINE

## 2021-01-18 PROCEDURE — 25010000002 FENTANYL CITRATE (PF) 100 MCG/2ML SOLUTION: Performed by: NURSE ANESTHETIST, CERTIFIED REGISTERED

## 2021-01-18 PROCEDURE — 45385 COLONOSCOPY W/LESION REMOVAL: CPT | Performed by: INTERNAL MEDICINE

## 2021-01-18 RX ORDER — PROPOFOL 10 MG/ML
VIAL (ML) INTRAVENOUS AS NEEDED
Status: DISCONTINUED | OUTPATIENT
Start: 2021-01-18 | End: 2021-01-18 | Stop reason: SURG

## 2021-01-18 RX ORDER — SODIUM CHLORIDE 0.9 % (FLUSH) 0.9 %
10 SYRINGE (ML) INJECTION AS NEEDED
Status: DISCONTINUED | OUTPATIENT
Start: 2021-01-18 | End: 2021-01-18 | Stop reason: HOSPADM

## 2021-01-18 RX ORDER — DROPERIDOL 2.5 MG/ML
0.62 INJECTION, SOLUTION INTRAMUSCULAR; INTRAVENOUS ONCE AS NEEDED
Status: CANCELLED | OUTPATIENT
Start: 2021-01-18

## 2021-01-18 RX ORDER — IPRATROPIUM BROMIDE AND ALBUTEROL SULFATE 2.5; .5 MG/3ML; MG/3ML
3 SOLUTION RESPIRATORY (INHALATION) ONCE AS NEEDED
Status: CANCELLED | OUTPATIENT
Start: 2021-01-18

## 2021-01-18 RX ORDER — MIDAZOLAM HYDROCHLORIDE 1 MG/ML
1 INJECTION INTRAMUSCULAR; INTRAVENOUS
Status: DISCONTINUED | OUTPATIENT
Start: 2021-01-18 | End: 2021-01-18 | Stop reason: HOSPADM

## 2021-01-18 RX ORDER — FENTANYL CITRATE 50 UG/ML
INJECTION, SOLUTION INTRAMUSCULAR; INTRAVENOUS AS NEEDED
Status: DISCONTINUED | OUTPATIENT
Start: 2021-01-18 | End: 2021-01-18 | Stop reason: SURG

## 2021-01-18 RX ORDER — SODIUM CHLORIDE 0.9 % (FLUSH) 0.9 %
10 SYRINGE (ML) INJECTION EVERY 12 HOURS SCHEDULED
Status: DISCONTINUED | OUTPATIENT
Start: 2021-01-18 | End: 2021-01-18 | Stop reason: HOSPADM

## 2021-01-18 RX ORDER — SODIUM CHLORIDE, SODIUM LACTATE, POTASSIUM CHLORIDE, CALCIUM CHLORIDE 600; 310; 30; 20 MG/100ML; MG/100ML; MG/100ML; MG/100ML
100 INJECTION, SOLUTION INTRAVENOUS ONCE AS NEEDED
Status: CANCELLED | OUTPATIENT
Start: 2021-01-18

## 2021-01-18 RX ORDER — ONDANSETRON 2 MG/ML
4 INJECTION INTRAMUSCULAR; INTRAVENOUS AS NEEDED
Status: CANCELLED | OUTPATIENT
Start: 2021-01-18

## 2021-01-18 RX ORDER — SODIUM CHLORIDE, SODIUM LACTATE, POTASSIUM CHLORIDE, CALCIUM CHLORIDE 600; 310; 30; 20 MG/100ML; MG/100ML; MG/100ML; MG/100ML
125 INJECTION, SOLUTION INTRAVENOUS ONCE
Status: COMPLETED | OUTPATIENT
Start: 2021-01-18 | End: 2021-01-18

## 2021-01-18 RX ORDER — MIDAZOLAM HYDROCHLORIDE 1 MG/ML
INJECTION INTRAMUSCULAR; INTRAVENOUS AS NEEDED
Status: DISCONTINUED | OUTPATIENT
Start: 2021-01-18 | End: 2021-01-18 | Stop reason: SURG

## 2021-01-18 RX ORDER — MEPERIDINE HYDROCHLORIDE 25 MG/ML
12.5 INJECTION INTRAMUSCULAR; INTRAVENOUS; SUBCUTANEOUS
Status: CANCELLED | OUTPATIENT
Start: 2021-01-18 | End: 2021-01-19

## 2021-01-18 RX ORDER — FENTANYL CITRATE 50 UG/ML
50 INJECTION, SOLUTION INTRAMUSCULAR; INTRAVENOUS
Status: CANCELLED | OUTPATIENT
Start: 2021-01-18

## 2021-01-18 RX ADMIN — PROPOFOL 160 MCG/KG/MIN: 10 INJECTION, EMULSION INTRAVENOUS at 09:59

## 2021-01-18 RX ADMIN — SODIUM CHLORIDE, POTASSIUM CHLORIDE, SODIUM LACTATE AND CALCIUM CHLORIDE: 600; 310; 30; 20 INJECTION, SOLUTION INTRAVENOUS at 09:27

## 2021-01-18 RX ADMIN — PROPOFOL 50 MG: 10 INJECTION, EMULSION INTRAVENOUS at 09:59

## 2021-01-18 RX ADMIN — MIDAZOLAM HYDROCHLORIDE 2 MG: 1 INJECTION, SOLUTION INTRAMUSCULAR; INTRAVENOUS at 09:56

## 2021-01-18 RX ADMIN — FENTANYL CITRATE 100 MCG: 50 INJECTION INTRAMUSCULAR; INTRAVENOUS at 09:56

## 2021-01-18 NOTE — ANESTHESIA PREPROCEDURE EVALUATION
Anesthesia Evaluation     no history of anesthetic complications:  NPO Solid Status: > 8 hours  NPO Liquid Status: > 8 hours           Airway   Mallampati: II  TM distance: >3 FB  Neck ROM: full  No difficulty expected  Dental - normal exam     Pulmonary - normal exam   Cardiovascular - normal exam    (+) hypertension,       Neuro/Psych  (+) headaches,     GI/Hepatic/Renal/Endo    (+) obesity,  GERD,      Musculoskeletal     Abdominal  - normal exam    Bowel sounds: normal.   Substance History      OB/GYN          Other        ROS/Med Hx Other: Fibromyalgia                  Anesthesia Plan    ASA 3     general     intravenous induction     Anesthetic plan, all risks, benefits, and alternatives have been provided, discussed and informed consent has been obtained with: patient.

## 2021-01-18 NOTE — OP NOTE
COLONOSCOPY PROCEDURE NOTE    Mone Amador  1/18/2021    GASTROENTEROLOGIST:  Jaron Car MD    PRE-PROCEDURE DIAGNOSIS:   Gastroesophageal reflux disease, unspecified whether esophagitis present [K21.9]  Encounter for colorectal cancer screening [Z12.11, Z12.12]    POST-PROCEDURE DIAGNOSIS:  1.-Normal terminal ileum  2.-Status post cold snare polypectomy of three ascending colon polyps measuring 3 to 7 mm, one 6 mm transverse colon polyp and another 6 mm sigmoid colon polyp  3.-Minimal left-sided diverticulosis    INDICATION:  Colon cancer screening    PROCEDURE:  COLONOSCOPY with cold snare polypectomy    ANESTHESIA:  Propofol administered by anesthesia.  See anesthesia notes for ASA classification    STAFF  Circulator: Albania Holland, RN  Endo Technician: Shashi Perez    Findings:  As noted in the post procedure diagnosis    OPERATIVE PROCEDURE   After proper informed consent was obtained, the patient was taken the operating suite and placed in left lateral decubitus position.  An Olympus video colonoscope 180 series was inserted in the rectum and advanced to the terminal ileum under direct visualization.  Cecum and terminal ileum were identified by visualization of the appendiceal orifice and ileocecal valve.  The colonoscope was then slowly withdrawn from the cecum to the rectum and passed a second time from rectum to cecum.  The colonoscope was retroflexed in the cecum and rectum. Scope was then withdrawn. Patient tolerated the procedure well. There were no immediate complications. Cecal withdrawal time was 13 minutes.    ESTIMATED BLOOD LOSS  None    SPECIMENS  1.-Transverse, sigmoid and ascending colon polyps.  Pathology pending             COMPLICATIONS  None    RECOMMENDATIONS:  1.-Await pathology report  2.-Repeat colonoscopy in 3 years    Jaron Car MD  01/18/21 10:07 EST

## 2021-01-18 NOTE — ANESTHESIA POSTPROCEDURE EVALUATION
Patient: Mone Amador    Procedure Summary     Date: 01/18/21 Room / Location:  [facility]  /  [facility]    Anesthesia Start: 0956 Anesthesia Stop: 1037    Procedures:       ESOPHAGOGASTRODUODENOSCOPY WITH BIOPSY CPT CODE: 70589 (N/A Esophagus)      COLONOSCOPY FOR SCREENING CPT CODE:  (N/A ) Diagnosis:       Gastroesophageal reflux disease, unspecified whether esophagitis present      Encounter for colorectal cancer screening      (Gastroesophageal reflux disease, unspecified whether esophagitis present [K21.9])      (Encounter for colorectal cancer screening [Z12.11, Z12.12])    Surgeon: Jaron Car MD Provider: Santos Gonzales MD    Anesthesia Type: general ASA Status: 3          Anesthesia Type: general    Vitals  Vitals Value Taken Time   /82 01/18/21 1109   Temp 97.2 °F (36.2 °C) 01/18/21 1039   Pulse 70 01/18/21 1109   Resp 18 01/18/21 1109   SpO2 96 % 01/18/21 1109           Post Anesthesia Care and Evaluation    Patient location during evaluation: PHASE II  Patient participation: complete - patient participated  Level of consciousness: awake and alert  Pain score: 0  Pain management: adequate  Airway patency: patent  Anesthetic complications: No anesthetic complications  PONV Status: controlled  Cardiovascular status: acceptable  Respiratory status: acceptable and room air  Hydration status: euvolemic  No anesthesia care post op

## 2021-01-18 NOTE — OP NOTE
ESOPHAGOGASTRODUODENOSCOPY PROCEDURE REPORT    Mone Amador  1/18/2021    GASTROENTEROLOGIST:  Jaron Car MD    PRE-PROCEDURE DIAGNOSIS:  Epigastric pain    POST-PROCEDURE DIAGNOSIS:  1.-Normal esophagus.  EG junction at 36 cm  2.-Normal stomach.  Antral biopsies taken to rule out H. pylori  3.-Normal duodenum.  Duodenal biopsies taken to rule out celiac disease    INDICATION:  Epigastric pain  GERD    Procedure(s):  ESOPHAGOGASTRODUODENOSCOPY WITH BIOPSY CPT CODE: 40013    ANESTHESIA:  Propofol administered by anesthesia.  See anesthesia notes for ASA classification    STAFF:  Circulator: Albania Holland RN  Endo Technician: Shashi Perez    FINDINGS:  As noted in the post procedure adenosis    OPERATIVE PROCEDURE:  After proper informed consent was obtained, patient was transferred to the OR/endoscopy suite.  Patient was then placed in left lateral decubitus position. The Olympus 180 series video gastroscope was inserted orally under direct visualization.  Esophagus, stomach, and duodenum were inspected.  The endoscope was passed to the third portion of the duodenum.  Scope was retroflexed for visualization of the cardia and incisuraThe endoscope was then withdrawn. Patient tolerated the procedure well. There were no immediate complications.    ESTIMATED BLOOD LOSS:  None    SPECIMENS:  1.-Gastric antral biopsies rule out H. pylori  2.-Duodenal biopsies rule out celiac disease               COMPLICATIONS;  None    RECOMMENDATIONS/ PLAN:  1.-Await pathology report  2.-GI clinic follow-up  3.-Proceed with screening colonoscopy    Jaron Car MD     01/18/21 10:06 EST

## 2021-01-18 NOTE — H&P
Chief complaint  Abdominal pain    Subjective     Patient is a 50 y.o. female who presents today for an EGD and colonoscopy.  She reports that for a long time she has intermittent abdominal pain and once she withholds sodas the pain goes away.  Patient also reports constipation.  She denies all other GI symptoms, including rectal bleeding.  Family history is positive for two paternal uncles dying from colon cancer.  She has never had an EGD or colonoscopy before.  Medical, surgical, and social histories were reviewed and are listed below.      Review of Systems  Review of Systems - General ROS: negative for - weight loss  Psychological ROS: negative for - behavioral disorder  Ophthalmic ROS: negative for - dry eyes  ENT ROS: negative for - vertigo or vocal changes  Hematological and Lymphatic ROS: negative for - jaundice or swollen lymph nodes  Respiratory ROS: negative for - sputum changes or stridor  Cardiovascular ROS: negative for - irregular heartbeat or murmur  Gastrointestinal ROS: positive for-epigastric pain, constipation ;negative for - blood in stools or change in stools  Genito-Urinary ROS: negative for - hematuria or incontinence  Musculoskeletal ROS: negative for - gait disturbance      History  Past Medical History:   Diagnosis Date   • Anxiety    • Arthritis    • Cholecystolithiasis    • Fibromyalgia    • Headache    • Hypertension      Past Surgical History:   Procedure Laterality Date   •  SECTION      ,    • CHOLECYSTECTOMY     • HYSTERECTOMY      32     Family History   Problem Relation Age of Onset   • Heart disease Mother    • Hypertension Mother    • Stroke Mother    • Anxiety disorder Mother    • Thyroid disease Father    • Glaucoma Father    • Multiple sclerosis Sister    • No Known Problems Brother    • No Known Problems Son    • No Known Problems Sister    • No Known Problems Son    • Breast cancer Neg Hx      Social History     Tobacco Use   • Smoking status:  Never Smoker   • Smokeless tobacco: Never Used   Substance Use Topics   • Alcohol use: No   • Drug use: No     Medications Prior to Admission   Medication Sig Dispense Refill Last Dose   • Diclofenac Sodium (VOLTAREN) 1 % gel gel Apply 2 grams topically to the appropriate area as directed 2 (two) times a day. 100 g 0 1/17/2021 at Unknown time   • DULoxetine (CYMBALTA) 60 MG capsule Take 1 capsule by mouth 2 (Two) Times a Day. 60 capsule 6 1/17/2021 at Unknown time   • DULoxetine (CYMBALTA) 60 MG capsule Take 1 capsule by mouth 2 (two) times a day. 60 capsule 7 1/17/2021 at Unknown time   • hydroCHLOROthiazide (HYDRODIURIL) 25 MG tablet Take 1 tablet by mouth Daily. 30 tablet 6 1/17/2021 at Unknown time   • hydroCHLOROthiazide (HYDRODIURIL) 25 MG tablet Take 1 tablet by mouth Daily. 30 tablet 5 1/17/2021 at Unknown time   • HYDROcodone-acetaminophen (NORCO) 5-325 MG per tablet Take 1 tablet by mouth every 12 hours as needed for pain 30 tablet 0 1/17/2021 at Unknown time   • ibuprofen (ADVIL,MOTRIN) 800 MG tablet Take 1 tablet by mouth 3 (Three) Times a Day with food. 90 tablet 2 1/17/2021 at Unknown time   • LORazepam (ATIVAN) 0.5 MG tablet Take 1 tablet by mouth 2 times every day as needed 60 tablet 2 1/17/2021 at Unknown time   • nadolol (CORGARD) 40 MG tablet Take 1 tablet by mouth Twice a Day. 60 tablet 6 1/18/2021 at 0700   • ondansetron (ZOFRAN) 4 MG tablet Take 1 tablet by mouth 3 (Three) Times a Day As Needed for Nausea 30 tablet 0 1/17/2021 at Unknown time   • rizatriptan (MAXALT) 10 MG tablet Take 1 tablet by mouth at the Onset of Headache. May repeat in 2 hours if needed. Do not exceed Three tablets in 24 Hours. 12 tablet 5 1/17/2021 at Unknown time   • rizatriptan (MAXALT) 10 MG tablet Take 1 tablet by mouth once, may repeat at 2 hour intervals; do not exceed 30 mg in 24 hours 12 tablet 5 1/17/2021 at Unknown time   • traZODone (DESYREL) 100 MG tablet Take 1 tablet by mouth Every Night after Meal for  Sleep. 30 tablet 2 1/17/2021 at Unknown time   • zolpidem (AMBIEN) 10 MG tablet TAKE ONE TABLET BY MOUTH EVERY NIGHT AT BEDTIME 30 tablet 2 1/17/2021 at Unknown time     Allergies:  Morphine and related and Latex    Objective     Vital Signs       Physical Exam  General:  This is a WD pleasant female in no acute distress  Vital signs stable, afebrile  HEENT exam:  WNL. Sclera are anicteric.  EOMI  Neck:  supple, FROM.  No JVD.  Trachea midline  Lungs:  Respiratory effort normal. Auscultation: Very slight wheeze in CARRIE; without rhonchi, rales  Heart:  Regular rate and rhythm, without murmur, gallop, rub.  No pedal edema  Abdomen:  Minimal tenderness in her epigastric region; no palpable masses;  Bowel sounds normal  Musculoskeletal:  muscle strength/tone is normal.    Psyc:  alert, oriented x 3.  Mood and affect are appropriate  skin:  Warm with good turgor.  Without rash or lesion  extremities:  Examination of the extremities revealed no cyanosis, clubbing or edema.    Results Review:                     Invalid input(s): PROTCrCl cannot be calculated (Patient's most recent lab result is older than the maximum 30 days allowed.).  No results found for: AMMONIA      No results found for: BLOODCX  No results found for: URINECX  No results found for: WOUNDCX  No results found for: STOOLCX    Imaging:  Imaging Results (Last 24 Hours)     ** No results found for the last 24 hours. **                Impression:  Patient Active Problem List   Diagnosis Code   • Anxiety F41.9   • Fibromyalgia M79.7   • Hypertension I10   • Facet arthritis of lumbar region M47.816   • Intractable chronic migraine without aura and without status migrainosus G43.719   • Morbid obesity due to excess calories (CMS/[facility], located within [facility]) E66.01   • Anxiety and depression F41.9, F32.9   • Lumbar stenosis with neurogenic claudication M48.062   • Gastroesophageal reflux disease K21.9   • Encounter for colorectal cancer screening Z12.11, Z12.12       Assessment:  1.   Epigastric pain  2.  Constipation  3.  Family history of colon cancer (2 paternal uncles)    Plan:  The patient will undergo an EGD and colonoscopy.  Procedures were discussed with the patient who voiced understanding and agreement.    MOMO Garcia  01/18/21  07:53 EST

## 2021-01-20 LAB
LAB AP CASE REPORT: NORMAL
PATH REPORT.FINAL DX SPEC: NORMAL

## 2021-01-22 ENCOUNTER — APPOINTMENT (OUTPATIENT)
Dept: MAMMOGRAPHY | Facility: HOSPITAL | Age: 51
End: 2021-01-22

## 2021-02-23 ENCOUNTER — LAB (OUTPATIENT)
Dept: LAB | Facility: HOSPITAL | Age: 51
End: 2021-02-23

## 2021-02-23 DIAGNOSIS — R50.9 FEVER, UNSPECIFIED FEVER CAUSE: ICD-10-CM

## 2021-02-23 DIAGNOSIS — R50.9 FEVER, UNSPECIFIED FEVER CAUSE: Primary | ICD-10-CM

## 2021-02-23 PROCEDURE — C9803 HOPD COVID-19 SPEC COLLECT: HCPCS

## 2021-02-23 PROCEDURE — U0004 COV-19 TEST NON-CDC HGH THRU: HCPCS | Performed by: UROLOGY

## 2021-02-24 LAB — SARS-COV-2 RNA RESP QL NAA+PROBE: NOT DETECTED

## 2021-03-04 ENCOUNTER — IMMUNIZATION (OUTPATIENT)
Dept: VACCINE CLINIC | Facility: HOSPITAL | Age: 51
End: 2021-03-04

## 2021-03-04 PROCEDURE — 0001A: CPT | Performed by: INTERNAL MEDICINE

## 2021-03-04 PROCEDURE — 91300 HC SARSCOV02 VAC 30MCG/0.3ML IM: CPT | Performed by: INTERNAL MEDICINE

## 2021-03-08 ENCOUNTER — TELEPHONE (OUTPATIENT)
Dept: PAIN MEDICINE | Facility: CLINIC | Age: 51
End: 2021-03-08

## 2021-03-08 NOTE — TELEPHONE ENCOUNTER
Caller: BRANDT ORLANDO     Relationship to patient: PATIENT     Best call back number: [phone]    Patient is needing:PT REQUESTING A F/U  APPT FOR 03/30 - UNABLE TO SCHED W/ [facility] FOR THAT DAY- PT ASKED TO BE SCHEDULED W/ DR GRIDER, PER HUB WORK FLOW  FOLLOW UP APPTS GO TO [facility] UNLESS  OKAYED BY DR GRIDER- PLEASE CALL PATIENT BACK [phone]

## 2021-03-14 NOTE — PROGRESS NOTES
"Chief Complaint: \"Pain in my lower back, but only on the right side.\"     History of Present Illness:   Patient: Ms. Mone Amador, 50 y.o. female originally referred by Dr. Christopher Wu in consultation for intractable chronic lower back pain. Patient reports a now almost 2 year history of lower back pain, which began without incident. Patient was last seen on August 3, 2020 when she underwent bilateral lumbar medial branch rhizotoimes at L3, L4, and L5, from which she reports experiencing almost complete pain relief lasting almost seven months.  Additionally, she reports continued complete relief of her left sided lower back pain, she reports a recurrence of her lower back pain confined to her right side.  She denies leg pain.  She did not participate in physical therapy as medically advised. She returns today for post procedure follow up and evaluation.   Pain description: constant pain with intermittent exacerbation, described as tingling and aching sensation.   Radiation of pain: The lower back pain  is confined to her right side, with some occasional radiation into her right hip.  She continues to report complete relief of her left-sided lower back pain.    Pain intensity today: 7/10  Average pain intensity last week: 7/10  Pain intensity ranges from: 7/10 to 10/10  Aggravating factors: Pain increases with bending and ambulating more than 100 yards, standing longer 5 minutes.  Alleviating factors: Pain decreases with sittting and lying down.     Associated symptoms:   Patient denies pain, numbness or weakness in the lower extremities. She does report some \"tingling\" to her anterior thighs associated with standing for long periods.  Patient denies any new bladder or bowel problems.   Patient denies difficulties with her balance or recent falls.      Review of previous therapies and additional medical records:  Mone Amador has already failed the following measures, including:   Conservative measures: oral " analgesics, physical therapy and chiropractic therapy   Interventional measures: 03/13/2019: DxTx bilateral L4-L5 TESI  05/22/2019: bilateral L4-L5 TESI  08/03/2020: Bilateral lumbar RFTC  Surgical measures: No history of lumbar spine surgery  Patient underwent neurosurgical consultation with Dr Leobardo Albarado on 07/17/2018 and was found not to be a surgical candidate at that time.   Mone Amador presents with significant comorbidities including anxiety, depression and insomnia, engaged in treatment, fibromyalgia, migraine headaches, moderate obesity, engaged in treatment.  In terms of current analgesics, Mone Amador takes: Cymbalta, Norco (PRN), Flexeril, ibuprofen, and Maxalt. Patient also takes trazodone, lorazepam and Ambien  I have reviewed [facility] Report #[id number] consistent to medication reconciliation.    Global Pain Scale 01-21 2019 04-04 2019 05-01 2019 06-19 2019 02-26 2020 03-17 2021       Pain  13  6  21  10  17  19       Feelings  4  4  5  6  5  0       Clinical outcomes  12  4  18  7  11  14       Activities  14  0  24  13  5  23       GPS Total:  43  14  68  36  38  56         Review of Diagnostic Studies:  XR SPINE LUMBAR COMPLETE W FLEX EXT- 02/25/2019: Flexion and extension views show stable posterior spondylolisthesis of L2 on L3 vertebral body and grossly stable anterior spondylolisthesis of L4 vertebral body. Advanced facet arthropathy noted. L1/L2 advanced disc desiccation and endplate change.  MRI LUMBAR SPINE 06/13/18: Vertebral body height and alignment are preserved throughout. Marrow signal is unremarkable. Spinal cord terminates appropriately at the L1 level with normal signal present within the conus. Shortening of the pedicles of mainly of the lower levels contributes to mild generalized stenosis. The visualized paravertebral soft tissues are unremarkable.  L1/L2: Unremarkable.  L2/L3: Unremarkable.  L3/L4: Unremarkable.  L4/L5: Advanced hypertrophic facet arthropathy  with periarticular inflammation. Underlying annular type disc bulge eccentric to left. There is mild central canal and lateral recess stenosis. There is severe left and moderate right neural foraminal stenosis with mass effect on exiting left L4 nerve root laterally.   L5/S1: Mild disc bulge. Moderate hypertrophic facet arthropathy. Mild left-sided lateral recess stenosis. Moderate left neural foraminal stenosis and mild right neural foraminal stenosis.    Review of Systems   Musculoskeletal: Positive for back pain.   All other systems reviewed and are negative.        Patient Active Problem List   Diagnosis   • Anxiety   • Fibromyalgia   • Hypertension   • Facet arthritis of lumbar region   • Intractable chronic migraine without aura and without status migrainosus   • Morbid obesity due to excess calories (CMS/HCC)   • Anxiety and depression   • Lumbar stenosis with neurogenic claudication   • Gastroesophageal reflux disease   • Encounter for colorectal cancer screening       Past Medical History:   Diagnosis Date   • Anxiety    • Arthritis    • Cholecystolithiasis    • Fibromyalgia    • Headache    • Hypertension          Past Surgical History:   Procedure Laterality Date   •  SECTION      ,    • CHOLECYSTECTOMY     • COLONOSCOPY N/A 2021    Procedure: COLONOSCOPY FOR SCREENING CPT CODE: ;  Surgeon: Jaron Car MD;  Location: [facility];  Service: Gastroenterology;  Laterality: N/A;   • ENDOSCOPY N/A 2021    Procedure: ESOPHAGOGASTRODUODENOSCOPY WITH BIOPSY CPT CODE: 69457;  Surgeon: Jaron Car MD;  Location: [facility];  Service: Gastroenterology;  Laterality: N/A;   • HYSTERECTOMY      32         Family History   Problem Relation Age of Onset   • Heart disease Mother    • Hypertension Mother    • Stroke Mother    • Anxiety disorder Mother    • Thyroid disease Father    • Glaucoma Father    • Multiple sclerosis Sister    • No Known Problems  Brother    • No Known Problems Son    • No Known Problems Sister    • No Known Problems Son    • Breast cancer Neg Hx          Social History     Socioeconomic History   • Marital status:      Spouse name: Not on file   • Number of children: Not on file   • Years of education: Not on file   • Highest education level: Not on file   Tobacco Use   • Smoking status: Never Smoker   • Smokeless tobacco: Never Used   Vaping Use   • Vaping Use: Never used   Substance and Sexual Activity   • Alcohol use: No   • Drug use: No   • Sexual activity: Defer           Current Outpatient Medications:   •  Diclofenac Sodium (VOLTAREN) 1 % gel gel, Apply 2 grams topically to the appropriate area as directed 2 (two) times a day., Disp: 100 g, Rfl: 0  •  DULoxetine (CYMBALTA) 60 MG capsule, Take 1 capsule by mouth 2 (two) times a day., Disp: 60 capsule, Rfl: 7  •  hydroCHLOROthiazide (HYDRODIURIL) 25 MG tablet, Take 1 tablet by mouth Daily., Disp: 30 tablet, Rfl: 5  •  ibuprofen (ADVIL,MOTRIN) 800 MG tablet, Take 1 tablet by mouth 3 (Three) Times a Day with food., Disp: 90 tablet, Rfl: 2  •  LORazepam (ATIVAN) 0.5 MG tablet, Take 1 tablet by mouth 2 (Two) Times a Day As Needed., Disp: 60 tablet, Rfl: 2  •  nadolol (CORGARD) 40 MG tablet, Take 1 tablet by mouth Twice a Day., Disp: 60 tablet, Rfl: 6  •  rizatriptan (MAXALT) 10 MG tablet, Take 1 tablet by mouth once, may repeat at 2 hour intervals; do not exceed 30 mg in 24 hours, Disp: 12 tablet, Rfl: 5  •  traZODone (DESYREL) 100 MG tablet, take 1 tablet by mouth  every day after meals, Disp: 30 tablet, Rfl: 2  •  DULoxetine (CYMBALTA) 60 MG capsule, Take 1 capsule by mouth 2 (Two) Times a Day., Disp: 60 capsule, Rfl: 6  •  hydroCHLOROthiazide (HYDRODIURIL) 25 MG tablet, Take 1 tablet by mouth Daily., Disp: 30 tablet, Rfl: 6  •  hydroCHLOROthiazide (HYDRODIURIL) 25 MG tablet, Take 1 tablet by mouth Daily., Disp: 30 tablet, Rfl: 5  •  HYDROcodone-acetaminophen (NORCO) 5-325 MG per  "tablet, Take 1 tablet by mouth every 12 hours as needed for pain, Disp: 30 tablet, Rfl: 0  •  ibuprofen (ADVIL,MOTRIN) 800 MG tablet, Take 1 tablet by mouth 3 (Three) Times a Day with food., Disp: 90 tablet, Rfl: 2  •  ondansetron (ZOFRAN) 4 MG tablet, Take 1 tablet by mouth 3 (Three) Times a Day As Needed for Nausea, Disp: 30 tablet, Rfl: 0  •  rizatriptan (MAXALT) 10 MG tablet, Take 1 tablet by mouth at the Onset of Headache. May repeat in 2 hours if needed. Do not exceed Three tablets in 24 Hours., Disp: 12 tablet, Rfl: 5  •  rizatriptan (MAXALT) 10 MG tablet, Take 1 tablet by mouth once, may repeat at 2 hour intervals; do not exceed 30 mg in 24 hours, Disp: 12 tablet, Rfl: 5  •  traZODone (DESYREL) 100 MG tablet, Take 1 tablet by mouth Every Night after Meal for Sleep., Disp: 30 tablet, Rfl: 2  •  zolpidem (AMBIEN) 10 MG tablet, TAKE ONE TABLET BY MOUTH EVERY NIGHT AT BEDTIME, Disp: 30 tablet, Rfl: 2      Allergies   Allergen Reactions   • Morphine And Related Itching and Nausea Only   • Latex Itching and Rash         /89   Pulse 72   Temp 98.7 °F (37.1 °C)   Resp 14   Ht 157.5 cm (62\")   Wt 102 kg (224 lb 3.2 oz)   SpO2 98%   BMI 41.01 kg/m²        Physical Exam:   Constitutional: Patient is oriented to person, place, and time. Patient appears well-developed and well-nourished.   HEENT: Head: Normocephalic and atraumatic. Eyes: Conjunctivae and lids are normal. Pupils: Equal, round, reactive to light.   Neck: Trachea normal. Neck supple. No JVD present.   Pulmonary Respiratory effort: No increased work of breathing or signs of respiratory distress. Auscultation of lungs: Clear to auscultation.   Cardiovascular Auscultation of heart: Normal rate and rhythm, normal S1 and S2, no murmurs.   Gait and station: Gait evaluation demonstrated a normal gait   Lumbar spine: Passive and active range of motion are improved with some pain to the right. Extension, lateral flexion, and flexion of the lumbar spine " increased and reproduced her right sided pain. Lumbar facet joint loading maneuvers are positive on the right.  Pastor and Gaenslen's tests are negative   Piriformis maneuvers are negative   Palpation of the bilateral ischial tuberosities, unrevealing   Palpation of the bilateral greater trochanter, unrevealing   Examination of the Iliotibial band: unrevealing   Hip joints: The range of motion of the hip joints is full and without pain   Neurological: Patient is alert and oriented to person, place, and time. Speech: speech is normal. Cortical function: Normal mental status.   Cranial nerves: Cranial nerves 2-12 intact.   Reflex Scores:  Right patellar: 1+  Left patellar: 1+  Right Achilles: 1+  Left Achilles: 1+  Motor strength: 5/5  Motor Tone: normal tone.   Involuntary movements: none.   Superficial/Primitive Reflexes: primitive reflexes were absent.   Right Sands: absent  Left Sands: absent  Right ankle clonus: absent  Left ankle clonus: absent   Negative long tract signs. Straight leg raising test negative. Femoral stretch sign is negative.   Sensation: No sensory loss. Sensory exam: intact to light touch, intact pain and temperature sensation, intact vibration sensation and normal proprioception.   Coordination: Normal finger to nose and heel to shin. Normal balance and negative Romberg's sign   Skin and subcutaneous tissue: Skin is warm and intact. No rash noted. No cyanosis.   Psychiatric: Judgment and insight: Normal. Orientation to person, place and time: Normal. Recent and remote memory: Intact. Mood and affect: Normal.     ASSESSMENT:   1. Spondylosis of lumbar region without myelopathy or radiculopathy    2. Lumbar stenosis with neurogenic claudication    3. Morbid obesity due to excess calories (CMS/HCC)    4. Fibromyalgia    5. Anxiety and depression        PLAN/MEDICAL DECISION MAKING: I had a lengthy conversation with Ms. Mone Amador regarding her chronic pain condition and potential  therapeutic options including risks, benefits, alternative therapies, to name a few.  Patient was last seen on August 3, 2020 when she underwent bilateral lumbar medial branch rhizotoimes at L3, L4, and L5, from which she reports experiencing almost complete pain relief lasting almost seven months.  Additionally, she reports continued complete relief of her left sided lower back pain, she reports a recurrence of her lower back pain confined to her right side.  She denies leg pain. Lumbar MRI revealed facet joint widening at L4-L5 with some stenosis. Patient has failed to obtain pain relief with conservative measures, as referenced above. I have reviewed all available patient's medical records as well as previous therapies as referenced above.  Therefore, I have proposed the following plan:  1. Pharmacological measures: Reviewed. Discussed.   A. Patient takes Cymbalta, Norco (PRN), Flexeril, ibuprofen, and Maxalt. Patient also takes trazodone, lorazepam and Ambien  2. Interventional pain management measures: Patient will be scheduled for right lumbar medial branch rhizotomies at L3, L4, L5; for right lumbar facet joints at L4-L5, L5-S1.  Otherwise, we may repeat bilateral L4-L5 transforaminal epidural steroid injections. We may repeat epidural depending on patient's outcome.  Patient will follow-up with Dr. Albarado thereafter.  3. Diagnostics: I have discussed with the patient if she continues to struggle with pain we could possibly order a new MRI of the lumbar spine  4. Long-term rehabilitation efforts:  A. The patient does not have a history of falls. I did complete a risk assessment for falls.   B. Patient will start a comprehensive physical therapy program for water therapy, therapeutic exercise, core strengthening, gait and balance training, neurodynamics, myofascial release, cupping and dry needling   C. Start an exercise program such as yoga and water therapy  D. Contrast therapy: Apply ice-packs for 15-20  minutes, followed by heating pads for 15-20 minutes to affected area   E. Referral to [facility]  5.  The patient has been instructed to contact my office with any questions or difficulties. The patient understands the plan and agrees to proceed accordingly.           Patient Care Team:  Christopher Wu MD as PCP - General (Family Medicine)  Loyda Washington MD as Consulting Physician (Family Medicine)  Leobardo Albarado MD as Consulting Physician (Neurosurgery)  Santos Betancourt MD as Consulting Physician (Urology)  Dinh Amador MD as Consulting Physician (Pain Medicine)  Maryann Saldana APRN as Nurse Practitioner (Nurse Practitioner)     No orders of the defined types were placed in this encounter.        Future Appointments   Date Time Provider Department Center   3/26/2021  3:25 PM [facility]         [facility]     EMR Dragon/Transcription disclaimer:  Much of this encounter note is an electronic transcription of spoken language to printed text. Electronic transcription of spoken language may permit erroneous, or at times, nonsensical words or phrases to be inadvertently transcribed. Although I have reviewed the note for such errors, some may still exist.

## 2021-03-17 ENCOUNTER — OFFICE VISIT (OUTPATIENT)
Dept: PAIN MEDICINE | Facility: CLINIC | Age: 51
End: 2021-03-17

## 2021-03-17 VITALS
WEIGHT: 224.2 LBS | HEIGHT: 62 IN | OXYGEN SATURATION: 98 % | HEART RATE: 72 BPM | SYSTOLIC BLOOD PRESSURE: 130 MMHG | BODY MASS INDEX: 41.26 KG/M2 | DIASTOLIC BLOOD PRESSURE: 89 MMHG | TEMPERATURE: 98.7 F | RESPIRATION RATE: 14 BRPM

## 2021-03-17 DIAGNOSIS — M79.7 FIBROMYALGIA: ICD-10-CM

## 2021-03-17 DIAGNOSIS — F41.9 ANXIETY AND DEPRESSION: ICD-10-CM

## 2021-03-17 DIAGNOSIS — M48.062 LUMBAR STENOSIS WITH NEUROGENIC CLAUDICATION: ICD-10-CM

## 2021-03-17 DIAGNOSIS — E66.01 MORBID OBESITY DUE TO EXCESS CALORIES (HCC): ICD-10-CM

## 2021-03-17 DIAGNOSIS — F32.A ANXIETY AND DEPRESSION: ICD-10-CM

## 2021-03-17 DIAGNOSIS — M47.816 SPONDYLOSIS OF LUMBAR REGION WITHOUT MYELOPATHY OR RADICULOPATHY: ICD-10-CM

## 2021-03-17 DIAGNOSIS — M47.816 SPONDYLOSIS OF LUMBAR REGION WITHOUT MYELOPATHY OR RADICULOPATHY: Primary | ICD-10-CM

## 2021-03-17 PROCEDURE — 99213 OFFICE O/P EST LOW 20 MIN: CPT | Performed by: NURSE PRACTITIONER

## 2021-03-26 ENCOUNTER — IMMUNIZATION (OUTPATIENT)
Dept: VACCINE CLINIC | Facility: HOSPITAL | Age: 51
End: 2021-03-26

## 2021-03-26 PROCEDURE — 91300 HC SARSCOV02 VAC 30MCG/0.3ML IM: CPT | Performed by: INTERNAL MEDICINE

## 2021-03-26 PROCEDURE — 0002A: CPT | Performed by: INTERNAL MEDICINE

## 2021-03-31 DIAGNOSIS — M47.816 SPONDYLOSIS OF LUMBAR REGION WITHOUT MYELOPATHY OR RADICULOPATHY: Primary | ICD-10-CM

## 2021-04-01 ENCOUNTER — TRANSCRIBE ORDERS (OUTPATIENT)
Dept: ADMINISTRATIVE | Facility: HOSPITAL | Age: 51
End: 2021-04-01

## 2021-04-01 DIAGNOSIS — Z01.818 PRE-OPERATIVE CLEARANCE: Primary | ICD-10-CM

## 2021-04-05 ENCOUNTER — LAB (OUTPATIENT)
Dept: LAB | Facility: HOSPITAL | Age: 51
End: 2021-04-05

## 2021-04-05 DIAGNOSIS — Z01.818 PRE-OPERATIVE CLEARANCE: ICD-10-CM

## 2021-04-05 PROCEDURE — U0004 COV-19 TEST NON-CDC HGH THRU: HCPCS | Performed by: ANESTHESIOLOGY

## 2021-04-05 PROCEDURE — C9803 HOPD COVID-19 SPEC COLLECT: HCPCS

## 2021-04-06 LAB — SARS-COV-2 RNA NOSE QL NAA+PROBE: NOT DETECTED

## 2021-04-07 ENCOUNTER — OUTSIDE FACILITY SERVICE (OUTPATIENT)
Dept: PAIN MEDICINE | Facility: CLINIC | Age: 51
End: 2021-04-07

## 2021-04-07 PROCEDURE — 64635 DESTROY LUMB/SAC FACET JNT: CPT | Performed by: ANESTHESIOLOGY

## 2021-04-07 PROCEDURE — 64636 DESTROY L/S FACET JNT ADDL: CPT | Performed by: ANESTHESIOLOGY

## 2021-04-07 PROCEDURE — 99152 MOD SED SAME PHYS/QHP 5/>YRS: CPT | Performed by: ANESTHESIOLOGY

## 2021-04-08 ENCOUNTER — TELEPHONE (OUTPATIENT)
Dept: PAIN MEDICINE | Facility: CLINIC | Age: 51
End: 2021-04-08

## 2021-04-08 NOTE — TELEPHONE ENCOUNTER
CARMELA for Mone at [phone] to inquire about how she is doing after her procedure yesterday with Dr. Amador. Office number was provided in case of any questions or concerns. Also advised her that we were sending out her reminder card for her follow up in the mail and to call if she doesn't receive this.

## 2021-05-06 ENCOUNTER — LAB (OUTPATIENT)
Dept: UROLOGY | Facility: CLINIC | Age: 51
End: 2021-05-06

## 2021-05-06 DIAGNOSIS — R53.83 FATIGUE, UNSPECIFIED TYPE: Primary | ICD-10-CM

## 2021-05-06 DIAGNOSIS — M25.50 ARTHRALGIA, UNSPECIFIED JOINT: ICD-10-CM

## 2021-05-06 LAB
25(OH)D3 SERPL-MCNC: 15.6 NG/ML
ALBUMIN SERPL-MCNC: 4 G/DL (ref 3.5–5.2)
ALBUMIN/GLOB SERPL: 1.4 G/DL
ALP SERPL-CCNC: 65 U/L (ref 39–117)
ALT SERPL W P-5'-P-CCNC: 24 U/L (ref 1–33)
ANION GAP SERPL CALCULATED.3IONS-SCNC: 8.2 MMOL/L (ref 5–15)
AST SERPL-CCNC: 15 U/L (ref 1–32)
BILIRUB SERPL-MCNC: 0.5 MG/DL (ref 0–1.2)
BUN SERPL-MCNC: 12 MG/DL (ref 6–20)
BUN/CREAT SERPL: 14.6 (ref 7–25)
CALCIUM SPEC-SCNC: 9.6 MG/DL (ref 8.6–10.5)
CHLORIDE SERPL-SCNC: 97 MMOL/L (ref 98–107)
CO2 SERPL-SCNC: 31.8 MMOL/L (ref 22–29)
CREAT SERPL-MCNC: 0.82 MG/DL (ref 0.57–1)
DEPRECATED RDW RBC AUTO: 42.9 FL (ref 37–54)
ERYTHROCYTE [DISTWIDTH] IN BLOOD BY AUTOMATED COUNT: 13.7 % (ref 12.3–15.4)
FOLATE SERPL-MCNC: 10.1 NG/ML (ref 4.78–24.2)
GFR SERPL CREATININE-BSD FRML MDRD: 74 ML/MIN/1.73
GLOBULIN UR ELPH-MCNC: 2.8 GM/DL
GLUCOSE SERPL-MCNC: 86 MG/DL (ref 65–99)
HCT VFR BLD AUTO: 43.6 % (ref 34–46.6)
HGB BLD-MCNC: 14.6 G/DL (ref 12–15.9)
IRON 24H UR-MRATE: 68 MCG/DL (ref 37–145)
IRON SATN MFR SERPL: 18 % (ref 20–50)
MCH RBC QN AUTO: 28.7 PG (ref 26.6–33)
MCHC RBC AUTO-ENTMCNC: 33.5 G/DL (ref 31.5–35.7)
MCV RBC AUTO: 85.8 FL (ref 79–97)
PLATELET # BLD AUTO: 403 10*3/MM3 (ref 140–450)
PMV BLD AUTO: 10.1 FL (ref 6–12)
POTASSIUM SERPL-SCNC: 4 MMOL/L (ref 3.5–5.2)
PROT SERPL-MCNC: 6.8 G/DL (ref 6–8.5)
RBC # BLD AUTO: 5.08 10*6/MM3 (ref 3.77–5.28)
SODIUM SERPL-SCNC: 137 MMOL/L (ref 136–145)
TIBC SERPL-MCNC: 375 MCG/DL (ref 298–536)
TRANSFERRIN SERPL-MCNC: 252 MG/DL (ref 200–360)
TSH SERPL DL<=0.05 MIU/L-ACNC: 3.83 UIU/ML (ref 0.27–4.2)
VIT B12 BLD-MCNC: 290 PG/ML (ref 211–946)
WBC # BLD AUTO: 5.81 10*3/MM3 (ref 3.4–10.8)

## 2021-05-06 PROCEDURE — 82607 VITAMIN B-12: CPT | Performed by: UROLOGY

## 2021-05-06 PROCEDURE — 82306 VITAMIN D 25 HYDROXY: CPT | Performed by: UROLOGY

## 2021-05-06 PROCEDURE — 36415 COLL VENOUS BLD VENIPUNCTURE: CPT | Performed by: NURSE PRACTITIONER

## 2021-05-06 PROCEDURE — 83540 ASSAY OF IRON: CPT | Performed by: UROLOGY

## 2021-05-06 PROCEDURE — 80053 COMPREHEN METABOLIC PANEL: CPT | Performed by: UROLOGY

## 2021-05-06 PROCEDURE — 84443 ASSAY THYROID STIM HORMONE: CPT | Performed by: UROLOGY

## 2021-05-06 PROCEDURE — 85027 COMPLETE CBC AUTOMATED: CPT | Performed by: UROLOGY

## 2021-05-06 PROCEDURE — 82746 ASSAY OF FOLIC ACID SERUM: CPT | Performed by: UROLOGY

## 2021-05-06 PROCEDURE — 84466 ASSAY OF TRANSFERRIN: CPT | Performed by: UROLOGY

## 2021-05-07 DIAGNOSIS — R11.0 NAUSEA: Primary | ICD-10-CM

## 2021-05-07 RX ORDER — ONDANSETRON 4 MG/1
4 TABLET, FILM COATED ORAL DAILY PRN
Qty: 30 TABLET | Refills: 6 | Status: SHIPPED | OUTPATIENT
Start: 2021-05-07 | End: 2021-08-18

## 2021-05-07 RX ORDER — SEMAGLUTIDE 1.34 MG/ML
0.25 INJECTION, SOLUTION SUBCUTANEOUS WEEKLY
Qty: 6 ML | Refills: 2 | Status: SHIPPED | OUTPATIENT
Start: 2021-05-07 | End: 2021-08-18

## 2021-05-27 RX ORDER — NADOLOL 40 MG/1
40 TABLET ORAL
Qty: 60 TABLET | Refills: 6 | Status: CANCELLED | OUTPATIENT
Start: 2021-05-27

## 2021-06-28 DIAGNOSIS — L05.91 PILONIDAL CYST OF NATAL CLEFT: Primary | ICD-10-CM

## 2021-06-28 RX ORDER — ERYTHROMYCIN STEARATE 250 MG
250 TABLET ORAL 3 TIMES DAILY
Qty: 42 TABLET | Refills: 0 | Status: SHIPPED | OUTPATIENT
Start: 2021-06-28 | End: 2021-06-29

## 2021-06-29 DIAGNOSIS — L05.91 PILONIDAL CYST OF NATAL CLEFT: Primary | ICD-10-CM

## 2021-06-29 RX ORDER — FLUCONAZOLE 100 MG/1
100 TABLET ORAL DAILY
Qty: 2 TABLET | Refills: 0 | Status: SHIPPED | OUTPATIENT
Start: 2021-06-29 | End: 2021-08-18

## 2021-06-29 RX ORDER — AMOXICILLIN AND CLAVULANATE POTASSIUM 875; 125 MG/1; MG/1
1 TABLET, FILM COATED ORAL 2 TIMES DAILY
Qty: 28 TABLET | Refills: 0 | Status: SHIPPED | OUTPATIENT
Start: 2021-06-29 | End: 2021-07-13

## 2021-06-30 DIAGNOSIS — M48.062 LUMBAR STENOSIS WITH NEUROGENIC CLAUDICATION: Primary | ICD-10-CM

## 2021-07-11 RX ORDER — DULOXETIN HYDROCHLORIDE 60 MG/1
60 CAPSULE, DELAYED RELEASE ORAL 2 TIMES DAILY
Qty: 60 CAPSULE | Refills: 7 | Status: CANCELLED | OUTPATIENT
Start: 2021-07-11

## 2021-07-13 ENCOUNTER — HOSPITAL ENCOUNTER (OUTPATIENT)
Dept: MRI IMAGING | Facility: HOSPITAL | Age: 51
Discharge: HOME | End: 2021-07-13

## 2021-07-13 ENCOUNTER — HOSPITAL ENCOUNTER (OUTPATIENT)
Dept: GENERAL RADIOLOGY | Facility: HOSPITAL | Age: 51
Discharge: HOME | End: 2021-07-13

## 2021-07-13 DIAGNOSIS — M48.062 LUMBAR STENOSIS WITH NEUROGENIC CLAUDICATION: ICD-10-CM

## 2021-07-13 PROCEDURE — 72114 X-RAY EXAM L-S SPINE BENDING: CPT

## 2021-07-13 PROCEDURE — 72148 MRI LUMBAR SPINE W/O DYE: CPT

## 2021-07-13 PROCEDURE — 72114 X-RAY EXAM L-S SPINE BENDING: CPT | Performed by: RADIOLOGY

## 2021-07-13 PROCEDURE — 72148 MRI LUMBAR SPINE W/O DYE: CPT | Performed by: RADIOLOGY

## 2021-07-14 DIAGNOSIS — M48.062 LUMBAR STENOSIS WITH NEUROGENIC CLAUDICATION: Primary | ICD-10-CM

## 2021-08-18 ENCOUNTER — OFFICE VISIT (OUTPATIENT)
Dept: NEUROSURGERY | Facility: CLINIC | Age: 51
End: 2021-08-18

## 2021-08-18 VITALS
HEIGHT: 62 IN | DIASTOLIC BLOOD PRESSURE: 82 MMHG | RESPIRATION RATE: 20 BRPM | WEIGHT: 212.6 LBS | TEMPERATURE: 96.9 F | BODY MASS INDEX: 39.12 KG/M2 | SYSTOLIC BLOOD PRESSURE: 122 MMHG

## 2021-08-18 DIAGNOSIS — G89.29 CHRONIC RIGHT-SIDED LOW BACK PAIN WITH RIGHT-SIDED SCIATICA: ICD-10-CM

## 2021-08-18 DIAGNOSIS — M43.10 ACQUIRED SPONDYLOLISTHESIS: Primary | ICD-10-CM

## 2021-08-18 DIAGNOSIS — M54.41 CHRONIC RIGHT-SIDED LOW BACK PAIN WITH RIGHT-SIDED SCIATICA: ICD-10-CM

## 2021-08-18 PROCEDURE — 99214 OFFICE O/P EST MOD 30 MIN: CPT | Performed by: PHYSICIAN ASSISTANT

## 2021-08-18 RX ORDER — METHOCARBAMOL 750 MG/1
750 TABLET, FILM COATED ORAL 3 TIMES DAILY
Qty: 60 TABLET | Refills: 1 | Status: SHIPPED | OUTPATIENT
Start: 2021-08-18 | End: 2021-12-01 | Stop reason: HOSPADM

## 2021-08-18 RX ORDER — IBUPROFEN 800 MG/1
TABLET ORAL EVERY 8 HOURS
COMMUNITY
Start: 2021-07-12 | End: 2021-08-18

## 2021-08-18 RX ORDER — NABUMETONE 750 MG/1
750 TABLET, FILM COATED ORAL 2 TIMES DAILY
Qty: 60 TABLET | Refills: 0 | Status: SHIPPED | OUTPATIENT
Start: 2021-08-18 | End: 2021-09-29 | Stop reason: SDUPTHER

## 2021-08-18 NOTE — PROGRESS NOTES
"NAME: BRANDT AMADOR   DOS: 2021  : 1970  PCP: Christopher Wu MD    Chief Complaint:  Back Pain      History of Present Illness: Ms. Amador is a 50 y.o. female who is seen today with an exacerbation of chronic low back pain with right leg radiculopathy.  Patient notes she has had ongoing back pain for the last several years that has continued to worsen describes as a \"stabbing\" pain..  She notes over the last year she has had onset of right leg numbness/tingliness that extends in her anterior/medial aspect of her thigh that increases in intensity on her anterior shin and radiates to the top of her foot into her big toe.  Patient notes that she has only able to walk approximately 2 miles in the store until she needs to return to car for rest.  She does note tendency to lean on cart when walking.  Patient has undergone physical therapy and injections.  She underwent a rhizotomy that helped with her left leg pain, however, the right side risotto me has not relieved her symptoms.  She is taking ibuprofen 800 mg multiple times a day and tramadol.  Is unable to tolerate gabapentin.  Has not attempted muscle relaxer in several years.  Patient has lost approximately 15 pounds in the last several months.  She has denies any urinary retention/incontinence or saddle anesthesia.    She is seen today in evaluation.      Past Medical History:   Diagnosis Date   • Anxiety    • Arthritis    • Cholecystolithiasis    • Fibromyalgia    • Headache    • Hypertension    • Low back pain        Past Surgical History:   Procedure Laterality Date   •  SECTION      ,    • CHOLECYSTECTOMY     • COLONOSCOPY N/A 2021    Procedure: COLONOSCOPY FOR SCREENING CPT CODE: ;  Surgeon: Jaron Car MD;  Location: [facility];  Service: Gastroenterology;  Laterality: N/A;   • ENDOSCOPY N/A 2021    Procedure: ESOPHAGOGASTRODUODENOSCOPY WITH BIOPSY CPT CODE: 63744;  Surgeon: Joceline" Jaron Kidd MD;  Location: [facility];  Service: Gastroenterology;  Laterality: N/A;   • HYSTERECTOMY  2003 32             Review of Systems   Constitutional: Positive for fatigue. Negative for activity change, appetite change, chills, diaphoresis, fever and unexpected weight change.   HENT: Negative for congestion, dental problem, drooling, ear discharge, ear pain, facial swelling, hearing loss, mouth sores, nosebleeds, postnasal drip, rhinorrhea, sinus pressure, sinus pain, sneezing, sore throat, tinnitus, trouble swallowing and voice change.    Eyes: Negative for photophobia, pain, discharge, redness, itching and visual disturbance.   Respiratory: Negative for apnea, cough, choking, chest tightness, shortness of breath, wheezing and stridor.    Cardiovascular: Negative for chest pain, palpitations and leg swelling.   Gastrointestinal: Negative for abdominal distention, abdominal pain, anal bleeding, blood in stool, constipation, diarrhea, nausea, rectal pain and vomiting.   Endocrine: Negative for cold intolerance, heat intolerance, polydipsia, polyphagia and polyuria.   Genitourinary: Negative for decreased urine volume, difficulty urinating, dysuria, enuresis, flank pain, frequency, genital sores, hematuria and urgency.   Musculoskeletal: Positive for back pain. Negative for arthralgias, gait problem, joint swelling, myalgias, neck pain and neck stiffness.   Skin: Negative for color change, pallor, rash and wound.   Allergic/Immunologic: Negative for environmental allergies, food allergies and immunocompromised state.   Neurological: Positive for headaches. Negative for dizziness, tremors, seizures, syncope, facial asymmetry, speech difficulty, weakness, light-headedness and numbness.   Hematological: Negative for adenopathy. Does not bruise/bleed easily.   Psychiatric/Behavioral: Negative for agitation, behavioral problems, confusion, decreased concentration, dysphoric mood, hallucinations, self-injury,  sleep disturbance and suicidal ideas. The patient is not nervous/anxious and is not hyperactive.             Medications:    Current Outpatient Medications:   •  hydroCHLOROthiazide (HYDRODIURIL) 25 MG tablet, Take 1 tablet by mouth Daily., Disp: 30 tablet, Rfl: 5  •  LORazepam (ATIVAN) 0.5 MG tablet, Take 1 tablet by mouth 2 (Two) Times a Day As Needed., Disp: 60 tablet, Rfl: 2  •  nadolol (CORGARD) 40 MG tablet, Take 1 tablet by mouth Twice a Day., Disp: 60 tablet, Rfl: 6  •  rizatriptan (MAXALT) 10 MG tablet, Take 1 tablet by mouth once, may repeat at 2 hour intervals; do not exceed 30 mg in 24 hours, Disp: 12 tablet, Rfl: 5  •  traZODone (DESYREL) 100 MG tablet, Take 1 tablet by mouth Daily after meals, Disp: 30 tablet, Rfl: 4  •  zolpidem (AMBIEN) 10 MG tablet, Take 1 tablet by mouth every night at bedtime., Disp: 30 tablet, Rfl: 2  •  DULoxetine (CYMBALTA) 60 MG capsule, Take 1 capsule by mouth 2 (Two) Times a Day., Disp: 60 capsule, Rfl: 6  •  hydroCHLOROthiazide (HYDRODIURIL) 25 MG tablet, Take 1 tablet by mouth Daily., Disp: 30 tablet, Rfl: 6  •  methocarbamol (ROBAXIN) 750 MG tablet, Take 1 tablet by mouth 3 (Three) Times a Day., Disp: 60 tablet, Rfl: 1  •  nabumetone (RELAFEN) 750 MG tablet, Take 1 tablet by mouth 2 (Two) Times a Day., Disp: 60 tablet, Rfl: 0  •  rizatriptan (MAXALT) 10 MG tablet, Take 1 tablet by mouth at the Onset of Headache. May repeat in 2 hours if needed. Do not exceed Three tablets in 24 Hours., Disp: 12 tablet, Rfl: 5  •  rizatriptan (MAXALT) 10 MG tablet, Take 1 tablet by mouth once, may repeat at 2 hour intervals; do not exceed 30 mg in 24 hours, Disp: 12 tablet, Rfl: 5    Allergies:  Allergies   Allergen Reactions   • Morphine And Related Itching and Nausea Only   • Latex Itching and Rash       Social History     Tobacco Use   • Smoking status: Never Smoker   • Smokeless tobacco: Never Used   Vaping Use   • Vaping Use: Never used   Substance Use Topics   • Alcohol use: No   •  Drug use: No       Family History   Problem Relation Age of Onset   • Heart disease Mother    • Hypertension Mother    • Stroke Mother    • Anxiety disorder Mother    • Thyroid disease Father    • Glaucoma Father    • Multiple sclerosis Sister    • No Known Problems Brother    • No Known Problems Son    • No Known Problems Sister    • No Known Problems Son    • Breast cancer Neg Hx        Review of Imaging:  MRI of the lumbar spine was reviewed along with the corresponding radiology report.  Study demonstrates a grade 1 anterolisthesis of L4 on L1.  There is also demonstration of disc bulge causing moderate bilateral foraminal narrowing.    X-ray of the lumbar spine that was performed on 2021 was reviewed along with the corresponding radiology report.  Study demonstrates grade 1 anterolisthesis of L4 on L5.    Vitals:    21 1048   BP: 122/82   Resp: 20   Temp: 96.9 °F (36.1 °C)     Body mass index is 38.89 kg/m².    Physical Exam  Neurological:      Mental Status: She is oriented to person, place, and time.      Coordination: Romberg Test normal.      Gait: Gait is intact.      Deep Tendon Reflexes:      Reflex Scores:       Patellar reflexes are 2+ on the right side and 2+ on the left side.       Achilles reflexes are 2+ on the right side and 2+ on the left side.  Psychiatric:         Speech: Speech normal.       Neurologic Exam     Mental Status   Oriented to person, place, and time.   Speech: speech is normal     Motor Exam   Muscle bulk: normal  Overall muscle tone: normal    Strength   Right iliopsoas: 5/5  Left iliopsoas: 5/5  Right quadriceps: 5/5  Left quadriceps: 5/5  Right hamstrin/5  Left hamstrin/5  Left glutei: 5/5  Right anterior tibial: 4/5  Left anterior tibial: 5/5  Right gastroc: 5/5  Left gastroc: 5/5    Sensory Exam   Right leg light touch: Decreased throughout compared to left.  Left leg light touch: normal    Gait, Coordination, and Reflexes     Gait  Gait:  normal    Coordination   Romberg: negative    Reflexes   Right patellar: 2+  Left patellar: 2+  Right achilles: 2+  Left achilles: 2+  Right Sands: absent  Left Sands: absent  Right ankle clonus: absent  Left ankle clonus: absent      Diagnoses/Plan:    Ms. Amador is a 50 y.o. female who is seen today with exacerbation of low back pain with right leg radiculopathy in an L4 pattern.  After reviewing the studies, patient has evidence of a grade 1 spondylolisthesis at L4-L5 that moves (8 mm - 11 mm) in flexion and extension.  Therefore, after discussing with Dr. Boucher, we recommend patient to undergo a PLIF L4-5 for her spondylolisthesis and for her right leg radiculopathy.  We will have patient follow-up next week via telephone with Dr. Boucher to discuss the surgery in further detail.  It is noted to patient that she is at increased risk of complication with this surgery due to her obesity and fibromyalgia.  Patient notes understanding and willing to proceed.      Patient's Body mass index is 38.89 kg/m². indicating that she is obese (BMI >30). Obesity-related health conditions include the following: hypertension and osteoarthritis.            Kari Bay PA-C

## 2021-08-20 ENCOUNTER — TELEPHONE (OUTPATIENT)
Dept: NEUROSURGERY | Facility: CLINIC | Age: 51
End: 2021-08-20

## 2021-08-20 NOTE — TELEPHONE ENCOUNTER
Discussed with Sander.  Please set patient up for a telephone visit with him in the upcoming week to discuss surgery.

## 2021-08-20 NOTE — TELEPHONE ENCOUNTER
Patient is requesting an appointment to see Dr. Boucher. Patient states that Kari was going to discuss surgery/follow-up with with . Please see note from Adrienne for more information.     Patient's phone #: [phone]   Last OV: 08/18/2021  Office Visit with Kari Bay PA-C (08/18/2021)

## 2021-08-20 NOTE — TELEPHONE ENCOUNTER
Caller: Mone Amador    Relationship: Self    Best call back number: [phone]  What is the best time to reach you: ANY    Who are you requesting to speak with (clinical staff, provider,  specific staff member): CLINICAL    Do you know the name of the person who called: NA    What was the call regarding: POSSIBLE FOLLOW UP WITH DR. ARNOLD    Do you require a callback: YES    PATIENT STATES SHE RECEIVED A CALL FROM THE OFFICE BUT NO VM.  PATIENT STATES THAT RICHY WAS GOING TO DISCUSS WITH DR. ARNOLD REGARDING PATIENT HAVING SURGERY OR OTHER FOLLOW UP.    PLEASE CALL THE PATIENT WITH QUESTIONS AND CONCERNS @ [phone]    THANK YOU

## 2021-08-23 ENCOUNTER — OFFICE VISIT (OUTPATIENT)
Dept: NEUROSURGERY | Facility: CLINIC | Age: 51
End: 2021-08-23

## 2021-08-23 VITALS — HEIGHT: 62 IN | WEIGHT: 212.9 LBS | BODY MASS INDEX: 39.18 KG/M2

## 2021-08-23 DIAGNOSIS — M79.7 FIBROMYALGIA: ICD-10-CM

## 2021-08-23 DIAGNOSIS — M54.17 LUMBOSACRAL RADICULOPATHY AT L5: ICD-10-CM

## 2021-08-23 DIAGNOSIS — M43.16 SPONDYLOLISTHESIS OF LUMBAR REGION: Primary | ICD-10-CM

## 2021-08-23 DIAGNOSIS — E66.01 CLASS 2 SEVERE OBESITY DUE TO EXCESS CALORIES WITH SERIOUS COMORBIDITY AND BODY MASS INDEX (BMI) OF 38.0 TO 38.9 IN ADULT (HCC): ICD-10-CM

## 2021-08-23 PROBLEM — E66.812 CLASS 2 SEVERE OBESITY DUE TO EXCESS CALORIES WITH SERIOUS COMORBIDITY AND BODY MASS INDEX (BMI) OF 38.0 TO 38.9 IN ADULT: Status: ACTIVE | Noted: 2019-02-21

## 2021-08-23 PROCEDURE — 99442 PR PHYS/QHP TELEPHONE EVALUATION 11-20 MIN: CPT | Performed by: NEUROLOGICAL SURGERY

## 2021-08-23 RX ORDER — OXYCODONE HCL 10 MG/1
10 TABLET, FILM COATED, EXTENDED RELEASE ORAL ONCE
Status: CANCELLED | OUTPATIENT
Start: 2021-08-23 | End: 2021-08-23

## 2021-08-23 RX ORDER — SODIUM CHLORIDE 0.9 % (FLUSH) 0.9 %
10 SYRINGE (ML) INJECTION EVERY 12 HOURS SCHEDULED
Status: CANCELLED | OUTPATIENT
Start: 2021-08-23

## 2021-08-23 RX ORDER — SODIUM CHLORIDE 0.9 % (FLUSH) 0.9 %
10 SYRINGE (ML) INJECTION AS NEEDED
Status: CANCELLED | OUTPATIENT
Start: 2021-08-23

## 2021-08-23 RX ORDER — IBUPROFEN 200 MG
800 TABLET ORAL ONCE
Status: CANCELLED | OUTPATIENT
Start: 2021-08-23 | End: 2021-08-23

## 2021-08-23 RX ORDER — CHLORHEXIDINE GLUCONATE 4 G/100ML
SOLUTION TOPICAL
Qty: 120 ML | Refills: 0 | Status: SHIPPED | OUTPATIENT
Start: 2021-08-23 | End: 2021-12-01 | Stop reason: HOSPADM

## 2021-08-23 RX ORDER — PREGABALIN 75 MG/1
150 CAPSULE ORAL ONCE
Status: CANCELLED | OUTPATIENT
Start: 2021-08-23 | End: 2021-08-23

## 2021-08-23 RX ORDER — ACETAMINOPHEN 325 MG/1
1000 TABLET ORAL ONCE
Status: CANCELLED | OUTPATIENT
Start: 2021-08-23 | End: 2021-08-23

## 2021-08-23 NOTE — H&P
Subjective     Chief Complaint: Low back pain    Patient ID: Mone Amador is a 50 y.o. female is here today for follow-up.    History of Present Illness    This is a 50-year-old woman who was seen by one of our physician assistants last week.  She has back pain and bilateral leg pain coinciding with L5 radiculopathies.  She is a candidate for an L4-5 PLIF.  Telephone consultation was established today to obtain informed consent for this procedure and to answer any questions she might have regarding her upcoming surgery.  She reports no significant change in her symptoms since her last clinic visit with Kari Bay.    The following portions of the patient's history were reviewed and updated as appropriate: allergies, current medications, past family history, past medical history, past social history, past surgical history and problem list.    Family history:   Family History   Problem Relation Age of Onset   • Heart disease Mother    • Hypertension Mother    • Stroke Mother    • Anxiety disorder Mother    • Thyroid disease Father    • Glaucoma Father    • Multiple sclerosis Sister    • No Known Problems Brother    • No Known Problems Son    • No Known Problems Sister    • No Known Problems Son    • Breast cancer Neg Hx        Social history:   Social History     Socioeconomic History   • Marital status:      Spouse name: Not on file   • Number of children: Not on file   • Years of education: Not on file   • Highest education level: Not on file   Tobacco Use   • Smoking status: Never Smoker   • Smokeless tobacco: Never Used   Vaping Use   • Vaping Use: Never used   Substance and Sexual Activity   • Alcohol use: No   • Drug use: No   • Sexual activity: Defer       Review of Systems   Constitutional: Positive for fatigue. Negative for activity change, appetite change, chills, diaphoresis, fever and unexpected weight change.   HENT: Negative for congestion, dental problem, drooling, ear discharge, ear  "pain, facial swelling, hearing loss, mouth sores, nosebleeds, postnasal drip, rhinorrhea, sinus pressure, sinus pain, sneezing, sore throat, tinnitus, trouble swallowing and voice change.    Eyes: Negative for photophobia, pain, discharge, redness, itching and visual disturbance.   Respiratory: Negative for apnea, cough, choking, chest tightness, shortness of breath, wheezing and stridor.    Cardiovascular: Negative for chest pain, palpitations and leg swelling.   Gastrointestinal: Negative for abdominal distention, abdominal pain, anal bleeding, blood in stool, constipation, diarrhea, nausea, rectal pain and vomiting.   Endocrine: Negative for cold intolerance, heat intolerance, polydipsia, polyphagia and polyuria.   Genitourinary: Negative for decreased urine volume, difficulty urinating, dysuria, enuresis, flank pain, frequency, genital sores, hematuria and urgency.   Musculoskeletal: Positive for back pain. Negative for arthralgias, gait problem, joint swelling, myalgias, neck pain and neck stiffness.   Skin: Negative for color change, pallor, rash and wound.   Allergic/Immunologic: Negative for environmental allergies, food allergies and immunocompromised state.   Neurological: Positive for headaches. Negative for dizziness, tremors, seizures, syncope, facial asymmetry, speech difficulty, weakness, light-headedness and numbness.   Hematological: Negative for adenopathy. Does not bruise/bleed easily.   Psychiatric/Behavioral: Negative for agitation, behavioral problems, confusion, decreased concentration, dysphoric mood, hallucinations, self-injury, sleep disturbance and suicidal ideas. The patient is not nervous/anxious and is not hyperactive.        Objective   Height 157.5 cm (62\"), weight 96.6 kg (212 lb 14.4 oz).  Body mass index is 38.94 kg/m².    Physical Exam    Assessment/Plan     Independent Review of Radiographic Studies:      Available for my review is a MRI of the lumbar spine which was performed on " July 13, 2021.  This demonstrates a grade 1 spondylolisthesis of L4 on L5 measuring approximately 5 mm.  There is widening of the facet joints at L4-5 and there is severe, bilateral lateral recess stenosis, likely contributing to bilateral L5 radiculopathies.  Available also for my review are flexion-extension films of the lumbar spine.  The interpreting radiologist does not comment on the obvious exacerbation of the spondylolisthesis in the flexed position, indicating pathological motion.  There is clear radiographic evidence of a mobile spondylolisthesis at L4-5.    Medical Decision Making:      You have chosen to receive care through a telephone visit. Do you consent to use a telephone visit for your medical care today? Yes    Approximately 12 minutes was spent on today's call including reviewing her prior records and obtaining informed consent for her L4-5 PLIF.    All questions were answered.  No guarantees were given or implied.  She acknowledges the attendant risks and benefits of her procedure.  She acknowledges that she is at increased risk of periprocedural complications due to her morbid obesity.  We will schedule her on an elective basis for an L4-5 PLIF with use of bone morphogenic protein which I also discussed with her.    Diagnoses and all orders for this visit:    1. Spondylolisthesis of lumbar region (Primary)    2. Lumbosacral radiculopathy at L5    3. Class 2 severe obesity due to excess calories with serious comorbidity and body mass index (BMI) of 38.0 to 38.9 in adult (CMS/[facility])    4. Fibromyalgia        No follow-ups on file.           This document signed by HARISH Boucher MD August 23, 2021 09:26 EDT

## 2021-08-23 NOTE — PROGRESS NOTES
Subjective     Chief Complaint: Low back pain    Patient ID: Mone Amador is a 50 y.o. female is here today for follow-up.    History of Present Illness    This is a 50-year-old woman who was seen by one of our physician assistants last week.  She has back pain and bilateral leg pain coinciding with L5 radiculopathies.  She is a candidate for an L4-5 PLIF.  Telephone consultation was established today to obtain informed consent for this procedure and to answer any questions she might have regarding her upcoming surgery.  She reports no significant change in her symptoms since her last clinic visit with Kari Bay.    The following portions of the patient's history were reviewed and updated as appropriate: allergies, current medications, past family history, past medical history, past social history, past surgical history and problem list.    Family history:   Family History   Problem Relation Age of Onset   • Heart disease Mother    • Hypertension Mother    • Stroke Mother    • Anxiety disorder Mother    • Thyroid disease Father    • Glaucoma Father    • Multiple sclerosis Sister    • No Known Problems Brother    • No Known Problems Son    • No Known Problems Sister    • No Known Problems Son    • Breast cancer Neg Hx        Social history:   Social History     Socioeconomic History   • Marital status:      Spouse name: Not on file   • Number of children: Not on file   • Years of education: Not on file   • Highest education level: Not on file   Tobacco Use   • Smoking status: Never Smoker   • Smokeless tobacco: Never Used   Vaping Use   • Vaping Use: Never used   Substance and Sexual Activity   • Alcohol use: No   • Drug use: No   • Sexual activity: Defer       Review of Systems   Constitutional: Positive for fatigue. Negative for activity change, appetite change, chills, diaphoresis, fever and unexpected weight change.   HENT: Negative for congestion, dental problem, drooling, ear discharge, ear  "pain, facial swelling, hearing loss, mouth sores, nosebleeds, postnasal drip, rhinorrhea, sinus pressure, sinus pain, sneezing, sore throat, tinnitus, trouble swallowing and voice change.    Eyes: Negative for photophobia, pain, discharge, redness, itching and visual disturbance.   Respiratory: Negative for apnea, cough, choking, chest tightness, shortness of breath, wheezing and stridor.    Cardiovascular: Negative for chest pain, palpitations and leg swelling.   Gastrointestinal: Negative for abdominal distention, abdominal pain, anal bleeding, blood in stool, constipation, diarrhea, nausea, rectal pain and vomiting.   Endocrine: Negative for cold intolerance, heat intolerance, polydipsia, polyphagia and polyuria.   Genitourinary: Negative for decreased urine volume, difficulty urinating, dysuria, enuresis, flank pain, frequency, genital sores, hematuria and urgency.   Musculoskeletal: Positive for back pain. Negative for arthralgias, gait problem, joint swelling, myalgias, neck pain and neck stiffness.   Skin: Negative for color change, pallor, rash and wound.   Allergic/Immunologic: Negative for environmental allergies, food allergies and immunocompromised state.   Neurological: Positive for headaches. Negative for dizziness, tremors, seizures, syncope, facial asymmetry, speech difficulty, weakness, light-headedness and numbness.   Hematological: Negative for adenopathy. Does not bruise/bleed easily.   Psychiatric/Behavioral: Negative for agitation, behavioral problems, confusion, decreased concentration, dysphoric mood, hallucinations, self-injury, sleep disturbance and suicidal ideas. The patient is not nervous/anxious and is not hyperactive.        Objective   Height 157.5 cm (62\"), weight 96.6 kg (212 lb 14.4 oz).  Body mass index is 38.94 kg/m².    Physical Exam    Assessment/Plan     Independent Review of Radiographic Studies:      Available for my review is a MRI of the lumbar spine which was performed on " July 13, 2021.  This demonstrates a grade 1 spondylolisthesis of L4 on L5 measuring approximately 5 mm.  There is widening of the facet joints at L4-5 and there is severe, bilateral lateral recess stenosis, likely contributing to bilateral L5 radiculopathies.  Available also for my review are flexion-extension films of the lumbar spine.  The interpreting radiologist does not comment on the obvious exacerbation of the spondylolisthesis in the flexed position, indicating pathological motion.  There is clear radiographic evidence of a mobile spondylolisthesis at L4-5.    Medical Decision Making:      You have chosen to receive care through a telephone visit. Do you consent to use a telephone visit for your medical care today? Yes    Approximately 12 minutes was spent on today's call including reviewing her prior records and obtaining informed consent for her L4-5 PLIF.    All questions were answered.  No guarantees were given or implied.  She acknowledges the attendant risks and benefits of her procedure.  She acknowledges that she is at increased risk of periprocedural complications due to her morbid obesity.  We will schedule her on an elective basis for an L4-5 PLIF with use of bone morphogenic protein which I also discussed with her.    Diagnoses and all orders for this visit:    1. Spondylolisthesis of lumbar region (Primary)    2. Lumbosacral radiculopathy at L5    3. Class 2 severe obesity due to excess calories with serious comorbidity and body mass index (BMI) of 38.0 to 38.9 in adult (CMS/[facility])    4. Fibromyalgia        No follow-ups on file.           This document signed by HARISH Boucher MD August 23, 2021 09:26 EDT

## 2021-09-29 RX ORDER — NABUMETONE 750 MG/1
750 TABLET, FILM COATED ORAL 2 TIMES DAILY
Qty: 60 TABLET | Refills: 0 | Status: SHIPPED | OUTPATIENT
Start: 2021-09-29 | End: 2021-12-01 | Stop reason: HOSPADM

## 2021-09-29 NOTE — TELEPHONE ENCOUNTER
Provider:  Sander  Caller:  Automated refill request  Surgery: NA  Surgery Date:    Last visit:  08/18/21   Next visit: Na    Reason for call:         Requested Prescriptions     Pending Prescriptions Disp Refills   • nabumetone (RELAFEN) 750 MG tablet 60 tablet 0     Sig: Take 1 tablet by mouth 2 (Two) Times a Day.

## 2021-09-30 PROBLEM — M54.17 LUMBOSACRAL RADICULOPATHY AT L5: Status: ACTIVE | Noted: 2021-09-30

## 2021-10-05 DIAGNOSIS — G89.29 CHRONIC RIGHT-SIDED LOW BACK PAIN WITH RIGHT-SIDED SCIATICA: ICD-10-CM

## 2021-10-05 DIAGNOSIS — M54.41 CHRONIC RIGHT-SIDED LOW BACK PAIN WITH RIGHT-SIDED SCIATICA: ICD-10-CM

## 2021-10-05 DIAGNOSIS — M43.10 ACQUIRED SPONDYLOLISTHESIS: ICD-10-CM

## 2021-10-05 DIAGNOSIS — M54.17 LUMBOSACRAL RADICULOPATHY AT L5: ICD-10-CM

## 2021-10-05 DIAGNOSIS — M79.7 FIBROMYALGIA: ICD-10-CM

## 2021-10-05 DIAGNOSIS — M43.16 SPONDYLOLISTHESIS OF LUMBAR REGION: Primary | ICD-10-CM

## 2021-10-05 RX ORDER — CHLORHEXIDINE GLUCONATE 4 G/100ML
SOLUTION TOPICAL DAILY PRN
Qty: 120 ML | Refills: 0 | Status: SHIPPED | OUTPATIENT
Start: 2021-10-05 | End: 2021-12-01 | Stop reason: HOSPADM

## 2021-10-06 ENCOUNTER — LAB (OUTPATIENT)
Dept: LAB | Facility: HOSPITAL | Age: 51
End: 2021-10-06

## 2021-10-06 ENCOUNTER — E-VISIT (OUTPATIENT)
Dept: FAMILY MEDICINE CLINIC | Facility: TELEHEALTH | Age: 51
End: 2021-10-06

## 2021-10-06 DIAGNOSIS — R05.9 COUGH: ICD-10-CM

## 2021-10-06 DIAGNOSIS — Z20.822 SUSPECTED COVID-19 VIRUS INFECTION: Primary | ICD-10-CM

## 2021-10-06 DIAGNOSIS — Z20.822 SUSPECTED COVID-19 VIRUS INFECTION: ICD-10-CM

## 2021-10-06 PROCEDURE — BHEMPVIDEOVISIT: Performed by: NURSE PRACTITIONER

## 2021-10-06 PROCEDURE — U0004 COV-19 TEST NON-CDC HGH THRU: HCPCS | Performed by: NURSE PRACTITIONER

## 2021-10-06 PROCEDURE — C9803 HOPD COVID-19 SPEC COLLECT: HCPCS

## 2021-10-06 RX ORDER — BROMPHENIRAMINE MALEATE, PSEUDOEPHEDRINE HYDROCHLORIDE, AND DEXTROMETHORPHAN HYDROBROMIDE 2; 30; 10 MG/5ML; MG/5ML; MG/5ML
10 SYRUP ORAL 4 TIMES DAILY PRN
Qty: 200 ML | Refills: 0 | Status: SHIPPED | OUTPATIENT
Start: 2021-10-06 | End: 2021-12-13

## 2021-10-06 NOTE — PATIENT INSTRUCTIONS
3 Key Steps to Take While Waiting for Your COVID-19 Test Result  To help stop the spread of COVID-19, take these 3 key steps NOW while waiting for your test results:  1. Stay home and monitor your health.  Stay home and monitor your health to help protect your friends, family, and others from possibly getting COVID-19 from you.  Stay home and away from others:  · If possible, stay away from others, especially people who are at higher risk for getting very sick from COVID-19, such as older adults and people with other medical conditions.  · If you have been in contact with someone with COVID-19, stay home and away from others for 14 days after your last contact with that person. Follow the recommendations of your local public health department if you need to quarantine.  · If you have a fever, cough or other symptoms of COVID-19, stay home and away from others (except to get medical care).  Monitor your health:  · Watch for fever, cough, shortness of breath, or other symptoms of COVID-19. Remember, symptoms may appear 2-14 days after exposure to COVID-19 and can include:  ? Fever or chills  ? Cough  ? Shortness of breath or difficulty breathing  ? Tiredness  ? Muscle or body aches  ? Headache  ? New loss of taste or smell  ? Sore throat  ? Congestion or runny nose  ? Nausea or vomiting  ? Diarrhea  2. Think about the people you have recently been around.  If you are diagnosed with COVID-19, a public health worker may call you to check on your health, discuss who you have been around, and ask where you spent time while you may have been able to spread COVID-19 to others. While you wait for your COVID-19 test result, think about everyone you have been around recently. This will be important information to give health workers if your test is positive.   Complete the information on the back of this page to help you remember everyone you have been around.   3. Answer the phone call from the health department.  If a public  health worker calls you, answer the call to help slow the spread of COVID-19 in your community.  · Discussions with health department staff are confidential. This means that your personal and medical information will be kept private and only shared with those who may need to know, like your health care provider.  · Your name will not be shared with those you came in contact with. The health department will only notify people you were in close contact with (within 6 feet for more than 15 minutes) that they might have been exposed to COVID-19.  Think about the people you have recently been around  If you test positive and are diagnosed with COVID-19, someone from the health department may call to check-in on your health, discuss who you have been around, and ask where you spent time while you may have been able to spread COVID-19 to others. This form can help you think about people you have recently been around so you will be ready if a public health worker calls you.  Things to think about. Have you:  · Gone to work or school?  · Gotten together with others (eaten out at a restaurant, gone out for drinks, exercised with others or gone to a gym, had friends or family over to your house, volunteered, gone to a party, pool, or park)?  · Gone to a store in person (e.g., grocery store, mall)?  · Gone to in-person appointments (e.g., salon, padilla, doctor's or dentist's office)?  · Ridden in a car with others (e.g., rideshare) or taken public transportation?  · Been inside a Jewish, Judaism, Buddhism or other places of Yarsanism?  Who lives with you?  · ______________________________________________________________________  · ______________________________________________________________________  · ______________________________________________________________________  · ______________________________________________________________________  Who have you been around (less than 6 feet for a total of 15 minutes or more) in the  last 10 days? (You may have more people to list than the space provided. If so, write on the front of this sheet or a separate piece of paper.)  Name ______________________________________________  · Phone number ____________________________________  · Date you last saw them _____________________________  · Where you last saw them ________________________________________________  Name ______________________________________________  · Phone number ____________________________________  · Date you last saw them _____________________________  · Where you last saw them ________________________________________________  Name ______________________________________________  · Phone number ____________________________________  · Date you last saw them _____________________________  · Where you last saw them ________________________________________________  Name ______________________________________________  · Phone number ____________________________________  · Date you last saw them _____________________________  · Where you last saw them ________________________________________________  Name ______________________________________________  · Phone number ____________________________________  · Date you last saw them _____________________________  · Where you last saw them ________________________________________________  What have you done in the last 10 days with other people?  Activity _____________________________________________  · Location _________________________________________  · Date ____________________________________________  Activity _____________________________________________  · Location _________________________________________  · Date ____________________________________________  Activity _____________________________________________  · Location _________________________________________  · Date ____________________________________________  Activity _____________________________________________  · Location  _________________________________________  · Date ____________________________________________  Activity _____________________________________________  · Location _________________________________________  · Date ____________________________________________  Centers for Disease Control and Prevention  cdc.gov/coronavirus  07/09/2021  This information is not intended to replace advice given to you by your health care provider. Make sure you discuss any questions you have with your health care provider.  Document Revised: 07/14/2021 Document Reviewed: 07/14/2021  [employer] Patient Education © 2021 [employer]  10 Things You Can Do to Manage Your COVID-19 Symptoms at Home  If you have possible or confirmed COVID-19:  1. Stay home except to get medical care.  2. Monitor your symptoms carefully. If your symptoms get worse, call your healthcare provider immediately.  3. Get rest and stay hydrated.  4. If you have a medical appointment, call the healthcare provider ahead of time and tell them that you have or may have COVID-19.  5. For medical emergencies, call 911 and notify the dispatch personnel that you have or may have COVID-19.  6. Cover your cough and sneezes with a tissue or use the inside of your elbow.  7. Wash your hands often with soap and water for at least 20 seconds or clean your hands with an alcohol-based hand  that contains at least 60% alcohol.  8. As much as possible, stay in a specific room and away from other people in your home. Also, you should use a separate bathroom, if available. If you need to be around other people in or outside of the home, wear a mask.  9. Avoid sharing personal items with other people in your household, like dishes, towels, and bedding.  10. Clean all surfaces that are touched often, like counters, tabletops, and doorknobs. Use household cleaning sprays or wipes according to the label instructions.  cdc.gov/coronavirus  07/16/2021  This information is not intended  to replace advice given to you by your health care provider. Make sure you discuss any questions you have with your health care provider.  Document Revised: 08/04/2021 Document Reviewed: 08/04/2021  [employer] Patient Education © 2021 [employer]  How to Quarantine at Home  Information for Patients and Families    These instructions are for people with confirmed or suspected COVID-19 who do not need to be hospitalized and those with confirmed COVID-19 who were hospitalized and discharged to care for themselves at home.    If you were tested through the Health Department  The Health Department will monitor your wellbeing.  If it is determined that you do not need to be hospitalized and can be isolated at home, you will be monitored by staff from your local or state health department.     If you were tested through a Commercial Lab  You will need to monitor yourself and report changes in your symptoms to your doctor.  See the section below called Monitor Your Symptoms.    Follow these steps until a healthcare provider or local or state health department says you can return to your normal activities.    Stay home except to get medical care  • Restrict activities outside your home, except for getting medical care.   • Do not go to work, school, or public areas.   • Avoid using public transportation, ride-sharing, or taxis.    Separate yourself from other people and animals in your home  People  As much as possible, you should stay in a specific room and away from other people in your home. Also, you should use a separate bathroom, if available.    Animals  You should restrict contact with pets and other animals while you are sick with COVID-19, just like you would around other people. When possible, have another member of your household care for your animals while you are sick. If you are sick with COVID-19, avoid contact with your pet, including petting, snuggling, being kissed or licked, and sharing food. If you must  care for your pet or be around animals while you are sick, wash your hands before and after you interact with pets and wear a facemask. See COVID-19 and Animals for more information.    Call ahead before visiting your doctor  If you have a medical appointment, call the healthcare provider and tell them that you have or may have COVID-19. This information will help the healthcare provider’s office take steps to keep other people from getting infected or exposed.    Wear a facemask  You should wear a facemask when you are around other people (e.g., sharing a room or vehicle) or pets and before you enter a healthcare provider’s office.     If you are not able to wear a facemask (for example, because it causes trouble breathing), then people who live with you should not stay in the same room with you, or they should wear a facemask if they enter your room.    Cover your coughs and sneezes  • Cover your mouth and nose with a tissue when you cough or sneeze.   • Throw used tissues in a lined trash can.   • Immediately wash your hands with soap and water for at least 20 seconds or, if soap and water are not available, clean your hands with an alcohol-based hand  that contains at least 60% alcohol.    Clean your hands often  • Wash your hands often with soap and water for at least 20 seconds, especially after blowing your nose, coughing, or sneezing; going to the bathroom; and before eating or preparing food.     • If soap and water are not readily available, use an alcohol-based hand  with at least 60% alcohol, covering all surfaces of your hands and rubbing them together until they feel dry.    • Soap and water are the best option if hands are visibly dirty. Avoid touching your eyes, nose, and mouth with unwashed hands.    Avoid sharing personal household items  • You should not share dishes, drinking glasses, cups, eating utensils, towels, or bedding with other people or pets in your home.   • After  using these items, they should be washed thoroughly with soap and water.    Clean all “high-touch” surfaces everyday  • High touch surfaces include counters, tabletops, doorknobs, bathroom fixtures, toilets, phones, keyboards, tablets, and bedside tables.   • Also, clean any surfaces that may have blood, stool, or body fluids on them.   • Use a household cleaning spray or wipe, according to the label instructions. Labels contain instructions for safe and effective use of the cleaning product, including precautions you should take when applying the product, such as wearing gloves and making sure you have good ventilation during use of the product.    Monitor your symptoms  • Seek prompt medical attention if your illness is worsening (e.g., difficulty breathing).   • Before seeking care, call your healthcare provider and tell them that you have, or are being evaluated for, COVID-19.   • Put on a facemask before you enter the facility.     • These steps will help the healthcare provider’s office to keep other people in the office or waiting room from getting infected or exposed.   • Persons who are placed under active monitoring or facilitated self-monitoring should follow instructions provided by their local health department or occupational health professionals, as appropriate.  • If you have a medical emergency and need to call 911, notify the dispatch personnel that you have, or are being evaluated for COVID-19. If possible, put on a facemask before emergency medical services arrive.    Discontinuing home isolation  Patients with confirmed COVID-19 should remain under home isolation precautions until the risk of secondary transmission to others is thought to be low. The decision to discontinue home isolation precautions should be made on a case-by-case basis, in consultation with healthcare providers and state and local health departments.    The below content are for household members, intimate partners, and  caregivers of a patient with symptomatic laboratory-confirmed COVID-19 or a patient under investigation:    Household members, intimate partners, and caregivers may have close contact with a person with symptomatic, laboratory-confirmed COVID-19 or a person under investigation.     Close contacts should monitor their health; they should call their healthcare provider right away if they develop symptoms suggestive of COVID-19 (e.g., fever, cough, shortness of breath)     Close contacts should also follow these recommendations:  • Make sure that you understand and can help the patient follow their healthcare provider’s instructions for medication(s) and care. You should help the patient with basic needs in the home and provide support for getting groceries, prescriptions, and other personal needs.  • Monitor the patient’s symptoms. If the patient is getting sicker, call his or her healthcare provider and tell them that the patient has laboratory-confirmed COVID-19. This will help the healthcare provider’s office take steps to keep other people in the office or waiting room from getting infected. Ask the healthcare provider to call the local or [facility] health department for additional guidance. If the patient has a medical emergency and you need to call 911, notify the dispatch personnel that the patient has, or is being evaluated for COVID-19.  • Household members should stay in another room or be  from the patient as much as possible. Household members should use a separate bedroom and bathroom, if available.  • Prohibit visitors who do not have an essential need to be in the home.  • Household members should care for any pets in the home. Do not handle pets or other animals while sick.  For more information, see COVID-19 and Animals.  • Make sure that shared spaces in the home have good air flow, such as by an air conditioner or an opened window, weather permitting.  • Perform hand hygiene frequently. Wash  your hands often with soap and water for at least 20 seconds or use an alcohol-based hand  that contains 60 to 95% alcohol, covering all surfaces of your hands and rubbing them together until they feel dry. Soap and water should be used preferentially if hands are visibly dirty.  • Avoid touching your eyes, nose, and mouth with unwashed hands.  • The patient should wear a facemask when you are around other people. If the patient is not able to wear a facemask (for example, because it causes trouble breathing), you, as the caregiver, should wear a mask when you are in the same room as the patient.  • Wear a disposable facemask and gloves when you touch or have contact with the patient’s blood, stool, or body fluids, such as saliva, sputum, nasal mucus, vomit, or urine.   o Throw out disposable facemasks and gloves after using them. Do not reuse.  o When removing personal protective equipment, first remove and dispose of gloves. Then, immediately clean your hands with soap and water or alcohol-based hand . Next, remove and dispose of facemask, and immediately clean your hands again with soap and water or alcohol-based hand .  • Avoid sharing household items with the patient. You should not share dishes, drinking glasses, cups, eating utensils, towels, bedding, or other items. After the patient uses these items, you should wash them thoroughly (see below “Wash laundry thoroughly”).  • Clean all “high-touch” surfaces, such as counters, tabletops, doorknobs, bathroom fixtures, toilets, phones, keyboards, tablets, and bedside tables, every day. Also, clean any surfaces that may have blood, stool, or body fluids on them.   o Use a household cleaning spray or wipe, according to the label instructions. Labels contain instructions for safe and effective use of the cleaning product including precautions you should take when applying the product, such as wearing gloves and making sure you have good  ventilation during use of the product.  • Wash laundry thoroughly.   o Immediately remove and wash clothes or bedding that have blood, stool, or body fluids on them.  o Wear disposable gloves while handling soiled items and keep soiled items away from your body. Clean your hands (with soap and water or an alcohol-based hand ) immediately after removing your gloves.  o Read and follow directions on labels of laundry or clothing items and detergent. In general, using a normal laundry detergent according to washing machine instructions and dry thoroughly using the warmest temperatures recommended on the clothing label.  • Place all used disposable gloves, facemasks, and other contaminated items in a lined container before disposing of them with other household waste. Clean your hands (with soap and water or an alcohol-based hand ) immediately after handling these items. Soap and water should be used preferentially if hands are visibly dirty.  • Discuss any additional questions with your state or local health department or healthcare provider.    Adapted from information provided by the Centers for Disease Control and Prevention.  For more information, visit https://www.cdc.gov/coronavirus/2019-ncov/hcp/guidance-prevent-spread.html

## 2021-10-06 NOTE — PROGRESS NOTES
Mone Amador    1970  [id number]    I have reviewed the e-Visit questionnaire and patient's answers, my assessment and plan are as follows:      HPI  Mone Amador is a 50 y.o. with a 1 day history of persistent dry cough which prevents her from sleeping at night, runny nose, nasal congestion, headache, body aches, low-grade fever 99.6.  She denies ear pain, sore throat, shortness of breath, loss of taste/smell.  She would like to be tested for COVID-19.     Review of Systems - Negative except symptoms listed in HPI      Diagnoses and all orders for this visit:    1. Suspected COVID-19 virus infection (Primary)  -     COVID PRE-OP / PRE-PROCEDURE SCREENING ORDER (NO ISOLATION) - Swab, Nasopharynx; Future    2. Cough  -     brompheniramine-pseudoephedrine-DM 30-2-10 MG/5ML syrup; Take 10 mL by mouth 4 (Four) Times a Day As Needed for Congestion, Cough or Allergies.  Dispense: 200 mL; Refill: 0    --COVID-19 test to rule out infection; quarantine and symptomatic treatment  --Bromfed DM is an antihistamine, decongestant, and cough suppressant for your symptoms; take as prescribed    The [facility] testing site will call you to schedule a time for your COVID-19 test    We will call with results when they are available. The phone call will come from a blocked number. A [facility] message will also be sent after the first attempted call to notify you that your test results are available and the results will be released on [facility] for you to review.     Continue to treat your symptoms just as you would for any viral illness. Take Tylenol and/or Ibuprofen for pain and/or fever, you may find it better to alternate these every 4 hours, so that you are only taking each every 8 hours. Stay hydrated, rest and you may treat cough with over-the-counter cough syrup such as Robitussin.     You will need to Self Quarantine (instructions are being sent to [facility]) until the following criteria has been met:  --it has been 10 days  from onset of symptoms  --minimum of 24 hours fever free without fever reducing medication  --AND improvement of symptoms    Continue to wear a mask for 14 days or until symptoms resolve. If symptoms worsen, go to Urgent Care or Emergency Department for care.        Any medications prescribed have been sent electronically to   [facility] [address] [place] [address], [place] - [address] [facility] [address] [facility] [address] - [phone]  - [phone] FX  [address] [facility] [address]  Phone: [phone] Fax: [phone]    [facility]  [address] [facility] [address]  Phone: [phone] Fax: [phone]      Time Documentation  Counseled patient  Counseling topics: diagnosis, treatment options and return instructions  Total encounter time: counseling time more than 50% of visit: 20 minutes        CECIL Serrano  10/06/21  09:47 EDT

## 2021-10-07 LAB — SARS-COV-2 RNA NOSE QL NAA+PROBE: DETECTED

## 2021-10-26 ENCOUNTER — PRE-ADMISSION TESTING (OUTPATIENT)
Dept: PREADMISSION TESTING | Facility: HOSPITAL | Age: 51
End: 2021-10-26

## 2021-10-26 VITALS — BODY MASS INDEX: 37.28 KG/M2 | HEIGHT: 62 IN | WEIGHT: 202.6 LBS

## 2021-10-26 DIAGNOSIS — M54.17 LUMBOSACRAL RADICULOPATHY AT L5: ICD-10-CM

## 2021-10-26 DIAGNOSIS — M43.16 SPONDYLOLISTHESIS OF LUMBAR REGION: ICD-10-CM

## 2021-10-26 LAB
ANION GAP SERPL CALCULATED.3IONS-SCNC: 12 MMOL/L (ref 5–15)
BUN SERPL-MCNC: 12 MG/DL (ref 6–20)
BUN/CREAT SERPL: 13.2 (ref 7–25)
CALCIUM SPEC-SCNC: 9.9 MG/DL (ref 8.6–10.5)
CHLORIDE SERPL-SCNC: 95 MMOL/L (ref 98–107)
CO2 SERPL-SCNC: 28 MMOL/L (ref 22–29)
CREAT SERPL-MCNC: 0.91 MG/DL (ref 0.57–1)
DEPRECATED RDW RBC AUTO: 40.3 FL (ref 37–54)
ERYTHROCYTE [DISTWIDTH] IN BLOOD BY AUTOMATED COUNT: 13.2 % (ref 12.3–15.4)
GFR SERPL CREATININE-BSD FRML MDRD: 65 ML/MIN/1.73
GLUCOSE SERPL-MCNC: 111 MG/DL (ref 65–99)
HBA1C MFR BLD: 5.5 % (ref 4.8–5.6)
HCT VFR BLD AUTO: 45.7 % (ref 34–46.6)
HGB BLD-MCNC: 15.8 G/DL (ref 12–15.9)
MCH RBC QN AUTO: 29 PG (ref 26.6–33)
MCHC RBC AUTO-ENTMCNC: 34.6 G/DL (ref 31.5–35.7)
MCV RBC AUTO: 83.9 FL (ref 79–97)
MRSA DNA SPEC QL NAA+PROBE: NEGATIVE
PLATELET # BLD AUTO: 436 10*3/MM3 (ref 140–450)
PMV BLD AUTO: 9.6 FL (ref 6–12)
POTASSIUM SERPL-SCNC: 3.9 MMOL/L (ref 3.5–5.2)
QT INTERVAL: 406 MS
QTC INTERVAL: 425 MS
RBC # BLD AUTO: 5.45 10*6/MM3 (ref 3.77–5.28)
SARS-COV-2 RNA PNL SPEC NAA+PROBE: DETECTED
SODIUM SERPL-SCNC: 135 MMOL/L (ref 136–145)
WBC # BLD AUTO: 8.03 10*3/MM3 (ref 3.4–10.8)

## 2021-10-26 PROCEDURE — 93010 ELECTROCARDIOGRAM REPORT: CPT | Performed by: INTERNAL MEDICINE

## 2021-10-26 PROCEDURE — C9803 HOPD COVID-19 SPEC COLLECT: HCPCS

## 2021-10-26 PROCEDURE — 36415 COLL VENOUS BLD VENIPUNCTURE: CPT

## 2021-10-26 PROCEDURE — 85027 COMPLETE CBC AUTOMATED: CPT

## 2021-10-26 PROCEDURE — 80048 BASIC METABOLIC PNL TOTAL CA: CPT

## 2021-10-26 PROCEDURE — 87641 MR-STAPH DNA AMP PROBE: CPT

## 2021-10-26 PROCEDURE — 93005 ELECTROCARDIOGRAM TRACING: CPT

## 2021-10-26 PROCEDURE — 83036 HEMOGLOBIN GLYCOSYLATED A1C: CPT

## 2021-10-26 PROCEDURE — U0004 COV-19 TEST NON-CDC HGH THRU: HCPCS

## 2021-10-26 NOTE — PAT
An arrival time for procedure was not given during PAT visit. If patient had any questions or concerns about their arrival time, they were instructed to contact their surgeon/physician.  Additionally, if the patient referred to an arrival time that was acquired from their my chart account, patient was encouraged to verify that time with their surgeon/physician.  NO arrival times given in Pre Admission Testing Department.    Patient to apply Chlorhexadine wipes  to surgical area (as instructed) the night before procedure and the AM of procedure. Wipes provided.    Patient instructed to drink 20 ounces (or until full) of Gatorade and it needs to be completed 1 hour (for Main OR patients) or 2 hours (scheduled  section patients) before given arrival time for procedure (NO RED Gatorade)    Patient verbalized understanding.    Clean catch urinalysis not indicated because patient denied recent urinary frequency, urinary urgency, burning or pain upon urination, or flank pain. No recent UTIs.    Bactroban and Chlorhexidine Prescription prescribed by physician before PAT visit.  Verified with patient that medications were picked up from their pharmacy.  Written instructions given to patient during PAT visit.  Patient/family also instructed to complete skin prep checklist and return the checklist on the day of surgery to preoperative staff.  Patient/family verbalized understanding.    PATIENT TESTED POSITIVE FOR COVID ON 10/06/2021. NO CURRENT SYMPTOMS OF COVID.    Per new instructions from PAT supervisor, COVID testing performed in PAT today.

## 2021-10-27 DIAGNOSIS — U07.1 COVID-19 VIRUS DETECTED: Primary | ICD-10-CM

## 2021-10-29 DIAGNOSIS — B00.1 FEVER BLISTER: Primary | ICD-10-CM

## 2021-10-29 RX ORDER — ACYCLOVIR 50 MG/G
OINTMENT TOPICAL 4 TIMES DAILY
Qty: 30 G | Refills: 0 | Status: SHIPPED | OUTPATIENT
Start: 2021-10-29 | End: 2022-01-26

## 2021-10-29 RX ORDER — ACYCLOVIR 50 MG/G
OINTMENT TOPICAL 4 TIMES DAILY
Qty: 30 G | Refills: 0 | Status: SHIPPED | OUTPATIENT
Start: 2021-10-29 | End: 2021-10-29

## 2021-11-08 ENCOUNTER — TELEPHONE (OUTPATIENT)
Dept: NEUROSURGERY | Facility: CLINIC | Age: 51
End: 2021-11-08

## 2021-11-08 NOTE — TELEPHONE ENCOUNTER
Provider:  Sander  Caller: Patient  Time of call:   10:55  Phone #:  [phone]  Surgery:  L4-5 posterior lumbar interbody  Surgery Date: JACOBY   Last visit:  8-23-21   Next visit: JACOBY LUNDBERG:         Reason for call:  Patient is calling to get her surgery scheduled. She said that it was postponed because she tested positive for Covid at her Preadmission testing.  She is back to work at [employer].  She has a letter form the health department saying she is cleared.     What is she supposed to do next to this scheduled. Please Advise. Thank you.

## 2021-11-12 DIAGNOSIS — R63.5 WEIGHT GAIN: Primary | ICD-10-CM

## 2021-11-15 ENCOUNTER — TELEPHONE (OUTPATIENT)
Dept: NEUROSURGERY | Facility: CLINIC | Age: 51
End: 2021-11-15

## 2021-11-15 DIAGNOSIS — E66.01 CLASS 2 SEVERE OBESITY DUE TO EXCESS CALORIES WITH SERIOUS COMORBIDITY AND BODY MASS INDEX (BMI) OF 38.0 TO 38.9 IN ADULT (HCC): Primary | ICD-10-CM

## 2021-11-15 NOTE — TELEPHONE ENCOUNTER
----- Message from Mone Amador sent at 11/15/2021 11:31 AM EST -----  Regarding: Surgery  Do you know when my surgery will be rescheduled?  My back has been bothering me a lot worse.     Thanks   Mone Amador

## 2021-11-16 ENCOUNTER — LAB (OUTPATIENT)
Dept: LAB | Facility: HOSPITAL | Age: 51
End: 2021-11-16

## 2021-11-16 DIAGNOSIS — E66.01 CLASS 2 SEVERE OBESITY DUE TO EXCESS CALORIES WITH SERIOUS COMORBIDITY AND BODY MASS INDEX (BMI) OF 38.0 TO 38.9 IN ADULT (HCC): ICD-10-CM

## 2021-11-16 LAB — SARS-COV-2 RNA PNL SPEC NAA+PROBE: NOT DETECTED

## 2021-11-16 PROCEDURE — C9803 HOPD COVID-19 SPEC COLLECT: HCPCS

## 2021-11-16 PROCEDURE — U0004 COV-19 TEST NON-CDC HGH THRU: HCPCS | Performed by: PHYSICIAN ASSISTANT

## 2021-11-17 ENCOUNTER — TELEPHONE (OUTPATIENT)
Dept: NEUROSURGERY | Facility: CLINIC | Age: 51
End: 2021-11-17

## 2021-11-17 NOTE — TELEPHONE ENCOUNTER
PT CALLED STATES COVID TEST HAS BEEN DONE AND HAS COME BACK TODAY NEGATIVE,  WAS DONE IN [facility], IS LOCATED IN LABS.      PLEASE CALL PT TO SCHEDULE SURGERY    THANK YOU

## 2021-11-29 ENCOUNTER — PRE-ADMISSION TESTING (OUTPATIENT)
Dept: PREADMISSION TESTING | Facility: HOSPITAL | Age: 51
End: 2021-11-29

## 2021-11-29 ENCOUNTER — ANESTHESIA EVENT (OUTPATIENT)
Dept: PERIOP | Facility: HOSPITAL | Age: 51
End: 2021-11-29

## 2021-11-29 VITALS — BODY MASS INDEX: 38.34 KG/M2 | WEIGHT: 208.34 LBS | HEIGHT: 62 IN

## 2021-11-29 LAB
DEPRECATED RDW RBC AUTO: 43.8 FL (ref 37–54)
ERYTHROCYTE [DISTWIDTH] IN BLOOD BY AUTOMATED COUNT: 13.6 % (ref 12.3–15.4)
HBA1C MFR BLD: 5.8 % (ref 4.8–5.6)
HCT VFR BLD AUTO: 45 % (ref 34–46.6)
HGB BLD-MCNC: 14.7 G/DL (ref 12–15.9)
MCH RBC QN AUTO: 28.8 PG (ref 26.6–33)
MCHC RBC AUTO-ENTMCNC: 32.7 G/DL (ref 31.5–35.7)
MCV RBC AUTO: 88.1 FL (ref 79–97)
MRSA DNA SPEC QL NAA+PROBE: NEGATIVE
PLATELET # BLD AUTO: 343 10*3/MM3 (ref 140–450)
PMV BLD AUTO: 9.6 FL (ref 6–12)
POTASSIUM SERPL-SCNC: 4 MMOL/L (ref 3.5–5.2)
RBC # BLD AUTO: 5.11 10*6/MM3 (ref 3.77–5.28)
SARS-COV-2 RNA PNL SPEC NAA+PROBE: NOT DETECTED
WBC NRBC COR # BLD: 7.53 10*3/MM3 (ref 3.4–10.8)

## 2021-11-29 PROCEDURE — U0004 COV-19 TEST NON-CDC HGH THRU: HCPCS

## 2021-11-29 PROCEDURE — 84132 ASSAY OF SERUM POTASSIUM: CPT

## 2021-11-29 PROCEDURE — C9803 HOPD COVID-19 SPEC COLLECT: HCPCS

## 2021-11-29 PROCEDURE — 36415 COLL VENOUS BLD VENIPUNCTURE: CPT

## 2021-11-29 PROCEDURE — 85027 COMPLETE CBC AUTOMATED: CPT

## 2021-11-29 PROCEDURE — 83036 HEMOGLOBIN GLYCOSYLATED A1C: CPT

## 2021-11-29 PROCEDURE — 87641 MR-STAPH DNA AMP PROBE: CPT

## 2021-11-29 RX ORDER — FAMOTIDINE 10 MG/ML
20 INJECTION, SOLUTION INTRAVENOUS ONCE
Status: CANCELLED | OUTPATIENT
Start: 2021-11-29 | End: 2021-11-29

## 2021-11-29 NOTE — ANESTHESIA PREPROCEDURE EVALUATION
Anesthesia Evaluation     Patient summary reviewed and Nursing notes reviewed   no history of anesthetic complications:  NPO Solid Status: > 8 hours  NPO Liquid Status: > 8 hours           Airway   Mallampati: II  TM distance: >3 FB  Neck ROM: full  No difficulty expected  Dental - normal exam     Pulmonary - normal exam    breath sounds clear to auscultation  (-) not a smoker  Cardiovascular - normal exam  Exercise tolerance: good (4-7 METS)    ECG reviewed  Patient on routine beta blocker  Rhythm: regular  Rate: normal    (+) hypertension,     ROS comment: EKG NSR    Neuro/Psych  (+) headaches, psychiatric history Anxiety,     GI/Hepatic/Renal/Endo    (+) morbid obesity, GERD,      Musculoskeletal     (+) back pain,   Abdominal    Substance History - negative use     OB/GYN          Other        ROS/Med Hx Other: hgb 15.8 k 3.9                Anesthesia Plan    ASA 3     general

## 2021-11-30 ENCOUNTER — HOSPITAL ENCOUNTER (INPATIENT)
Facility: HOSPITAL | Age: 51
LOS: 1 days | Discharge: HOME | End: 2021-12-01
Attending: NEUROLOGICAL SURGERY | Admitting: NEUROLOGICAL SURGERY

## 2021-11-30 ENCOUNTER — ANESTHESIA (OUTPATIENT)
Dept: PERIOP | Facility: HOSPITAL | Age: 51
End: 2021-11-30

## 2021-11-30 ENCOUNTER — APPOINTMENT (OUTPATIENT)
Dept: GENERAL RADIOLOGY | Facility: HOSPITAL | Age: 51
End: 2021-11-30

## 2021-11-30 DIAGNOSIS — M43.16 SPONDYLOLISTHESIS OF LUMBAR REGION: ICD-10-CM

## 2021-11-30 DIAGNOSIS — M54.17 LUMBOSACRAL RADICULOPATHY AT L5: ICD-10-CM

## 2021-11-30 DIAGNOSIS — Z98.1 S/P LUMBAR SPINAL FUSION: Primary | ICD-10-CM

## 2021-11-30 LAB — GLUCOSE BLDC GLUCOMTR-MCNC: 92 MG/DL (ref 70–130)

## 2021-11-30 PROCEDURE — 25010000002 DEXAMETHASONE: Performed by: NEUROLOGICAL SURGERY

## 2021-11-30 PROCEDURE — 0SG00AJ FUSION OF LUMBAR VERTEBRAL JOINT WITH INTERBODY FUSION DEVICE, POSTERIOR APPROACH, ANTERIOR COLUMN, OPEN APPROACH: ICD-10-PCS | Performed by: NEUROLOGICAL SURGERY

## 2021-11-30 PROCEDURE — 0ST20ZZ RESECTION OF LUMBAR VERTEBRAL DISC, OPEN APPROACH: ICD-10-PCS | Performed by: NEUROLOGICAL SURGERY

## 2021-11-30 PROCEDURE — 3E0U0GB INTRODUCTION OF RECOMBINANT BONE MORPHOGENETIC PROTEIN INTO JOINTS, OPEN APPROACH: ICD-10-PCS | Performed by: NEUROLOGICAL SURGERY

## 2021-11-30 PROCEDURE — 76000 FLUOROSCOPY <1 HR PHYS/QHP: CPT

## 2021-11-30 PROCEDURE — C1713 ANCHOR/SCREW BN/BN,TIS/BN: HCPCS | Performed by: NEUROLOGICAL SURGERY

## 2021-11-30 PROCEDURE — 22853 INSJ BIOMECHANICAL DEVICE: CPT | Performed by: NEUROLOGICAL SURGERY

## 2021-11-30 PROCEDURE — 61783 SCAN PROC SPINAL: CPT | Performed by: NEUROLOGICAL SURGERY

## 2021-11-30 PROCEDURE — 22840 INSERT SPINE FIXATION DEVICE: CPT | Performed by: NEUROLOGICAL SURGERY

## 2021-11-30 PROCEDURE — 25010000002 NEOSTIGMINE 10 MG/10ML SOLUTION: Performed by: NURSE ANESTHETIST, CERTIFIED REGISTERED

## 2021-11-30 PROCEDURE — 22853 INSJ BIOMECHANICAL DEVICE: CPT | Performed by: PHYSICIAN ASSISTANT

## 2021-11-30 PROCEDURE — 22630 ARTHRD PST TQ 1NTRSPC LUM: CPT | Performed by: NEUROLOGICAL SURGERY

## 2021-11-30 PROCEDURE — 0 CEFAZOLIN IN DEXTROSE 2-4 GM/100ML-% SOLUTION: Performed by: NEUROLOGICAL SURGERY

## 2021-11-30 PROCEDURE — 82962 GLUCOSE BLOOD TEST: CPT

## 2021-11-30 PROCEDURE — C1889 IMPLANT/INSERT DEVICE, NOC: HCPCS | Performed by: NEUROLOGICAL SURGERY

## 2021-11-30 PROCEDURE — 25010000002 BUPRENORPHINE PER 0.1 MG: Performed by: NEUROLOGICAL SURGERY

## 2021-11-30 PROCEDURE — 25010000002 HYDROMORPHONE 1 MG/ML SOLUTION

## 2021-11-30 PROCEDURE — 25010000002 FENTANYL CITRATE (PF) 50 MCG/ML SOLUTION

## 2021-11-30 PROCEDURE — 22840 INSERT SPINE FIXATION DEVICE: CPT | Performed by: PHYSICIAN ASSISTANT

## 2021-11-30 PROCEDURE — 25010000002 FENTANYL CITRATE (PF) 50 MCG/ML SOLUTION: Performed by: NURSE ANESTHETIST, CERTIFIED REGISTERED

## 2021-11-30 PROCEDURE — 22630 ARTHRD PST TQ 1NTRSPC LUM: CPT | Performed by: PHYSICIAN ASSISTANT

## 2021-11-30 PROCEDURE — 25010000002 DEXAMETHASONE SODIUM PHOSPHATE 10 MG/ML SOLUTION 1 ML VIAL: Performed by: NEUROLOGICAL SURGERY

## 2021-11-30 PROCEDURE — 25010000002 ONDANSETRON PER 1 MG: Performed by: NURSE ANESTHETIST, CERTIFIED REGISTERED

## 2021-11-30 PROCEDURE — 25010000002 PROPOFOL 10 MG/ML EMULSION: Performed by: NURSE ANESTHETIST, CERTIFIED REGISTERED

## 2021-11-30 DEVICE — ROD 1475501040 4.75 CCM NS CURV 40MM
Type: IMPLANTABLE DEVICE | Site: SPINE LUMBAR | Status: FUNCTIONAL
Brand: CD HORIZON® SPINAL SYSTEM

## 2021-11-30 DEVICE — FLOSEAL HEMOSTATIC MATRIX, 10ML
Type: IMPLANTABLE DEVICE | Site: SPINE LUMBAR | Status: FUNCTIONAL
Brand: FLOSEAL HEMOSTATIC MATRIX

## 2021-11-30 DEVICE — DBM T42200 2.5CMX10CM 2 EACH GRAFTON MAT
Type: IMPLANTABLE DEVICE | Site: SPINE LUMBAR | Status: FUNCTIONAL
Brand: GRAFTON®AND GRAFTON PLUS®DEMINERALIZED BONE MATRIX (DBM)

## 2021-11-30 DEVICE — HEMOST ABS SURGIFOAM SZ100 8X12 10MM: Type: IMPLANTABLE DEVICE | Site: SPINE LUMBAR | Status: FUNCTIONAL

## 2021-11-30 DEVICE — SPACER 7770828 ELEVATE STD 28X8MM
Type: IMPLANTABLE DEVICE | Site: SPINE LUMBAR | Status: FUNCTIONAL
Brand: ELEVATE™ SPINAL SYSTEM

## 2021-11-30 DEVICE — BONE GRAFT KIT 7510200 INFUSE SMALL
Type: IMPLANTABLE DEVICE | Site: SPINE LUMBAR | Status: FUNCTIONAL
Brand: INFUSE® BONE GRAFT

## 2021-11-30 RX ORDER — SODIUM CHLORIDE, SODIUM LACTATE, POTASSIUM CHLORIDE, CALCIUM CHLORIDE 600; 310; 30; 20 MG/100ML; MG/100ML; MG/100ML; MG/100ML
9 INJECTION, SOLUTION INTRAVENOUS CONTINUOUS
Status: CANCELLED | OUTPATIENT
Start: 2021-11-30

## 2021-11-30 RX ORDER — FENTANYL CITRATE 50 UG/ML
50 INJECTION, SOLUTION INTRAMUSCULAR; INTRAVENOUS
Status: DISCONTINUED | OUTPATIENT
Start: 2021-11-30 | End: 2021-11-30 | Stop reason: HOSPADM

## 2021-11-30 RX ORDER — PREGABALIN 150 MG/1
150 CAPSULE ORAL ONCE
Status: COMPLETED | OUTPATIENT
Start: 2021-11-30 | End: 2021-11-30

## 2021-11-30 RX ORDER — MIDAZOLAM HYDROCHLORIDE 1 MG/ML
1 INJECTION INTRAMUSCULAR; INTRAVENOUS
Status: DISCONTINUED | OUTPATIENT
Start: 2021-11-30 | End: 2021-11-30 | Stop reason: HOSPADM

## 2021-11-30 RX ORDER — SODIUM CHLORIDE, SODIUM LACTATE, POTASSIUM CHLORIDE, CALCIUM CHLORIDE 600; 310; 30; 20 MG/100ML; MG/100ML; MG/100ML; MG/100ML
9 INJECTION, SOLUTION INTRAVENOUS CONTINUOUS
Status: DISCONTINUED | OUTPATIENT
Start: 2021-11-30 | End: 2021-12-01 | Stop reason: HOSPADM

## 2021-11-30 RX ORDER — CEFAZOLIN SODIUM 2 G/100ML
2 INJECTION, SOLUTION INTRAVENOUS EVERY 8 HOURS
Status: COMPLETED | OUTPATIENT
Start: 2021-11-30 | End: 2021-12-01

## 2021-11-30 RX ORDER — ONDANSETRON 2 MG/ML
4 INJECTION INTRAMUSCULAR; INTRAVENOUS EVERY 6 HOURS PRN
Status: DISCONTINUED | OUTPATIENT
Start: 2021-11-30 | End: 2021-12-01 | Stop reason: HOSPADM

## 2021-11-30 RX ORDER — MIDAZOLAM HYDROCHLORIDE 1 MG/ML
1 INJECTION INTRAMUSCULAR; INTRAVENOUS
Status: CANCELLED | OUTPATIENT
Start: 2021-11-30

## 2021-11-30 RX ORDER — DULOXETIN HYDROCHLORIDE 60 MG/1
60 CAPSULE, DELAYED RELEASE ORAL 2 TIMES DAILY
Status: DISCONTINUED | OUTPATIENT
Start: 2021-11-30 | End: 2021-12-01 | Stop reason: HOSPADM

## 2021-11-30 RX ORDER — LIDOCAINE HYDROCHLORIDE AND EPINEPHRINE 5; 5 MG/ML; UG/ML
INJECTION, SOLUTION INFILTRATION; PERINEURAL AS NEEDED
Status: DISCONTINUED | OUTPATIENT
Start: 2021-11-30 | End: 2021-11-30 | Stop reason: HOSPADM

## 2021-11-30 RX ORDER — EPHEDRINE SULFATE 50 MG/ML
INJECTION, SOLUTION INTRAVENOUS AS NEEDED
Status: DISCONTINUED | OUTPATIENT
Start: 2021-11-30 | End: 2021-11-30 | Stop reason: SURG

## 2021-11-30 RX ORDER — AMOXICILLIN 250 MG
1 CAPSULE ORAL NIGHTLY PRN
Status: DISCONTINUED | OUTPATIENT
Start: 2021-11-30 | End: 2021-12-01 | Stop reason: HOSPADM

## 2021-11-30 RX ORDER — FAMOTIDINE 20 MG/1
20 TABLET, FILM COATED ORAL ONCE
Status: COMPLETED | OUTPATIENT
Start: 2021-11-30 | End: 2021-11-30

## 2021-11-30 RX ORDER — ONDANSETRON 4 MG/1
4 TABLET, FILM COATED ORAL EVERY 6 HOURS PRN
Status: DISCONTINUED | OUTPATIENT
Start: 2021-11-30 | End: 2021-12-01 | Stop reason: HOSPADM

## 2021-11-30 RX ORDER — NEOSTIGMINE METHYLSULFATE 1 MG/ML
INJECTION, SOLUTION INTRAVENOUS AS NEEDED
Status: DISCONTINUED | OUTPATIENT
Start: 2021-11-30 | End: 2021-11-30 | Stop reason: SURG

## 2021-11-30 RX ORDER — LIDOCAINE HYDROCHLORIDE 10 MG/ML
0.5 INJECTION, SOLUTION EPIDURAL; INFILTRATION; INTRACAUDAL; PERINEURAL ONCE AS NEEDED
Status: COMPLETED | OUTPATIENT
Start: 2021-11-30 | End: 2021-11-30

## 2021-11-30 RX ORDER — FENTANYL CITRATE 50 UG/ML
INJECTION, SOLUTION INTRAMUSCULAR; INTRAVENOUS
Status: COMPLETED
Start: 2021-11-30 | End: 2021-11-30

## 2021-11-30 RX ORDER — ONDANSETRON 2 MG/ML
4 INJECTION INTRAMUSCULAR; INTRAVENOUS ONCE AS NEEDED
Status: DISCONTINUED | OUTPATIENT
Start: 2021-11-30 | End: 2021-11-30 | Stop reason: HOSPADM

## 2021-11-30 RX ORDER — DOCUSATE SODIUM 100 MG/1
100 CAPSULE, LIQUID FILLED ORAL 2 TIMES DAILY PRN
Status: DISCONTINUED | OUTPATIENT
Start: 2021-11-30 | End: 2021-12-01 | Stop reason: HOSPADM

## 2021-11-30 RX ORDER — HYDROMORPHONE HYDROCHLORIDE 1 MG/ML
0.5 INJECTION, SOLUTION INTRAMUSCULAR; INTRAVENOUS; SUBCUTANEOUS
Status: DISCONTINUED | OUTPATIENT
Start: 2021-11-30 | End: 2021-11-30 | Stop reason: HOSPADM

## 2021-11-30 RX ORDER — POLYETHYLENE GLYCOL 3350 17 G/17G
17 POWDER, FOR SOLUTION ORAL DAILY PRN
Status: DISCONTINUED | OUTPATIENT
Start: 2021-11-30 | End: 2021-12-01 | Stop reason: HOSPADM

## 2021-11-30 RX ORDER — GLYCOPYRROLATE 0.2 MG/ML
INJECTION INTRAMUSCULAR; INTRAVENOUS AS NEEDED
Status: DISCONTINUED | OUTPATIENT
Start: 2021-11-30 | End: 2021-11-30 | Stop reason: SURG

## 2021-11-30 RX ORDER — SODIUM CHLORIDE 0.9 % (FLUSH) 0.9 %
10 SYRINGE (ML) INJECTION AS NEEDED
Status: DISCONTINUED | OUTPATIENT
Start: 2021-11-30 | End: 2021-12-01 | Stop reason: HOSPADM

## 2021-11-30 RX ORDER — LORAZEPAM 0.5 MG/1
0.5 TABLET ORAL 2 TIMES DAILY PRN
Status: DISCONTINUED | OUTPATIENT
Start: 2021-11-30 | End: 2021-12-01 | Stop reason: HOSPADM

## 2021-11-30 RX ORDER — MAGNESIUM HYDROXIDE 1200 MG/15ML
LIQUID ORAL AS NEEDED
Status: DISCONTINUED | OUTPATIENT
Start: 2021-11-30 | End: 2021-11-30 | Stop reason: HOSPADM

## 2021-11-30 RX ORDER — NADOLOL 20 MG/1
40 TABLET ORAL
Status: DISCONTINUED | OUTPATIENT
Start: 2021-12-01 | End: 2021-12-01 | Stop reason: HOSPADM

## 2021-11-30 RX ORDER — SODIUM CHLORIDE 0.9 % (FLUSH) 0.9 %
3 SYRINGE (ML) INJECTION EVERY 12 HOURS SCHEDULED
Status: DISCONTINUED | OUTPATIENT
Start: 2021-11-30 | End: 2021-12-01 | Stop reason: HOSPADM

## 2021-11-30 RX ORDER — SODIUM CHLORIDE 0.9 % (FLUSH) 0.9 %
10 SYRINGE (ML) INJECTION EVERY 12 HOURS SCHEDULED
Status: DISCONTINUED | OUTPATIENT
Start: 2021-11-30 | End: 2021-11-30 | Stop reason: HOSPADM

## 2021-11-30 RX ORDER — LIDOCAINE HYDROCHLORIDE 10 MG/ML
INJECTION, SOLUTION EPIDURAL; INFILTRATION; INTRACAUDAL; PERINEURAL AS NEEDED
Status: DISCONTINUED | OUTPATIENT
Start: 2021-11-30 | End: 2021-11-30 | Stop reason: SURG

## 2021-11-30 RX ORDER — FAMOTIDINE 20 MG/1
20 TABLET, FILM COATED ORAL ONCE
Status: CANCELLED | OUTPATIENT
Start: 2021-11-30 | End: 2021-11-30

## 2021-11-30 RX ORDER — CEFAZOLIN SODIUM 2 G/100ML
2 INJECTION, SOLUTION INTRAVENOUS ONCE
Status: COMPLETED | OUTPATIENT
Start: 2021-11-30 | End: 2021-11-30

## 2021-11-30 RX ORDER — HYDROCHLOROTHIAZIDE 25 MG/1
25 TABLET ORAL DAILY
Status: DISCONTINUED | OUTPATIENT
Start: 2021-11-30 | End: 2021-12-01 | Stop reason: HOSPADM

## 2021-11-30 RX ORDER — FAMOTIDINE 10 MG/ML
20 INJECTION, SOLUTION INTRAVENOUS ONCE
Status: CANCELLED | OUTPATIENT
Start: 2021-11-30 | End: 2021-11-30

## 2021-11-30 RX ORDER — SODIUM CHLORIDE 0.9 % (FLUSH) 0.9 %
10 SYRINGE (ML) INJECTION EVERY 12 HOURS SCHEDULED
Status: CANCELLED | OUTPATIENT
Start: 2021-11-30

## 2021-11-30 RX ORDER — SODIUM CHLORIDE 0.9 % (FLUSH) 0.9 %
10 SYRINGE (ML) INJECTION AS NEEDED
Status: CANCELLED | OUTPATIENT
Start: 2021-11-30

## 2021-11-30 RX ORDER — FENTANYL CITRATE 50 UG/ML
INJECTION, SOLUTION INTRAMUSCULAR; INTRAVENOUS AS NEEDED
Status: DISCONTINUED | OUTPATIENT
Start: 2021-11-30 | End: 2021-11-30 | Stop reason: SURG

## 2021-11-30 RX ORDER — PROPOFOL 10 MG/ML
VIAL (ML) INTRAVENOUS AS NEEDED
Status: DISCONTINUED | OUTPATIENT
Start: 2021-11-30 | End: 2021-11-30 | Stop reason: SURG

## 2021-11-30 RX ORDER — SODIUM CHLORIDE 0.9 % (FLUSH) 0.9 %
10 SYRINGE (ML) INJECTION AS NEEDED
Status: DISCONTINUED | OUTPATIENT
Start: 2021-11-30 | End: 2021-11-30 | Stop reason: HOSPADM

## 2021-11-30 RX ORDER — CYCLOBENZAPRINE HCL 10 MG
10 TABLET ORAL EVERY 8 HOURS SCHEDULED
Status: DISCONTINUED | OUTPATIENT
Start: 2021-11-30 | End: 2021-12-01

## 2021-11-30 RX ORDER — ROCURONIUM BROMIDE 10 MG/ML
INJECTION, SOLUTION INTRAVENOUS AS NEEDED
Status: DISCONTINUED | OUTPATIENT
Start: 2021-11-30 | End: 2021-11-30 | Stop reason: SURG

## 2021-11-30 RX ORDER — OXYCODONE HYDROCHLORIDE AND ACETAMINOPHEN 5; 325 MG/1; MG/1
1 TABLET ORAL EVERY 4 HOURS PRN
Status: DISCONTINUED | OUTPATIENT
Start: 2021-11-30 | End: 2021-12-01

## 2021-11-30 RX ORDER — ONDANSETRON 2 MG/ML
INJECTION INTRAMUSCULAR; INTRAVENOUS AS NEEDED
Status: DISCONTINUED | OUTPATIENT
Start: 2021-11-30 | End: 2021-11-30 | Stop reason: SURG

## 2021-11-30 RX ORDER — LIDOCAINE HYDROCHLORIDE 10 MG/ML
0.5 INJECTION, SOLUTION EPIDURAL; INFILTRATION; INTRACAUDAL; PERINEURAL ONCE AS NEEDED
Status: CANCELLED | OUTPATIENT
Start: 2021-11-30

## 2021-11-30 RX ADMIN — NEOSTIGMINE METHYLSULFATE 4 MG: 0.5 INJECTION INTRAVENOUS at 10:11

## 2021-11-30 RX ADMIN — CYCLOBENZAPRINE 10 MG: 10 TABLET, FILM COATED ORAL at 20:43

## 2021-11-30 RX ADMIN — EPHEDRINE SULFATE 15 MG: 50 INJECTION INTRAVENOUS at 08:46

## 2021-11-30 RX ADMIN — FENTANYL CITRATE 50 MCG: 50 INJECTION, SOLUTION INTRAMUSCULAR; INTRAVENOUS at 13:12

## 2021-11-30 RX ADMIN — OXYCODONE HYDROCHLORIDE AND ACETAMINOPHEN 1 TABLET: 5; 325 TABLET ORAL at 17:16

## 2021-11-30 RX ADMIN — SODIUM CHLORIDE, POTASSIUM CHLORIDE, SODIUM LACTATE AND CALCIUM CHLORIDE: 600; 310; 30; 20 INJECTION, SOLUTION INTRAVENOUS at 10:11

## 2021-11-30 RX ADMIN — GLYCOPYRROLATE 0.6 MG: 0.2 INJECTION INTRAMUSCULAR; INTRAVENOUS at 10:11

## 2021-11-30 RX ADMIN — FENTANYL CITRATE 50 MCG: 50 INJECTION, SOLUTION INTRAMUSCULAR; INTRAVENOUS at 10:59

## 2021-11-30 RX ADMIN — HYDROCHLOROTHIAZIDE 25 MG: 25 TABLET ORAL at 17:16

## 2021-11-30 RX ADMIN — FAMOTIDINE 20 MG: 20 TABLET ORAL at 06:23

## 2021-11-30 RX ADMIN — HYDROMORPHONE HYDROCHLORIDE 0.5 MG: 1 INJECTION, SOLUTION INTRAMUSCULAR; INTRAVENOUS; SUBCUTANEOUS at 12:08

## 2021-11-30 RX ADMIN — DULOXETINE HYDROCHLORIDE 60 MG: 60 CAPSULE, DELAYED RELEASE ORAL at 20:43

## 2021-11-30 RX ADMIN — CYCLOBENZAPRINE 10 MG: 10 TABLET, FILM COATED ORAL at 17:16

## 2021-11-30 RX ADMIN — HYDROMORPHONE HYDROCHLORIDE 0.5 MG: 1 INJECTION, SOLUTION INTRAMUSCULAR; INTRAVENOUS; SUBCUTANEOUS at 14:33

## 2021-11-30 RX ADMIN — SODIUM CHLORIDE, POTASSIUM CHLORIDE, SODIUM LACTATE AND CALCIUM CHLORIDE 9 ML/HR: 600; 310; 30; 20 INJECTION, SOLUTION INTRAVENOUS at 06:33

## 2021-11-30 RX ADMIN — EPHEDRINE SULFATE 15 MG: 50 INJECTION INTRAVENOUS at 08:38

## 2021-11-30 RX ADMIN — CEFAZOLIN SODIUM 2 G: 2 INJECTION, SOLUTION INTRAVENOUS at 07:55

## 2021-11-30 RX ADMIN — EPHEDRINE SULFATE 10 MG: 50 INJECTION INTRAVENOUS at 08:34

## 2021-11-30 RX ADMIN — FENTANYL CITRATE 100 MCG: 50 INJECTION, SOLUTION INTRAMUSCULAR; INTRAVENOUS at 07:55

## 2021-11-30 RX ADMIN — PROPOFOL 20 MG: 10 INJECTION, EMULSION INTRAVENOUS at 10:16

## 2021-11-30 RX ADMIN — PROPOFOL 200 MG: 10 INJECTION, EMULSION INTRAVENOUS at 07:55

## 2021-11-30 RX ADMIN — FENTANYL CITRATE 50 MCG: 50 INJECTION, SOLUTION INTRAMUSCULAR; INTRAVENOUS at 11:10

## 2021-11-30 RX ADMIN — DEXAMETHASONE SODIUM PHOSPHATE 10 MG: 10 INJECTION INTRAMUSCULAR; INTRAVENOUS at 07:55

## 2021-11-30 RX ADMIN — FENTANYL CITRATE 50 MCG: 50 INJECTION, SOLUTION INTRAMUSCULAR; INTRAVENOUS at 11:33

## 2021-11-30 RX ADMIN — LIDOCAINE HYDROCHLORIDE 50 MG: 10 INJECTION, SOLUTION EPIDURAL; INFILTRATION; INTRACAUDAL; PERINEURAL at 07:55

## 2021-11-30 RX ADMIN — PROPOFOL 20 MG: 10 INJECTION, EMULSION INTRAVENOUS at 10:19

## 2021-11-30 RX ADMIN — ROCURONIUM BROMIDE 50 MG: 10 INJECTION, SOLUTION INTRAVENOUS at 07:55

## 2021-11-30 RX ADMIN — PREGABALIN 150 MG: 150 CAPSULE ORAL at 06:24

## 2021-11-30 RX ADMIN — ONDANSETRON 4 MG: 2 INJECTION INTRAMUSCULAR; INTRAVENOUS at 10:08

## 2021-11-30 RX ADMIN — LIDOCAINE HYDROCHLORIDE 0.5 ML: 10 INJECTION, SOLUTION EPIDURAL; INFILTRATION; INTRACAUDAL; PERINEURAL at 06:33

## 2021-11-30 RX ADMIN — CEFAZOLIN SODIUM 2 G: 2 INJECTION, SOLUTION INTRAVENOUS at 17:13

## 2021-11-30 RX ADMIN — OXYCODONE HYDROCHLORIDE AND ACETAMINOPHEN 1 TABLET: 5; 325 TABLET ORAL at 22:26

## 2021-11-30 NOTE — ANESTHESIA POSTPROCEDURE EVALUATION
Patient: Mone Amador    Procedure Summary     Date: 11/30/21 Room / Location:  [facility]  [facility]    Anesthesia Start: 0753 Anesthesia Stop: 1041    Procedure: L4-5 posterior lumbar interbody fusion with use of bone morphogenic protein (N/A Spine Lumbar) Diagnosis:       Spondylolisthesis of lumbar region      Lumbosacral radiculopathy at L5      (Spondylolisthesis of lumbar region [M43.16])      (Lumbosacral radiculopathy at L5 [M54.17])    Surgeons: Marquis Boucher MD Provider: Rey Sellers MD    Anesthesia Type: general ASA Status: 3          Anesthesia Type: general    Vitals  Vitals Value Taken Time   BP     Temp     Pulse     Resp     SpO2 93 % 11/30/21 1041           Post Anesthesia Care and Evaluation    Patient location during evaluation: PACU  Patient participation: waiting for patient participation  Level of consciousness: lethargic and responsive to physical stimuli  Pain management: adequate  Airway patency: patent  Anesthetic complications: No anesthetic complications  PONV Status: none  Cardiovascular status: hemodynamically stable and acceptable  Respiratory status: nonlabored ventilation, acceptable, nasal cannula and oral airway  Hydration status: acceptable

## 2021-11-30 NOTE — ANESTHESIA PROCEDURE NOTES
Airway  Urgency: elective    Date/Time: 11/30/2021 7:57 AM  Airway not difficult    General Information and Staff    Patient location during procedure: OR  CRNA: Austin Nolasco CRNA    Indications and Patient Condition  Indications for airway management: airway protection    Preoxygenated: yes  MILS not maintained throughout  Mask difficulty assessment: 1 - vent by mask    Final Airway Details  Final airway type: endotracheal airway      Successful airway: ETT  Cuffed: yes   Successful intubation technique: video laryngoscopy  Facilitating devices/methods: intubating stylet  Endotracheal tube insertion site: oral  Blade: [place]  Blade size: 3  ETT size (mm): 7.5  Cormack-Lehane Classification: grade I - full view of glottis  Placement verified by: chest auscultation and capnometry   Measured from: lips  ETT/EBT  to lips (cm): 20  Number of attempts at approach: 1  Assessment: lips, teeth, and gum same as pre-op and atraumatic intubation    Additional Comments  Negative epigastric sounds, Breath sound equal bilaterally with symmetric chest rise and fall. Grade III with Mac 3. Easy with [place]

## 2021-12-01 VITALS
HEIGHT: 62 IN | WEIGHT: 208 LBS | SYSTOLIC BLOOD PRESSURE: 114 MMHG | BODY MASS INDEX: 38.28 KG/M2 | DIASTOLIC BLOOD PRESSURE: 78 MMHG | TEMPERATURE: 97.9 F | RESPIRATION RATE: 14 BRPM | HEART RATE: 55 BPM | OXYGEN SATURATION: 99 %

## 2021-12-01 PROCEDURE — 97110 THERAPEUTIC EXERCISES: CPT

## 2021-12-01 PROCEDURE — 0 CEFAZOLIN IN DEXTROSE 2-4 GM/100ML-% SOLUTION: Performed by: NEUROLOGICAL SURGERY

## 2021-12-01 PROCEDURE — 99024 POSTOP FOLLOW-UP VISIT: CPT | Performed by: PHYSICIAN ASSISTANT

## 2021-12-01 PROCEDURE — 97116 GAIT TRAINING THERAPY: CPT

## 2021-12-01 PROCEDURE — 99239 HOSP IP/OBS DSCHRG MGMT >30: CPT | Performed by: PHYSICIAN ASSISTANT

## 2021-12-01 PROCEDURE — 97161 PT EVAL LOW COMPLEX 20 MIN: CPT

## 2021-12-01 RX ORDER — DIAZEPAM 5 MG/1
5 TABLET ORAL 3 TIMES DAILY PRN
Qty: 30 TABLET | Refills: 0 | Status: SHIPPED | OUTPATIENT
Start: 2021-12-01 | End: 2022-01-26

## 2021-12-01 RX ORDER — OXYCODONE AND ACETAMINOPHEN 7.5; 325 MG/1; MG/1
1 TABLET ORAL EVERY 4 HOURS PRN
Start: 2021-12-01 | End: 2021-12-01 | Stop reason: SDUPTHER

## 2021-12-01 RX ORDER — PSEUDOEPHEDRINE HCL 30 MG
100 TABLET ORAL 2 TIMES DAILY PRN
Qty: 30 EACH | Refills: 0 | Status: SHIPPED | OUTPATIENT
Start: 2021-12-01 | End: 2022-01-26

## 2021-12-01 RX ORDER — DIAZEPAM 5 MG/1
5 TABLET ORAL 3 TIMES DAILY
Status: DISCONTINUED | OUTPATIENT
Start: 2021-12-01 | End: 2021-12-01 | Stop reason: HOSPADM

## 2021-12-01 RX ORDER — OXYCODONE AND ACETAMINOPHEN 7.5; 325 MG/1; MG/1
1 TABLET ORAL EVERY 4 HOURS PRN
Qty: 40 TABLET | Refills: 0 | Status: SHIPPED | OUTPATIENT
Start: 2021-12-01 | End: 2021-12-13

## 2021-12-01 RX ORDER — OXYCODONE AND ACETAMINOPHEN 7.5; 325 MG/1; MG/1
1 TABLET ORAL EVERY 4 HOURS PRN
Status: DISCONTINUED | OUTPATIENT
Start: 2021-12-01 | End: 2021-12-01 | Stop reason: HOSPADM

## 2021-12-01 RX ORDER — ONDANSETRON 4 MG/1
4 TABLET, FILM COATED ORAL EVERY 6 HOURS PRN
Qty: 30 TABLET | Refills: 0 | Status: SHIPPED | OUTPATIENT
Start: 2021-12-01 | End: 2022-01-26

## 2021-12-01 RX ADMIN — LORAZEPAM 0.5 MG: 0.5 TABLET ORAL at 00:39

## 2021-12-01 RX ADMIN — CEFAZOLIN SODIUM 2 G: 2 INJECTION, SOLUTION INTRAVENOUS at 00:25

## 2021-12-01 RX ADMIN — CYCLOBENZAPRINE 10 MG: 10 TABLET, FILM COATED ORAL at 06:01

## 2021-12-01 RX ADMIN — DULOXETINE HYDROCHLORIDE 60 MG: 60 CAPSULE, DELAYED RELEASE ORAL at 08:32

## 2021-12-01 RX ADMIN — SODIUM CHLORIDE, PRESERVATIVE FREE 3 ML: 5 INJECTION INTRAVENOUS at 08:33

## 2021-12-01 RX ADMIN — HYDROCHLOROTHIAZIDE 25 MG: 25 TABLET ORAL at 08:32

## 2021-12-01 RX ADMIN — OXYCODONE HYDROCHLORIDE AND ACETAMINOPHEN 1 TABLET: 5; 325 TABLET ORAL at 02:32

## 2021-12-01 RX ADMIN — DIAZEPAM 5 MG: 5 TABLET ORAL at 10:54

## 2021-12-01 RX ADMIN — OXYCODONE AND ACETAMINOPHEN 1 TABLET: 7.5; 325 TABLET ORAL at 10:54

## 2021-12-01 RX ADMIN — OXYCODONE HYDROCHLORIDE AND ACETAMINOPHEN 1 TABLET: 5; 325 TABLET ORAL at 06:02

## 2021-12-01 NOTE — PROGRESS NOTES
"NEUROSURGERY PROGRESS NOTE    Interval History:   POD 1 status post L4-5 PLIF.  Patient reports improvement in her lower extremity pain.  Her back pain is not well controlled.  She is ambulatory and voiding without difficulty.    Vital Signs  Blood pressure 108/64, pulse 54, temperature 98.1 °F (36.7 °C), resp. rate 16, height 157.5 cm (62\"), weight 94.3 kg (208 lb), SpO2 95 %.    Physical Exam:  She is resting comfortably in bed no apparent distress.  ELIZABETH drain has minimal output.  Moves all extremities command.  Surgical incision clean, dry and intact.     Results Review:    I/O last 3 completed shifts:  In: 1240 [P.O.:240; I.V.:1000]  Out: 2455 [Urine:2250; Drains:205]  I/O this shift:  In: -   Out: 420 [Urine:400; Drains:20]      Assessment/Plan:   Diagnosis: Status post L4-5 PLIF  -We will have the patient work with physical therapy and likely discharge to home this afternoon.  I have made some adjustments to her medications to see if we can get her back pain better controlled.    Lynne Sellers PA-C  12/01/21  09:10 EST    "

## 2021-12-01 NOTE — THERAPY EVALUATION
Patient Name: Mone Amador  : 1970    MRN: [id number]                              Today's Date: 2021       Admit Date: 2021    Visit Dx:     ICD-10-CM ICD-9-CM   1. S/P lumbar spinal fusion  Z98.1 V45.4   2. Spondylolisthesis of lumbar region  M43.16 738.4   3. Lumbosacral radiculopathy at L5  M54.17 724.4     Patient Active Problem List   Diagnosis   • Anxiety   • Fibromyalgia   • Hypertension   • Facet arthritis of lumbar region   • Intractable chronic migraine without aura and without status migrainosus   • Class 2 severe obesity due to excess calories with serious comorbidity and body mass index (BMI) of 38.0 to 38.9 in adult (HCC)   • Anxiety and depression   • Lumbar stenosis with neurogenic claudication   • Gastroesophageal reflux disease   • Encounter for colorectal cancer screening   • Spondylolisthesis of lumbar region   • Lumbosacral radiculopathy at L5     Past Medical History:   Diagnosis Date   • Anxiety    • Arthritis    • Cholecystolithiasis    • Fibromyalgia    • Headache    • History of COVID-19 10/06/2021   • Hypertension    • Low back pain    • Migraine    • Wears glasses (prescription)      Past Surgical History:   Procedure Laterality Date   •  SECTION      2001   • CHOLECYSTECTOMY     • COLONOSCOPY N/A 2021    Procedure: COLONOSCOPY FOR SCREENING CPT CODE: ;  Surgeon: Jaron Car MD;  Location: [facility];  Service: Gastroenterology;  Laterality: N/A;   • ENDOSCOPY N/A 2021    Procedure: ESOPHAGOGASTRODUODENOSCOPY WITH BIOPSY CPT CODE: 74822;  Surgeon: Jaron Car MD;  Location: [facility];  Service: Gastroenterology;  Laterality: N/A;   • HYSTERECTOMY      32   • LUMBAR DISCECTOMY FUSION INSTRUMENTATION N/A 2021    Procedure: posterior lumbar interbody fusion with use of bone morphogenic protein L4-5;  Surgeon: Marquis Boucher MD;  Location: [facility];  Service: Neurosurgery;   Laterality: N/A;      General Information     Row Name 12/01/21 1354          Physical Therapy Time and Intention    Document Type evaluation  -     Mode of Treatment physical therapy  -     Row Name 12/01/21 1354          General Information    Patient Profile Reviewed yes  -     Prior Level of Function independent:; all household mobility; community mobility; bed mobility; ADL's  -     Existing Precautions/Restrictions fall; spinal; other (see comments)  ELIZABETH drain  -     Barriers to Rehab medically complex  -     Row Name 12/01/21 1354          Living Environment    Lives With spouse; child(dank), adult  -     Row Name 12/01/21 1354          Home Main Entrance    Number of Stairs, Main Entrance one  -HP     Stair Railings, Main Entrance railings safe and in good condition  -     Row Name 12/01/21 1354          Stairs Within Home, Primary    Stairs, Within Home, Primary one flight of stairs to BR  -     Number of Stairs, Within Home, Primary other (see comments)  -     Stair Railings, Within Home, Primary railing on left side (ascending)  -     Row Name 12/01/21 1354          Cognition    Orientation Status (Cognition) oriented x 3  -     Row Name 12/01/21 1354          Safety Issues, Functional Mobility    Safety Issues Affecting Function (Mobility) insight into deficits/self-awareness; awareness of need for assistance; safety precaution awareness  -     Impairments Affecting Function (Mobility) endurance/activity tolerance; strength; pain  -           User Key  (r) = Recorded By, (t) = Taken By, (c) = Cosigned By    Initials Name Provider Type     Kait Rockwell PT Physical Therapist               Mobility     Row Name 12/01/21 1356          Bed Mobility    Bed Mobility supine-sit; scooting/bridging  -     Scooting/Bridging [place] (Bed Mobility) contact guard  -     Supine-Sit [place] (Bed Mobility) minimum assist (75% patient effort)  -     Comment (Bed Mobility) Pt  required Min A for supine to sit provided by spouse.  -     Row Name 12/01/21 1356          Transfers    Comment (Transfers) Pt performed STS with FWW and CGA. VC for hand placement.  -     Row Name 12/01/21 1356          Sit-Stand Transfer    Sit-Stand [place] (Transfers) contact guard  -     Assistive Device (Sit-Stand Transfers) walker, front-wheeled  -     Row Name 12/01/21 1356          Gait/Stairs (Locomotion)    [place] Level (Gait) contact guard  -     Assistive Device (Gait) walker, front-wheeled  -     Distance in Feet (Gait) 400  -     Deviations/Abnormal Patterns (Gait) base of support, wide; marcelo decreased; gait speed decreased; stride length decreased  -     Comment (Gait/Stairs) Pt amb 200' with FWW and 200' without AD and CGA. Pt demostrated good safety awareness and no LOB observed. Pt navigated 13 stairs with CGA for safety only. VC on sequencing and family educated on how to provide assistance at home.  -           User Key  (r) = Recorded By, (t) = Taken By, (c) = Cosigned By    Initials Name Provider Type     Kait Rockwell, PT Physical Therapist               Obj/Interventions     Row Name 12/01/21 1423          Range of Motion Comprehensive    General Range of Motion no range of motion deficits identified  -     Row Name 12/01/21 1423          Strength Comprehensive (MMT)    General Manual Muscle Testing (MMT) Assessment lower extremity strength deficits identified  -     Comment, General Manual Muscle Testing (MMT) Assessment BLE grossly 4/5 symmetrical B dorsiflexion and great toe extension 4/5  -     Row Name 12/01/21 1423          Motor Skills    Motor Skills therapeutic exercise  -     Therapeutic Exercise hip; knee; ankle  -     Row Name 12/01/21 1423          Hip (Therapeutic Exercise)    Hip (Therapeutic Exercise) strengthening exercise  -     Hip Strengthening (Therapeutic Exercise) bilateral; external rotation; internal rotation; heel  slides  isometric core contractions with heel slides, butterflies x10 B  -     Row Name 12/01/21 1423          Knee (Therapeutic Exercise)    Knee (Therapeutic Exercise) isometric exercises  -     Knee Isometrics (Therapeutic Exercise) bilateral; quad sets; gluteal sets; 10 repetitions  -[facility] Name 12/01/21 1423          Ankle (Therapeutic Exercise)    Ankle (Therapeutic Exercise) AROM (active range of motion)  -     Ankle AROM (Therapeutic Exercise) bilateral; dorsiflexion; plantarflexion; 10 repetitions  -     Row Name 12/01/21 1423          Balance    Balance Assessment sitting static balance; sitting dynamic balance; standing static balance; standing dynamic balance  -     Static Sitting Balance WFL; sitting, edge of bed  -     Dynamic Sitting Balance WFL; sitting, edge of bed  -     Static Standing Balance WFL; unsupported  -     Dynamic Standing Balance WFL; unsupported  -     Balance Interventions occupation based/functional task; sitting; standing; sit to stand  -           User Key  (r) = Recorded By, (t) = Taken By, (c) = Cosigned By    Initials Name Provider Type     Kait Rockwell, PT Physical Therapist               Goals/Plan     Row Name 12/01/21 1433          Bed Mobility Goal 1 (PT)    Activity/Assistive Device (Bed Mobility Goal 1, PT) sit to supine/supine to sit  -HP     [place] Level/Cues Needed (Bed Mobility Goal 1, PT) independent  -HP     Time Frame (Bed Mobility Goal 1, PT) long term goal (LTG); 10 days  -     Progress/Outcomes (Bed Mobility Goal 1, PT) goal ongoing  -     Row Name 12/01/21 1433          Transfer Goal 1 (PT)    Activity/Assistive Device (Transfer Goal 1, PT) sit-to-stand/stand-to-sit  -     [place] Level/Cues Needed (Transfer Goal 1, PT) independent  -     Time Frame (Transfer Goal 1, PT) long term goal (LTG); 10 days  -     Progress/Outcome (Transfer Goal 1, PT) goal ongoing  -[facility] Name 12/01/21 1433          Gait Training  Goal 1 (PT)    Activity/Assistive Device (Gait Training Goal 1, PT) gait (walking locomotion)  -HP     [place] Level (Gait Training Goal 1, PT) supervision required  -HP     Distance (Gait Training Goal 1, PT) 1000  -HP     Time Frame (Gait Training Goal 1, PT) long term goal (LTG); 10 days  -HP     Progress/Outcome (Gait Training Goal 1, PT) goal ongoing  -     Row Name 12/01/21 1433          Stairs Goal 1 (PT)    Activity/Assistive Device (Stairs Goal 1, PT) ascending stairs; descending stairs  -HP     [place] Level/Cues Needed (Stairs Goal 1, PT) supervision required  -HP     Number of Stairs (Stairs Goal 1, PT) 15  -HP     Time Frame (Stairs Goal 1, PT) long term goal (LTG); 10 days  -HP     Progress/Outcome (Stairs Goal 1, PT) goal ongoing  -HP           User Key  (r) = Recorded By, (t) = Taken By, (c) = Cosigned By    Initials Name Provider Type     Kait Rockwell, PT Physical Therapist               Clinical Impression     Row Name 12/01/21 1428          Pain    Additional Documentation Pain Scale: Numbers Pre/Post-Treatment (Group)  -     Row Name 12/01/21 1428          Pain Scale: Numbers Pre/Post-Treatment    Pretreatment Pain Rating 5/10  -HP     Posttreatment Pain Rating 5/10  -HP     Pain Location - Orientation lower  -HP     Pain Location back  -HP     Pre/Posttreatment Pain Comment pt denied radicular pain; pt reported relief with ambulation  -     Pain Intervention(s) Repositioned; Cold applied; Ambulation/increased activity  -     Row Name 12/01/21 1428          Plan of Care Review    Plan of Care Reviewed With patient; spouse  -HP     Progress improving  -HP     Outcome Summary PT eval complete. Pt performed bed mobility with Min A provided by spouse with VC for sequencing and appropriate hand placement. Pt amb 200' with FWW and 200' without AD and CGA. Pt demostrated good safety awareness and no LOB observed. Pt navigated 13 stairs with CGA for safety only. VC on sequencing and  family educated on how to provide assistance at home. IPPT warranted. Recommend home with assist.  -HP     Row Name 12/01/21 1428          Therapy Assessment/Plan (PT)    Rehab Potential (PT) good, to achieve stated therapy goals  -HP     Criteria for Skilled Interventions Met (PT) yes; meets criteria; skilled treatment is necessary  -HP     Predicted Duration of Therapy Intervention (PT) two weeks  -HP     Row Name 12/01/21 1428          Vital Signs    Pre Systolic BP Rehab --  VSS; RN cleared for therapy  -HP     Posttreatment Heart Rate (beats/min) 74  -HP     Pre SpO2 (%) 95  -HP     O2 Delivery Pre Treatment room air  -HP     Post SpO2 (%) 92  -HP     O2 Delivery Post Treatment room air  -HP     Pre Patient Position Supine  -HP     Intra Patient Position Standing  -HP     Post Patient Position Sitting  -HP     Row Name 12/01/21 1428          Positioning and Restraints    Pre-Treatment Position in bed  -HP     Post Treatment Position chair  -HP     In Chair notified nsg; sitting; call light within reach; encouraged to call for assist; exit alarm on  -HP           User Key  (r) = Recorded By, (t) = Taken By, (c) = Cosigned By    Initials Name Provider Type    HP Kait Rockwell, PT Physical Therapist               Outcome Measures     Row Name 12/01/21 1434 12/01/21 0830       How much help from another person do you currently need...    Turning from your back to your side while in flat bed without using bedrails? 3  -HP 3  -KL    Moving from lying on back to sitting on the side of a flat bed without bedrails? 3  -HP 3  -KL    Moving to and from a bed to a chair (including a wheelchair)? 4  -HP 3  -KL    Standing up from a chair using your arms (e.g., wheelchair, bedside chair)? 4  -HP 3  -KL    Climbing 3-5 steps with a railing? 3  -HP 3  -KL    To walk in hospital room? 4  -HP 3  -KL    AM-PAC 6 Clicks Score (PT) 21  -HP 18  -KL    Row Name 12/01/21 1434          Functional Assessment    Outcome Measure Options  AM-PAC 6 Clicks Basic Mobility (PT)  -HP           User Key  (r) = Recorded By, (t) = Taken By, (c) = Cosigned By    Initials Name Provider Type    Cassandra Sequeira, RN Registered Nurse    Kait Brody PT Physical Therapist                             Physical Therapy Education                 Title: PT OT SLP Therapies (Done)     Topic: Physical Therapy (Done)     Point: Mobility training (Done)     Learning Progress Summary           Patient Acceptance, E,D,TB,H, VU,DU by  at 12/1/2021 1435    Comment: pt and spouse educated on safety precautions and proper assistance for home   Family Acceptance, E,D,TB,H, VU,DU by  at 12/1/2021 1435    Comment: pt and spouse educated on safety precautions and proper assistance for home                   Point: Home exercise program (Done)     Learning Progress Summary           Patient Acceptance, E,D,TB,H, VU,DU by  at 12/1/2021 1435    Comment: pt and spouse educated on safety precautions and proper assistance for home   Family Acceptance, E,D,TB,H, VU,DU by  at 12/1/2021 1435    Comment: pt and spouse educated on safety precautions and proper assistance for home                   Point: Body mechanics (Done)     Learning Progress Summary           Patient Acceptance, E,D,TB,H, VU,DU by  at 12/1/2021 1435    Comment: pt and spouse educated on safety precautions and proper assistance for home   Family Acceptance, E,D,TB,H, VU,DU by  at 12/1/2021 1435    Comment: pt and spouse educated on safety precautions and proper assistance for home                   Point: Precautions (Done)     Learning Progress Summary           Patient Acceptance, E,D,TB,H, VU,DU by  at 12/1/2021 1435    Comment: pt and spouse educated on safety precautions and proper assistance for home   Family Acceptance, E,D,TB,H, VU,DU by  at 12/1/2021 1435    Comment: pt and spouse educated on safety precautions and proper assistance for home                               User Key      Initials Effective Dates Name Provider Type Discipline     06/01/21 -  Kait Rockwell PT Physical Therapist PT              PT Recommendation and Plan  Planned Therapy Interventions (PT): balance training, bed mobility training, gait training, home exercise program, patient/family education, strengthening, stair training, stretching, transfer training  Plan of Care Reviewed With: patient, spouse  Progress: improving  Outcome Summary: PT eval complete. Pt performed bed mobility with Min A provided by spouse with VC for sequencing and appropriate hand placement. Pt amb 200' with FWW and 200' without AD and CGA. Pt demostrated good safety awareness and no LOB observed. Pt navigated 13 stairs with CGA for safety only. VC on sequencing and family educated on how to provide assistance at home. IPPT warranted. Recommend home with assist.     Time Calculation:    PT Charges     Row Name 12/01/21 1313             Time Calculation    Start Time 1313  -HP      PT Received On 12/01/21  -HP      PT Goal Re-Cert Due Date 12/11/21  -HP              Timed Charges    08773 - PT Therapeutic Exercise Minutes 10  -HP      71336 - Gait Training Minutes  15  -HP              Untimed Charges    PT Eval/Re-eval Minutes 35  -HP              Total Minutes    Timed Charges Total Minutes 25  -HP      Untimed Charges Total Minutes 35  -HP       Total Minutes 60  -HP            User Key  (r) = Recorded By, (t) = Taken By, (c) = Cosigned By    Initials Name Provider Type     Kait Rockwell PT Physical Therapist              Therapy Charges for Today     Code Description Service Date Service Provider Modifiers Qty    74074501617 HC PT THER PROC EA 15 MIN 12/1/2021 Kait Rockwell, PT GP 1    09805633515 HC GAIT TRAINING EA 15 MIN 12/1/2021 Kait Rockwell, PT GP 1    54102888719 HC PT EVAL LOW COMPLEXITY 3 12/1/2021 Kait Rockwell, PT GP 1          PT G-Codes  Outcome Measure Options: AM-PAC 6 Clicks Basic Mobility (PT)  AM-PAC 6 Clicks Score  (PT): 21    Kait Rockwell, PT  12/1/2021

## 2021-12-01 NOTE — PLAN OF CARE
Goal Outcome Evaluation:  Plan of Care Reviewed With: patient        Progress: improving   Pt is alert and oriented x4. VSS on room air to 2L NC. Pt has slept off and on throughout the shift. Scheduled flexeril and PRN percocet given as ordered for pain. Ice packs utilized. PRN ativan given as ordered. Covaderm to back is CDI. Dressing reinforced with medipore tape. ELIZABETH drain had 75mL of bloody drainage out this shift. Pt is up with stand-by assist and walker to the bathroom. Voiding spontaneously. Pt ambulated 600ft in the hallway this shift. Will continue to monitor.

## 2021-12-01 NOTE — PLAN OF CARE
Goal Outcome Evaluation:   Patient alert and oriented. VSS. Dressing dry and intact. ELIZABETH in place at full suction. 30 mL output after arriving to floor.   Patient ambulating well with standby assist and walker just for security reassurance, 300 feet walked.   One dose PRN pain medication administered.

## 2021-12-01 NOTE — PLAN OF CARE
Goal Outcome Evaluation:  Plan of Care Reviewed With: patient, spouse        Progress: improving  Outcome Summary: PT eval complete. Pt performed bed mobility with Min A provided by spouse with VC for sequencing and appropriate hand placement. Pt amb 200' with FWW and 200' without AD and CGA. Pt demostrated good safety awareness and no LOB observed. Pt navigated 13 stairs with CGA for safety only. VC on sequencing and family educated on how to provide assistance at home. IPPT warranted. Recommend home with assist.

## 2021-12-01 NOTE — CASE MANAGEMENT/SOCIAL WORK
Discharge Planning Assessment  [facility]     Patient Name: Mone Amador  MRN: [id number]  Today's Date: 12/1/2021    Admit Date: 11/30/2021     Discharge Needs Assessment     Row Name 12/01/21 1224       Living Environment    Lives With spouse; child(dank), adult    Current Living Arrangements home/apartment/condo    Primary Care Provided by self    Provides Primary Care For no one    Family Caregiver if Needed spouse; child(dank), adult    Able to Return to Prior Arrangements yes       Transition Planning    Patient/Family Anticipates Transition to home with family    Patient/Family Anticipated Services at Transition none    Transportation Anticipated family or friend will provide       Discharge Needs Assessment    Readmission Within the Last 30 Days no previous admission in last 30 days    Equipment Currently Used at Home none               Discharge Plan     Row Name 12/01/21 1225       Plan    Plan home with family    Patient/Family in Agreement with Plan yes    Plan Comments I met with Mrs. Amador at the bedside. She lives with her  and adult children in [facility]. She is independent with mobility, activities of daily living, and medications. She drives herself when leaving the home. She has no medical equipment at home and is not current with any outpatient therapies. Mrs. Amador anticipates returning home with her  and adult children at the time of discharge. Mrs. Amador does not have any anticipated discharge needs at this time. Case management will continue to follow Mrs. Amador's plan of care and assist with discharge needs.    Final Discharge Disposition Code 01 - home or self-care              Continued Care and Services - Admitted Since 11/30/2021    Coordination has not been started for this encounter.     Selected Continued Care - Episodes Includes selections from active Coordinated Care Management episodes    Rising Risk Care Management Episode start date: 11/16/2021    There are no active outsourced providers for this episode.               Expected Discharge Date and Time     Expected Discharge Date Expected Discharge Time    Dec 1, 2021          Demographic Summary     Row Name 12/01/21 1223       General Information    General Information Comments I confirmed Christopher Wu is Mrs. Amador's PCP. I confirmed her insurance to be [employer].               Functional Status     Row Name 12/01/21 1224       Functional Status, IADL    Medications independent    Meal Preparation independent    Housekeeping independent    Laundry independent    Shopping independent               Psychosocial    No documentation.                Abuse/Neglect    No documentation.                Legal    No documentation.                Substance Abuse    No documentation.                Patient Forms    No documentation.                   Uziel Butt RN

## 2021-12-02 NOTE — DISCHARGE SUMMARY
DISCHARGE SUMMARY  PATIENT:  BRANDT AMADOR  YOB: 1970  VISIT ID:  [id number]  PRIMARY CARE:  Christopher Wu MD  ADMITTING PHYSICIAN:  Iram martinez. providers found    DATE OF ADMISSION:  11/30/2021  DATE OF DISCHARGE:  12/02/21      ADMITTING DIAGNOSIS:  Lumbar spondylolisthesis, bilateral L5 radiculopathy    DISCHARGE DIAGNOSIS:  Lumbar spondylolisthesis, bilateral L5 radiculopathy    Past Medical History:   Diagnosis Date   • Anxiety    • Arthritis    • Cholecystolithiasis    • Fibromyalgia    • Headache    • History of COVID-19 10/06/2021   • Hypertension    • Low back pain    • Migraine    • Wears glasses (prescription)          PROCEDURES:  L4-5 posterior lumbar interbody fusion    BRIEF HOSPITAL COURSE:  Ms. Amador is a 51 y.o. female who presented to our office with progressive low back pain and bilateral lower extremity pain and weakness. Preoperative imaging demonstrated bilateral L5 nerve root compression with a mobile spondylolisthesis of L4 on L5. She failed conservative treatment. Operative intervention was indicated and she underwent uncomplicated L4-5 PLIF on 11/30/2021. A ELIZABETH drain was left in place postoperatively with moderate output overnight. This was removed on POD 1 when output had decreased.    Her hospital stay, vital signs remained stable and her postoperative dressing remained clean, dry and intact. Motor and sensory function were found to be intact throughout. She was ambulatory and voiding independently. She was tolerating a p.o. diet without nausea or vomiting. Pain was well-controlled with oral medications. She was discharged in satisfactory condition on POD 1, 12/1/2021.    DISCHARGE MEDICATIONS:     Discharge Medications      New Medications      Instructions Start Date   diazePAM 5 MG tablet  Commonly known as: VALIUM   5 mg, Oral, 3 Times Daily PRN      ondansetron 4 MG tablet  Commonly known as: ZOFRAN   4 mg, Oral, Every 6 Hours PRN      oxyCODONE-acetaminophen  7.5-325 MG per tablet  Commonly known as: PERCOCET   1 tablet, Oral, Every 4 Hours PRN      Stool Softener 100 MG capsule  Generic drug: docusate sodium   100 mg, Oral, 2 Times Daily PRN         Changes to Medications      Instructions Start Date   acyclovir 5 % ointment  Commonly known as: Zovirax  What changed:   · how much to take  · when to take this  · reasons to take this   Topical, 4 Times Daily      DULoxetine 60 MG capsule  Commonly known as: CYMBALTA  What changed:   · how much to take  · when to take this   Take 1 capsule by mouth 2 times daily.      LORazepam 0.5 MG tablet  Commonly known as: ATIVAN  What changed:   · how much to take  · how to take this  · when to take this  · reasons to take this   Take 1 tablet by mouth 2 (Two) Times a Day As Needed.      nadolol 40 MG tablet  Commonly known as: CORGARD  What changed: when to take this   Take 1 tablet by mouth Twice a Day.      rizatriptan 10 MG tablet  Commonly known as: MAXALT  What changed:   · how much to take  · how to take this  · when to take this  · reasons to take this   Take 1 tablet by mouth once, may repeat at 2 hour intervals; do not exceed 30 mg in 24 hours      traZODone 100 MG tablet  Commonly known as: DESYREL  What changed:   · how much to take  · how to take this  · when to take this   Take 1 tablet by mouth every day after a meal         Continue These Medications      Instructions Start Date   azithromycin 250 MG tablet  Commonly known as: ZITHROMAX   take 2 tablets by mouth on day 1 then 1 daily the next 4 days.      brompheniramine-pseudoephedrine-DM 30-2-10 MG/5ML syrup   10 mL, Oral, 4 Times Daily PRN      hydroCHLOROthiazide 25 MG tablet  Commonly known as: HYDRODIURIL   25 mg, Oral, Daily         Stop These Medications    chlorhexidine 4 % external liquid  Commonly known as: HIBICLENS     Hibiclens 4 % external liquid  Generic drug: chlorhexidine     ibuprofen 800 MG tablet  Commonly known as: ADVIL,MOTRIN     methocarbamol  750 MG tablet  Commonly known as: ROBAXIN     mupirocin 2 % ointment  Commonly known as: Bactroban     nabumetone 750 MG tablet  Commonly known as: RELAFEN     zolpidem 10 MG tablet  Commonly known as: AMBIEN          Discharge to home    ACTIVITY:  As tolerated. No strenuous activity. No heavy lifting, bending,    DIET:  As tolerated    FOLLOW UP:  2 weeks with neurosurgery     Follow-up Information     Christopher Wu MD .    Specialty: Family Medicine  Contact information:  [address] [place], [place] [id number] [phone]             Lynne Sellers PA-C Follow up in 2 week(s).    Specialties: Physician Assistant, Neurosurgery  Contact information:  [address] [id number] [phone]

## 2021-12-13 ENCOUNTER — OFFICE VISIT (OUTPATIENT)
Dept: NEUROSURGERY | Facility: CLINIC | Age: 51
End: 2021-12-13

## 2021-12-13 VITALS
BODY MASS INDEX: 38.57 KG/M2 | SYSTOLIC BLOOD PRESSURE: 112 MMHG | HEIGHT: 62 IN | TEMPERATURE: 98 F | WEIGHT: 209.6 LBS | DIASTOLIC BLOOD PRESSURE: 72 MMHG

## 2021-12-13 DIAGNOSIS — Z98.1 STATUS POST LUMBAR SPINAL FUSION: Primary | ICD-10-CM

## 2021-12-13 PROCEDURE — 99024 POSTOP FOLLOW-UP VISIT: CPT | Performed by: PHYSICIAN ASSISTANT

## 2021-12-13 RX ORDER — OXYCODONE HYDROCHLORIDE AND ACETAMINOPHEN 5; 325 MG/1; MG/1
1 TABLET ORAL EVERY 6 HOURS PRN
Qty: 30 TABLET | Refills: 0 | Status: SHIPPED | OUTPATIENT
Start: 2021-12-13 | End: 2022-01-26

## 2021-12-13 RX ORDER — CYCLOBENZAPRINE HCL 10 MG
10 TABLET ORAL 2 TIMES DAILY PRN
Qty: 30 TABLET | Refills: 0 | Status: SHIPPED | OUTPATIENT
Start: 2021-12-13 | End: 2022-01-26

## 2021-12-13 NOTE — PROGRESS NOTES
"Subjective     Chief Complaint: Follow-up lumbar fusion    Patient ID: Mone Amador is a 51 y.o. female is here today for follow-up.    History of Present Illness    This is a 51-year-old woman who underwent an uncomplicated L4-5 PLIF on 11/30/2021.  She presents today 2 weeks postop.  She reports complete resolution of her preoperative leg pain.  She still has back pain which is well controlled with Percocet at this time.  She has had a small amount of serous drainage from her incision which has not been persistent.  No fevers, chills or other constitutional symptoms.    The following portions of the patient's history were reviewed and updated as appropriate: allergies, current medications, past family history, past medical history, past social history, past surgical history and problem list.    Family history:   Family History   Problem Relation Age of Onset   • Heart disease Mother    • Hypertension Mother    • Stroke Mother    • Anxiety disorder Mother    • Thyroid disease Father    • Glaucoma Father    • Multiple sclerosis Sister    • No Known Problems Brother    • No Known Problems Son    • No Known Problems Sister    • No Known Problems Son    • Breast cancer Neg Hx        Social history:   Social History     Socioeconomic History   • Marital status:    Tobacco Use   • Smoking status: Never Smoker   • Smokeless tobacco: Never Used   Vaping Use   • Vaping Use: Never used   Substance and Sexual Activity   • Alcohol use: No   • Drug use: Never   • Sexual activity: Defer       Review of Systems   Musculoskeletal: Positive for back pain.   All other systems reviewed and are negative.      Objective   Blood pressure 112/72, temperature 98 °F (36.7 °C), temperature source Infrared, height 157.5 cm (62\"), weight 95.1 kg (209 lb 9.6 oz).  Body mass index is 38.34 kg/m².  Patient's Body mass index is 38.34 kg/m². indicating that she is obese (BMI >30). Obesity-related health conditions include the " following: dyslipidemias and osteoarthritis. Obesity is unchanged. BMI is is above average; BMI management plan is completed. We discussed portion control and increasing exercise..      Physical Exam  Constitutional:       General: She is not in acute distress.     Appearance: Normal appearance. She is not ill-appearing, toxic-appearing or diaphoretic.   HENT:      Head: Normocephalic.   Eyes:      Conjunctiva/sclera: Conjunctivae normal.   Pulmonary:      Effort: Pulmonary effort is normal.   Skin:     General: Skin is warm and dry.      Comments: No active drainage or drainage able to be expressed.  No erythema or fluctuance   Neurological:      Mental Status: She is alert and oriented to person, place, and time.      Motor: Motor function is intact.      Gait: Gait is intact.   Psychiatric:         Mood and Affect: Mood normal.         Behavior: Behavior normal.           Assessment/Plan       Ms. Ahumada is seen today 2 weeks status post L4-5 PLIF.  She is pleased with the resolution of preoperative leg pain.  She continues to have some back pain.  She is taking Percocet as needed.  I have given her a refill and stepped her down to Percocet 5.  We will try to stepdown to tramadol at her next refill.  I would like to keep a close eye on her wound and carefully reviewed the signs/symptoms of wound infection.  I will follow-up with her and 1-2 weeks for a wound check.  I instructed her to call with new or worsening symptoms.    Diagnoses and all orders for this visit:    1. Status post lumbar spinal fusion (Primary)    Other orders  -     cyclobenzaprine (FLEXERIL) 10 MG tablet; Take 1 tablet by mouth 2 (Two) Times a Day As Needed for Muscle Spasms.  Dispense: 30 tablet; Refill: 0        Return in about 1 week (around 12/20/2021), or if symptoms worsen or fail to improve.             Lynne Sellers PA-C

## 2021-12-27 ENCOUNTER — OFFICE VISIT (OUTPATIENT)
Dept: NEUROSURGERY | Facility: CLINIC | Age: 51
End: 2021-12-27

## 2021-12-27 VITALS
TEMPERATURE: 97.1 F | SYSTOLIC BLOOD PRESSURE: 128 MMHG | WEIGHT: 216.8 LBS | DIASTOLIC BLOOD PRESSURE: 82 MMHG | BODY MASS INDEX: 39.9 KG/M2 | HEIGHT: 62 IN

## 2021-12-27 DIAGNOSIS — Z98.1 S/P LUMBAR FUSION: Primary | ICD-10-CM

## 2021-12-27 PROCEDURE — 99024 POSTOP FOLLOW-UP VISIT: CPT | Performed by: PHYSICIAN ASSISTANT

## 2021-12-27 NOTE — PROGRESS NOTES
Subjective     Chief Complaint: Wound check    Patient ID: Mone Amador is a 51 y.o. female is here today for follow-up.    History of Present Illness    This is a 51-year-old woman who underwent uncomplicated L4-5 PLIF on 11/30/2021.  At her last office visit, she had some slow wound healing and presents today for wound check.  Her incision has improved.  She denies any fevers, chills, drainage, erythema or fluctuance.  She is slowly increasing her activity and is doing well.  She is working on weaning off of her Percocet.    The following portions of the patient's history were reviewed and updated as appropriate: allergies, current medications, past family history, past medical history, past social history, past surgical history and problem list.    Family history:   Family History   Problem Relation Age of Onset   • Heart disease Mother    • Hypertension Mother    • Stroke Mother    • Anxiety disorder Mother    • Thyroid disease Father    • Glaucoma Father    • Multiple sclerosis Sister    • No Known Problems Brother    • No Known Problems Son    • No Known Problems Sister    • No Known Problems Son    • Breast cancer Neg Hx        Social history:   Social History     Socioeconomic History   • Marital status:    Tobacco Use   • Smoking status: Never Smoker   • Smokeless tobacco: Never Used   Vaping Use   • Vaping Use: Never used   Substance and Sexual Activity   • Alcohol use: No   • Drug use: Never   • Sexual activity: Defer       Review of Systems   Constitutional: Negative.    HENT: Negative.    Eyes: Negative.    Respiratory: Negative.    Cardiovascular: Negative.    Gastrointestinal: Negative.    Endocrine: Negative.    Genitourinary: Negative.    Musculoskeletal: Positive for back pain.   Skin: Negative.    Allergic/Immunologic: Negative.    Neurological: Negative.    Hematological: Negative.    Psychiatric/Behavioral: Negative.        Objective   Blood pressure 128/82, temperature 97.1 °F  "(36.2 °C), temperature source Infrared, height 157.5 cm (62\"), weight 98.3 kg (216 lb 12.8 oz).  Body mass index is 39.65 kg/m².        Physical Exam  Constitutional:       Appearance: Normal appearance.   HENT:      Head: Normocephalic and atraumatic.   Skin:     General: Skin is warm and dry.      Comments: Wound edges are well approximated.  There is 1 small area at the superior pole of her incision that has some granulation tissue present.  There is no active drainage or drainage able to be expressed.  There is no erythema or fluctuance.   Neurological:      Mental Status: She is alert and oriented to person, place, and time.      GCS: GCS eye subscore is 4. GCS verbal subscore is 5. GCS motor subscore is 6.      Motor: Motor function is intact.      Gait: Gait is intact.   Psychiatric:         Mood and Affect: Mood normal.         Behavior: Behavior normal.           Assessment/Plan       This is a 51-year-old woman who is approximately 3 weeks status post L4-5 PLIF.  Her wound is healing well at this point.  I reviewed signs/symptoms of wound infection with her.  To begin with physical therapy later this week and have given her a referral.  She will think about when she would like to return to work as a  and will call our office for a return to work note next week.  Follow-up in 4-6 weeks with postoperative x-rays, or sooner if clinically indicated.    Diagnoses and all orders for this visit:    1. S/P lumbar fusion (Primary)  -     Ambulatory Referral to Physical Therapy  -     XR Spine Lumbar AP & Lateral        Return in about 4 weeks (around 1/24/2022), or if symptoms worsen or fail to improve.             Lynne Sellers PA-C        "

## 2021-12-28 ENCOUNTER — TELEPHONE (OUTPATIENT)
Dept: NEUROSURGERY | Facility: CLINIC | Age: 51
End: 2021-12-28

## 2021-12-28 NOTE — TELEPHONE ENCOUNTER
Please get details on what part-time will entail, half days for only 3 days/week? Then okay to provide her with a return to work note at her request. Her only restrictions should be no lifting more than 15 pounds, avoid repetitive bending and twisting

## 2021-12-28 NOTE — TELEPHONE ENCOUNTER
Pt called: she is wanting to have her RTW to state that she can return part time next week and needs it to states her work restrictions. She would like this to be faxed to [phone]. She would also like to have access to then note through [employer].      Pt contact: [phone]  Best time to call: anytime

## 2021-12-28 NOTE — TELEPHONE ENCOUNTER
Provider:  [place]  Caller: Patient  Time of call:   2:29  Phone #:  [phone]  Surgery:  posterior lumbar interbody fusion with use of bone morphogenic protein L4-5   Surgery Date:  11/30/2021  Last visit: Office Visit with Lynne Sellers PA-C (12/27/2021)  Next visit: 01/26/2022    Reason for call:         Please see encounter from Orly. Patient is requesting a RTW note for part time only. LVM for her to give us a call back with the answer.    I attempted to call patient to see what her job entails but no answer. In last OV note, PA notes that patient is a .

## 2021-12-28 NOTE — TELEPHONE ENCOUNTER
Patient returned my call.     Patient states that she would do a lot of standing and walking throughout the day.     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     Patient is wanting to work 2: (8) hour shifts and 1 half day/week.    Patient understands the restrictions that were noted by PA.    Okay to fax RTW letter?    [employer].   Fax number: [phone]  Employee leave number: [phone] - needs to be on the cover page.

## 2021-12-30 ENCOUNTER — TELEPHONE (OUTPATIENT)
Dept: NEUROSURGERY | Facility: CLINIC | Age: 51
End: 2021-12-30

## 2021-12-30 DIAGNOSIS — Z98.1 S/P LUMBAR FUSION: Primary | ICD-10-CM

## 2021-12-30 DIAGNOSIS — M43.16 SPONDYLOLISTHESIS OF LUMBAR REGION: ICD-10-CM

## 2021-12-30 DIAGNOSIS — M79.7 FIBROMYALGIA: ICD-10-CM

## 2021-12-30 RX ORDER — TRAMADOL HYDROCHLORIDE 50 MG/1
50 TABLET ORAL EVERY 8 HOURS PRN
Qty: 30 TABLET | Refills: 0 | Status: SHIPPED | OUTPATIENT
Start: 2021-12-30 | End: 2022-07-19

## 2022-01-03 NOTE — TELEPHONE ENCOUNTER
Tried to call patient to make sure she had got her Tramadol from the pharmacy. There was no answer and no way to leave a message. Will try later.Thank you.

## 2022-01-05 ENCOUNTER — TREATMENT (OUTPATIENT)
Dept: PHYSICAL THERAPY | Facility: CLINIC | Age: 52
End: 2022-01-05

## 2022-01-05 DIAGNOSIS — Z98.1 S/P LUMBAR FUSION: Primary | ICD-10-CM

## 2022-01-05 PROCEDURE — 97162 PT EVAL MOD COMPLEX 30 MIN: CPT | Performed by: PHYSICAL THERAPIST

## 2022-01-05 PROCEDURE — 97014 ELECTRIC STIMULATION THERAPY: CPT | Performed by: PHYSICAL THERAPIST

## 2022-01-05 PROCEDURE — 97110 THERAPEUTIC EXERCISES: CPT | Performed by: PHYSICAL THERAPIST

## 2022-01-05 NOTE — PROGRESS NOTES
Physical Therapy Initial Evaluation and Plan of Care    Patient: Mone Amador   : 1970  Diagnosis/ICD-10 Code:  S/P lumbar fusion [Z98.1]  Referring practitioner: Lynne Sellers PA-C  Date of Initial Visit: 2022  Today's Date: 2022  Patient seen for 1 session         Visit Diagnoses:    ICD-10-CM ICD-9-CM   1. S/P lumbar fusion  Z98.1 V45.4         Subjective Questionnaire: Oswestry: 20/50=40% impaired      Subjective Evaluation    History of Present Illness  Date of surgery: 2021  Mechanism of injury: Patient reports that she underwent a lumbar fusion at L4/5 on 2021.  She states that she had previously been experiencing right LE radicular pain, but notes that radicular pain went away after the surgery.  Patient reports that she experiences stiffness in her back.  She estimates a 15 minute sitting tolerance, standing tolerance of 30 minutes, and 30 minute walking tolerance.  Patient states that she has difficulty with lifting, but notes that she continues to have a 15# lifting restriction.  Patient reports that she is allowed to bend forward, but not repetitively; she notes that she is not allowed to twist.      Patient Occupation:    Precautions and Work Restrictions: s/p lumbar fusionPain  Current pain rating: 3  At best pain ratin  At worst pain rating: 10  Location: Lumbar  Quality: dull ache  Relieving factors: rest, change in position, ice and medications  Aggravating factors: ambulation, movement, standing, prolonged positioning, lifting and sleeping    Patient Goals  Patient goals for therapy: decreased pain, increased motion and increased strength             Objective          Postural Observations  Seated posture: fair  Standing posture: fair        Palpation   Left   Muscle spasm in the erector spinae and lumbar paraspinals.     Right   Muscle spasm in the erector spinae and lumbar paraspinals. Tenderness of the erector spinae, lumbar paraspinals  and quadratus lumborum.     Tenderness     Lumbar Spine  Tenderness in the spinous process.     Left Hip   No tenderness in the PSIS.     Right Hip   No tenderness in the PSIS.     Active Range of Motion     Lumbar   Flexion: Active lumbar flexion: 50%     Strength/Myotome Testing     Left Hip   Planes of Motion   Flexion: 4-  Abduction: 4-  Adduction: 4-    Right Hip   Planes of Motion   Flexion: 4-  Abduction: 4-  Adduction: 4-    Left Knee   Flexion: 4-  Extension: 4-    Right Knee   Flexion: 4-  Extension: 4-    Left Ankle/Foot   Dorsiflexion: 4  Plantar flexion: 4    Right Ankle/Foot   Dorsiflexion: 4-  Plantar flexion: 4-     General Comments     Lumbar Comments  Small area of slow healing noted at top of incision site; no drainage noted, minimal redness.  Patient reports that MD is aware of slow healing incision.  Patient instructed to continue to monitor incision site.           Assessment & Plan     Assessment  Impairments: abnormal gait, abnormal or restricted ROM, activity intolerance, impaired physical strength, lacks appropriate home exercise program and pain with function  Functional Limitations: carrying objects, lifting, sleeping, walking, pulling, pushing, uncomfortable because of pain, moving in bed, sitting, standing, stooping and unable to perform repetitive tasks  Assessment details: Patient is a 51 year old female who comes to physical therapy for rehabilitation s/p lumbar fusion. The patient presents with increased pain, decreased lumbar ROM, and decreased LE strength. Patient reports a 40% functional mobility impairment, based on the patient's response to the Modified Oswestry.  Patient will benefit from skilled PT, so that patient can achieve maximum level of function.     Prognosis: good    Goals  Plan Goals: SHORT TERM GOALS:     4 weeks  1. Patient will be independent/compliant with HEP.  2. Patient will report pain no greater than 4/10 when performing self-care activities.  3. Patient  will report pain no greater than 4/10 when sitting to travel community distances.    LONG TERM GOALS:   12 weeks  1. Patient will show at least 50% lumbar AROM to show improved ability to perform functional activities.  2. bilateral LE strength will improve to at least 4+/5 to allow for greater ease with daily tasks.  3. Patient to report less than 20% impairment on the Modified Oswestry for improved functional mobility.  4. Patient will report pain no greater than 3/10 while performing household chores.        Plan  Therapy options: will be seen for skilled therapy services  Planned modality interventions: cryotherapy, TENS, electrical stimulation/Russian stimulation, thermotherapy (hydrocollator packs), ultrasound and dry needling  Planned therapy interventions: manual therapy, balance/weight-bearing training, body mechanics training, neuromuscular re-education, postural training, soft tissue mobilization, flexibility, spinal/joint mobilization, functional ROM exercises, strengthening, gait training, stretching, home exercise program, therapeutic activities, IADL retraining, transfer training and joint mobilization  Frequency: 2x week  Duration in weeks: 12  Treatment plan discussed with: patient  Plan details: Moderate Evaluation  96600  Re-evaluation   43312    Therapeutic exercise  36330  Therapeutic activity    09055  Neuromuscular re-education   83758  Manual therapy   13477  Gait training  64007    Unattended e-stim (Medicaid/Medicare)     Moist heat/cryotherapy 22487   Ultrasound   68322          History # of Personal Factors and/or Comorbidities: MODERATE (1-2)  Examination of Body System(s): # of elements: MODERATE (3)  Clinical Presentation: STABLE   Clinical Decision Making: MODERATE      Timed:         Manual Therapy:         mins  95320;     Therapeutic Exercise:    11     mins  92478;     Neuromuscular Latoya:        mins  96454;    Therapeutic Activity:          mins  99137;     Gait Training:            mins  41044;     Ultrasound:          mins  28921;    Ionto                                   mins   83968  Self Care                            mins   24233  Canalith Repos         mins 95483      Un-Timed:  Electrical Stimulation:    10     mins  54724 ( );  Dry Needling          mins self-pay  Traction          mins 04898  Low Eval          Mins  79677  Mod Eval     37     Mins  70220  High Eval                            Mins  99220        Timed Treatment:   11   mins   Total Treatment:     58   mins          PT: Elizabeth Pederson PT     License Number: [id number]  Electronically signed by Elizabeth Pederson PT, 01/05/22, 12:52 PM EST    Certification Period: 1/5/2022 thru 4/4/2022  I certify that the therapy services are furnished while this patient is under my care.  The services outlined above are required by this patient, and will be reviewed every 90 days.         Physician Signature:__________________________________________________    PHYSICIAN: Lynne Sellers PA-C      DATE:     Please sign and return via fax to [email or web address] . Thank you, [facility].

## 2022-01-10 ENCOUNTER — TREATMENT (OUTPATIENT)
Dept: PHYSICAL THERAPY | Facility: CLINIC | Age: 52
End: 2022-01-10

## 2022-01-10 DIAGNOSIS — Z98.1 S/P LUMBAR FUSION: Primary | ICD-10-CM

## 2022-01-10 PROCEDURE — 97110 THERAPEUTIC EXERCISES: CPT | Performed by: PHYSICAL THERAPIST

## 2022-01-10 PROCEDURE — 97140 MANUAL THERAPY 1/> REGIONS: CPT | Performed by: PHYSICAL THERAPIST

## 2022-01-10 PROCEDURE — 97014 ELECTRIC STIMULATION THERAPY: CPT | Performed by: PHYSICAL THERAPIST

## 2022-01-10 NOTE — PROGRESS NOTES
Physical Therapy Daily Treatment Note      Patient: Mone Amador   : 1970  Referring practitioner: Lynne Sellers PA-C  Date of Initial Visit: Type: THERAPY  Noted: 2022  Today's Date: 1/10/2022  Patient seen for 2 sessions       Visit Diagnoses:    ICD-10-CM ICD-9-CM   1. S/P lumbar fusion  Z98.1 V45.4       Subjective:  Patient arrives to therapy w/ reports of 3/10 back pain w/ meds.  Pt verbalizes compliance of initiating home program w/ no complaints.      Objective   See Exercise, Manual, and Modality Logs for complete treatment.       Assessment/Plan:  Patient responded well to today's session w/ reports of slight decrease in pain following, 1/10.  Treatment initiated w/ therex as listed f/b manual rx and conclusion of modalities.  Therex completed w/ focus on improved functional mobility, improved bilateral LE strength, and postural awareness, as tolerated.  Pt provided w/ cues and demonstration w/ exercise for good form, and for max benefit.  Soft tissue mobilization performed to pt's bilateral lumbar paraspinals, avoiding incisional areas, to address tightness, and decrease pain.  Cryotherapy w/ Estim applied at conclusion.  No signs of distress or adverse reactions observed during, and/ or following tx.  Pt continues to benefit from therapy services, and will be progressed as tolerated.  Continue w/ PT's POC.      Timed:         Manual Therapy:    14     mins  86451;     Therapeutic Exercise:    30     mins  06883;     Neuromuscular Latoya:        mins  32829;    Therapeutic Activity:          mins  17103;     Gait Training:           mins  16846;     Ultrasound:          mins  91316;    Ionto                                   mins   56927  Self Care                            mins   13227  Canalith Repos         mins 41447      Un-Timed:  Electrical Stimulation:   10      mins  57089 ( );  Dry Needling          mins self-pay  Traction          mins 27552      Timed Treatment:  44     mins   Total Treatment:     54   mins    Tamera Andrews. Miguel Ángel, LIEN  [place] License: [id number]

## 2022-01-12 ENCOUNTER — TREATMENT (OUTPATIENT)
Dept: PHYSICAL THERAPY | Facility: CLINIC | Age: 52
End: 2022-01-12

## 2022-01-12 DIAGNOSIS — Z98.1 S/P LUMBAR FUSION: Primary | ICD-10-CM

## 2022-01-12 PROCEDURE — 97014 ELECTRIC STIMULATION THERAPY: CPT | Performed by: PHYSICAL THERAPIST

## 2022-01-12 PROCEDURE — 97140 MANUAL THERAPY 1/> REGIONS: CPT | Performed by: PHYSICAL THERAPIST

## 2022-01-12 PROCEDURE — 97110 THERAPEUTIC EXERCISES: CPT | Performed by: PHYSICAL THERAPIST

## 2022-01-12 NOTE — PROGRESS NOTES
Physical Therapy Daily Treatment Note      Patient: Mone Amador   : 1970  Referring practitioner: Lynne Sellers PA-C  Date of Initial Visit: Type: THERAPY  Noted: 2022  Today's Date: 2022  Patient seen for 3 sessions       Visit Diagnoses:    ICD-10-CM ICD-9-CM   1. S/P lumbar fusion  Z98.1 V45.4       Subjective:  Patient states she has returned to work part time, noting that she worked a full day yesterday, and a half day today.  Pt states she tolerated last session well w/ good response.  4/10 pain pre tx    Objective   See Exercise, Manual, and Modality Logs for complete treatment.       Assessment/Plan:  Patient responded well to today's session w/ reports of slight decrease in pain following, 1/10.  Pt completed therex as listed f/b manual soft tissue mobilization, and conclusion of modalities.  Therex progressed with additional postural and LE strengthening activities added to program.  Pt observed w/ good tolerance, however required cues and demonstration for good form, and for max benefit.  Incisional area avoided during manual rx; continues to demonstrate good healing.  Cryotherapy w/ Estim applied at conclusion.  Pt will be progressed w/ therapy as tolerated to restore function, decrease pain, and address goals. Continue w/ PT's POC.       Timed:         Manual Therapy:   15      mins  84620;     Therapeutic Exercise:     34    mins  62811;     Neuromuscular Latoya:        mins  72206;    Therapeutic Activity:          mins  05481;     Gait Training:           mins  97605;     Ultrasound:          mins  06795;    Ionto                                   mins   29704  Self Care                            mins   68081  Canalith Repos         mins 09391      Un-Timed:  Electrical Stimulation:     10    mins  73035 ( );  Dry Needling          mins self-pay  Traction          mins 10865      Timed Treatment:  49    mins   Total Treatment:     59   mins    Tamera Andrews. Miguel Ángel  LIEN  [place] License: [id number]

## 2022-01-21 ENCOUNTER — TELEPHONE (OUTPATIENT)
Dept: PHYSICAL THERAPY | Facility: CLINIC | Age: 52
End: 2022-01-21

## 2022-01-24 ENCOUNTER — TREATMENT (OUTPATIENT)
Dept: PHYSICAL THERAPY | Facility: CLINIC | Age: 52
End: 2022-01-24

## 2022-01-24 DIAGNOSIS — Z98.1 S/P LUMBAR FUSION: Primary | ICD-10-CM

## 2022-01-24 PROCEDURE — 97014 ELECTRIC STIMULATION THERAPY: CPT | Performed by: PHYSICAL THERAPIST

## 2022-01-24 PROCEDURE — 97140 MANUAL THERAPY 1/> REGIONS: CPT | Performed by: PHYSICAL THERAPIST

## 2022-01-24 PROCEDURE — 97110 THERAPEUTIC EXERCISES: CPT | Performed by: PHYSICAL THERAPIST

## 2022-01-24 NOTE — PROGRESS NOTES
Physical Therapy Daily Treatment Note      Patient: Mone Amador   : 1970  Referring practitioner: Lynne Sellers PA-C  Date of Initial Visit: Type: THERAPY  Noted: 2022  Today's Date: 2022  Patient seen for 4 sessions       Visit Diagnoses:    ICD-10-CM ICD-9-CM   1. S/P lumbar fusion  Z98.1 V45.4       Subjective:  Patient arrives to therapy w/ reports of 2/10 low back pain.  Pt states her pain was greater last week, however she feels it may have been due to the cold, snowy weather, work, and possibly due to decreased activity. Pt reports she is scheduled to f/u with referring provider 22.    Objective   See Exercise, Manual, and Modality Logs for complete treatment.       Assessment/Plan:  Patient responded well to today's session w/ no reports of pain noted at conclusion.  Pt received manual rx including soft tissue mobilization to bilateral lumbar paraspinals f/b therex as listed, and conclusion of modalities.  Therex completed w/ focus on improved lumbar ROM, improved lumbar stability and bilateral LE strength, as tolerated.  Exercise progressed w/ side lying clams initiated, and strengthening reps increased by 10, as tolerated.  Pt observed w/ good tolerance, and continues to require cues and demonstration for max benefit, and proper form.  Pt continues to benefit from therapy services, and will be progressed as tolerated to address goals, decrease pain, and restore function.  Continue w/ PT's POC.      Timed:         Manual Therapy:   14      mins  80759;     Therapeutic Exercise:    36     mins  87993;     Neuromuscular Latoya:        mins  35499;    Therapeutic Activity:          mins  12384;     Gait Training:           mins  41286;     Ultrasound:          mins  35111;    Ionto                                   mins   09245  Self Care                            mins   94249  Canalith Repos         mins 13965      Un-Timed:  Electrical Stimulation:   10      mins  91711 (  );  Dry Needling          mins self-pay  Traction          mins 13361      Timed Treatment:  50    mins   Total Treatment:     60   mins    Tamera Andrews. LIEN [place]  [place] License: [id number]

## 2022-01-26 ENCOUNTER — OFFICE VISIT (OUTPATIENT)
Dept: NEUROSURGERY | Facility: CLINIC | Age: 52
End: 2022-01-26

## 2022-01-26 ENCOUNTER — HOSPITAL ENCOUNTER (OUTPATIENT)
Dept: GENERAL RADIOLOGY | Facility: HOSPITAL | Age: 52
Discharge: HOME | End: 2022-01-26
Admitting: PHYSICIAN ASSISTANT

## 2022-01-26 VITALS — WEIGHT: 212.4 LBS | HEIGHT: 62 IN | BODY MASS INDEX: 39.08 KG/M2 | TEMPERATURE: 98 F

## 2022-01-26 DIAGNOSIS — M43.16 SPONDYLOLISTHESIS OF LUMBAR REGION: Primary | ICD-10-CM

## 2022-01-26 PROCEDURE — 99024 POSTOP FOLLOW-UP VISIT: CPT | Performed by: PHYSICIAN ASSISTANT

## 2022-01-26 PROCEDURE — 72100 X-RAY EXAM L-S SPINE 2/3 VWS: CPT | Performed by: RADIOLOGY

## 2022-01-26 PROCEDURE — 72100 X-RAY EXAM L-S SPINE 2/3 VWS: CPT

## 2022-01-26 NOTE — PROGRESS NOTES
Subjective     Chief Complaint: Follow-up lumbar fusion    Patient ID: Mone Amador is a 51 y.o. female is here today for follow-up.    History of Present Illness    This is a 51-year-old woman who underwent uncomplicated L4-5 PLIF in November 2021.  She presents today in routine follow-up.  She has been participating in physical therapy.  She has returned to work without difficulty.  On occasion, she has some soreness in her back which is well controlled with over-the-counter medications.  Denies any lower extremity pain, numbness or weakness    The following portions of the patient's history were reviewed and updated as appropriate: allergies, current medications, past family history, past medical history, past social history, past surgical history and problem list.    Family history:   Family History   Problem Relation Age of Onset   • Heart disease Mother    • Hypertension Mother    • Stroke Mother    • Anxiety disorder Mother    • Thyroid disease Father    • Glaucoma Father    • Multiple sclerosis Sister    • No Known Problems Brother    • No Known Problems Son    • No Known Problems Sister    • No Known Problems Son    • Breast cancer Neg Hx        Social history:   Social History     Socioeconomic History   • Marital status:    Tobacco Use   • Smoking status: Never Smoker   • Smokeless tobacco: Never Used   Vaping Use   • Vaping Use: Never used   Substance and Sexual Activity   • Alcohol use: No   • Drug use: Never   • Sexual activity: Defer       Review of Systems   Constitutional: Negative for activity change, appetite change, chills, diaphoresis, fatigue, fever and unexpected weight change.   HENT: Negative for congestion, dental problem, drooling, ear discharge, ear pain, facial swelling, hearing loss, mouth sores, nosebleeds, postnasal drip, rhinorrhea, sinus pressure, sinus pain, sneezing, sore throat, tinnitus, trouble swallowing and voice change.    Eyes: Negative for photophobia, pain,  "discharge, redness, itching and visual disturbance.   Respiratory: Negative for apnea, cough, choking, chest tightness, shortness of breath, wheezing and stridor.    Cardiovascular: Negative for chest pain, palpitations and leg swelling.   Gastrointestinal: Negative for abdominal distention, abdominal pain, anal bleeding, blood in stool, constipation, diarrhea, nausea, rectal pain and vomiting.   Endocrine: Negative for cold intolerance, heat intolerance, polydipsia, polyphagia and polyuria.   Genitourinary: Negative for decreased urine volume, difficulty urinating, dysuria, enuresis, flank pain, frequency, genital sores, hematuria and urgency.   Musculoskeletal: Positive for back pain. Negative for arthralgias, gait problem, joint swelling, myalgias, neck pain and neck stiffness.   Skin: Negative for color change, pallor, rash and wound.   Allergic/Immunologic: Negative for environmental allergies, food allergies and immunocompromised state.   Neurological: Negative for dizziness, tremors, seizures, syncope, facial asymmetry, speech difficulty, weakness, light-headedness, numbness and headaches.   Hematological: Negative for adenopathy. Does not bruise/bleed easily.   Psychiatric/Behavioral: Negative for agitation, behavioral problems, confusion, decreased concentration, dysphoric mood, hallucinations, self-injury, sleep disturbance and suicidal ideas. The patient is not nervous/anxious and is not hyperactive.    All other systems reviewed and are negative.      Objective   Temperature 98 °F (36.7 °C), height 157.5 cm (62.01\"), weight 96.3 kg (212 lb 6.4 oz).  Body mass index is 38.84 kg/m².  Patient's Body mass index is 38.84 kg/m². indicating that she is obese (BMI >30). Obesity-related health conditions include the following: osteoarthritis. Obesity is unchanged. BMI is is above average; BMI management plan is completed. We discussed portion control and increasing exercise..      Physical Exam  Constitutional:  "      Appearance: Normal appearance. She is obese.   HENT:      Head: Normocephalic and atraumatic.   Pulmonary:      Effort: Pulmonary effort is normal.   Skin:     General: Skin is warm and dry.      Comments: Well-healed surgical incision   Neurological:      General: No focal deficit present.      Mental Status: She is alert and oriented to person, place, and time.   Psychiatric:         Mood and Affect: Mood normal.         Behavior: Behavior normal.           Assessment/Plan       This is a 51-year-old woman who is status post L4-5 PLIF in November 2021. Patient is doing very well.  She has returned to work.  She has completed physical therapy.  She is pleased with her surgical outcome.  We will be happy to follow with her on an as-needed basis moving forward.  Reviewed signs/symptoms that would warrant a follow-up with our office.    Diagnoses and all orders for this visit:    1. Spondylolisthesis of lumbar region (Primary)        Return if symptoms worsen or fail to improve.             Lynne Sellers PA-C

## 2022-01-31 DIAGNOSIS — R63.5 WEIGHT GAIN: Primary | ICD-10-CM

## 2022-03-17 ENCOUNTER — TELEPHONE (OUTPATIENT)
Dept: NEUROSURGERY | Facility: CLINIC | Age: 52
End: 2022-03-17

## 2022-03-17 NOTE — TELEPHONE ENCOUNTER
Spoke with patient.  She states that yesterday she developed tingling down her anterior left leg.  She denies significant pain or weakness in association.  She denies fall or trauma.  The tingling is little better today however she is concerned.  I advised her to continue to monitoring this over the weekend.  She will call with an update on Monday if she develops associated pain or weakness.  At that time if we need to restart her gabapentin we can do that.

## 2022-03-17 NOTE — TELEPHONE ENCOUNTER
Provider:  Marlo  Caller: Patient's  [employer] message.  Time of call:  ALFREDO   Phone #:  [phone]  Surgery:  posterior lumbar interbody fusion with use of bone morphogenic protein L4-5  Surgery Date:  11/30/2021  Last visit: Office Visit with Lynne Sellers PA-C (01/26/2022)    Next visit: ALFREDO MCKEONPER:         Reason for call:         Please see [employer] message from patient below:      ----- Message from Mone Amador sent at 3/17/2022 10:41 AM EDT -----  Regarding: Stinging Paing  The sting started yesterday and has gone away.  I now have tingling running down my left leg.  I am still on all the medications I was on when last seen.  I have started taking topamax for my migraines.  I took it several years ago and did not have any problems so I don't think that would be the cause.      Thanks    Mone    Patient Message with Lynne Sellers PA-C (03/16/2022)

## 2022-03-21 ENCOUNTER — TELEPHONE (OUTPATIENT)
Dept: NEUROSURGERY | Facility: CLINIC | Age: 52
End: 2022-03-21

## 2022-03-21 RX ORDER — PREGABALIN 75 MG/1
75 CAPSULE ORAL 2 TIMES DAILY
Qty: 60 CAPSULE | Refills: 1 | Status: SHIPPED | OUTPATIENT
Start: 2022-03-21 | End: 2022-07-19

## 2022-03-21 NOTE — TELEPHONE ENCOUNTER
Provider:  Marlo  Caller: patient  Time of call:  10:20   Phone #:  [phone]  Surgery:  Posterior Lumbar Fusion  Surgery Date:  11-  Last visit:   1-26-22  Next visit: nilam    TOÑO: 02/11/2022 Lorazepam 0.5MG 1970 60 [address] [facility]  OUTPATIENT  PHARMACY  [place] 1  03/07/2022 Lorazepam 0.5MG 1970 60 [address] [facility] Wu [facility]  OUTPATIENT  PHARMACY  [place] 1  03/07/2022 Zolpidem Tartrate 10MG 1970 30 30 Christopher Wu [facility]  OUTPATIENT  PHARMACY  [place] 1        Reason for call:  Patient called and said that she was having worse stinging like pain.  I have tried to call no answer, left VM for her to return call to get more information. She called and Talked to Dhara Thursday.    Spoke with patient.  She states that yesterday she developed tingling down her anterior left leg.  She denies significant pain or weakness in association.  She denies fall or trauma.  The tingling is little better today however she is concerned.  I advised her to continue to monitoring this over the weekend.  She will call with an update on Monday if she develops associated pain or weakness.  At that time if we need to restart her gabapentin we can do that.    When did it start:Thursday    Where is it located: It starts in her back and goes to her left leg.    How long has this been going on/is this new or the same as before surgery:  3-4 days different    Characteristics of symptom/severity:sharp stinging like pain    Timing- Is it constant or intermittent: constant    What makes it worse: no    What makes it better: not really    What therapies/medications have you tried:800 mg Tylneol

## 2022-03-21 NOTE — TELEPHONE ENCOUNTER
Lets try some Lyrica I will send to pharmacy. She can also alternate NSAIDs/tylenol. Ok to restart with PT if she wishes. Call with update in 10 days

## 2022-03-31 ENCOUNTER — TELEPHONE (OUTPATIENT)
Dept: NEUROSURGERY | Facility: CLINIC | Age: 52
End: 2022-03-31

## 2022-04-01 ENCOUNTER — TELEPHONE (OUTPATIENT)
Dept: NEUROSURGERY | Facility: CLINIC | Age: 52
End: 2022-04-01

## 2022-04-01 RX ORDER — GABAPENTIN 300 MG/1
300 CAPSULE ORAL 3 TIMES DAILY
Qty: 90 CAPSULE | Refills: 1 | Status: SHIPPED | OUTPATIENT
Start: 2022-04-01 | End: 2022-07-19

## 2022-04-01 NOTE — TELEPHONE ENCOUNTER
LVM letting pt know that she can try Gabapentin if you she would like and to call our office and we will get this sent to her pharmacy.

## 2022-04-01 NOTE — TELEPHONE ENCOUNTER
Attempted to contact patient to inform her of Gabapentin prescription but no answer. Will try again later.

## 2022-04-01 NOTE — TELEPHONE ENCOUNTER
Patient called back and wanted to try the Gabapentin. Please Advise. Thank you.    Bebeto: 03/07/2022 Zolpidem Tartrate 10MG 1970 30 [address] Christopher Wu [facility]  OUTPATIENT  PHARMACY  [facility] 1  03/21/2022 Pregabalin 75MG 1970 60 30 Lynne Sellers [facility]  OUTPATIENT  PHARMACY  [facility] 1

## 2022-04-01 NOTE — TELEPHONE ENCOUNTER
Caller: Mone Amador  Relationship: Self  Best call back number:[phone]  What was the call regarding: PATIENT CALLED BACK, I WARM TRANSFERRED TO RJOELIO AT THE PRACTICE.

## 2022-04-11 DIAGNOSIS — L05.91 PILONIDAL CYST OF NATAL CLEFT: Primary | ICD-10-CM

## 2022-04-11 RX ORDER — SULFAMETHOXAZOLE AND TRIMETHOPRIM 800; 160 MG/1; MG/1
1 TABLET ORAL 2 TIMES DAILY
Qty: 28 TABLET | Refills: 0 | Status: SHIPPED | OUTPATIENT
Start: 2022-04-11 | End: 2022-04-26

## 2022-04-15 ENCOUNTER — PATIENT ROUNDING (BHMG ONLY) (OUTPATIENT)
Dept: SURGERY | Facility: CLINIC | Age: 52
End: 2022-04-15

## 2022-04-15 ENCOUNTER — OFFICE VISIT (OUTPATIENT)
Dept: SURGERY | Facility: CLINIC | Age: 52
End: 2022-04-15

## 2022-04-15 VITALS
HEIGHT: 62 IN | DIASTOLIC BLOOD PRESSURE: 84 MMHG | HEART RATE: 83 BPM | BODY MASS INDEX: 39.67 KG/M2 | SYSTOLIC BLOOD PRESSURE: 126 MMHG | WEIGHT: 215.6 LBS

## 2022-04-15 DIAGNOSIS — R19.8 ANAL DISCHARGE: Primary | ICD-10-CM

## 2022-04-15 PROCEDURE — 99203 OFFICE O/P NEW LOW 30 MIN: CPT | Performed by: SURGERY

## 2022-04-15 NOTE — PROGRESS NOTES
Subjective   Mone Leila Amador is a 51 y.o. female here today for possible abscess in rectal area..    History of Present Illness  Ms. Amador was seen in the office today for a 1 year history of intermittent perianal drainage.  She states that the area gets sore at times making it uncomfortable for her to sit.  She does have intermittent chronic drainage.  She denies a prior history of a perirectal abscess or pilonidal cyst.  She did have a colonoscopy in January which was otherwise unremarkable.  Specifically there was no evidence of Crohn's disease of the rectum.  Patient states she is tired of dealing with the drainage and discomfort  Allergies   Allergen Reactions   • Latex Itching and Rash   • Morphine And Related Itching and Nausea Only     Current Outpatient Medications   Medication Sig Dispense Refill   • DULoxetine (CYMBALTA) 60 MG capsule Take 1 capsule by mouth 2 times daily. (Patient taking differently: Take 60 mg by mouth 2 (Two) Times a Day.) 60 capsule 5   • gabapentin (NEURONTIN) 400 MG capsule Take 1 capsule by mouth 3 times every day. 90 capsule 2   • ibuprofen (ADVIL,MOTRIN) 800 MG tablet Take 1 tablet by mouth 3 times every day with food. 90 tablet 2   • LORazepam (Ativan) 0.5 MG tablet Take 1 tablet by mouth 2 (Two) Times a Day As Needed. 60 tablet 2   • nadolol (CORGARD) 40 MG tablet Take 1 tablet by mouth twice daily. 60 tablet 6   • naproxen (NAPROSYN) 500 MG tablet Take 1 tablet by mouth 2 times every day with food as needed 60 tablet 1   • topiramate (TOPAMAX) 25 MG tablet Take 1 tablet by mouth at bedtime for 1 week then increase to twice per day. 60 tablet 1   • traZODone (DESYREL) 100 MG tablet Take 1 tablet by mouth every day after a meal (Patient taking differently: Take 100 mg by mouth Every Night.) 30 tablet 4   • zolpidem (Ambien) 10 MG tablet TAKE ONE TABLET BY MOUTH EVERY NIGHT AT BEDTIME 30 tablet 2   • DULoxetine (CYMBALTA) 60 MG capsule Take 1 capsule by mouth 2 times every  day. 60 capsule 5   • gabapentin (NEURONTIN) 300 MG capsule Take 1 capsule by mouth 3 (Three) Times a Day. 90 capsule 1   • hydroCHLOROthiazide (HYDRODIURIL) 25 MG tablet Take 1 tablet by mouth Daily. 30 tablet 5   • hydroCHLOROthiazide (HYDRODIURIL) 25 MG tablet Take 1 tablet by mouth every day. 30 tablet 5   • HYDROcodone-acetaminophen (NORCO) 5-325 MG per tablet Take 1 tablet by mouth every 8 hours as needed for pain. 30 tablet 0   • nadolol (CORGARD) 40 MG tablet Take 1 tablet by mouth Twice a Day. (Patient taking differently: Take 40 mg by mouth 2 (Two) Times a Day.) 60 tablet 6   • pregabalin (Lyrica) 75 MG capsule Take 1 capsule by mouth 2 (Two) Times a Day. 60 capsule 1   • rizatriptan (MAXALT) 10 MG tablet Take 1 tablet by mouth once, may repeat at 2 hour intervals; do not exceed 30 mg in 24 hours (Patient taking differently: Take 10 mg by mouth 1 (One) Time As Needed for Migraine.) 12 tablet 5   • rizatriptan (MAXALT) 10 MG tablet Take 1 tablet by mouth once, may repeat at 2 hour intervals; do not exceed 30 mg in 24 hours 12 tablet 5   • Semaglutide,0.25 or 0.5MG/DOS, (Ozempic, 0.25 or 0.5 MG/DOSE,) 2 MG/1.5ML solution pen-injector Inject 0.25 mg under the skin into the appropriate area as directed once weekly. 1.5 mL 2   • sulfamethoxazole-trimethoprim (Bactrim DS) 800-160 MG per tablet Take 1 tablet by mouth 2 (Two) Times a Day for 14 days. 28 tablet 0   • traMADol (ULTRAM) 50 MG tablet Take 1 tablet by mouth Every 8 (Eight) Hours As Needed for Moderate Pain . 30 tablet 0   • traZODone (DESYREL) 100 MG tablet Take 1 tablet by mouth every day after a meal. 30 tablet 4     Current Facility-Administered Medications   Medication Dose Route Frequency Provider Last Rate Last Admin   • Semaglutide(0.25 or 0.5MG/DOS) (OZEMPIC) solution pen-injector 0.25 mg  0.25 mg Subcutaneous Weekly Rafael Naik MD       • Semaglutide(0.25 or 0.5MG/DOS) (OZEMPIC) solution pen-injector 0.25 mg  0.25 mg Subcutaneous  "Weekly Rafael Naik MD         Past Medical History:   Diagnosis Date   • Anxiety    • Arthritis    • Cholecystolithiasis    • Fibromyalgia    • Headache    • History of COVID-19 10/06/2021   • Hypertension    • Low back pain    • Migraine    • Wears glasses (prescription)      Past Surgical History:   Procedure Laterality Date   •  SECTION      ,    • CHOLECYSTECTOMY     • COLONOSCOPY N/A 2021    Procedure: COLONOSCOPY FOR SCREENING CPT CODE: ;  Surgeon: Jaron Car MD;  Location: [facility] OR;  Service: Gastroenterology;  Laterality: N/A;   • ENDOSCOPY N/A 2021    Procedure: ESOPHAGOGASTRODUODENOSCOPY WITH BIOPSY CPT CODE: 86562;  Surgeon: Jaron Car MD;  Location:  [facility] OR;  Service: Gastroenterology;  Laterality: N/A;   • HYSTERECTOMY  2003   • LUMBAR DISCECTOMY FUSION INSTRUMENTATION N/A 2021    Procedure: posterior lumbar interbody fusion with use of bone morphogenic protein L4-5;  Surgeon: Marquis Boucher MD;  Location:  [facility] OR;  Service: Neurosurgery;  Laterality: N/A;       Pertinent Review of Systems:  Respiratory: no shortness of breath  Cardiovascular: no chest pain  Other pertinent:      Objective   /84 (BP Location: Left arm)   Pulse 83   Ht 157.5 cm (62\")   Wt 97.8 kg (215 lb 9.6 oz)   BMI 39.43 kg/m²   Physical Exam  General:  This is a WD WN female in no acute distress  Lungs:  Respiratory effort normal. Auscultation: Clear, without wheezes, rhonchi, rales  Heart:  Regular rate and rhythm, without murmur, gallop, rub.  No pedal edema  Anal area: Patient was examined in the lateral position.  No evidence of pilonidal.  In the 12:30 position approximately 3 cm from the anal verge there is a small 3 to 4 mm opening suggestive of a fistula.  Digital rectal examination demonstrates a fairly regular area in the 12 o'clock position which is likely the source of the fistula.    Procedures "     Results/Data:      Assessment/Plan   Anal fistula    Proceed with fistulectomy/fistulotomy/seton placement at patient's convenience.         Discussion/Summary    Time spent:     Patient's Body mass index is 39.43 kg/m². indicating that she is obese (BMI >30). Obesity-related health conditions include the following: hypertension. Obesity is newly identified. BMI is is above average; BMI management plan is completed. We discussed portion control and increasing exercise..       No future appointments.      Please note that portions of this note were completed with a voice recognition program.

## 2022-04-15 NOTE — PROGRESS NOTES
April 15, 2022    Hello, may I speak with Mone Amador?    My name is Tatiana       I am  with [facility] GENERAL SURGERY  [address] [facility] [address] [place] [address] [phone].    Before we get started may I verify your date of birth? 1970    I am calling to officially welcome you to our practice and ask about your recent visit. Is this a good time to talk? No, got voicemail    Tell me about your visit with us. What things went well?  left voicemail        We're always looking for ways to make our patients' experiences even better. Do you have recommendations on ways we may improve?  no, left voicemail    Overall were you satisfied with your first visit to our practice? No, got voicemail       I appreciate you taking the time to speak with me today. Is there anything else I can do for you? No, left voicemail      Thank you, and have a great day.

## 2022-04-15 NOTE — PROGRESS NOTES
April 15, 2022    Hello, may I speak with Mone Amador    My name is Tatiana       [facility] GENERAL SURGERY  [address], [place] [address]  [place] [address] [phone].    Before we get started may I verify your date of birth? 1970    I am calling to officially welcome you to our practice and ask about your recent visit. Is this a good time to talk? yes    Tell me about your visit with us. What things went well?  yes       We're always looking for ways to make our patients' experiences even better. Do you have recommendations on ways we may improve?  yes    Overall were you satisfied with your first visit to our practice?yes       I appreciate you taking the time to speak with me today. Is there anything else I can do for you? yes      Thank you, and have a great day.

## 2022-04-19 ENCOUNTER — OFFICE VISIT (OUTPATIENT)
Dept: SURGERY | Facility: CLINIC | Age: 52
End: 2022-04-19

## 2022-04-19 VITALS
HEIGHT: 62 IN | WEIGHT: 217 LBS | BODY MASS INDEX: 39.93 KG/M2 | DIASTOLIC BLOOD PRESSURE: 82 MMHG | SYSTOLIC BLOOD PRESSURE: 124 MMHG

## 2022-04-19 DIAGNOSIS — K60.3 ANAL FISTULA: Primary | ICD-10-CM

## 2022-04-19 PROBLEM — K60.30 ANAL FISTULA: Status: ACTIVE | Noted: 2022-04-19

## 2022-04-19 PROCEDURE — 99214 OFFICE O/P EST MOD 30 MIN: CPT | Performed by: SURGERY

## 2022-04-19 NOTE — H&P (VIEW-ONLY)
Subjective   Mone Amador is a 51 y.o. female.     Chief Complaint: anal fistula    History of Present Illness She is a obese 52 yo who has had problems on and off for about a year from a area posterior to the anal opening. It is sore at times and drains at times. It has been treated with antibiotics a few times which did help temporarily. She has constipation and did have a colonoscopy a little over a year ago that was ok.     The following portions of the patient's history were reviewed and updated as appropriate: current medications, past family history, past medical history, past social history, past surgical history and problem list.    Review of Systems   Constitutional: Negative for activity change, appetite change, chills, fever and unexpected weight change.   HENT: Negative for congestion, facial swelling and sore throat.    Eyes: Negative for photophobia and visual disturbance.   Respiratory: Negative for chest tightness, shortness of breath and wheezing.    Cardiovascular: Negative for chest pain, palpitations and leg swelling.   Gastrointestinal: Positive for rectal pain. Negative for abdominal distention, abdominal pain, anal bleeding, blood in stool, constipation, diarrhea, nausea and vomiting.   Endocrine: Negative for cold intolerance, heat intolerance, polydipsia and polyuria.   Genitourinary: Negative for difficulty urinating, dysuria, flank pain and urgency.   Musculoskeletal: Negative for back pain and myalgias.   Skin: Positive for color change and wound. Negative for rash.   Allergic/Immunologic: Negative for immunocompromised state.   Neurological: Negative for dizziness, seizures, syncope, light-headedness, numbness and headaches.   Hematological: Negative for adenopathy. Does not bruise/bleed easily.   Psychiatric/Behavioral: Negative for behavioral problems and confusion. The patient is not nervous/anxious.        Objective   Physical Exam  Vitals reviewed.   Constitutional:        General: She is not in acute distress.     Appearance: She is well-developed. She is not ill-appearing.       HENT:      Head: Normocephalic. No laceration. Hair is normal.      Right Ear: Hearing and ear canal normal.      Left Ear: Hearing and ear canal normal.      Nose: Nose normal.      Right Sinus: No maxillary sinus tenderness or frontal sinus tenderness.      Left Sinus: No maxillary sinus tenderness or frontal sinus tenderness.   Eyes:      General: Lids are normal.      Conjunctiva/sclera: Conjunctivae normal.      Pupils: Pupils are equal, round, and reactive to light.   Neck:      Thyroid: No thyroid mass or thyromegaly.      Vascular: No JVD.      Trachea: No tracheal tenderness or tracheal deviation.   Cardiovascular:      Rate and Rhythm: Normal rate and regular rhythm.      Heart sounds: No murmur heard.    No gallop.   Pulmonary:      Effort: Pulmonary effort is normal.      Breath sounds: Normal breath sounds. No stridor. No wheezing.   Chest:      Chest wall: No tenderness.   Breasts:      Right: No supraclavicular adenopathy.      Left: No supraclavicular adenopathy.       Abdominal:      General: Bowel sounds are normal. There is no distension.      Palpations: Abdomen is soft. There is no mass.      Tenderness: There is no abdominal tenderness. There is no guarding or rebound.      Hernia: No hernia is present.   Musculoskeletal:         General: No deformity.      Cervical back: Normal range of motion.   Lymphadenopathy:      Cervical: No cervical adenopathy.      Upper Body:      Right upper body: No supraclavicular adenopathy.      Left upper body: No supraclavicular adenopathy.   Skin:     General: Skin is warm and dry.      Coloration: Skin is not pale.      Findings: Lesion present. No erythema or rash.   Neurological:      Mental Status: She is alert and oriented to person, place, and time.      Motor: No abnormal muscle tone.   Psychiatric:         Behavior: Behavior normal.          Thought Content: Thought content normal.         Past Medical History:   Diagnosis Date   • Anxiety    • Arthritis    • Cholecystolithiasis    • Fibromyalgia    • Headache    • History of COVID-19 10/06/2021   • Hypertension    • Low back pain    • Migraine    • Wears glasses (prescription)        Family History   Problem Relation Age of Onset   • Heart disease Mother    • Hypertension Mother    • Stroke Mother    • Anxiety disorder Mother    • Thyroid disease Father    • Glaucoma Father    • Multiple sclerosis Sister    • No Known Problems Brother    • No Known Problems Son    • No Known Problems Sister    • No Known Problems Son    • Breast cancer Neg Hx        Social History     Tobacco Use   • Smoking status: Never Smoker   • Smokeless tobacco: Never Used   Vaping Use   • Vaping Use: Never used   Substance Use Topics   • Alcohol use: No   • Drug use: Never       Past Surgical History:   Procedure Laterality Date   •  SECTION      ,    • CHOLECYSTECTOMY     • COLONOSCOPY N/A 2021    Procedure: COLONOSCOPY FOR SCREENING CPT CODE: ;  Surgeon: Jaron Car MD;  Location: [facility];  Service: Gastroenterology;  Laterality: N/A;   • ENDOSCOPY N/A 2021    Procedure: ESOPHAGOGASTRODUODENOSCOPY WITH BIOPSY CPT CODE: 63938;  Surgeon: Jaron Car MD;  Location: [facility];  Service: Gastroenterology;  Laterality: N/A;   • HYSTERECTOMY  2003   • LUMBAR DISCECTOMY FUSION INSTRUMENTATION N/A 2021    Procedure: posterior lumbar interbody fusion with use of bone morphogenic protein L4-5;  Surgeon: Marquis Boucher MD;  Location: [facility];  Service: Neurosurgery;  Laterality: N/A;       Current Outpatient Medications   Medication Instructions   • DULoxetine (CYMBALTA) 60 MG capsule Take 1 capsule by mouth 2 times daily.   • DULoxetine (CYMBALTA) 60 MG capsule Take 1 capsule by mouth 2 times every day.   • gabapentin (NEURONTIN) 400 MG  capsule Take 1 capsule by mouth 3 times every day.   • gabapentin (NEURONTIN) 300 mg, Oral, 3 Times Daily   • hydroCHLOROthiazide (HYDRODIURIL) 25 MG tablet Take 1 tablet by mouth every day.   • hydroCHLOROthiazide (HYDRODIURIL) 25 mg, Oral, Daily   • HYDROcodone-acetaminophen (NORCO) 5-325 MG per tablet Take 1 tablet by mouth every 8 hours as needed for pain.   • ibuprofen (ADVIL,MOTRIN) 800 MG tablet Take 1 tablet by mouth 3 times every day with food.   • LORazepam (Ativan) 0.5 MG tablet Take 1 tablet by mouth 2 (Two) Times a Day As Needed.   • nadolol (CORGARD) 40 MG tablet Take 1 tablet by mouth Twice a Day.   • nadolol (CORGARD) 40 MG tablet Take 1 tablet by mouth twice daily.   • naproxen (NAPROSYN) 500 MG tablet Take 1 tablet by mouth 2 times every day with food as needed   • pregabalin (LYRICA) 75 mg, Oral, 2 Times Daily   • rizatriptan (MAXALT) 10 MG tablet Take 1 tablet by mouth once, may repeat at 2 hour intervals; do not exceed 30 mg in 24 hours   • rizatriptan (MAXALT) 10 MG tablet Take 1 tablet by mouth once, may repeat at 2 hour intervals; do not exceed 30 mg in 24 hours   • Semaglutide,0.25 or 0.5MG/DOS, (Ozempic, 0.25 or 0.5 MG/DOSE,) 2 MG/1.5ML solution pen-injector Inject 0.25 mg under the skin into the appropriate area as directed once weekly.   • sulfamethoxazole-trimethoprim (Bactrim DS) 800-160 MG per tablet 1 tablet, Oral, 2 Times Daily   • topiramate (Topamax) 25 MG tablet Take 1 tablet by mouth at bedtime for 1 week then increase to 1 tablet twice per day.   • traMADol (ULTRAM) 50 mg, Oral, Every 8 Hours PRN   • traZODone (DESYREL) 100 MG tablet Take 1 tablet by mouth every day after a meal   • traZODone (DESYREL) 100 MG tablet Take 1 tablet by mouth every day after a meal.   • zolpidem (Ambien) 10 MG tablet TAKE ONE TABLET BY MOUTH EVERY NIGHT AT BEDTIME         Assessment/Plan   Diagnoses and all orders for this visit:    1. Anal fistula (Primary)    anal fistulotomy and likely seton  placement

## 2022-04-19 NOTE — PROGRESS NOTES
Subjective   Mone Amador is a 51 y.o. female.     Chief Complaint: anal fistula    History of Present Illness She is a obese 52 yo who has had problems on and off for about a year from a area posterior to the anal opening. It is sore at times and drains at times. It has been treated with antibiotics a few times which did help temporarily. She has constipation and did have a colonoscopy a little over a year ago that was ok.     The following portions of the patient's history were reviewed and updated as appropriate: current medications, past family history, past medical history, past social history, past surgical history and problem list.    Review of Systems   Constitutional: Negative for activity change, appetite change, chills, fever and unexpected weight change.   HENT: Negative for congestion, facial swelling and sore throat.    Eyes: Negative for photophobia and visual disturbance.   Respiratory: Negative for chest tightness, shortness of breath and wheezing.    Cardiovascular: Negative for chest pain, palpitations and leg swelling.   Gastrointestinal: Positive for rectal pain. Negative for abdominal distention, abdominal pain, anal bleeding, blood in stool, constipation, diarrhea, nausea and vomiting.   Endocrine: Negative for cold intolerance, heat intolerance, polydipsia and polyuria.   Genitourinary: Negative for difficulty urinating, dysuria, flank pain and urgency.   Musculoskeletal: Negative for back pain and myalgias.   Skin: Positive for color change and wound. Negative for rash.   Allergic/Immunologic: Negative for immunocompromised state.   Neurological: Negative for dizziness, seizures, syncope, light-headedness, numbness and headaches.   Hematological: Negative for adenopathy. Does not bruise/bleed easily.   Psychiatric/Behavioral: Negative for behavioral problems and confusion. The patient is not nervous/anxious.        Objective   Physical Exam  Vitals reviewed.   Constitutional:        General: She is not in acute distress.     Appearance: She is well-developed. She is not ill-appearing.       HENT:      Head: Normocephalic. No laceration. Hair is normal.      Right Ear: Hearing and ear canal normal.      Left Ear: Hearing and ear canal normal.      Nose: Nose normal.      Right Sinus: No maxillary sinus tenderness or frontal sinus tenderness.      Left Sinus: No maxillary sinus tenderness or frontal sinus tenderness.   Eyes:      General: Lids are normal.      Conjunctiva/sclera: Conjunctivae normal.      Pupils: Pupils are equal, round, and reactive to light.   Neck:      Thyroid: No thyroid mass or thyromegaly.      Vascular: No JVD.      Trachea: No tracheal tenderness or tracheal deviation.   Cardiovascular:      Rate and Rhythm: Normal rate and regular rhythm.      Heart sounds: No murmur heard.    No gallop.   Pulmonary:      Effort: Pulmonary effort is normal.      Breath sounds: Normal breath sounds. No stridor. No wheezing.   Chest:      Chest wall: No tenderness.   Breasts:      Right: No supraclavicular adenopathy.      Left: No supraclavicular adenopathy.       Abdominal:      General: Bowel sounds are normal. There is no distension.      Palpations: Abdomen is soft. There is no mass.      Tenderness: There is no abdominal tenderness. There is no guarding or rebound.      Hernia: No hernia is present.   Musculoskeletal:         General: No deformity.      Cervical back: Normal range of motion.   Lymphadenopathy:      Cervical: No cervical adenopathy.      Upper Body:      Right upper body: No supraclavicular adenopathy.      Left upper body: No supraclavicular adenopathy.   Skin:     General: Skin is warm and dry.      Coloration: Skin is not pale.      Findings: Lesion present. No erythema or rash.   Neurological:      Mental Status: She is alert and oriented to person, place, and time.      Motor: No abnormal muscle tone.   Psychiatric:         Behavior: Behavior normal.          Thought Content: Thought content normal.         Past Medical History:   Diagnosis Date   • Anxiety    • Arthritis    • Cholecystolithiasis    • Fibromyalgia    • Headache    • History of COVID-19 10/06/2021   • Hypertension    • Low back pain    • Migraine    • Wears glasses (prescription)        Family History   Problem Relation Age of Onset   • Heart disease Mother    • Hypertension Mother    • Stroke Mother    • Anxiety disorder Mother    • Thyroid disease Father    • Glaucoma Father    • Multiple sclerosis Sister    • No Known Problems Brother    • No Known Problems Son    • No Known Problems Sister    • No Known Problems Son    • Breast cancer Neg Hx        Social History     Tobacco Use   • Smoking status: Never Smoker   • Smokeless tobacco: Never Used   Vaping Use   • Vaping Use: Never used   Substance Use Topics   • Alcohol use: No   • Drug use: Never       Past Surgical History:   Procedure Laterality Date   •  SECTION      ,    • CHOLECYSTECTOMY     • COLONOSCOPY N/A 2021    Procedure: COLONOSCOPY FOR SCREENING CPT CODE: ;  Surgeon: Jaron Car MD;  Location: [facility];  Service: Gastroenterology;  Laterality: N/A;   • ENDOSCOPY N/A 2021    Procedure: ESOPHAGOGASTRODUODENOSCOPY WITH BIOPSY CPT CODE: 84728;  Surgeon: Jaron Car MD;  Location: [facility];  Service: Gastroenterology;  Laterality: N/A;   • HYSTERECTOMY  2003   • LUMBAR DISCECTOMY FUSION INSTRUMENTATION N/A 2021    Procedure: posterior lumbar interbody fusion with use of bone morphogenic protein L4-5;  Surgeon: Marquis Boucher MD;  [facility]  Service: Neurosurgery;  Laterality: N/A;       Current Outpatient Medications   Medication Instructions   • DULoxetine (CYMBALTA) 60 MG capsule Take 1 capsule by mouth 2 times daily.   • DULoxetine (CYMBALTA) 60 MG capsule Take 1 capsule by mouth 2 times every day.   • gabapentin (NEURONTIN) 400 MG  capsule Take 1 capsule by mouth 3 times every day.   • gabapentin (NEURONTIN) 300 mg, Oral, 3 Times Daily   • hydroCHLOROthiazide (HYDRODIURIL) 25 MG tablet Take 1 tablet by mouth every day.   • hydroCHLOROthiazide (HYDRODIURIL) 25 mg, Oral, Daily   • HYDROcodone-acetaminophen (NORCO) 5-325 MG per tablet Take 1 tablet by mouth every 8 hours as needed for pain.   • ibuprofen (ADVIL,MOTRIN) 800 MG tablet Take 1 tablet by mouth 3 times every day with food.   • LORazepam (Ativan) 0.5 MG tablet Take 1 tablet by mouth 2 (Two) Times a Day As Needed.   • nadolol (CORGARD) 40 MG tablet Take 1 tablet by mouth Twice a Day.   • nadolol (CORGARD) 40 MG tablet Take 1 tablet by mouth twice daily.   • naproxen (NAPROSYN) 500 MG tablet Take 1 tablet by mouth 2 times every day with food as needed   • pregabalin (LYRICA) 75 mg, Oral, 2 Times Daily   • rizatriptan (MAXALT) 10 MG tablet Take 1 tablet by mouth once, may repeat at 2 hour intervals; do not exceed 30 mg in 24 hours   • rizatriptan (MAXALT) 10 MG tablet Take 1 tablet by mouth once, may repeat at 2 hour intervals; do not exceed 30 mg in 24 hours   • Semaglutide,0.25 or 0.5MG/DOS, (Ozempic, 0.25 or 0.5 MG/DOSE,) 2 MG/1.5ML solution pen-injector Inject 0.25 mg under the skin into the appropriate area as directed once weekly.   • sulfamethoxazole-trimethoprim (Bactrim DS) 800-160 MG per tablet 1 tablet, Oral, 2 Times Daily   • topiramate (Topamax) 25 MG tablet Take 1 tablet by mouth at bedtime for 1 week then increase to 1 tablet twice per day.   • traMADol (ULTRAM) 50 mg, Oral, Every 8 Hours PRN   • traZODone (DESYREL) 100 MG tablet Take 1 tablet by mouth every day after a meal   • traZODone (DESYREL) 100 MG tablet Take 1 tablet by mouth every day after a meal.   • zolpidem (Ambien) 10 MG tablet TAKE ONE TABLET BY MOUTH EVERY NIGHT AT BEDTIME         Assessment/Plan   Diagnoses and all orders for this visit:    1. Anal fistula (Primary)    anal fistulotomy and likely seton  placement

## 2022-04-22 ENCOUNTER — TELEPHONE (OUTPATIENT)
Dept: SURGERY | Facility: CLINIC | Age: 52
End: 2022-04-22

## 2022-04-22 NOTE — TELEPHONE ENCOUNTER
Pre Admission Testing (PAT) scheduled for Wednesday 4/27/22 @ 1:45 pm at Outpatient Surgery on the ground floor (opposite side of the ER and Main Entrance).   Drive Thru Covid test scheduled after PAT appointment (follow orange arrows on hospital signs to testing site). Covid test MUST be done here, no outside test accepted!    Patient needs to be NPO for surgery (no food or drinks after midnight the night before surgery or nothing morning of surgery). Patient also is required to have a  accompany them on procedure day. This person must remain on campus at all times either inside the hospital or in their car. They cannot drop you off and pick you up.    Surgery scheduled Friday 4/29/22 @ 10:30 am at Outpatient Surgery on the ground floor (opposite side of the ER and Main Entrance).

## 2022-04-27 ENCOUNTER — PRE-ADMISSION TESTING (OUTPATIENT)
Dept: PREADMISSION TESTING | Facility: HOSPITAL | Age: 52
End: 2022-04-27
Payer: COMMERCIAL

## 2022-04-27 LAB
ALBUMIN SERPL-MCNC: 4.1 G/DL (ref 3.5–5.2)
ALBUMIN/GLOB SERPL: 1.1 G/DL
ALP SERPL-CCNC: 90 U/L (ref 39–117)
ALT SERPL W P-5'-P-CCNC: 17 U/L (ref 1–33)
ANION GAP SERPL CALCULATED.3IONS-SCNC: 13.2 MMOL/L (ref 5–15)
AST SERPL-CCNC: 14 U/L (ref 1–32)
BILIRUB SERPL-MCNC: 0.4 MG/DL (ref 0–1.2)
BUN SERPL-MCNC: 16 MG/DL (ref 6–20)
BUN/CREAT SERPL: 16.8 (ref 7–25)
CALCIUM SPEC-SCNC: 9.6 MG/DL (ref 8.6–10.5)
CHLORIDE SERPL-SCNC: 101 MMOL/L (ref 98–107)
CO2 SERPL-SCNC: 23.8 MMOL/L (ref 22–29)
CREAT SERPL-MCNC: 0.95 MG/DL (ref 0.57–1)
DEPRECATED RDW RBC AUTO: 47.2 FL (ref 37–54)
EGFRCR SERPLBLD CKD-EPI 2021: 72.7 ML/MIN/1.73
ERYTHROCYTE [DISTWIDTH] IN BLOOD BY AUTOMATED COUNT: 15 % (ref 12.3–15.4)
GLOBULIN UR ELPH-MCNC: 3.6 GM/DL
GLUCOSE SERPL-MCNC: 103 MG/DL (ref 65–99)
HCT VFR BLD AUTO: 43.7 % (ref 34–46.6)
HGB BLD-MCNC: 14.6 G/DL (ref 12–15.9)
MCH RBC QN AUTO: 28.8 PG (ref 26.6–33)
MCHC RBC AUTO-ENTMCNC: 33.4 G/DL (ref 31.5–35.7)
MCV RBC AUTO: 86.2 FL (ref 79–97)
PLATELET # BLD AUTO: 396 10*3/MM3 (ref 140–450)
PMV BLD AUTO: 9.2 FL (ref 6–12)
POTASSIUM SERPL-SCNC: 3.8 MMOL/L (ref 3.5–5.2)
PROT SERPL-MCNC: 7.7 G/DL (ref 6–8.5)
RBC # BLD AUTO: 5.07 10*6/MM3 (ref 3.77–5.28)
SODIUM SERPL-SCNC: 138 MMOL/L (ref 136–145)
WBC NRBC COR # BLD: 7.77 10*3/MM3 (ref 3.4–10.8)

## 2022-04-27 PROCEDURE — 36415 COLL VENOUS BLD VENIPUNCTURE: CPT

## 2022-04-27 PROCEDURE — 85027 COMPLETE CBC AUTOMATED: CPT

## 2022-04-27 PROCEDURE — 80053 COMPREHEN METABOLIC PANEL: CPT

## 2022-04-27 NOTE — DISCHARGE INSTRUCTIONS
4/29/22    1000 am  ARRIVAL TIME per Dr. Vigil's office.    TAKE the following medications the morning of surgery:    HOLD all diabetic medications the morning of surgery as ordered by physician.    Please discontinue all blood thinners and anticoagulants (except aspirin) prior to surgery as per your surgeon and cardiologist instructions.    Aspirin may be continued up to the day prior to surgery.     General Instructions:  Do not eat or drink after midnight: 4/28/22 includes water, mints, or gum. You may brush your teeth.  Dental appliances that are removable must be taken out day of surgery.  Do not smoke, chew tobacco, or drink alcohol 24 hours prior to surgery.  Bring medications in original bottles, any inhalers and if applicable your C-PAP/BI-PAP machine.  Bring any papers given to you in the doctor's office.  Wear clean comfortable clothes and socks.  Do not wear contact lenses or make-up. Bring a case for your glasses if applicable.  Bring crutches or walker if applicable.  Leave all other valuables and jewelry at home.    If you were given a blood bank ID arm band remember to bring it with you the day of surgery.    Preventing a Surgical Site Infection:  Shower the night before surgery (unless instructed other wise) using a fresh bar of anti-bacterial soap (such as Dial) and clean washcloth. Dry with a clean towel and dress in clean clothing.  For 2 to 3 days before surgery, avoid shaving with a razor near where you will have surgery because the razor can irritate skin and make it easier to develop an infection. Ask your surgeon if you will be receiving antibiotics prior to surgery.  Make sure you, your family, and all healthcare providers clear their hands with soap and water or an alcohol-based hand  before caring for you or your wound.  If at all possible, quit smoking as many days before surgery as you can.    Day of surgery:  Upon arrival, a Pre-op nurse and Anesthesiologist will review your  health history, obtain vital signs, and answer questions you may have. The only belongings needed at this time will be your home medications and if applicable your C-PAP/BI-PAP machine. If you are staying overnight your family can leave the rest of your belongings in the car and bring them to your room later. A Pre-op nurse will start an IV and you may receive medication in preparation for surgery, including something to help you relax. Your family will be able to see you in the Pre-op area. While you are in surgery your family should notify the waiting room  if they leave the waiting room area and provide a contact phone number.    Please be aware that surgery does come with discomfort. We want to make every effort to control your discomfort so please discuss any uncontrolled symptoms with your nurse. Your doctor will most likely have prescribed pain medications.    If you are going home after surgery you will receive individualized written care instructions before being discharged. A responsible adult must drive you to and from the hospital on the day of surgery and stay with you for 24 hours.    If you are staying overnight following surgery, you will be transported to your hospital room following the recovery period.  [facility] has all private rooms.    If you have any questions please call Pre-Admission Testing at [phone].  Deductibles and co-payments are collected on the day of service. Please be prepared to pay the required co-pay, deductible or deposit on the day of service as defined by your plan.    A RESPONSIBLE PERSON MUST REMAIN IN THE WAITING ROOM DURING YOUR PROCEDURE AND A RESPONSIBLE  MUST BE AVAILABLE UPON YOUR DISCHARGE.

## 2022-04-28 ENCOUNTER — LAB (OUTPATIENT)
Dept: LAB | Facility: HOSPITAL | Age: 52
End: 2022-04-28

## 2022-04-28 DIAGNOSIS — Z20.822 ENCOUNTER FOR PREPROCEDURE SCREENING LABORATORY TESTING FOR COVID-19: ICD-10-CM

## 2022-04-28 DIAGNOSIS — Z01.812 ENCOUNTER FOR PREPROCEDURE SCREENING LABORATORY TESTING FOR COVID-19: ICD-10-CM

## 2022-04-28 DIAGNOSIS — Z01.812 ENCOUNTER FOR PREPROCEDURE SCREENING LABORATORY TESTING FOR COVID-19: Primary | ICD-10-CM

## 2022-04-28 DIAGNOSIS — Z20.822 ENCOUNTER FOR PREPROCEDURE SCREENING LABORATORY TESTING FOR COVID-19: Primary | ICD-10-CM

## 2022-04-28 LAB — SARS-COV-2 RNA RESP QL NAA+PROBE: NOT DETECTED

## 2022-04-28 PROCEDURE — U0003 INFECTIOUS AGENT DETECTION BY NUCLEIC ACID (DNA OR RNA); SEVERE ACUTE RESPIRATORY SYNDROME CORONAVIRUS 2 (SARS-COV-2) (CORONAVIRUS DISEASE [COVID-19]), AMPLIFIED PROBE TECHNIQUE, MAKING USE OF HIGH THROUGHPUT TECHNOLOGIES AS DESCRIBED BY CMS-2020-01-R: HCPCS | Performed by: SURGERY

## 2022-04-28 PROCEDURE — C9803 HOPD COVID-19 SPEC COLLECT: HCPCS | Performed by: SURGERY

## 2022-04-29 ENCOUNTER — APPOINTMENT (OUTPATIENT)
Dept: GENERAL RADIOLOGY | Facility: HOSPITAL | Age: 52
End: 2022-04-29
Payer: COMMERCIAL

## 2022-04-29 ENCOUNTER — HOSPITAL ENCOUNTER (OUTPATIENT)
Facility: HOSPITAL | Age: 52
Setting detail: HOSPITAL OUTPATIENT SURGERY
Discharge: HOME | End: 2022-04-29
Attending: SURGERY | Admitting: SURGERY
Payer: COMMERCIAL

## 2022-04-29 ENCOUNTER — ANESTHESIA EVENT (OUTPATIENT)
Dept: PERIOP | Facility: HOSPITAL | Age: 52
End: 2022-04-29
Payer: COMMERCIAL

## 2022-04-29 ENCOUNTER — ANESTHESIA (OUTPATIENT)
Dept: PERIOP | Facility: HOSPITAL | Age: 52
End: 2022-04-29
Payer: COMMERCIAL

## 2022-04-29 VITALS
BODY MASS INDEX: 39.56 KG/M2 | HEART RATE: 83 BPM | OXYGEN SATURATION: 98 % | DIASTOLIC BLOOD PRESSURE: 63 MMHG | TEMPERATURE: 97.5 F | HEIGHT: 62 IN | SYSTOLIC BLOOD PRESSURE: 115 MMHG | RESPIRATION RATE: 18 BRPM | WEIGHT: 215 LBS

## 2022-04-29 PROCEDURE — 25010000002 FENTANYL CITRATE (PF) 50 MCG/ML SOLUTION: Performed by: NURSE ANESTHETIST, CERTIFIED REGISTERED

## 2022-04-29 PROCEDURE — 25010000002 PROPOFOL 10 MG/ML EMULSION: Performed by: NURSE ANESTHETIST, CERTIFIED REGISTERED

## 2022-04-29 PROCEDURE — 46270 REMOVE ANAL FIST SUBQ: CPT | Performed by: SURGERY

## 2022-04-29 PROCEDURE — 25010000002 MIDAZOLAM PER 1 MG: Performed by: NURSE ANESTHETIST, CERTIFIED REGISTERED

## 2022-04-29 PROCEDURE — 0 BUPIVACAINE LIPOSOME 1.3 % SUSPENSION: Performed by: SURGERY

## 2022-04-29 PROCEDURE — 25010000002 ONDANSETRON PER 1 MG: Performed by: NURSE ANESTHETIST, CERTIFIED REGISTERED

## 2022-04-29 PROCEDURE — C9290 INJ, BUPIVACAINE LIPOSOME: HCPCS | Performed by: SURGERY

## 2022-04-29 RX ORDER — SODIUM CHLORIDE, SODIUM LACTATE, POTASSIUM CHLORIDE, CALCIUM CHLORIDE 600; 310; 30; 20 MG/100ML; MG/100ML; MG/100ML; MG/100ML
100 INJECTION, SOLUTION INTRAVENOUS ONCE AS NEEDED
Status: DISCONTINUED | OUTPATIENT
Start: 2022-04-29 | End: 2022-04-29 | Stop reason: HOSPADM

## 2022-04-29 RX ORDER — LIDOCAINE HYDROCHLORIDE 20 MG/ML
INJECTION, SOLUTION INFILTRATION; PERINEURAL AS NEEDED
Status: DISCONTINUED | OUTPATIENT
Start: 2022-04-29 | End: 2022-04-29 | Stop reason: SURG

## 2022-04-29 RX ORDER — DROPERIDOL 2.5 MG/ML
0.62 INJECTION, SOLUTION INTRAMUSCULAR; INTRAVENOUS ONCE AS NEEDED
Status: DISCONTINUED | OUTPATIENT
Start: 2022-04-29 | End: 2022-04-29 | Stop reason: HOSPADM

## 2022-04-29 RX ORDER — SODIUM CHLORIDE, SODIUM LACTATE, POTASSIUM CHLORIDE, CALCIUM CHLORIDE 600; 310; 30; 20 MG/100ML; MG/100ML; MG/100ML; MG/100ML
INJECTION, SOLUTION INTRAVENOUS CONTINUOUS PRN
Status: DISCONTINUED | OUTPATIENT
Start: 2022-04-29 | End: 2022-04-29 | Stop reason: SURG

## 2022-04-29 RX ORDER — MAGNESIUM HYDROXIDE 1200 MG/15ML
LIQUID ORAL AS NEEDED
Status: DISCONTINUED | OUTPATIENT
Start: 2022-04-29 | End: 2022-04-29 | Stop reason: HOSPADM

## 2022-04-29 RX ORDER — MIDAZOLAM HYDROCHLORIDE 1 MG/ML
INJECTION INTRAMUSCULAR; INTRAVENOUS AS NEEDED
Status: DISCONTINUED | OUTPATIENT
Start: 2022-04-29 | End: 2022-04-29 | Stop reason: SURG

## 2022-04-29 RX ORDER — MIDAZOLAM HYDROCHLORIDE 1 MG/ML
1 INJECTION INTRAMUSCULAR; INTRAVENOUS
Status: DISCONTINUED | OUTPATIENT
Start: 2022-04-29 | End: 2022-04-29 | Stop reason: HOSPADM

## 2022-04-29 RX ORDER — FAMOTIDINE 10 MG/ML
INJECTION, SOLUTION INTRAVENOUS AS NEEDED
Status: DISCONTINUED | OUTPATIENT
Start: 2022-04-29 | End: 2022-04-29 | Stop reason: SURG

## 2022-04-29 RX ORDER — FENTANYL CITRATE 50 UG/ML
50 INJECTION, SOLUTION INTRAMUSCULAR; INTRAVENOUS
Status: DISCONTINUED | OUTPATIENT
Start: 2022-04-29 | End: 2022-04-29 | Stop reason: HOSPADM

## 2022-04-29 RX ORDER — KETOROLAC TROMETHAMINE 30 MG/ML
30 INJECTION, SOLUTION INTRAMUSCULAR; INTRAVENOUS EVERY 6 HOURS PRN
Status: DISCONTINUED | OUTPATIENT
Start: 2022-04-29 | End: 2022-04-29 | Stop reason: HOSPADM

## 2022-04-29 RX ORDER — PROPOFOL 10 MG/ML
VIAL (ML) INTRAVENOUS AS NEEDED
Status: DISCONTINUED | OUTPATIENT
Start: 2022-04-29 | End: 2022-04-29 | Stop reason: SURG

## 2022-04-29 RX ORDER — SODIUM CHLORIDE 0.9 % (FLUSH) 0.9 %
10 SYRINGE (ML) INJECTION AS NEEDED
Status: DISCONTINUED | OUTPATIENT
Start: 2022-04-29 | End: 2022-04-29 | Stop reason: HOSPADM

## 2022-04-29 RX ORDER — SODIUM CHLORIDE, SODIUM LACTATE, POTASSIUM CHLORIDE, CALCIUM CHLORIDE 600; 310; 30; 20 MG/100ML; MG/100ML; MG/100ML; MG/100ML
125 INJECTION, SOLUTION INTRAVENOUS ONCE
Status: COMPLETED | OUTPATIENT
Start: 2022-04-29 | End: 2022-04-29

## 2022-04-29 RX ORDER — ONDANSETRON 2 MG/ML
4 INJECTION INTRAMUSCULAR; INTRAVENOUS AS NEEDED
Status: DISCONTINUED | OUTPATIENT
Start: 2022-04-29 | End: 2022-04-29 | Stop reason: HOSPADM

## 2022-04-29 RX ORDER — IPRATROPIUM BROMIDE AND ALBUTEROL SULFATE 2.5; .5 MG/3ML; MG/3ML
3 SOLUTION RESPIRATORY (INHALATION) ONCE AS NEEDED
Status: DISCONTINUED | OUTPATIENT
Start: 2022-04-29 | End: 2022-04-29 | Stop reason: HOSPADM

## 2022-04-29 RX ORDER — ACETAMINOPHEN 160 MG
TABLET,DISINTEGRATING ORAL AS NEEDED
Status: DISCONTINUED | OUTPATIENT
Start: 2022-04-29 | End: 2022-04-29 | Stop reason: HOSPADM

## 2022-04-29 RX ORDER — ONDANSETRON 2 MG/ML
INJECTION INTRAMUSCULAR; INTRAVENOUS AS NEEDED
Status: DISCONTINUED | OUTPATIENT
Start: 2022-04-29 | End: 2022-04-29 | Stop reason: SURG

## 2022-04-29 RX ORDER — FENTANYL CITRATE 50 UG/ML
INJECTION, SOLUTION INTRAMUSCULAR; INTRAVENOUS AS NEEDED
Status: DISCONTINUED | OUTPATIENT
Start: 2022-04-29 | End: 2022-04-29 | Stop reason: SURG

## 2022-04-29 RX ORDER — SODIUM CHLORIDE 0.9 % (FLUSH) 0.9 %
10 SYRINGE (ML) INJECTION EVERY 12 HOURS SCHEDULED
Status: DISCONTINUED | OUTPATIENT
Start: 2022-04-29 | End: 2022-04-29 | Stop reason: HOSPADM

## 2022-04-29 RX ADMIN — MIDAZOLAM 2 MG: 1 INJECTION INTRAMUSCULAR; INTRAVENOUS at 10:00

## 2022-04-29 RX ADMIN — SODIUM CHLORIDE, POTASSIUM CHLORIDE, SODIUM LACTATE AND CALCIUM CHLORIDE: 600; 310; 30; 20 INJECTION, SOLUTION INTRAVENOUS at 10:00

## 2022-04-29 RX ADMIN — FAMOTIDINE 20 MG: 10 INJECTION INTRAVENOUS at 10:06

## 2022-04-29 RX ADMIN — PROPOFOL 150 MG: 10 INJECTION, EMULSION INTRAVENOUS at 10:06

## 2022-04-29 RX ADMIN — SODIUM CHLORIDE, POTASSIUM CHLORIDE, SODIUM LACTATE AND CALCIUM CHLORIDE 125 ML/HR: 600; 310; 30; 20 INJECTION, SOLUTION INTRAVENOUS at 09:48

## 2022-04-29 RX ADMIN — LIDOCAINE HYDROCHLORIDE 60 MG: 20 INJECTION, SOLUTION INFILTRATION; PERINEURAL at 10:06

## 2022-04-29 RX ADMIN — ONDANSETRON 4 MG: 2 INJECTION INTRAMUSCULAR; INTRAVENOUS at 10:06

## 2022-04-29 RX ADMIN — FENTANYL CITRATE 100 MCG: 50 INJECTION INTRAMUSCULAR; INTRAVENOUS at 10:00

## 2022-04-29 NOTE — ANESTHESIA POSTPROCEDURE EVALUATION
Patient: Mone Amador    Procedure Summary     Date: 04/29/22 Room / Location: [facility] OR 01 /  [facility] OR    Anesthesia Start: 1000 Anesthesia Stop: 1030    Procedure: ANAL FISTULA REPAIR (N/A Rectum) Diagnosis:       Anal fistula      (Anal fistula [K60.3])    Surgeons: Pito Vigil MD Provider: Gurpreet Tejeda MD    Anesthesia Type: general ASA Status: 3          Anesthesia Type: general    Vitals  Vitals Value Taken Time   /65 04/29/22 1101   Temp 97.3 °F (36.3 °C) 04/29/22 1031   Pulse 73 04/29/22 1101   Resp 10 04/29/22 1101   SpO2 98 % 04/29/22 1101           Post Anesthesia Care and Evaluation    Patient location during evaluation: PACU  Patient participation: complete - patient participated  Level of consciousness: awake  Pain score: 0  Pain management: adequate  Airway patency: patent  Anesthetic complications: No anesthetic complications  PONV Status: none  Cardiovascular status: hemodynamically stable  Respiratory status: nasal cannula  Hydration status: acceptable

## 2022-04-29 NOTE — ANESTHESIA PREPROCEDURE EVALUATION
Anesthesia Evaluation     Patient summary reviewed and Nursing notes reviewed   no history of anesthetic complications:  NPO Solid Status: > 8 hours  NPO Liquid Status: > 8 hours           Airway   Mallampati: II  TM distance: >3 FB  Neck ROM: full  Dental      Pulmonary - negative pulmonary ROS    breath sounds clear to auscultation  (-) pneumonia  Cardiovascular   Exercise tolerance: good (4-7 METS)    Rhythm: regular  Rate: normal    (+) hypertension,       Neuro/Psych  (+) headaches, numbness, psychiatric history,    GI/Hepatic/Renal/Endo    (+) obesity, morbid obesity, GERD,      Musculoskeletal     Abdominal   (+) obese,     Abdomen: soft.   Substance History - negative use     OB/GYN          Other   arthritis,                    Anesthesia Plan    ASA 3     general     intravenous induction     Anesthetic plan, all risks, benefits, and alternatives have been provided, discussed and informed consent has been obtained with: patient.  Use of blood products discussed with consented to blood products.   Plan discussed with CRNA.        CODE STATUS:

## 2022-04-29 NOTE — OP NOTE
RECTAL FISSURE INCISION AND DRAINAGE  Procedure Note    Mone Amador  4/29/2022    Pre-op Diagnosis:   Anal fistula [K60.3]    Post-op Diagnosis: same and anal fissure       Procedure(s):  ANAL FISTULA REPAIR    Surgeon(s):  Pito Vigil MD    Anesthesia: General    Staff:   Circulator: Elisha Johnson, WALKER; Ralph Smith RN  Scrub Person: Milly Rahman  Assistant: Andrews Bucio    Estimated Blood Loss: minimal    Specimens:                * No orders in the log *      Drains: * No LDAs found *    Procedure: The perineum was prepped and draped. The anal area was injected with local. The opening was in the posterior midline about 2.5 cm out from the anal opening. The probe was used and it went in slightly to the patients right side but did not come out in the rectum. She has a anal fissure in the posterior midline. The anus was dilated. A large antiocath was placed in the tract and injected with peroxide but none came out inside the rectum. The opening was enlarged with the scalpel and a 1/4 inch penrose drain was placed. It was sutured in with a silk suture and the patient was awakened and sent to recovery.     Findings: anal fissure, anal fistula    Complications: none   Grafts / Implants N/A    Pito Vigil MD     Date: 4/29/2022  Time: 10:31 EDT

## 2022-04-29 NOTE — ANESTHESIA PROCEDURE NOTES
Airway  Urgency: elective    Date/Time: 4/29/2022 10:06 AM  Airway not difficult    General Information and Staff    Patient location during procedure: OR  Anesthesiologist: Gurpreet Tejeda MD  CRNA/CAA: Foster Grissom CRNA    Indications and Patient Condition  Indications for airway management: airway protection and cardio/pulmonary arrest    Preoxygenated: yes  MILS maintained throughout  Mask difficulty assessment: 0 - not attempted    Final Airway Details  Final airway type: supraglottic airway      Successful airway: unique  Size 4    Number of attempts at approach: 1  Assessment: lips, teeth, and gum same as pre-op and atraumatic intubation    Additional Comments  Dentition & lips unchanged from preop

## 2022-05-09 ENCOUNTER — OFFICE VISIT (OUTPATIENT)
Dept: SURGERY | Facility: CLINIC | Age: 52
End: 2022-05-09

## 2022-05-09 VITALS — WEIGHT: 215 LBS | BODY MASS INDEX: 39.56 KG/M2 | HEIGHT: 62 IN

## 2022-05-09 DIAGNOSIS — K60.3 ANAL FISTULA: Primary | ICD-10-CM

## 2022-05-09 PROCEDURE — 99024 POSTOP FOLLOW-UP VISIT: CPT | Performed by: SURGERY

## 2022-05-09 RX ORDER — SULFAMETHOXAZOLE AND TRIMETHOPRIM 800; 160 MG/1; MG/1
1 TABLET ORAL 2 TIMES DAILY
Qty: 10 TABLET | Refills: 0 | Status: SHIPPED | OUTPATIENT
Start: 2022-05-09 | End: 2022-07-19 | Stop reason: SDUPTHER

## 2022-05-09 NOTE — PROGRESS NOTES
Subjective   Mone Amador is a 51 y.o. female.     Chief Complaint: anal fistula    History of Present Illness Her opening on the skin did not track in to the rectum, so a small penrose was placed.    The following portions of the patient's history were reviewed and updated as appropriate: current medications, past family history, past medical history, past social history, past surgical history and problem list.    Review of Systems    Objective   Physical Exam no pus the penrose is nearly out.   It was removed  Past Medical History:   Diagnosis Date   • Anxiety    • Arthritis    • Cholecystolithiasis    • Fibromyalgia    • Headache    • History of COVID-19 10/06/2021   • Hypertension    • Kidney stone    • Low back pain    • Migraine    • Wears glasses (prescription)        Family History   Problem Relation Age of Onset   • Heart disease Mother    • Hypertension Mother    • Stroke Mother    • Anxiety disorder Mother    • Thyroid disease Father    • Glaucoma Father    • Multiple sclerosis Sister    • No Known Problems Brother    • No Known Problems Son    • No Known Problems Sister    • No Known Problems Son    • Breast cancer Neg Hx        Social History     Tobacco Use   • Smoking status: Never Smoker   • Smokeless tobacco: Never Used   Vaping Use   • Vaping Use: Never used   Substance Use Topics   • Alcohol use: No   • Drug use: Never       Past Surgical History:   Procedure Laterality Date   •  SECTION      ,    • CHOLECYSTECTOMY     • COLONOSCOPY N/A 2021    Procedure: COLONOSCOPY FOR SCREENING CPT CODE: ;  Surgeon: Jaron Car MD;  Location: [facility];  Service: Gastroenterology;  Laterality: N/A;   • ENDOSCOPY N/A 2021    Procedure: ESOPHAGOGASTRODUODENOSCOPY WITH BIOPSY CPT CODE: 95949;  Surgeon: Jaron Car MD;  Location: [facility] OR;  Service: Gastroenterology;  Laterality: N/A;   • HYSTERECTOMY  2003   • LUMBAR DISCECTOMY  FUSION INSTRUMENTATION N/A 11/30/2021    Procedure: posterior lumbar interbody fusion with use of bone morphogenic protein L4-5;  Surgeon: Marquis Boucher MD;  Location:  [facility] OR;  Service: Neurosurgery;  Laterality: N/A;   • RECTAL FISSURE INCISION AND DRAINAGE N/A 4/29/2022    Procedure: ANAL FISTULA REPAIR;  Surgeon: Pito Vigil MD;  Location: [facility] OR;  Service: General;  Laterality: N/A;       Current Outpatient Medications   Medication Instructions   • DULoxetine (CYMBALTA) 60 MG capsule Take 1 capsule by mouth 2 times daily.   • DULoxetine (CYMBALTA) 60 MG capsule Take 1 capsule by mouth 2 times every day.   • gabapentin (NEURONTIN) 400 MG capsule Take 1 capsule by mouth 3 times every day.   • gabapentin (NEURONTIN) 300 mg, Oral, 3 Times Daily   • hydroCHLOROthiazide (HYDRODIURIL) 25 MG tablet Take 1 tablet by mouth every day.   • hydroCHLOROthiazide (HYDRODIURIL) 25 mg, Oral, Daily   • HYDROcodone-acetaminophen (NORCO) 5-325 MG per tablet Take 1 tablet by mouth every 8 hours as needed for pain.   • ibuprofen (ADVIL,MOTRIN) 800 MG tablet Take 1 tablet by mouth 3 times every day with food.   • LORazepam (Ativan) 0.5 MG tablet Take 1 tablet by mouth 2 (Two) Times a Day As Needed.   • nadolol (CORGARD) 40 MG tablet Take 1 tablet by mouth Twice a Day.   • nadolol (CORGARD) 40 MG tablet Take 1 tablet by mouth twice daily.   • naproxen (NAPROSYN) 500 MG tablet Take 1 tablet by mouth 2 times every day with food as needed   • pregabalin (LYRICA) 75 mg, Oral, 2 Times Daily   • rizatriptan (MAXALT) 10 MG tablet Take 1 tablet by mouth once, may repeat at 2 hour intervals; do not exceed 30 mg in 24 hours   • rizatriptan (MAXALT) 10 MG tablet Take 1 tablet by mouth once, may repeat at 2 hour intervals; do not exceed 30 mg in 24 hours   • Semaglutide,0.25 or 0.5MG/DOS, (Ozempic, 0.25 or 0.5 MG/DOSE,) 2 MG/1.5ML solution pen-injector Inject 0.25 mg under the skin into the appropriate area as directed once  weekly.   • topiramate (Topamax) 25 MG tablet Take 1 tablet by mouth at bedtime for 1 week then increase to 1 tablet twice per day.   • traMADol (ULTRAM) 50 mg, Oral, Every 8 Hours PRN   • traZODone (DESYREL) 100 MG tablet Take 1 tablet by mouth every day after a meal   • traZODone (DESYREL) 100 MG tablet Take 1 tablet by mouth every day after a meal.   • zolpidem (Ambien) 10 MG tablet TAKE ONE TABLET BY MOUTH EVERY NIGHT AT BEDTIME         Assessment/Plan   Diagnoses and all orders for this visit:    1. Anal fistula (Primary)    return 3 weeks.

## 2022-05-18 ENCOUNTER — LAB (OUTPATIENT)
Dept: UROLOGY | Facility: CLINIC | Age: 52
End: 2022-05-18

## 2022-05-18 DIAGNOSIS — N39.0 URINARY TRACT INFECTION WITHOUT HEMATURIA, SITE UNSPECIFIED: Primary | ICD-10-CM

## 2022-05-18 LAB
BILIRUB BLD-MCNC: NEGATIVE MG/DL
CLARITY, POC: ABNORMAL
COLOR UR: YELLOW
EXPIRATION DATE: ABNORMAL
GLUCOSE UR STRIP-MCNC: NEGATIVE MG/DL
KETONES UR QL: NEGATIVE
LEUKOCYTE EST, POC: ABNORMAL
Lab: ABNORMAL
NITRITE UR-MCNC: NEGATIVE MG/ML
PH UR: 5 [PH] (ref 5–8)
PROT UR STRIP-MCNC: ABNORMAL MG/DL
RBC # UR STRIP: NEGATIVE /UL
SP GR UR: 1.02 (ref 1–1.03)
UROBILINOGEN UR QL: NORMAL

## 2022-05-18 PROCEDURE — 81003 URINALYSIS AUTO W/O SCOPE: CPT | Performed by: NURSE PRACTITIONER

## 2022-05-18 PROCEDURE — 87086 URINE CULTURE/COLONY COUNT: CPT | Performed by: NURSE PRACTITIONER

## 2022-05-19 LAB — BACTERIA SPEC AEROBE CULT: NO GROWTH

## 2022-06-02 ENCOUNTER — TELEPHONE (OUTPATIENT)
Dept: NEUROSURGERY | Facility: CLINIC | Age: 52
End: 2022-06-02

## 2022-06-02 ENCOUNTER — OFFICE VISIT (OUTPATIENT)
Dept: GASTROENTEROLOGY | Facility: CLINIC | Age: 52
End: 2022-06-02

## 2022-06-02 VITALS
DIASTOLIC BLOOD PRESSURE: 79 MMHG | HEIGHT: 62 IN | HEART RATE: 84 BPM | SYSTOLIC BLOOD PRESSURE: 126 MMHG | OXYGEN SATURATION: 99 % | BODY MASS INDEX: 40.85 KG/M2 | WEIGHT: 222 LBS

## 2022-06-02 DIAGNOSIS — K59.04 CHRONIC IDIOPATHIC CONSTIPATION: Primary | ICD-10-CM

## 2022-06-02 DIAGNOSIS — R10.84 GENERALIZED ABDOMINAL PAIN: ICD-10-CM

## 2022-06-02 DIAGNOSIS — R74.8 ELEVATED LIVER ENZYMES: ICD-10-CM

## 2022-06-02 DIAGNOSIS — R14.0 BLOATING: ICD-10-CM

## 2022-06-02 PROCEDURE — 99214 OFFICE O/P EST MOD 30 MIN: CPT | Performed by: PHYSICIAN ASSISTANT

## 2022-06-02 RX ORDER — METHOCARBAMOL 750 MG/1
750 TABLET, FILM COATED ORAL 2 TIMES DAILY
Qty: 30 TABLET | Refills: 0 | Status: SHIPPED | OUTPATIENT
Start: 2022-06-02 | End: 2022-07-19

## 2022-06-02 NOTE — TELEPHONE ENCOUNTER
Spoke to patient and relayed your message about her restarting her PT exercises/home exercises.     She would like to try the muscle relaxer. Confirmed pharmacy with patient.

## 2022-06-02 NOTE — PROGRESS NOTES
Chief Complaint   Patient presents with   • GIBSON   • Constipation       Mone Amador is a 51 y.o. female who presents to the office today for evaluation of GIBSON and Constipation  .    HPI  Patient presents the clinic today for evaluation of chronic constipation and Gibson fatty liver.  Patient states that at times she is experience some right upper quadrant pain and mildly elevated liver enzymes-she is concerned that she could have fatty liver and would like to know the severity of it.  She has never had prior liver imaging.  Patient is also complaining of chronic constipation which she will go several days in between bowel movements.  Patient states due to the constipation she experiences a lot of generalized abdominal pain and bloating.  Most bowel movements range from type I-III on the [place] stool scale.  She has previously tried MiraLAX however symptoms do not seem to be well controlled on the medication.  She is also tried over-the-counter stool softeners and laxatives which occasionally do help.  Review of Systems   Constitutional: Positive for fatigue.   HENT: Negative for sore throat and trouble swallowing.    Eyes: Negative.    Respiratory: Negative for cough, chest tightness and shortness of breath.    Cardiovascular: Negative for chest pain, palpitations and leg swelling.   Gastrointestinal: Positive for abdominal distention, abdominal pain and constipation. Negative for anal bleeding, blood in stool, diarrhea, nausea, rectal pain and vomiting.   Endocrine: Negative.    Genitourinary: Negative for difficulty urinating.   Musculoskeletal: Positive for back pain.   Skin: Negative.    Allergic/Immunologic: Negative.    Neurological: Positive for headaches.   Hematological: Does not bruise/bleed easily.   Psychiatric/Behavioral: Negative.        ACTIVE PROBLEMS:   Specialty Problems        Gastroenterology Problems    Gastroesophageal reflux disease        Anal fistula              PAST MEDICAL  HISTORY:  Past Medical History:   Diagnosis Date   • Anxiety    • Arthritis    • Cholecystolithiasis    • Colon polyp    • Fatty liver    • Fibromyalgia    • Headache    • History of COVID-19 10/06/2021   • Hypertension    • Kidney stone    • Low back pain    • Migraine    • Wears glasses (prescription)        SURGICAL HISTORY:  Past Surgical History:   Procedure Laterality Date   •  SECTION      ,    • CHOLECYSTECTOMY     • COLONOSCOPY N/A 2021    Procedure: COLONOSCOPY FOR SCREENING CPT CODE: ;  Surgeon: Jaron Car MD;  Location: [facility];  Service: Gastroenterology;  Laterality: N/A;   • ENDOSCOPY N/A 2021    Procedure: ESOPHAGOGASTRODUODENOSCOPY WITH BIOPSY CPT CODE: 46345;  Surgeon: Jaron Car MD;  Location: [facility];  Service: Gastroenterology;  Laterality: N/A;   • HYSTERECTOMY  2003   • LUMBAR DISCECTOMY FUSION INSTRUMENTATION N/A 2021    Procedure: posterior lumbar interbody fusion with use of bone morphogenic protein L4-5;  Surgeon: Marquis Boucher MD;  Location: [facility];  Service: Neurosurgery;  Laterality: N/A;   • RECTAL FISSURE INCISION AND DRAINAGE N/A 2022    Procedure: ANAL FISTULA REPAIR;  Surgeon: Pito Vigil MD;  Location: [facility];  Service: General;  Laterality: N/A;   • UPPER GASTROINTESTINAL ENDOSCOPY  2021       FAMILY HISTORY:  Family History   Problem Relation Age of Onset   • Heart disease Mother    • Hypertension Mother    • Stroke Mother    • Anxiety disorder Mother    • Thyroid disease Father    • Glaucoma Father    • Multiple sclerosis Sister    • No Known Problems Brother    • No Known Problems Son    • No Known Problems Sister    • No Known Problems Son    • Breast cancer Neg Hx        SOCIAL HISTORY:  Social History     Tobacco Use   • Smoking status: Never Smoker   • Smokeless tobacco: Never Used   Substance Use Topics   • Alcohol use: No       CURRENT  MEDICATION:    Current Outpatient Medications:   •  DULoxetine (CYMBALTA) 60 MG capsule, Take 1 capsule by mouth 2 times daily. (Patient taking differently: Take 60 mg by mouth 2 (Two) Times a Day.), Disp: 60 capsule, Rfl: 5  •  DULoxetine (CYMBALTA) 60 MG capsule, Take 1 capsule by mouth 2 times every day., Disp: 60 capsule, Rfl: 5  •  gabapentin (NEURONTIN) 300 MG capsule, Take 1 capsule by mouth 3 (Three) Times a Day., Disp: 90 capsule, Rfl: 1  •  gabapentin (NEURONTIN) 400 MG capsule, Take 1 capsule by mouth 3 times every day., Disp: 90 capsule, Rfl: 2  •  hydroCHLOROthiazide (HYDRODIURIL) 25 MG tablet, Take 1 tablet by mouth Daily., Disp: 30 tablet, Rfl: 5  •  hydroCHLOROthiazide (HYDRODIURIL) 25 MG tablet, Take 1 tablet by mouth every day., Disp: 30 tablet, Rfl: 5  •  HYDROcodone-acetaminophen (NORCO) 5-325 MG per tablet, Take 1 tablet by mouth every 8 hours as needed for pain., Disp: 30 tablet, Rfl: 0  •  ibuprofen (ADVIL,MOTRIN) 800 MG tablet, Take 1 tablet by mouth 3 times every day with food, Disp: 90 tablet, Rfl: 3  •  LORazepam (Ativan) 0.5 MG tablet, Take 1 tablet by mouth 2 times every day as needed, Disp: 60 tablet, Rfl: 2  •  methocarbamol (ROBAXIN) 750 MG tablet, Take 1 tablet by mouth 2 (Two) Times a Day., Disp: 30 tablet, Rfl: 0  •  nadolol (CORGARD) 40 MG tablet, Take 1 tablet by mouth Twice a Day. (Patient taking differently: Take 40 mg by mouth 2 (Two) Times a Day.), Disp: 60 tablet, Rfl: 6  •  nadolol (CORGARD) 40 MG tablet, Take 1 tablet by mouth twice daily., Disp: 60 tablet, Rfl: 6  •  naproxen (NAPROSYN) 500 MG tablet, Take 1 tablet by mouth 2 times every day with food as needed, Disp: 60 tablet, Rfl: 1  •  pregabalin (Lyrica) 75 MG capsule, Take 1 capsule by mouth 2 (Two) Times a Day., Disp: 60 capsule, Rfl: 1  •  rizatriptan (MAXALT) 10 MG tablet, Take 1 tablet by mouth once, may repeat at 2 hour intervals; do not exceed 30 mg in 24 hours (Patient taking differently: Take 10 mg by mouth  "1 (One) Time As Needed for Migraine.), Disp: 12 tablet, Rfl: 5  •  rizatriptan (MAXALT) 10 MG tablet, Take 1 tablet by mouth once, may repeat at 2 hour intervals; do not exceed 30 mg in 24 hours, Disp: 12 tablet, Rfl: 5  •  Semaglutide,0.25 or 0.5MG/DOS, (Ozempic, 0.25 or 0.5 MG/DOSE,) 2 MG/1.5ML solution pen-injector, Inject 0.25 mg under the skin into the appropriate area as directed once weekly., Disp: 1.5 mL, Rfl: 2  •  sulfamethoxazole-trimethoprim (Bactrim DS) 800-160 MG per tablet, Take 1 tablet by mouth 2 (Two) Times a Day., Disp: 10 tablet, Rfl: 0  •  topiramate (Topamax) 25 MG tablet, Take 1 tablet by mouth at bedtime for 1 week then increase to 1 tablet twice per day., Disp: 60 tablet, Rfl: 4  •  traMADol (ULTRAM) 50 MG tablet, Take 1 tablet by mouth Every 8 (Eight) Hours As Needed for Moderate Pain ., Disp: 30 tablet, Rfl: 0  •  traZODone (DESYREL) 100 MG tablet, Take 1 tablet by mouth every day after a meal., Disp: 30 tablet, Rfl: 4  •  traZODone (DESYREL) 100 MG tablet, Take 1 tablet by mouth every day after a meal, Disp: 30 tablet, Rfl: 4  •  zolpidem (Ambien) 10 MG tablet, TAKE ONE TABLET BY MOUTH EVERY NIGHT AT BEDTIME, Disp: 30 tablet, Rfl: 2  •  linaclotide (Linzess) 72 MCG capsule capsule, Take 1 capsule by mouth Every Morning Before Breakfast., Disp: 90 capsule, Rfl: 3    Current Facility-Administered Medications:   •  Semaglutide(0.25 or 0.5MG/DOS) (OZEMPIC) solution pen-injector 0.25 mg, 0.25 mg, Subcutaneous, Weekly, Rafael Naik MD  •  Semaglutide(0.25 or 0.5MG/DOS) (OZEMPIC) solution pen-injector 0.25 mg, 0.25 mg, Subcutaneous, Weekly, Rafael Naik MD    ALLERGIES:  Latex and Morphine and related    VISIT VITALS:  /79 (BP Location: Left arm, Patient Position: Sitting, Cuff Size: Adult)   Pulse 84   Ht 157.5 cm (62\")   Wt 101 kg (222 lb)   SpO2 99%   BMI 40.60 kg/m²   Physical Exam  Constitutional:       General: She is not in acute distress.     Appearance: " Normal appearance. She is well-developed.   HENT:      Head: Normocephalic and atraumatic.   Eyes:      Pupils: Pupils are equal, round, and reactive to light.   Cardiovascular:      Rate and Rhythm: Normal rate and regular rhythm.      Heart sounds: Normal heart sounds.   Pulmonary:      Effort: Pulmonary effort is normal. No respiratory distress.      Breath sounds: Normal breath sounds. No wheezing, rhonchi or rales.   Abdominal:      General: Abdomen is flat. Bowel sounds are normal. There is no distension.      Palpations: Abdomen is soft. There is no mass.      Tenderness: There is no abdominal tenderness. There is no guarding or rebound.      Hernia: No hernia is present.   Musculoskeletal:         General: No swelling. Normal range of motion.      Cervical back: Normal range of motion and neck supple.      Right lower leg: No edema.      Left lower leg: No edema.   Skin:     General: Skin is warm and dry.   Neurological:      Mental Status: She is alert and oriented to person, place, and time.   Psychiatric:         Attention and Perception: Attention normal.         Mood and Affect: Mood normal.         Speech: Speech normal.         Behavior: Behavior normal. Behavior is cooperative.         Thought Content: Thought content normal.         Assessment    Due to patient's symptoms-I will go ahead and obtain a full new patient liver work-up including labs and ultrasound to evaluate liver.  Patient will be contacted with results when available.  I will also go ahead and get her started on Linzess 72 mcg once daily.  Patient was told that we can increase the dosage if the lowest dose does not seem to be effective.   Diagnosis Plan   1. Chronic idiopathic constipation  linaclotide (Linzess) 72 MCG capsule capsule   2. Elevated liver enzymes  Endomysial Antibody IgA    Ferritin    Gamma GT    Hepatitis Panel, Acute    Iron Profile    Mitochondrial Antibodies, M2    GIBSON Fibrosure    Protime-INR    US Liver     Comprehensive Metabolic Panel    Ceruloplasmin    Celiac Comprehensive Panel    CBC & Differential    Anti-Smooth Muscle Antibody Titer    Anti-microsomal Antibody    STEFANO    Vitamin B12    Vitamin D 25 Hydroxy   3. Generalized abdominal pain     4. Bloating         Return if symptoms worsen or fail to improve.                   This document has been electronically signed by Dima Leggett PA-C  Mitzy 3, 2022 08:11 EDT    Part of this note may be an electronic transcription/translation of spoken language to printed text using the Dragon Dictation System.

## 2022-06-02 NOTE — TELEPHONE ENCOUNTER
"Provider:  Sander  Surgery:  posterior lumbar interbody fusion with use of bone morphogenic protein L4-5  Surgery Date:  11/30/2022  Last visit: Office Visit with Lynne Sellers PA-C (01/26/2022)    Next visit: NA    Reason for call:         Does patient need to come in for an evaluation?    Patient sent a [employer] message this last night:    \"June 1, 2022    Mone Amador  to Lynne Sellers PA-C        8:12 PM  I have been having a dull ache in my lower back for about 3 weeks and don’t know if it is something to be concerned about. I have been taking ibuprofen but it doesn’t help much.  I have not done anything to injure my back that I can think of.\"    I sent the patient a set of questions and this is these are her answer,     \"  Mone Amador  to Lynne Sellers PA-C        10:06 AM  The pain started about 2 months ago and has gradually gotten worse.  The pain is the same as before the surgery a dull ache but it does not go down to my legs.  At my job I sit all day and that makes it worse, the only thing that makes it better is laying down but as soon as I get up the pain comes back.  I am only taking ibuprofen which does not help that much.  I would say the pain gets up to a 10 most days.  It is so bad that at times I have considered going to the ED but have always decided to just rest.  I am also taking gabapentin but it does not help the back pain.         "

## 2022-06-02 NOTE — TELEPHONE ENCOUNTER
Call and tell her that she needs to resume her home exercises and stretches she learned in PT. I would call her in a muscle relaxant if she wants for evening use. Let me know.

## 2022-06-03 ENCOUNTER — LAB (OUTPATIENT)
Dept: UROLOGY | Facility: CLINIC | Age: 52
End: 2022-06-03

## 2022-06-03 DIAGNOSIS — R63.5 WEIGHT GAIN: ICD-10-CM

## 2022-06-03 DIAGNOSIS — R53.83 FATIGUE, UNSPECIFIED TYPE: Primary | ICD-10-CM

## 2022-06-03 LAB
25(OH)D3 SERPL-MCNC: 16.9 NG/ML (ref 30–100)
ALBUMIN SERPL-MCNC: 4.2 G/DL (ref 3.5–5.2)
ALBUMIN/GLOB SERPL: 1.5 G/DL
ALP SERPL-CCNC: 93 U/L (ref 39–117)
ALT SERPL W P-5'-P-CCNC: 21 U/L (ref 1–33)
ANION GAP SERPL CALCULATED.3IONS-SCNC: 14.3 MMOL/L (ref 5–15)
AST SERPL-CCNC: 17 U/L (ref 1–32)
BILIRUB SERPL-MCNC: 0.5 MG/DL (ref 0–1.2)
BUN SERPL-MCNC: 13 MG/DL (ref 6–20)
BUN/CREAT SERPL: 14 (ref 7–25)
CALCIUM SPEC-SCNC: 9.4 MG/DL (ref 8.6–10.5)
CERULOPLASMIN SERPL-MCNC: 28 MG/DL (ref 19–39)
CHLORIDE SERPL-SCNC: 100 MMOL/L (ref 98–107)
CHOLEST SERPL-MCNC: 219 MG/DL (ref 0–200)
CO2 SERPL-SCNC: 21.7 MMOL/L (ref 22–29)
CREAT SERPL-MCNC: 0.93 MG/DL (ref 0.57–1)
DEPRECATED RDW RBC AUTO: 42.4 FL (ref 37–54)
EGFRCR SERPLBLD CKD-EPI 2021: 74.6 ML/MIN/1.73
ERYTHROCYTE [DISTWIDTH] IN BLOOD BY AUTOMATED COUNT: 13.6 % (ref 12.3–15.4)
FERRITIN SERPL-MCNC: 117 NG/ML (ref 13–150)
GGT SERPL-CCNC: 9 U/L (ref 5–36)
GLOBULIN UR ELPH-MCNC: 2.8 GM/DL
GLUCOSE SERPL-MCNC: 109 MG/DL (ref 65–99)
HAV IGM SERPL QL IA: NORMAL
HBA1C MFR BLD: 5.5 % (ref 4.8–5.6)
HBV CORE IGM SERPL QL IA: NORMAL
HBV SURFACE AG SERPL QL IA: NORMAL
HCT VFR BLD AUTO: 43.9 % (ref 34–46.6)
HCV AB SER DONR QL: NORMAL
HDLC SERPL-MCNC: 45 MG/DL (ref 40–60)
HGB BLD-MCNC: 14.8 G/DL (ref 12–15.9)
INR PPP: 1 (ref 0.9–1.1)
IRON 24H UR-MRATE: 71 MCG/DL (ref 37–145)
IRON SATN MFR SERPL: 17 % (ref 20–50)
LDLC SERPL CALC-MCNC: 150 MG/DL (ref 0–100)
LDLC/HDLC SERPL: 3.28 {RATIO}
MCH RBC QN AUTO: 28.8 PG (ref 26.6–33)
MCHC RBC AUTO-ENTMCNC: 33.7 G/DL (ref 31.5–35.7)
MCV RBC AUTO: 85.6 FL (ref 79–97)
PLATELET # BLD AUTO: 381 10*3/MM3 (ref 140–450)
PMV BLD AUTO: 9.8 FL (ref 6–12)
POTASSIUM SERPL-SCNC: 3.9 MMOL/L (ref 3.5–5.2)
PROT SERPL-MCNC: 7 G/DL (ref 6–8.5)
PROTHROMBIN TIME: 13.4 SECONDS (ref 12.1–14.7)
RBC # BLD AUTO: 5.13 10*6/MM3 (ref 3.77–5.28)
SODIUM SERPL-SCNC: 136 MMOL/L (ref 136–145)
TIBC SERPL-MCNC: 407 MCG/DL (ref 298–536)
TRANSFERRIN SERPL-MCNC: 273 MG/DL (ref 200–360)
TRIGL SERPL-MCNC: 133 MG/DL (ref 0–150)
TSH SERPL DL<=0.05 MIU/L-ACNC: 3.14 UIU/ML (ref 0.27–4.2)
VIT B12 BLD-MCNC: 239 PG/ML (ref 211–946)
VLDLC SERPL-MCNC: 24 MG/DL (ref 5–40)
WBC NRBC COR # BLD: 6.75 10*3/MM3 (ref 3.4–10.8)

## 2022-06-03 PROCEDURE — 86231 EMA EACH IG CLASS: CPT | Performed by: PHYSICIAN ASSISTANT

## 2022-06-03 PROCEDURE — 82977 ASSAY OF GGT: CPT | Performed by: PHYSICIAN ASSISTANT

## 2022-06-03 PROCEDURE — 80074 ACUTE HEPATITIS PANEL: CPT | Performed by: PHYSICIAN ASSISTANT

## 2022-06-03 PROCEDURE — 86015 ACTIN ANTIBODY EACH: CPT | Performed by: PHYSICIAN ASSISTANT

## 2022-06-03 PROCEDURE — 80061 LIPID PANEL: CPT | Performed by: FAMILY MEDICINE

## 2022-06-03 PROCEDURE — 84450 TRANSFERASE (AST) (SGOT): CPT | Performed by: PHYSICIAN ASSISTANT

## 2022-06-03 PROCEDURE — 99024 POSTOP FOLLOW-UP VISIT: CPT | Performed by: NURSE PRACTITIONER

## 2022-06-03 PROCEDURE — 86258 DGP ANTIBODY EACH IG CLASS: CPT | Performed by: PHYSICIAN ASSISTANT

## 2022-06-03 PROCEDURE — 86381 MITOCHONDRIAL ANTIBODY EACH: CPT | Performed by: PHYSICIAN ASSISTANT

## 2022-06-03 PROCEDURE — 86376 MICROSOMAL ANTIBODY EACH: CPT | Performed by: PHYSICIAN ASSISTANT

## 2022-06-03 PROCEDURE — 82247 BILIRUBIN TOTAL: CPT | Performed by: PHYSICIAN ASSISTANT

## 2022-06-03 PROCEDURE — 86038 ANTINUCLEAR ANTIBODIES: CPT | Performed by: PHYSICIAN ASSISTANT

## 2022-06-03 PROCEDURE — 86364 TISS TRNSGLTMNASE EA IG CLAS: CPT | Performed by: PHYSICIAN ASSISTANT

## 2022-06-03 PROCEDURE — 82607 VITAMIN B-12: CPT | Performed by: PHYSICIAN ASSISTANT

## 2022-06-03 PROCEDURE — 84460 ALANINE AMINO (ALT) (SGPT): CPT | Performed by: PHYSICIAN ASSISTANT

## 2022-06-03 PROCEDURE — 83540 ASSAY OF IRON: CPT | Performed by: PHYSICIAN ASSISTANT

## 2022-06-03 PROCEDURE — 82784 ASSAY IGA/IGD/IGG/IGM EACH: CPT | Performed by: PHYSICIAN ASSISTANT

## 2022-06-03 PROCEDURE — 82728 ASSAY OF FERRITIN: CPT | Performed by: PHYSICIAN ASSISTANT

## 2022-06-03 PROCEDURE — 80050 GENERAL HEALTH PANEL: CPT | Performed by: FAMILY MEDICINE

## 2022-06-03 PROCEDURE — 82306 VITAMIN D 25 HYDROXY: CPT | Performed by: PHYSICIAN ASSISTANT

## 2022-06-03 PROCEDURE — 83010 ASSAY OF HAPTOGLOBIN QUANT: CPT | Performed by: PHYSICIAN ASSISTANT

## 2022-06-03 PROCEDURE — 82947 ASSAY GLUCOSE BLOOD QUANT: CPT | Performed by: PHYSICIAN ASSISTANT

## 2022-06-03 PROCEDURE — 85610 PROTHROMBIN TIME: CPT | Performed by: PHYSICIAN ASSISTANT

## 2022-06-03 PROCEDURE — 83036 HEMOGLOBIN GLYCOSYLATED A1C: CPT | Performed by: FAMILY MEDICINE

## 2022-06-03 PROCEDURE — 82172 ASSAY OF APOLIPOPROTEIN: CPT | Performed by: PHYSICIAN ASSISTANT

## 2022-06-03 PROCEDURE — 83883 ASSAY NEPHELOMETRY NOT SPEC: CPT | Performed by: PHYSICIAN ASSISTANT

## 2022-06-03 PROCEDURE — 84478 ASSAY OF TRIGLYCERIDES: CPT | Performed by: PHYSICIAN ASSISTANT

## 2022-06-03 PROCEDURE — 82465 ASSAY BLD/SERUM CHOLESTEROL: CPT | Performed by: PHYSICIAN ASSISTANT

## 2022-06-03 PROCEDURE — 84466 ASSAY OF TRANSFERRIN: CPT | Performed by: PHYSICIAN ASSISTANT

## 2022-06-03 PROCEDURE — 82390 ASSAY OF CERULOPLASMIN: CPT | Performed by: PHYSICIAN ASSISTANT

## 2022-06-04 LAB
LKM-1 AB SER-ACNC: 1 UNITS (ref 0–20)
MITOCHONDRIA M2 IGG SER-ACNC: <20 UNITS (ref 0–20)
SMA IGG SER-ACNC: 6 UNITS (ref 0–19)

## 2022-06-05 LAB
ENDOMYSIUM IGA SER QL: NEGATIVE
ENDOMYSIUM IGA SER QL: NEGATIVE
GLIADIN PEPTIDE IGA SER-ACNC: 3 UNITS (ref 0–19)
GLIADIN PEPTIDE IGG SER-ACNC: 2 UNITS (ref 0–19)
IGA SERPL-MCNC: 392 MG/DL (ref 87–352)
TTG IGA SER-ACNC: <2 U/ML (ref 0–3)
TTG IGG SER-ACNC: <2 U/ML (ref 0–5)

## 2022-06-06 LAB — ANA SER QL: NEGATIVE

## 2022-06-07 DIAGNOSIS — E55.9 VITAMIN D DEFICIENCY: Primary | ICD-10-CM

## 2022-06-07 DIAGNOSIS — E53.8 VITAMIN B12 DEFICIENCY: ICD-10-CM

## 2022-06-07 LAB
A2 MACROGLOB SERPL-MCNC: 259 MG/DL (ref 110–276)
ALT SERPL W P-5'-P-CCNC: 21 IU/L (ref 0–40)
APO A-I SERPL-MCNC: 133 MG/DL (ref 116–209)
AST SERPL W P-5'-P-CCNC: 22 IU/L (ref 0–40)
BILIRUB SERPL-MCNC: 0.2 MG/DL (ref 0–1.2)
CHOLEST SERPL-MCNC: 237 MG/DL (ref 100–199)
FIBROSIS SCORING:: ABNORMAL
FIBROSIS STAGE SERPL QL: ABNORMAL
GGT SERPL-CCNC: 10 IU/L (ref 0–60)
GLUCOSE SERPL-MCNC: 108 MG/DL (ref 65–99)
HAPTOGLOB SERPL-MCNC: 143 MG/DL (ref 33–346)
INTERPRETATIONS: (REFERENCE): ABNORMAL
LABORATORY COMMENT REPORT: ABNORMAL
LIVER FIBR SCORE SERPL CALC.FIBROSURE: 0.11 (ref 0–0.21)
NASH SCORING (REFERENCE): ABNORMAL
NECROINFLAMMATORY ACT GRADE SERPL QL: ABNORMAL
NECROINFLAMMATORY ACT SCORE SERPL: 0.5
SERVICE CMNT-IMP: ABNORMAL
STEATOSIS GRADE (REFERENCE): ABNORMAL
STEATOSIS GRADING (REFERENCE): ABNORMAL
STEATOSIS SCORE (REFERENCE): 0.46 (ref 0–0.3)
TRIGL SERPL-MCNC: 159 MG/DL (ref 0–149)

## 2022-06-07 RX ORDER — CYANOCOBALAMIN 1000 UG/ML
1000 INJECTION, SOLUTION INTRAMUSCULAR; SUBCUTANEOUS
Qty: 10 ML | Refills: 0 | Status: SHIPPED | OUTPATIENT
Start: 2022-06-07 | End: 2022-07-08 | Stop reason: SDUPTHER

## 2022-06-07 RX ORDER — ERGOCALCIFEROL 1.25 MG/1
50000 CAPSULE ORAL WEEKLY
Qty: 5 CAPSULE | Refills: 10 | Status: SHIPPED | OUTPATIENT
Start: 2022-06-07

## 2022-06-13 DIAGNOSIS — M79.672 LEFT FOOT PAIN: Primary | ICD-10-CM

## 2022-06-14 ENCOUNTER — HOSPITAL ENCOUNTER (OUTPATIENT)
Dept: GENERAL RADIOLOGY | Facility: HOSPITAL | Age: 52
Discharge: HOME | End: 2022-06-14
Admitting: UROLOGY

## 2022-06-14 DIAGNOSIS — M79.672 LEFT FOOT PAIN: ICD-10-CM

## 2022-06-14 PROCEDURE — 73630 X-RAY EXAM OF FOOT: CPT | Performed by: RADIOLOGY

## 2022-06-14 PROCEDURE — 73630 X-RAY EXAM OF FOOT: CPT

## 2022-06-15 DIAGNOSIS — E66.01 CLASS 3 SEVERE OBESITY DUE TO EXCESS CALORIES WITH SERIOUS COMORBIDITY AND BODY MASS INDEX (BMI) OF 40.0 TO 44.9 IN ADULT: Primary | ICD-10-CM

## 2022-06-29 ENCOUNTER — APPOINTMENT (OUTPATIENT)
Dept: ULTRASOUND IMAGING | Facility: HOSPITAL | Age: 52
End: 2022-06-29

## 2022-07-08 DIAGNOSIS — E53.8 VITAMIN B12 DEFICIENCY: ICD-10-CM

## 2022-07-08 RX ORDER — CYANOCOBALAMIN 1000 UG/ML
1000 INJECTION, SOLUTION INTRAMUSCULAR; SUBCUTANEOUS
Qty: 10 ML | Refills: 0 | Status: SHIPPED | OUTPATIENT
Start: 2022-07-08 | End: 2022-07-12 | Stop reason: SDUPTHER

## 2022-07-12 DIAGNOSIS — E53.8 VITAMIN B12 DEFICIENCY: ICD-10-CM

## 2022-07-12 RX ORDER — CYANOCOBALAMIN 1000 UG/ML
1000 INJECTION, SOLUTION INTRAMUSCULAR; SUBCUTANEOUS
Qty: 30 ML | Refills: 0 | Status: SHIPPED | OUTPATIENT
Start: 2022-07-12

## 2022-07-15 ENCOUNTER — TRANSCRIBE ORDERS (OUTPATIENT)
Dept: OTHER | Facility: OTHER | Age: 52
End: 2022-07-15

## 2022-07-15 DIAGNOSIS — M79.672 FOOT PAIN, LEFT: Primary | ICD-10-CM

## 2022-07-19 ENCOUNTER — APPOINTMENT (OUTPATIENT)
Dept: MRI IMAGING | Facility: HOSPITAL | Age: 52
End: 2022-07-19

## 2022-07-19 ENCOUNTER — PRE-ADMISSION TESTING (OUTPATIENT)
Dept: PREADMISSION TESTING | Facility: HOSPITAL | Age: 52
End: 2022-07-19

## 2022-07-19 ENCOUNTER — LAB (OUTPATIENT)
Dept: LAB | Facility: HOSPITAL | Age: 52
End: 2022-07-19

## 2022-07-19 ENCOUNTER — OFFICE VISIT (OUTPATIENT)
Dept: SURGERY | Facility: CLINIC | Age: 52
End: 2022-07-19

## 2022-07-19 VITALS
HEIGHT: 62 IN | BODY MASS INDEX: 40.48 KG/M2 | WEIGHT: 220 LBS | SYSTOLIC BLOOD PRESSURE: 110 MMHG | DIASTOLIC BLOOD PRESSURE: 72 MMHG

## 2022-07-19 DIAGNOSIS — K60.3 ANAL FISTULA: Primary | ICD-10-CM

## 2022-07-19 DIAGNOSIS — K60.3 ANAL FISTULA: ICD-10-CM

## 2022-07-19 LAB
ANION GAP SERPL CALCULATED.3IONS-SCNC: 12.4 MMOL/L (ref 5–15)
BUN SERPL-MCNC: 13 MG/DL (ref 6–20)
BUN/CREAT SERPL: 14.6 (ref 7–25)
CALCIUM SPEC-SCNC: 9.7 MG/DL (ref 8.6–10.5)
CHLORIDE SERPL-SCNC: 99 MMOL/L (ref 98–107)
CO2 SERPL-SCNC: 23.6 MMOL/L (ref 22–29)
CREAT SERPL-MCNC: 0.89 MG/DL (ref 0.57–1)
DEPRECATED RDW RBC AUTO: 41.2 FL (ref 37–54)
EGFRCR SERPLBLD CKD-EPI 2021: 78.6 ML/MIN/1.73
ERYTHROCYTE [DISTWIDTH] IN BLOOD BY AUTOMATED COUNT: 13.2 % (ref 12.3–15.4)
FLUAV SUBTYP SPEC NAA+PROBE: NOT DETECTED
FLUBV RNA ISLT QL NAA+PROBE: NOT DETECTED
GLUCOSE SERPL-MCNC: 84 MG/DL (ref 65–99)
HCT VFR BLD AUTO: 44.5 % (ref 34–46.6)
HGB BLD-MCNC: 15 G/DL (ref 12–15.9)
MCH RBC QN AUTO: 29.2 PG (ref 26.6–33)
MCHC RBC AUTO-ENTMCNC: 33.7 G/DL (ref 31.5–35.7)
MCV RBC AUTO: 86.6 FL (ref 79–97)
PLATELET # BLD AUTO: 409 10*3/MM3 (ref 140–450)
PMV BLD AUTO: 9.3 FL (ref 6–12)
POTASSIUM SERPL-SCNC: 3.1 MMOL/L (ref 3.5–5.2)
QT INTERVAL: 406 MS
QTC INTERVAL: 456 MS
RBC # BLD AUTO: 5.14 10*6/MM3 (ref 3.77–5.28)
SARS-COV-2 RNA PNL SPEC NAA+PROBE: NOT DETECTED
SODIUM SERPL-SCNC: 135 MMOL/L (ref 136–145)
WBC NRBC COR # BLD: 6.79 10*3/MM3 (ref 3.4–10.8)

## 2022-07-19 PROCEDURE — 93005 ELECTROCARDIOGRAM TRACING: CPT

## 2022-07-19 PROCEDURE — 85027 COMPLETE CBC AUTOMATED: CPT

## 2022-07-19 PROCEDURE — 99024 POSTOP FOLLOW-UP VISIT: CPT | Performed by: SURGERY

## 2022-07-19 PROCEDURE — C9803 HOPD COVID-19 SPEC COLLECT: HCPCS

## 2022-07-19 PROCEDURE — 93010 ELECTROCARDIOGRAM REPORT: CPT | Performed by: INTERNAL MEDICINE

## 2022-07-19 PROCEDURE — 36415 COLL VENOUS BLD VENIPUNCTURE: CPT

## 2022-07-19 PROCEDURE — 87636 SARSCOV2 & INF A&B AMP PRB: CPT

## 2022-07-19 PROCEDURE — 80048 BASIC METABOLIC PNL TOTAL CA: CPT

## 2022-07-19 RX ORDER — SULFAMETHOXAZOLE AND TRIMETHOPRIM 800; 160 MG/1; MG/1
1 TABLET ORAL 2 TIMES DAILY
Qty: 20 TABLET | Refills: 0 | Status: SHIPPED | OUTPATIENT
Start: 2022-07-19 | End: 2022-08-04 | Stop reason: ALTCHOICE

## 2022-07-19 NOTE — DISCHARGE INSTRUCTIONS

## 2022-07-19 NOTE — H&P (VIEW-ONLY)
Subjective   Mone Amador is a 51 y.o. female.     Chief Complaint: anal fistula    History of Present Illness He is a 50 yo who had a anal fistula that was opened and a penrose placed a couple of months ago . Since then the drain is out and the area had done well until a week or so ago when it swelled up and started draining. She has not been on any antibiotics. No bowel changes.     The following portions of the patient's history were reviewed and updated as appropriate: current medications, past family history, past medical history, past social history, past surgical history and problem list.    Review of Systems   Constitutional: Negative for activity change, appetite change, chills, fever and unexpected weight change.   HENT: Negative for congestion, facial swelling and sore throat.    Eyes: Negative for photophobia and visual disturbance.   Respiratory: Negative for chest tightness, shortness of breath and wheezing.    Cardiovascular: Negative for chest pain, palpitations and leg swelling.   Gastrointestinal: Positive for rectal pain. Negative for abdominal distention, abdominal pain, anal bleeding, blood in stool, constipation, diarrhea, nausea and vomiting.   Endocrine: Negative for cold intolerance, heat intolerance, polydipsia and polyuria.   Genitourinary: Negative for difficulty urinating, dysuria, flank pain and urgency.   Musculoskeletal: Negative for back pain and myalgias.   Skin: Negative for rash and wound.   Allergic/Immunologic: Negative for immunocompromised state.   Neurological: Negative for dizziness, seizures, syncope, light-headedness, numbness and headaches.   Hematological: Negative for adenopathy. Does not bruise/bleed easily.   Psychiatric/Behavioral: Negative for behavioral problems and confusion. The patient is not nervous/anxious.        Objective   Physical Exam  Vitals reviewed.   Constitutional:       General: She is not in acute distress.     Appearance: She is  well-developed. She is not ill-appearing.       HENT:      Head: Normocephalic. No laceration. Hair is normal.      Right Ear: Hearing and ear canal normal.      Left Ear: Hearing and ear canal normal.      Nose: Nose normal.      Right Sinus: No maxillary sinus tenderness or frontal sinus tenderness.      Left Sinus: No maxillary sinus tenderness or frontal sinus tenderness.   Eyes:      General: Lids are normal.      Conjunctiva/sclera: Conjunctivae normal.      Pupils: Pupils are equal, round, and reactive to light.   Neck:      Thyroid: No thyroid mass or thyromegaly.      Vascular: No JVD.      Trachea: No tracheal tenderness or tracheal deviation.   Cardiovascular:      Rate and Rhythm: Normal rate and regular rhythm.      Heart sounds: No murmur heard.    No gallop.   Pulmonary:      Effort: Pulmonary effort is normal.      Breath sounds: Normal breath sounds. No stridor. No wheezing.   Chest:      Chest wall: No tenderness.   Breasts:      Right: No supraclavicular adenopathy.      Left: No supraclavicular adenopathy.       Abdominal:      General: Bowel sounds are normal. There is no distension.      Palpations: Abdomen is soft. There is no mass.      Tenderness: There is no abdominal tenderness. There is no guarding or rebound.      Hernia: No hernia is present.   Musculoskeletal:         General: No deformity.      Cervical back: Normal range of motion.   Lymphadenopathy:      Cervical: No cervical adenopathy.      Upper Body:      Right upper body: No supraclavicular adenopathy.      Left upper body: No supraclavicular adenopathy.   Skin:     General: Skin is warm and dry.      Coloration: Skin is not pale.      Findings: No erythema or rash.   Neurological:      Mental Status: She is alert and oriented to person, place, and time.      Motor: No abnormal muscle tone.   Psychiatric:         Behavior: Behavior normal.         Thought Content: Thought content normal.         Past Medical History:    Diagnosis Date   • Anxiety    • Arthritis    • Cholecystolithiasis    • Colon polyp    • Fatty liver    • Fibromyalgia    • Headache    • History of COVID-19 10/06/2021   • Hypertension    • Kidney stone    • Low back pain    • Migraine    • Wears glasses (prescription)        Family History   Problem Relation Age of Onset   • Heart disease Mother    • Hypertension Mother    • Stroke Mother    • Anxiety disorder Mother    • Thyroid disease Father    • Glaucoma Father    • Multiple sclerosis Sister    • No Known Problems Brother    • No Known Problems Son    • No Known Problems Sister    • No Known Problems Son    • Breast cancer Neg Hx        Social History     Tobacco Use   • Smoking status: Never Smoker   • Smokeless tobacco: Never Used   Vaping Use   • Vaping Use: Never used   Substance Use Topics   • Alcohol use: No   • Drug use: No       Past Surgical History:   Procedure Laterality Date   •  SECTION      ,    • CHOLECYSTECTOMY     • COLONOSCOPY N/A 2021    Procedure: COLONOSCOPY FOR SCREENING CPT CODE: ;  Surgeon: Jaron Car MD;  Location: [facility];  Service: Gastroenterology;  Laterality: N/A;   • ENDOSCOPY N/A 2021    Procedure: ESOPHAGOGASTRODUODENOSCOPY WITH BIOPSY CPT CODE: 75013;  Surgeon: Jaron Car MD;  Location: [facility];  Service: Gastroenterology;  Laterality: N/A;   • HYSTERECTOMY      32   • LUMBAR DISCECTOMY FUSION INSTRUMENTATION N/A 2021    Procedure: posterior lumbar interbody fusion with use of bone morphogenic protein L4-5;  Surgeon: Marquis Boucher MD;  Location: [facility];  Service: Neurosurgery;  Laterality: N/A;   • RECTAL FISSURE INCISION AND DRAINAGE N/A 2022    Procedure: ANAL FISTULA REPAIR;  Surgeon: Pito Vigil MD;  Location: [facility];  Service: General;  Laterality: N/A;   • UPPER GASTROINTESTINAL ENDOSCOPY  2021       Current Outpatient Medications    Medication Instructions   • cyanocobalamin 1,000 mcg, Intramuscular, Every 14 Days   • DULoxetine (CYMBALTA) 60 MG capsule Take 1 capsule by mouth 2 times daily.   • DULoxetine (CYMBALTA) 60 MG capsule Take 1 capsule by mouth 2 times every day.   • gabapentin (NEURONTIN) 400 MG capsule Take 1 capsule by mouth 3 times every day.   • gabapentin (NEURONTIN) 300 mg, Oral, 3 Times Daily   • hydroCHLOROthiazide (HYDRODIURIL) 25 MG tablet Take 1 tablet by mouth every day.   • hydroCHLOROthiazide (HYDRODIURIL) 25 mg, Oral, Daily   • HYDROcodone-acetaminophen (NORCO) 5-325 MG per tablet Take 1 tablet by mouth every 8 hours as needed for pain.   • ibuprofen (ADVIL,MOTRIN) 800 MG tablet Take 1 tablet by mouth 3 times every day with food   • linaclotide (LINZESS) 72 mcg, Oral, Every Morning Before Breakfast   • Liraglutide (SAXENDA) 18 MG/3ML injection pen Inject 0.6mg under skin daily for week 1, THEN 1.2mg daily for week 2, THEN 1.8mg daily for week 3, then 2.4mg daily for week 4.   • LORazepam (Ativan) 0.5 MG tablet Take 1 tablet by mouth 2 times every day as needed   • methocarbamol (ROBAXIN) 750 mg, Oral, 2 Times Daily   • nadolol (CORGARD) 40 MG tablet Take 1 tablet by mouth Twice a Day.   • nadolol (CORGARD) 40 MG tablet Take 1 tablet by mouth twice daily.   • naproxen (NAPROSYN) 500 MG tablet Take 1 tablet by mouth 2 times every day with food as needed   • pregabalin (LYRICA) 75 mg, Oral, 2 Times Daily   • rizatriptan (MAXALT) 10 MG tablet Take 1 tablet by mouth once, may repeat at 2 hour intervals; do not exceed 30 mg in 24 hours   • rizatriptan (MAXALT) 10 MG tablet Take 1 tablet by mouth once, may repeat at 2 hour intervals; do not exceed 30 mg in 24 hours   • Semaglutide,0.25 or 0.5MG/DOS, (Ozempic, 0.25 or 0.5 MG/DOSE,) 2 MG/1.5ML solution pen-injector Inject 0.25 mg under the skin into the appropriate area as directed once weekly.   • sulfamethoxazole-trimethoprim (Bactrim DS) 800-160 MG per tablet 1 tablet,  Oral, 2 Times Daily   • topiramate (Topamax) 25 MG tablet Take 1 tablet by mouth at bedtime for 1 week then increase to 1 tablet twice per day.   • traMADol (ULTRAM) 50 mg, Oral, Every 8 Hours PRN   • traZODone (DESYREL) 100 MG tablet Take 1 tablet by mouth every day after a meal.   • traZODone (DESYREL) 100 MG tablet Take 1 tablet by mouth every day after a meal   • vitamin D (ERGOCALCIFEROL) 50,000 Units, Oral, Weekly   • zolpidem (Ambien) 10 MG tablet TAKE ONE TABLET BY MOUTH EVERY NIGHT AT BEDTIME         Assessment & Plan   Diagnoses and all orders for this visit:    1. Anal fistula (Primary)    anoscopy and possible fistulotomy  Start Bactrim

## 2022-07-19 NOTE — PROGRESS NOTES
Subjective   Mone Amador is a 51 y.o. female.     Chief Complaint: anal fistula    History of Present Illness He is a 52 yo who had a anal fistula that was opened and a penrose placed a couple of months ago . Since then the drain is out and the area had done well until a week or so ago when it swelled up and started draining. She has not been on any antibiotics. No bowel changes.     The following portions of the patient's history were reviewed and updated as appropriate: current medications, past family history, past medical history, past social history, past surgical history and problem list.    Review of Systems   Constitutional: Negative for activity change, appetite change, chills, fever and unexpected weight change.   HENT: Negative for congestion, facial swelling and sore throat.    Eyes: Negative for photophobia and visual disturbance.   Respiratory: Negative for chest tightness, shortness of breath and wheezing.    Cardiovascular: Negative for chest pain, palpitations and leg swelling.   Gastrointestinal: Positive for rectal pain. Negative for abdominal distention, abdominal pain, anal bleeding, blood in stool, constipation, diarrhea, nausea and vomiting.   Endocrine: Negative for cold intolerance, heat intolerance, polydipsia and polyuria.   Genitourinary: Negative for difficulty urinating, dysuria, flank pain and urgency.   Musculoskeletal: Negative for back pain and myalgias.   Skin: Negative for rash and wound.   Allergic/Immunologic: Negative for immunocompromised state.   Neurological: Negative for dizziness, seizures, syncope, light-headedness, numbness and headaches.   Hematological: Negative for adenopathy. Does not bruise/bleed easily.   Psychiatric/Behavioral: Negative for behavioral problems and confusion. The patient is not nervous/anxious.        Objective   Physical Exam  Vitals reviewed.   Constitutional:       General: She is not in acute distress.     Appearance: She is  well-developed. She is not ill-appearing.       HENT:      Head: Normocephalic. No laceration. Hair is normal.      Right Ear: Hearing and ear canal normal.      Left Ear: Hearing and ear canal normal.      Nose: Nose normal.      Right Sinus: No maxillary sinus tenderness or frontal sinus tenderness.      Left Sinus: No maxillary sinus tenderness or frontal sinus tenderness.   Eyes:      General: Lids are normal.      Conjunctiva/sclera: Conjunctivae normal.      Pupils: Pupils are equal, round, and reactive to light.   Neck:      Thyroid: No thyroid mass or thyromegaly.      Vascular: No JVD.      Trachea: No tracheal tenderness or tracheal deviation.   Cardiovascular:      Rate and Rhythm: Normal rate and regular rhythm.      Heart sounds: No murmur heard.    No gallop.   Pulmonary:      Effort: Pulmonary effort is normal.      Breath sounds: Normal breath sounds. No stridor. No wheezing.   Chest:      Chest wall: No tenderness.   Breasts:      Right: No supraclavicular adenopathy.      Left: No supraclavicular adenopathy.       Abdominal:      General: Bowel sounds are normal. There is no distension.      Palpations: Abdomen is soft. There is no mass.      Tenderness: There is no abdominal tenderness. There is no guarding or rebound.      Hernia: No hernia is present.   Musculoskeletal:         General: No deformity.      Cervical back: Normal range of motion.   Lymphadenopathy:      Cervical: No cervical adenopathy.      Upper Body:      Right upper body: No supraclavicular adenopathy.      Left upper body: No supraclavicular adenopathy.   Skin:     General: Skin is warm and dry.      Coloration: Skin is not pale.      Findings: No erythema or rash.   Neurological:      Mental Status: She is alert and oriented to person, place, and time.      Motor: No abnormal muscle tone.   Psychiatric:         Behavior: Behavior normal.         Thought Content: Thought content normal.         Past Medical History:    Diagnosis Date   • Anxiety    • Arthritis    • Cholecystolithiasis    • Colon polyp    • Fatty liver    • Fibromyalgia    • Headache    • History of COVID-19 10/06/2021   • Hypertension    • Kidney stone    • Low back pain    • Migraine    • Wears glasses (prescription)        Family History   Problem Relation Age of Onset   • Heart disease Mother    • Hypertension Mother    • Stroke Mother    • Anxiety disorder Mother    • Thyroid disease Father    • Glaucoma Father    • Multiple sclerosis Sister    • No Known Problems Brother    • No Known Problems Son    • No Known Problems Sister    • No Known Problems Son    • Breast cancer Neg Hx        Social History     Tobacco Use   • Smoking status: Never Smoker   • Smokeless tobacco: Never Used   Vaping Use   • Vaping Use: Never used   Substance Use Topics   • Alcohol use: No   • Drug use: No       Past Surgical History:   Procedure Laterality Date   •  SECTION      ,    • CHOLECYSTECTOMY     • COLONOSCOPY N/A 2021    Procedure: COLONOSCOPY FOR SCREENING CPT CODE: ;  Surgeon: Jaron Car MD;  Location: [facility];  Service: Gastroenterology;  Laterality: N/A;   • ENDOSCOPY N/A 2021    Procedure: ESOPHAGOGASTRODUODENOSCOPY WITH BIOPSY CPT CODE: 27125;  Surgeon: Jaron Car MD;  Location: [facility];  Service: Gastroenterology;  Laterality: N/A;   • HYSTERECTOMY      32   • LUMBAR DISCECTOMY FUSION INSTRUMENTATION N/A 2021    Procedure: posterior lumbar interbody fusion with use of bone morphogenic protein L4-5;  Surgeon: Marquis Boucher MD;  Location: [facility];  Service: Neurosurgery;  Laterality: N/A;   • RECTAL FISSURE INCISION AND DRAINAGE N/A 2022    Procedure: ANAL FISTULA REPAIR;  Surgeon: Pito Vigil MD;  Location: [facility];  Service: General;  Laterality: N/A;   • UPPER GASTROINTESTINAL ENDOSCOPY  2021       Current Outpatient Medications    Medication Instructions   • cyanocobalamin 1,000 mcg, Intramuscular, Every 14 Days   • DULoxetine (CYMBALTA) 60 MG capsule Take 1 capsule by mouth 2 times daily.   • DULoxetine (CYMBALTA) 60 MG capsule Take 1 capsule by mouth 2 times every day.   • gabapentin (NEURONTIN) 400 MG capsule Take 1 capsule by mouth 3 times every day.   • gabapentin (NEURONTIN) 300 mg, Oral, 3 Times Daily   • hydroCHLOROthiazide (HYDRODIURIL) 25 MG tablet Take 1 tablet by mouth every day.   • hydroCHLOROthiazide (HYDRODIURIL) 25 mg, Oral, Daily   • HYDROcodone-acetaminophen (NORCO) 5-325 MG per tablet Take 1 tablet by mouth every 8 hours as needed for pain.   • ibuprofen (ADVIL,MOTRIN) 800 MG tablet Take 1 tablet by mouth 3 times every day with food   • linaclotide (LINZESS) 72 mcg, Oral, Every Morning Before Breakfast   • Liraglutide (SAXENDA) 18 MG/3ML injection pen Inject 0.6mg under skin daily for week 1, THEN 1.2mg daily for week 2, THEN 1.8mg daily for week 3, then 2.4mg daily for week 4.   • LORazepam (Ativan) 0.5 MG tablet Take 1 tablet by mouth 2 times every day as needed   • methocarbamol (ROBAXIN) 750 mg, Oral, 2 Times Daily   • nadolol (CORGARD) 40 MG tablet Take 1 tablet by mouth Twice a Day.   • nadolol (CORGARD) 40 MG tablet Take 1 tablet by mouth twice daily.   • naproxen (NAPROSYN) 500 MG tablet Take 1 tablet by mouth 2 times every day with food as needed   • pregabalin (LYRICA) 75 mg, Oral, 2 Times Daily   • rizatriptan (MAXALT) 10 MG tablet Take 1 tablet by mouth once, may repeat at 2 hour intervals; do not exceed 30 mg in 24 hours   • rizatriptan (MAXALT) 10 MG tablet Take 1 tablet by mouth once, may repeat at 2 hour intervals; do not exceed 30 mg in 24 hours   • Semaglutide,0.25 or 0.5MG/DOS, (Ozempic, 0.25 or 0.5 MG/DOSE,) 2 MG/1.5ML solution pen-injector Inject 0.25 mg under the skin into the appropriate area as directed once weekly.   • sulfamethoxazole-trimethoprim (Bactrim DS) 800-160 MG per tablet 1 tablet,  Oral, 2 Times Daily   • topiramate (Topamax) 25 MG tablet Take 1 tablet by mouth at bedtime for 1 week then increase to 1 tablet twice per day.   • traMADol (ULTRAM) 50 mg, Oral, Every 8 Hours PRN   • traZODone (DESYREL) 100 MG tablet Take 1 tablet by mouth every day after a meal.   • traZODone (DESYREL) 100 MG tablet Take 1 tablet by mouth every day after a meal   • vitamin D (ERGOCALCIFEROL) 50,000 Units, Oral, Weekly   • zolpidem (Ambien) 10 MG tablet TAKE ONE TABLET BY MOUTH EVERY NIGHT AT BEDTIME         Assessment & Plan   Diagnoses and all orders for this visit:    1. Anal fistula (Primary)    anoscopy and possible fistulotomy  Start Bactrim

## 2022-07-21 ENCOUNTER — ANESTHESIA EVENT (OUTPATIENT)
Dept: PERIOP | Facility: HOSPITAL | Age: 52
End: 2022-07-21

## 2022-07-21 ENCOUNTER — ANESTHESIA (OUTPATIENT)
Dept: PERIOP | Facility: HOSPITAL | Age: 52
End: 2022-07-21

## 2022-07-21 ENCOUNTER — HOSPITAL ENCOUNTER (OUTPATIENT)
Facility: HOSPITAL | Age: 52
Setting detail: HOSPITAL OUTPATIENT SURGERY
Discharge: HOME | End: 2022-07-21
Attending: SURGERY | Admitting: SURGERY

## 2022-07-21 VITALS
SYSTOLIC BLOOD PRESSURE: 104 MMHG | OXYGEN SATURATION: 97 % | WEIGHT: 220 LBS | HEIGHT: 62 IN | HEART RATE: 57 BPM | TEMPERATURE: 97.5 F | BODY MASS INDEX: 40.48 KG/M2 | RESPIRATION RATE: 18 BRPM | DIASTOLIC BLOOD PRESSURE: 67 MMHG

## 2022-07-21 DIAGNOSIS — K60.3 ANAL FISTULA: Primary | ICD-10-CM

## 2022-07-21 PROCEDURE — 25010000002 ONDANSETRON PER 1 MG: Performed by: NURSE ANESTHETIST, CERTIFIED REGISTERED

## 2022-07-21 PROCEDURE — C9290 INJ, BUPIVACAINE LIPOSOME: HCPCS | Performed by: SURGERY

## 2022-07-21 PROCEDURE — 0 BUPIVACAINE LIPOSOME 1.3 % SUSPENSION: Performed by: SURGERY

## 2022-07-21 PROCEDURE — 46270 REMOVE ANAL FIST SUBQ: CPT | Performed by: SURGERY

## 2022-07-21 PROCEDURE — 25010000002 KETOROLAC TROMETHAMINE PER 15 MG: Performed by: NURSE ANESTHETIST, CERTIFIED REGISTERED

## 2022-07-21 PROCEDURE — 25010000002 MIDAZOLAM PER 1 MG: Performed by: NURSE ANESTHETIST, CERTIFIED REGISTERED

## 2022-07-21 PROCEDURE — 25010000002 FENTANYL CITRATE (PF) 50 MCG/ML SOLUTION: Performed by: NURSE ANESTHETIST, CERTIFIED REGISTERED

## 2022-07-21 RX ORDER — FENTANYL CITRATE 50 UG/ML
INJECTION, SOLUTION INTRAMUSCULAR; INTRAVENOUS AS NEEDED
Status: DISCONTINUED | OUTPATIENT
Start: 2022-07-21 | End: 2022-07-21 | Stop reason: SURG

## 2022-07-21 RX ORDER — SODIUM CHLORIDE, SODIUM LACTATE, POTASSIUM CHLORIDE, CALCIUM CHLORIDE 600; 310; 30; 20 MG/100ML; MG/100ML; MG/100ML; MG/100ML
125 INJECTION, SOLUTION INTRAVENOUS ONCE
Status: COMPLETED | OUTPATIENT
Start: 2022-07-21 | End: 2022-07-21

## 2022-07-21 RX ORDER — HYDROCODONE BITARTRATE AND ACETAMINOPHEN 5; 325 MG/1; MG/1
1 TABLET ORAL EVERY 4 HOURS PRN
Status: DISCONTINUED | OUTPATIENT
Start: 2022-07-21 | End: 2022-07-21 | Stop reason: HOSPADM

## 2022-07-21 RX ORDER — ACETAMINOPHEN 160 MG
TABLET,DISINTEGRATING ORAL AS NEEDED
Status: DISCONTINUED | OUTPATIENT
Start: 2022-07-21 | End: 2022-07-21 | Stop reason: HOSPADM

## 2022-07-21 RX ORDER — SODIUM CHLORIDE 0.9 % (FLUSH) 0.9 %
10 SYRINGE (ML) INJECTION EVERY 12 HOURS SCHEDULED
Status: DISCONTINUED | OUTPATIENT
Start: 2022-07-21 | End: 2022-07-21 | Stop reason: HOSPADM

## 2022-07-21 RX ORDER — MEPERIDINE HYDROCHLORIDE 25 MG/ML
12.5 INJECTION INTRAMUSCULAR; INTRAVENOUS; SUBCUTANEOUS
Status: DISCONTINUED | OUTPATIENT
Start: 2022-07-21 | End: 2022-07-21 | Stop reason: HOSPADM

## 2022-07-21 RX ORDER — SODIUM CHLORIDE 0.9 % (FLUSH) 0.9 %
10 SYRINGE (ML) INJECTION AS NEEDED
Status: DISCONTINUED | OUTPATIENT
Start: 2022-07-21 | End: 2022-07-21 | Stop reason: HOSPADM

## 2022-07-21 RX ORDER — FENTANYL CITRATE 50 UG/ML
50 INJECTION, SOLUTION INTRAMUSCULAR; INTRAVENOUS
Status: DISCONTINUED | OUTPATIENT
Start: 2022-07-21 | End: 2022-07-21 | Stop reason: HOSPADM

## 2022-07-21 RX ORDER — MIDAZOLAM HYDROCHLORIDE 1 MG/ML
1 INJECTION INTRAMUSCULAR; INTRAVENOUS
Status: DISCONTINUED | OUTPATIENT
Start: 2022-07-21 | End: 2022-07-21 | Stop reason: HOSPADM

## 2022-07-21 RX ORDER — MIDAZOLAM HYDROCHLORIDE 1 MG/ML
INJECTION INTRAMUSCULAR; INTRAVENOUS AS NEEDED
Status: DISCONTINUED | OUTPATIENT
Start: 2022-07-21 | End: 2022-07-21 | Stop reason: SURG

## 2022-07-21 RX ORDER — SODIUM CHLORIDE, SODIUM LACTATE, POTASSIUM CHLORIDE, CALCIUM CHLORIDE 600; 310; 30; 20 MG/100ML; MG/100ML; MG/100ML; MG/100ML
100 INJECTION, SOLUTION INTRAVENOUS ONCE AS NEEDED
Status: DISCONTINUED | OUTPATIENT
Start: 2022-07-21 | End: 2022-07-21 | Stop reason: HOSPADM

## 2022-07-21 RX ORDER — ONDANSETRON 2 MG/ML
4 INJECTION INTRAMUSCULAR; INTRAVENOUS AS NEEDED
Status: DISCONTINUED | OUTPATIENT
Start: 2022-07-21 | End: 2022-07-21 | Stop reason: HOSPADM

## 2022-07-21 RX ORDER — IPRATROPIUM BROMIDE AND ALBUTEROL SULFATE 2.5; .5 MG/3ML; MG/3ML
3 SOLUTION RESPIRATORY (INHALATION) ONCE AS NEEDED
Status: DISCONTINUED | OUTPATIENT
Start: 2022-07-21 | End: 2022-07-21 | Stop reason: HOSPADM

## 2022-07-21 RX ORDER — ONDANSETRON 2 MG/ML
INJECTION INTRAMUSCULAR; INTRAVENOUS AS NEEDED
Status: DISCONTINUED | OUTPATIENT
Start: 2022-07-21 | End: 2022-07-21 | Stop reason: SURG

## 2022-07-21 RX ORDER — MAGNESIUM HYDROXIDE 1200 MG/15ML
LIQUID ORAL AS NEEDED
Status: DISCONTINUED | OUTPATIENT
Start: 2022-07-21 | End: 2022-07-21 | Stop reason: HOSPADM

## 2022-07-21 RX ORDER — HYDROCODONE BITARTRATE AND ACETAMINOPHEN 5; 325 MG/1; MG/1
1 TABLET ORAL EVERY 4 HOURS PRN
Qty: 8 TABLET | Refills: 0 | Status: SHIPPED | OUTPATIENT
Start: 2022-07-21 | End: 2022-07-31

## 2022-07-21 RX ORDER — KETOROLAC TROMETHAMINE 30 MG/ML
30 INJECTION, SOLUTION INTRAMUSCULAR; INTRAVENOUS EVERY 6 HOURS PRN
Status: COMPLETED | OUTPATIENT
Start: 2022-07-21 | End: 2022-07-21

## 2022-07-21 RX ORDER — FAMOTIDINE 10 MG/ML
INJECTION, SOLUTION INTRAVENOUS AS NEEDED
Status: DISCONTINUED | OUTPATIENT
Start: 2022-07-21 | End: 2022-07-21 | Stop reason: SURG

## 2022-07-21 RX ADMIN — MIDAZOLAM 2 MG: 1 INJECTION INTRAMUSCULAR; INTRAVENOUS at 08:41

## 2022-07-21 RX ADMIN — FAMOTIDINE 20 MG: 10 INJECTION INTRAVENOUS at 08:41

## 2022-07-21 RX ADMIN — ONDANSETRON 4 MG: 2 INJECTION INTRAMUSCULAR; INTRAVENOUS at 08:41

## 2022-07-21 RX ADMIN — FENTANYL CITRATE 50 MCG: 50 INJECTION INTRAMUSCULAR; INTRAVENOUS at 08:55

## 2022-07-21 RX ADMIN — HYDROCODONE BITARTRATE AND ACETAMINOPHEN 1 TABLET: 5; 325 TABLET ORAL at 10:01

## 2022-07-21 RX ADMIN — FENTANYL CITRATE 50 MCG: 50 INJECTION INTRAMUSCULAR; INTRAVENOUS at 08:47

## 2022-07-21 RX ADMIN — SODIUM CHLORIDE, POTASSIUM CHLORIDE, SODIUM LACTATE AND CALCIUM CHLORIDE 125 ML/HR: 600; 310; 30; 20 INJECTION, SOLUTION INTRAVENOUS at 07:30

## 2022-07-21 RX ADMIN — SODIUM CHLORIDE, POTASSIUM CHLORIDE, SODIUM LACTATE AND CALCIUM CHLORIDE: 600; 310; 30; 20 INJECTION, SOLUTION INTRAVENOUS at 08:41

## 2022-07-21 RX ADMIN — KETOROLAC TROMETHAMINE 30 MG: 30 INJECTION, SOLUTION INTRAMUSCULAR; INTRAVENOUS at 10:01

## 2022-07-21 NOTE — ANESTHESIA PREPROCEDURE EVALUATION
Anesthesia Evaluation     Patient summary reviewed and Nursing notes reviewed   no history of anesthetic complications:  NPO Solid Status: > 8 hours  NPO Liquid Status: > 8 hours           Airway   Mallampati: II  TM distance: >3 FB  Neck ROM: full  Dental - normal exam     Pulmonary - negative pulmonary ROS    breath sounds clear to auscultation  (-) pneumonia  Cardiovascular   Exercise tolerance: good (4-7 METS)    Rhythm: regular  Rate: normal    (+) hypertension,       Neuro/Psych  (+) headaches, numbness, psychiatric history,    GI/Hepatic/Renal/Endo    (+) obesity, morbid obesity, GERD,  liver disease, renal disease,     Musculoskeletal     Abdominal   (+) obese,     Abdomen: soft.   Substance History - negative use     OB/GYN          Other   arthritis,                        Anesthesia Plan    ASA 3     general     intravenous induction     Anesthetic plan, risks, benefits, and alternatives have been provided, discussed and informed consent has been obtained with: patient.  Use of blood products discussed with consented to blood products.   Plan discussed with CRNA.        CODE STATUS:

## 2022-07-21 NOTE — ANESTHESIA POSTPROCEDURE EVALUATION
Patient: Mone Amador    Procedure Summary     Date: 07/21/22 Room / Location:  [facility] OR 01 /  [facility]    Anesthesia Start: 0842 Anesthesia Stop: 0908    Procedure: ANAL SCOPE with fistulotomy (N/A ) Diagnosis:       Anal fistula      (Anal fistula [K60.3])    Surgeons: Pito Vigil MD Provider: Santos Gonzales MD    Anesthesia Type: general ASA Status: 3          Anesthesia Type: general    Vitals  Vitals Value Taken Time   /64 07/21/22 0939   Temp 97 °F (36.1 °C) 07/21/22 0909   Pulse 67 07/21/22 0939   Resp 12 07/21/22 0939   SpO2 97 % 07/21/22 0939           Post Anesthesia Care and Evaluation    Patient location during evaluation: PHASE II  Patient participation: complete - patient participated  Level of consciousness: awake and alert  Pain score: 0  Pain management: adequate    Airway patency: patent  Anesthetic complications: No anesthetic complications    Cardiovascular status: acceptable  Respiratory status: acceptable  Hydration status: acceptable

## 2022-07-21 NOTE — OP NOTE
ANAL SCOPE  Procedure Note    Mone Amador  7/21/2022    Pre-op Diagnosis:   Anal fistula [K60.3]    Post-op Diagnosis: same         Procedure(s):  ANAL SCOPE with fistulotomy    Surgeon(s):  Pito Vigil MD    Anesthesia: Anesthesia type not filed in the log.    Staff:   Circulator: Génesis Swan, WALKER; Fiorella Hua RN  Scrub Person: Jason Hua  Assistant: Pito Howard    Estimated Blood Loss: minimal    Specimens:                * No orders in the log *      Drains:   Open Drain Perineal (Active)   Site Description Unable to view 07/21/22 0903       Procedure: The perineum was prepped and drape. The local was injected and the small opening in the posterior midline about 2.5 cm out from the anal verge was probed. It tracked to the patients right side but no opening to the rectum was seen. Peroxide was injected with the anal retractor in and no bubbling was seen in the rectum. The cautery was used to open the tract out laterally about 2 cm. A small trimmed piece of a penrose drain was sutured in with a chromic suture and a couple more chromic sutures placed.     Findings: anal fistula    Complications: none   Grafts / Implants N/A    Pito Vigil MD     Date: 7/21/2022  Time: 09:05 EDT

## 2022-07-21 NOTE — ANESTHESIA PROCEDURE NOTES
Airway  Urgency: elective    Date/Time: 7/21/2022 8:43 AM  Airway not difficult    General Information and Staff    Patient location during procedure: OR  CRNA/CAA: Latonia Jennings CRNA    Indications and Patient Condition  Indications for airway management: airway protection    Preoxygenated: yes  Mask difficulty assessment: 0 - not attempted    Final Airway Details  Final airway type: supraglottic airway      Successful airway: unique  Size 4    Number of attempts at approach: 1  Assessment: lips, teeth, and gum same as pre-op

## 2022-07-27 ENCOUNTER — HOSPITAL ENCOUNTER (OUTPATIENT)
Dept: MRI IMAGING | Facility: HOSPITAL | Age: 52
Discharge: HOME | End: 2022-07-27
Admitting: FAMILY MEDICINE

## 2022-07-27 DIAGNOSIS — M79.672 FOOT PAIN, LEFT: ICD-10-CM

## 2022-07-27 PROCEDURE — 73721 MRI JNT OF LWR EXTRE W/O DYE: CPT | Performed by: RADIOLOGY

## 2022-07-27 PROCEDURE — 73721 MRI JNT OF LWR EXTRE W/O DYE: CPT

## 2022-07-28 ENCOUNTER — OFFICE VISIT (OUTPATIENT)
Dept: SURGERY | Facility: CLINIC | Age: 52
End: 2022-07-28

## 2022-07-28 VITALS — WEIGHT: 220 LBS | BODY MASS INDEX: 40.48 KG/M2 | HEIGHT: 62 IN

## 2022-07-28 DIAGNOSIS — K60.3 ANAL FISTULA: Primary | ICD-10-CM

## 2022-07-28 PROCEDURE — 99024 POSTOP FOLLOW-UP VISIT: CPT | Performed by: SURGERY

## 2022-07-28 NOTE — PROGRESS NOTES
Subjective   Mone Amador is a 51 y.o. female.     Chief Complaint: anal fistula    History of Present Illness She had a small opening in the posterior midline that tracked over to her right side, but no opening was found in to the rectum on her anoscopy. The wound had a small penrose drain placed.     The following portions of the patient's history were reviewed and updated as appropriate: current medications, past family history, past medical history, past social history, past surgical history and problem list.    Review of Systems    Objective   Physical Exam the drain is out and the wound has healed in    Past Medical History:   Diagnosis Date   • Anxiety    • Arthritis    • Cholecystolithiasis    • Colon polyp    • Fatty liver    • Fibromyalgia    • Headache    • History of COVID-19 10/06/2021   • Hypertension    • Kidney stone    • Low back pain    • Migraine    • Wears glasses (prescription)        Family History   Problem Relation Age of Onset   • Heart disease Mother    • Hypertension Mother    • Stroke Mother    • Anxiety disorder Mother    • Thyroid disease Father    • Glaucoma Father    • Multiple sclerosis Sister    • No Known Problems Brother    • No Known Problems Son    • No Known Problems Sister    • No Known Problems Son    • Breast cancer Neg Hx        Social History     Tobacco Use   • Smoking status: Never Smoker   • Smokeless tobacco: Never Used   Vaping Use   • Vaping Use: Never used   Substance Use Topics   • Alcohol use: No   • Drug use: No       Past Surgical History:   Procedure Laterality Date   • ANAL SCOPE N/A 2022    Procedure: ANAL SCOPE with fistulotomy;  Surgeon: Pito Vigil MD;  Location: [facility];  Service: General;  Laterality: N/A;   •  SECTION      ,    • CHOLECYSTECTOMY     • COLONOSCOPY N/A 2021    Procedure: COLONOSCOPY FOR SCREENING CPT CODE: ;  Surgeon: Jaron Car MD;  Location: [facility] OR;  Service:  Gastroenterology;  Laterality: N/A;   • ENDOSCOPY N/A 01/18/2021    Procedure: ESOPHAGOGASTRODUODENOSCOPY WITH BIOPSY CPT CODE: 34584;  Surgeon: Jaron Car MD;  Location: [facility] OR;  Service: Gastroenterology;  Laterality: N/A;   • HYSTERECTOMY  2003 32   • LUMBAR DISCECTOMY FUSION INSTRUMENTATION N/A 11/30/2021    Procedure: posterior lumbar interbody fusion with use of bone morphogenic protein L4-5;  Surgeon: Marquis Boucher MD;  Location:  [facility] OR;  Service: Neurosurgery;  Laterality: N/A;   • RECTAL FISSURE INCISION AND DRAINAGE N/A 04/29/2022    Procedure: ANAL FISTULA REPAIR;  Surgeon: Pito Vigil MD;  Location:  [facility] OR;  Service: General;  Laterality: N/A;   • UPPER GASTROINTESTINAL ENDOSCOPY  01/18/2021       Current Outpatient Medications   Medication Instructions   • cyanocobalamin 1,000 mcg, Intramuscular, Every 14 Days   • DULoxetine (CYMBALTA) 60 MG capsule Take 1 capsule by mouth 2 times daily.   • DULoxetine (CYMBALTA) 60 MG capsule Take 1 capsule by mouth 2 times every day.   • gabapentin (NEURONTIN) 400 MG capsule Take 1 capsule by mouth three times every day.   • hydroCHLOROthiazide (HYDRODIURIL) 25 MG tablet Take 1 tablet by mouth every day.   • hydroCHLOROthiazide (HYDRODIURIL) 25 mg, Oral, Daily   • HYDROcodone-acetaminophen (NORCO) 5-325 MG per tablet Take 1 tablet by mouth Every 4 (Four) Hours As Needed for Moderate Pain.   • ibuprofen (ADVIL,MOTRIN) 800 MG tablet Take 1 tablet by mouth 3 times every day with food   • Liraglutide (SAXENDA) 18 MG/3ML injection pen Inject 0.6mg under skin daily for week 1, THEN 1.2mg daily for week 2, THEN 1.8mg daily for week 3, then 2.4mg daily for week 4.   • LORazepam (Ativan) 0.5 MG tablet Take 1 tablet by mouth 2 times every day as needed   • nadolol (CORGARD) 40 MG tablet Take 1 tablet by mouth Twice a Day.   • nadolol (CORGARD) 40 MG tablet Take 1 tablet by mouth twice daily.   • sulfamethoxazole-trimethoprim  (Bactrim DS) 800-160 MG per tablet 1 tablet, Oral, 2 Times Daily   • topiramate (Topamax) 25 MG tablet Take 1 tablet by mouth at bedtime for 1 week then increase to 1 tablet twice per day.   • traZODone (DESYREL) 100 MG tablet Take 1 tablet by mouth every day after a meal.   • traZODone (DESYREL) 100 MG tablet Take 1 tablet by mouth every day after a meal   • vitamin D (ERGOCALCIFEROL) 50,000 Units, Oral, Weekly   • zolpidem (Ambien) 10 MG tablet TAKE ONE TABLET BY MOUTH EVERY NIGHT AT BEDTIME         Assessment & Plan   Diagnoses and all orders for this visit:    1. Anal fistula (Primary)    return if fistula reopens.

## 2022-08-02 DIAGNOSIS — M79.672 LEFT FOOT PAIN: Primary | ICD-10-CM

## 2022-08-04 ENCOUNTER — LAB (OUTPATIENT)
Dept: LAB | Facility: HOSPITAL | Age: 52
End: 2022-08-04

## 2022-08-04 ENCOUNTER — OFFICE VISIT (OUTPATIENT)
Dept: ORTHOPEDIC SURGERY | Facility: CLINIC | Age: 52
End: 2022-08-04

## 2022-08-04 ENCOUNTER — TRANSCRIBE ORDERS (OUTPATIENT)
Dept: OTHER | Facility: OTHER | Age: 52
End: 2022-08-04

## 2022-08-04 ENCOUNTER — HOSPITAL ENCOUNTER (OUTPATIENT)
Dept: GENERAL RADIOLOGY | Facility: HOSPITAL | Age: 52
Discharge: HOME | End: 2022-08-04

## 2022-08-04 VITALS
DIASTOLIC BLOOD PRESSURE: 91 MMHG | HEIGHT: 62 IN | BODY MASS INDEX: 40.48 KG/M2 | WEIGHT: 220 LBS | SYSTOLIC BLOOD PRESSURE: 129 MMHG | HEART RATE: 84 BPM

## 2022-08-04 DIAGNOSIS — M79.672 LEFT FOOT PAIN: ICD-10-CM

## 2022-08-04 DIAGNOSIS — Z79.899 ENCOUNTER FOR LONG-TERM (CURRENT) USE OF OTHER MEDICATIONS: ICD-10-CM

## 2022-08-04 DIAGNOSIS — Z79.899 ENCOUNTER FOR LONG-TERM (CURRENT) USE OF OTHER MEDICATIONS: Primary | ICD-10-CM

## 2022-08-04 DIAGNOSIS — S93.602A FOOT SPRAIN, LEFT, INITIAL ENCOUNTER: Primary | ICD-10-CM

## 2022-08-04 LAB
AMPHET+METHAMPHET UR QL: NEGATIVE
AMPHETAMINES UR QL: NEGATIVE
BARBITURATES UR QL SCN: NEGATIVE
BENZODIAZ UR QL SCN: NEGATIVE
BUPRENORPHINE SERPL-MCNC: NEGATIVE NG/ML
CANNABINOIDS SERPL QL: NEGATIVE
COCAINE UR QL: NEGATIVE
METHADONE UR QL SCN: NEGATIVE
OPIATES UR QL: NEGATIVE
OXYCODONE UR QL SCN: NEGATIVE
PCP UR QL SCN: NEGATIVE
PROPOXYPH UR QL: NEGATIVE
TRICYCLICS UR QL SCN: NEGATIVE

## 2022-08-04 PROCEDURE — 73630 X-RAY EXAM OF FOOT: CPT | Performed by: RADIOLOGY

## 2022-08-04 PROCEDURE — 73630 X-RAY EXAM OF FOOT: CPT

## 2022-08-04 PROCEDURE — 80306 DRUG TEST PRSMV INSTRMNT: CPT

## 2022-08-04 PROCEDURE — 99213 OFFICE O/P EST LOW 20 MIN: CPT | Performed by: PHYSICIAN ASSISTANT

## 2022-08-04 NOTE — PROGRESS NOTES
[facility] [place] [facility] New Patient Visit          Patient: Mone Amador  YOB: 1970  Date of Encounter: 08/04/2022  PCP: Christopher Wu MD      Subjective     Chief Complaint   Patient presents with   • Left Foot - Initial Evaluation, Pain           History of Present Illness:     Mone Amador is a 51 y.o. female presents today as result of a left foot injury 1 month to 6-week history which patient suffered left lateral foot pain and swelling.  Patient states that she will have pain and stiffness and difficulty upon ambulation.  She presented to her PCP for radiographs that revealed no acute fractures dislocations.  There is concern for potential cuboid involvement and patient was sent for diagnostic MRI.  She is in today for evaluation.  She reports dull throbbing aching sensation to lateral aspect of the foot.  She has been ambulating no immobilization.  Ambulating in tennis shoe.  She has been using some anti-inflammatory medication with minimal benefit.  She reports dull throbbing aching sensation with intermittent swelling in her activity.  Denies any paresthesias.        Patient Active Problem List   Diagnosis   • Anxiety   • Fibromyalgia   • Hypertension   • Facet arthritis of lumbar region   • Intractable chronic migraine without aura and without status migrainosus   • Class 2 severe obesity due to excess calories with serious comorbidity and body mass index (BMI) of 38.0 to 38.9 in adult (HCC)   • Anxiety and depression   • Lumbar stenosis with neurogenic claudication   • Gastroesophageal reflux disease   • Encounter for colorectal cancer screening   • Spondylolisthesis of lumbar region   • Lumbosacral radiculopathy at L5   • Anal discharge   • Anal fistula     Past Medical History:   Diagnosis Date   • Anxiety    • Arthritis    • Arthritis of back 2019   • Cholecystolithiasis    • Colon polyp 12/20   • Fatty liver    • Fibromyalgia    • Fracture, foot 7/27/22   •  Headache    • History of COVID-19 10/06/2021   • Hypertension    • Kidney stone    • Low back pain    • Migraine    • Wears glasses (prescription)      Past Surgical History:   Procedure Laterality Date   • ANAL SCOPE N/A 2022    Procedure: ANAL SCOPE with fistulotomy;  Surgeon: Pito Vigil MD;  Location: [facility] OR;  Service: General;  Laterality: N/A;   • BACK SURGERY  21   •  SECTION      2001   • CHOLECYSTECTOMY     • COLONOSCOPY N/A 2021    Procedure: COLONOSCOPY FOR SCREENING CPT CODE: ;  Surgeon: Jaron Car MD;  Location: [facility] OR;  Service: Gastroenterology;  Laterality: N/A;   • ENDOSCOPY N/A 2021    Procedure: ESOPHAGOGASTRODUODENOSCOPY WITH BIOPSY CPT CODE: 56098;  Surgeon: Jaron Car MD;  Location: [facility] OR;  Service: Gastroenterology;  Laterality: N/A;   • HYSTERECTOMY  2003   • LUMBAR DISCECTOMY FUSION INSTRUMENTATION N/A 2021    Procedure: posterior lumbar interbody fusion with use of bone morphogenic protein L4-5;  Surgeon: Marquis Boucher MD;  Location: [facility] OR;  Service: Neurosurgery;  Laterality: N/A;   • RECTAL FISSURE INCISION AND DRAINAGE N/A 2022    Procedure: ANAL FISTULA REPAIR;  Surgeon: Pito Vigil MD;  Location: [facility] OR;  Service: General;  Laterality: N/A;   • UPPER GASTROINTESTINAL ENDOSCOPY  2021     Social History     Occupational History   • Not on file   Tobacco Use   • Smoking status: Never Smoker   • Smokeless tobacco: Never Used   Vaping Use   • Vaping Use: Never used   Substance and Sexual Activity   • Alcohol use: No   • Drug use: No   • Sexual activity: Yes     Partners: Male     Comment: Hysterectomy    Mone Amador  reports that she has never smoked. She has never used smokeless tobacco.. I have educated her on the risk of diseases from using tobacco products such as cancer, COPD and heart disease.     Social History     Social History  Narrative   • Not on file     Family History   Problem Relation Age of Onset   • Heart disease Mother    • Hypertension Mother    • Stroke Mother    • Anxiety disorder Mother    • Thyroid disease Father    • Glaucoma Father    • Multiple sclerosis Sister    • No Known Problems Brother    • No Known Problems Son    • No Known Problems Sister    • No Known Problems Son    • Breast cancer Neg Hx      Current Outpatient Medications   Medication Sig Dispense Refill   • cyanocobalamin 1000 MCG/ML injection Inject 1 mL into the appropriate muscle as directed by prescriber Every 14 (Fourteen) Days. 30 mL 0   • DULoxetine (CYMBALTA) 60 MG capsule Take 1 capsule by mouth 2 times daily. (Patient taking differently: Take 60 mg by mouth 2 (Two) Times a Day.) 60 capsule 5   • gabapentin (NEURONTIN) 400 MG capsule Take 1 capsule by mouth three times every day. 90 capsule 2   • hydroCHLOROthiazide (HYDRODIURIL) 25 MG tablet Take 1 tablet by mouth every day. 30 tablet 5   • ibuprofen (ADVIL,MOTRIN) 800 MG tablet Take 1 tablet by mouth 3 times every day with food 90 tablet 3   • Liraglutide (SAXENDA) 18 MG/3ML injection pen Inject 0.6mg under skin daily for week 1, THEN 1.2mg daily for week 2, THEN 1.8mg daily for week 3, then 2.4mg daily for week 4. (Patient taking differently: Inject 0.6mg under skin daily for week 1, THEN 1.2mg daily for week 2, THEN 1.8mg daily for week 3, then 2.4mg daily for week 4.) 15 mL 0   • LORazepam (Ativan) 0.5 MG tablet Take 1 tablet by mouth 2 times every day as needed 60 tablet 2   • nadolol (CORGARD) 40 MG tablet Take 1 tablet by mouth twice daily. 60 tablet 6   • topiramate (Topamax) 25 MG tablet Take 1 tablet by mouth at bedtime for 1 week then increase to 1 tablet twice per day. 60 tablet 4   • traZODone (DESYREL) 100 MG tablet Take 1 tablet by mouth every day after a meal 30 tablet 4   • vitamin D (ERGOCALCIFEROL) 1.25 MG (03736 UT) capsule capsule Take 1 capsule by mouth 1 (One) Time Per Week. 5  "capsule 10   • zolpidem (Ambien) 10 MG tablet TAKE ONE TABLET BY MOUTH EVERY NIGHT AT BEDTIME 30 tablet 2   • DULoxetine (CYMBALTA) 60 MG capsule Take 1 capsule by mouth 2 times every day. 60 capsule 5   • hydroCHLOROthiazide (HYDRODIURIL) 25 MG tablet Take 1 tablet by mouth Daily. 30 tablet 5   • nadolol (CORGARD) 40 MG tablet Take 1 tablet by mouth Twice a Day. (Patient taking differently: Take 40 mg by mouth 2 (Two) Times a Day.) 60 tablet 6   • traZODone (DESYREL) 100 MG tablet Take 1 tablet by mouth every day after a meal. 30 tablet 4     No current facility-administered medications for this visit.     Allergies   Allergen Reactions   • Latex Itching and Rash   • Morphine And Related Itching and Nausea Only            Review of Systems   Constitutional: Negative.   HENT: Negative.    Eyes: Negative.    Cardiovascular: Negative.    Respiratory: Negative.    Endocrine: Negative.    Hematologic/Lymphatic: Negative.    Skin: Negative.    Musculoskeletal: Positive for back pain, joint pain and joint swelling.        Pertinent positives listed in HPI   Gastrointestinal: Positive for constipation.   Genitourinary: Negative.    Neurological: Negative.    Psychiatric/Behavioral: The patient is nervous/anxious.    Allergic/Immunologic: Negative.          Objective      Vitals:    08/04/22 0837   BP: 129/91   Pulse: 84   Weight: 99.8 kg (220 lb)   Height: 157.5 cm (62\")      Class 3 Severe Obesity (BMI >=40). Obesity-related health conditions include the following: Listed in PMH. Obesity is newly identified. BMI is is above average; BMI management plan is completed. We discussed portion control and increasing exercise.      Physical Exam  Vitals and nursing note reviewed.   Constitutional:       General: She is not in acute distress.     Appearance: She is not ill-appearing.   HENT:      Head: Normocephalic and atraumatic.      Right Ear: External ear normal.      Left Ear: External ear normal.      Nose: Nose normal. No " congestion or rhinorrhea.   Eyes:      Extraocular Movements: Extraocular movements intact.      Conjunctiva/sclera: Conjunctivae normal.      Pupils: Pupils are equal, round, and reactive to light.   Cardiovascular:      Rate and Rhythm: Normal rate.      Pulses: Normal pulses.   Pulmonary:      Effort: Pulmonary effort is normal. No respiratory distress.      Breath sounds: No stridor.   Abdominal:      General: There is no distension.   Musculoskeletal:      Cervical back: Normal range of motion.      Comments: Examination today of patient's left foot reveals there is mild pain palpation of the lateral dorsal foot and along the cuboid.  Patient is tenderness palpation along the divergently ankle along the anterior talofibular ligament.  Patient has mild generalized swelling with no ecchymosis or erythema.  There is mild pain with flexion plantarflexion foot.  No significant instability with subtalar motion intact.  Neurovascular is grossly intact left lower extremity.   Skin:     General: Skin is warm and dry.      Capillary Refill: Capillary refill takes less than 2 seconds.   Neurological:      General: No focal deficit present.      Mental Status: She is alert and oriented to person, place, and time.   Psychiatric:         Mood and Affect: Mood normal.         Behavior: Behavior normal.         Thought Content: Thought content normal.         Judgment: Judgment normal.                 Radiology:        XR Foot 3+ View Left    Result Date: 8/4/2022  Large heel spur, consistent with chronic plantar fasciitis. No other source of the patient's foot pain was identified.  This report was finalized on 8/4/2022 9:48 AM by Dr. Bhavesh Daniels II, MD.      MRI Ankle Left Without Contrast    Result Date: 7/27/2022  Limited exam due to motion artifact. There is abnormal signal consistent with edema in the cuboid bone in the lateral midfoot. No other abnormalities were identified.  This report was finalized on 7/27/2022  3:10 PM by Dr. Bhavesh Daniels II, MD.            Assessment/Plan        ICD-10-CM ICD-9-CM   1. Foot sprain, left, initial encounter  S93.602A 845.10       15-year-old female with notable bony contusion and edema consistent with the cuboid bone and lateral midfoot with no radiographic evidence of occult fracture or further complication.  Further discussion was had with patient and there is no discharge medication.  She will implement immobilization with short equalizer boot over the course of next 2 weeks.  She may continue to limit the ibuprofen 3 times daily.  She will return back that time for repeat evaluation and likely progression of potential discussion of follow-up based physical therapy after his pain and symptoms.                  This document was signed by Bean Rizzo PA-C August 4, 2022     CC: Christopher Wu MD      Dictated Utilizing Dragon Dictation: Part of this note may be an electronic transcription/translation of spoken language to printed text using the Dragon Dictation System.

## 2022-08-19 ENCOUNTER — APPOINTMENT (OUTPATIENT)
Dept: MAMMOGRAPHY | Facility: HOSPITAL | Age: 52
End: 2022-08-19

## 2022-08-19 ENCOUNTER — OFFICE VISIT (OUTPATIENT)
Dept: ORTHOPEDIC SURGERY | Facility: CLINIC | Age: 52
End: 2022-08-19

## 2022-08-19 VITALS — WEIGHT: 220 LBS | HEIGHT: 62 IN | BODY MASS INDEX: 40.48 KG/M2

## 2022-08-19 DIAGNOSIS — S93.602D FOOT SPRAIN, LEFT, SUBSEQUENT ENCOUNTER: Primary | ICD-10-CM

## 2022-08-19 PROCEDURE — 99213 OFFICE O/P EST LOW 20 MIN: CPT | Performed by: PHYSICIAN ASSISTANT

## 2022-08-19 NOTE — PROGRESS NOTES
[facility] [place] Orthopaedic Surgery Established Patient Visit          Patient: Mone Amador  YOB: 1970  Date of Encounter: 08/19/2022  PCP: Christopher Wu MD      Subjective     Chief Complaint   Patient presents with   • Left Foot - Follow-up           History of Present Illness:     Mone Amador is a 51 y.o. female presents today as result of a left foot injury approximately 2 months ago in which patient suffered left lateral foot pain and swelling.  MRI revealed no evidence of acute fracture and there was edema present in the cuboid.  The patient recommended immobilization with equalizer boot over the last 2 weeks with some improvement of her pain symptoms.  She still has mild pain upon ambulation outside of the boot however believe her pain is alleviated with ambulation in the boot and NSAID usage.  She reports no new complaints.  Denies any paresthesias.        Patient Active Problem List   Diagnosis   • Anxiety   • Fibromyalgia   • Hypertension   • Facet arthritis of lumbar region   • Intractable chronic migraine without aura and without status migrainosus   • Class 2 severe obesity due to excess calories with serious comorbidity and body mass index (BMI) of 38.0 to 38.9 in adult (HCC)   • Anxiety and depression   • Lumbar stenosis with neurogenic claudication   • Gastroesophageal reflux disease   • Encounter for colorectal cancer screening   • Spondylolisthesis of lumbar region   • Lumbosacral radiculopathy at L5   • Anal discharge   • Anal fistula     Past Medical History:   Diagnosis Date   • Anxiety    • Arthritis    • Arthritis of back 2019   • Cholecystolithiasis    • Colon polyp 12/20   • Fatty liver    • Fibromyalgia    • Fracture, foot 7/27/22   • Headache    • History of COVID-19 10/06/2021   • Hypertension    • Kidney stone    • Low back pain    • Migraine    • Wears glasses (prescription)      Past Surgical History:   Procedure Laterality Date   • ANAL SCOPE N/A 07/21/2022     Procedure: ANAL SCOPE with fistulotomy;  Surgeon: Pito Vigil MD;  Location:  [facility];  Service: General;  Laterality: N/A;   • BACK SURGERY  21   •  SECTION      2001   • CHOLECYSTECTOMY     • COLONOSCOPY N/A 2021    Procedure: COLONOSCOPY FOR SCREENING CPT CODE: ;  Surgeon: Jaron Car MD;  Location:  [facility];  Service: Gastroenterology;  Laterality: N/A;   • ENDOSCOPY N/A 2021    Procedure: ESOPHAGOGASTRODUODENOSCOPY WITH BIOPSY CPT CODE: 81710;  Surgeon: Jaron Car MD;  Location:  [facility];  Service: Gastroenterology;  Laterality: N/A;   • HYSTERECTOMY  2003   • LUMBAR DISCECTOMY FUSION INSTRUMENTATION N/A 2021    Procedure: posterior lumbar interbody fusion with use of bone morphogenic protein L4-5;  Surgeon: Marquis Boucher MD;  Location:  [facility];  Service: Neurosurgery;  Laterality: N/A;   • RECTAL FISSURE INCISION AND DRAINAGE N/A 2022    Procedure: ANAL FISTULA REPAIR;  Surgeon: Pito Vigil MD;  Location:  [facility];  Service: General;  Laterality: N/A;   • UPPER GASTROINTESTINAL ENDOSCOPY  2021     Social History     Occupational History   • Not on file   Tobacco Use   • Smoking status: Never Smoker   • Smokeless tobacco: Never Used   Vaping Use   • Vaping Use: Never used   Substance and Sexual Activity   • Alcohol use: No   • Drug use: No   • Sexual activity: Yes     Partners: Male     Comment: Hysterectomy    Mone Amador  reports that she has never smoked. She has never used smokeless tobacco.. I have educated her on the risk of diseases from using tobacco products such as cancer, COPD and heart disease.     Social History     Social History Narrative   • Not on file     Family History   Problem Relation Age of Onset   • Heart disease Mother    • Hypertension Mother    • Stroke Mother    • Anxiety disorder Mother    • Thyroid disease Father    • Glaucoma Father    •  Multiple sclerosis Sister    • No Known Problems Brother    • No Known Problems Son    • No Known Problems Sister    • No Known Problems Son    • Breast cancer Neg Hx      Current Outpatient Medications   Medication Sig Dispense Refill   • cyanocobalamin 1000 MCG/ML injection Inject 1 mL into the appropriate muscle as directed by prescriber Every 14 (Fourteen) Days. 30 mL 0   • DULoxetine (CYMBALTA) 60 MG capsule Take 1 capsule by mouth 2 times daily. (Patient taking differently: Take 60 mg by mouth 2 (Two) Times a Day.) 60 capsule 5   • DULoxetine (CYMBALTA) 60 MG capsule Take 1 capsule by mouth 2 times every day. 60 capsule 5   • gabapentin (NEURONTIN) 400 MG capsule Take 1 capsule by mouth three times every day. 90 capsule 2   • hydroCHLOROthiazide (HYDRODIURIL) 25 MG tablet Take 1 tablet by mouth Daily. 30 tablet 5   • hydroCHLOROthiazide (HYDRODIURIL) 25 MG tablet Take 1 tablet by mouth every day. 30 tablet 5   • ibuprofen (ADVIL,MOTRIN) 800 MG tablet Take 1 tablet by mouth 3 times every day with food 90 tablet 3   • Liraglutide (SAXENDA) 18 MG/3ML injection pen Inject 0.6mg under skin daily for week 1, THEN 1.2mg daily for week 2, THEN 1.8mg daily for week 3, then 2.4mg daily for week 4. (Patient taking differently: Inject 0.6mg under skin daily for week 1, THEN 1.2mg daily for week 2, THEN 1.8mg daily for week 3, then 2.4mg daily for week 4.) 15 mL 0   • LORazepam (Ativan) 0.5 MG tablet Take 1 tablet by mouth 2 times every day as needed 60 tablet 2   • nadolol (CORGARD) 40 MG tablet Take 1 tablet by mouth Twice a Day. (Patient taking differently: Take 40 mg by mouth 2 (Two) Times a Day.) 60 tablet 6   • nadolol (CORGARD) 40 MG tablet Take 1 tablet by mouth twice daily. 60 tablet 6   • topiramate (Topamax) 25 MG tablet Take 1 tablet by mouth at bedtime for 1 week then increase to 1 tablet twice per day. 60 tablet 4   • traZODone (DESYREL) 100 MG tablet Take 1 tablet by mouth every day after a meal. 30 tablet 4  "  • traZODone (DESYREL) 100 MG tablet Take 1 tablet by mouth every day after a meal 30 tablet 4   • vitamin D (ERGOCALCIFEROL) 1.25 MG (21698 UT) capsule capsule Take 1 capsule by mouth 1 (One) Time Per Week. 5 capsule 10   • zolpidem (Ambien) 10 MG tablet TAKE ONE TABLET BY MOUTH EVERY NIGHT AT BEDTIME 30 tablet 2     No current facility-administered medications for this visit.     Allergies   Allergen Reactions   • Latex Itching and Rash   • Morphine And Related Itching and Nausea Only            Review of Systems   Constitutional: Negative.   HENT: Negative.    Eyes: Negative.    Cardiovascular: Negative.    Respiratory: Negative.    Endocrine: Negative.    Hematologic/Lymphatic: Negative.    Skin: Negative.    Musculoskeletal: Positive for back pain, joint pain and joint swelling.        Pertinent positives listed in HPI   Gastrointestinal: Positive for constipation.   Genitourinary: Negative.    Neurological: Negative.    Psychiatric/Behavioral: The patient is nervous/anxious.    Allergic/Immunologic: Negative.          Objective      Vitals:    08/19/22 0850   Weight: 99.8 kg (220 lb)   Height: 157.5 cm (62\")      Class 3 Severe Obesity (BMI >=40). Obesity-related health conditions include the following: Listed in PMH. Obesity is newly identified. BMI is is above average; BMI management plan is completed. We discussed portion control and increasing exercise.      Physical Exam  Vitals and nursing note reviewed.   Constitutional:       General: She is not in acute distress.     Appearance: She is not ill-appearing.   HENT:      Head: Normocephalic and atraumatic.      Right Ear: External ear normal.      Left Ear: External ear normal.      Nose: Nose normal. No congestion or rhinorrhea.   Eyes:      Extraocular Movements: Extraocular movements intact.      Conjunctiva/sclera: Conjunctivae normal.      Pupils: Pupils are equal, round, and reactive to light.   Cardiovascular:      Rate and Rhythm: Normal rate.    "   Pulses: Normal pulses.   Pulmonary:      Effort: Pulmonary effort is normal. No respiratory distress.      Breath sounds: No stridor.   Abdominal:      General: There is no distension.   Musculoskeletal:      Cervical back: Normal range of motion.      Comments: Examination today of patient's left foot reveals there is minimal pain to palpation of the lateral dorsal foot and along the cuboid.  Patient no longer tenderness with palpation along the anterior talofibular ligament.  Patient has reduction of the previous swelling with no ecchymosis or erythema. No significant instability with subtalar motion intact.  Neurovascular is grossly intact left lower extremity.   Skin:     General: Skin is warm and dry.      Capillary Refill: Capillary refill takes less than 2 seconds.   Neurological:      General: No focal deficit present.      Mental Status: She is alert and oriented to person, place, and time.   Psychiatric:         Mood and Affect: Mood normal.         Behavior: Behavior normal.         Thought Content: Thought content normal.         Judgment: Judgment normal.                 Radiology:        XR Foot 3+ View Left    Result Date: 8/4/2022  Large heel spur, consistent with chronic plantar fasciitis. No other source of the patient's foot pain was identified.  This report was finalized on 8/4/2022 9:48 AM by Dr. Bhavesh Daniels II, MD.      MRI Ankle Left Without Contrast    Result Date: 7/27/2022  Limited exam due to motion artifact. There is abnormal signal consistent with edema in the cuboid bone in the lateral midfoot. No other abnormalities were identified.  This report was finalized on 7/27/2022 3:10 PM by Dr. Bhavesh Daniels II, MD.            Assessment/Plan        ICD-10-CM ICD-9-CM   1. Foot sprain, left, subsequent encounter  S93.602D V58.89     845.10       15-year-old female with notable bony contusion and edema consistent with the cuboid bone and lateral midfoot with no radiographic evidence of  occult fracture or further complication.  She has been doing well with most recent immobilization.  She will slowly begin to wean from the equalizer boot into normal shoe over the next week.  She will continue with NSAIDs and progress with her range of motion activity as pain and swelling are her guide.  She will return back in 3 weeks for further evaluation.  We will continue to monitor discussed possibility of formal outpatient physical therapy depending on response                  This document was signed by Bean Rizzo PA-C August 19, 2022     CC: Christopher Wu MD      Dictated Utilizing Dragon Dictation: Part of this note may be an electronic transcription/translation of spoken language to printed text using the Dragon Dictation System.

## 2022-08-22 ENCOUNTER — APPOINTMENT (OUTPATIENT)
Dept: MAMMOGRAPHY | Facility: HOSPITAL | Age: 52
End: 2022-08-22

## 2022-08-23 ENCOUNTER — DISEASE STATE MANAGEMENT VISIT (OUTPATIENT)
Dept: PHARMACY | Facility: HOSPITAL | Age: 52
End: 2022-08-23

## 2022-08-23 ENCOUNTER — SPECIALTY PHARMACY (OUTPATIENT)
Dept: PHARMACY | Facility: HOSPITAL | Age: 52
End: 2022-08-23

## 2022-08-23 VITALS
DIASTOLIC BLOOD PRESSURE: 90 MMHG | WEIGHT: 225 LBS | HEART RATE: 72 BPM | BODY MASS INDEX: 41.15 KG/M2 | SYSTOLIC BLOOD PRESSURE: 127 MMHG

## 2022-08-23 RX ORDER — TIRZEPATIDE 2.5 MG/.5ML
2.5 INJECTION, SOLUTION SUBCUTANEOUS WEEKLY
Qty: 2 ML | Refills: 0 | Status: SHIPPED | OUTPATIENT
Start: 2022-08-23 | End: 2022-09-16

## 2022-08-23 NOTE — PROGRESS NOTES
[facility]  Weight Management Program        Mone Amador is a 51 y.o. female referred to the Medication Management Clinic by Dr. Naik for clinical pharmacy and specialty pharmacy management of [facility] for weight management.  Mone Amador has previously tried Saxenda, Ozempic, and calorie reduction for weight loss.  Saxenda was not successful for the patient and did not suppress her appetite. She stopped about the Saxenda about 1 month ago, reporting difficulty with remembering to take injection daily. She had success with Ozempic in the past.  Patient does not report doing anything for weight loss at this time. The patient denies a personal history or family history of thyroid cancer, denies a personal history of pancreatitis, and denies a diagnosis of gastroparesis.     Relevant Past Medical History and Co-morbidities  Past Medical History:   Diagnosis Date   • Anxiety    • Arthritis    • Arthritis of back 2019   • Cholecystolithiasis    • Colon polyp 12/20   • Fatty liver    • Fibromyalgia    • Fracture, foot 7/27/22   • Headache    • History of COVID-19 10/06/2021   • Hypertension    • Kidney stone    • Low back pain    • Migraine    • Wears glasses (prescription)      Social History     Socioeconomic History   • Marital status:    Tobacco Use   • Smoking status: Never Smoker   • Smokeless tobacco: Never Used   Vaping Use   • Vaping Use: Never used   Substance and Sexual Activity   • Alcohol use: No   • Drug use: No   • Sexual activity: Yes     Partners: Male     Comment: Hysterectomy       Allergies  Latex and Morphine and related    Current Medication List    Current Outpatient Medications:   •  cyanocobalamin 1000 MCG/ML injection, Inject 1 mL into the appropriate muscle as directed by prescriber Every 14 (Fourteen) Days., Disp: 30 mL, Rfl: 0  •  DULoxetine (CYMBALTA) 60 MG capsule, Take 1 capsule by mouth 2 times daily. (Patient taking differently: Take 60 mg by  mouth 2 (Two) Times a Day.), Disp: 60 capsule, Rfl: 5  •  DULoxetine (CYMBALTA) 60 MG capsule, Take 1 capsule by mouth 2 times every day., Disp: 60 capsule, Rfl: 5  •  gabapentin (NEURONTIN) 400 MG capsule, Take 1 capsule by mouth three times every day., Disp: 90 capsule, Rfl: 2  •  hydroCHLOROthiazide (HYDRODIURIL) 25 MG tablet, Take 1 tablet by mouth Daily., Disp: 30 tablet, Rfl: 5  •  hydroCHLOROthiazide (HYDRODIURIL) 25 MG tablet, Take 1 tablet by mouth every day., Disp: 30 tablet, Rfl: 5  •  ibuprofen (ADVIL,MOTRIN) 800 MG tablet, Take 1 tablet by mouth 3 times every day with food, Disp: 90 tablet, Rfl: 3  •  Liraglutide (SAXENDA) 18 MG/3ML injection pen, Inject 0.6mg under skin daily for week 1, THEN 1.2mg daily for week 2, THEN 1.8mg daily for week 3, then 2.4mg daily for week 4. (Patient taking differently: Inject 0.6mg under skin daily for week 1, THEN 1.2mg daily for week 2, THEN 1.8mg daily for week 3, then 2.4mg daily for week 4.), Disp: 15 mL, Rfl: 0  •  LORazepam (Ativan) 0.5 MG tablet, Take 1 tablet by mouth 2 times every day as needed, Disp: 60 tablet, Rfl: 2  •  nadolol (CORGARD) 40 MG tablet, Take 1 tablet by mouth Twice a Day. (Patient taking differently: Take 40 mg by mouth 2 (Two) Times a Day.), Disp: 60 tablet, Rfl: 6  •  nadolol (CORGARD) 40 MG tablet, Take 1 tablet by mouth twice daily., Disp: 60 tablet, Rfl: 6  •  topiramate (Topamax) 25 MG tablet, Take 1 tablet by mouth at bedtime for 1 week then increase to 1 tablet twice per day., Disp: 60 tablet, Rfl: 4  •  traZODone (DESYREL) 100 MG tablet, Take 1 tablet by mouth every day after a meal., Disp: 30 tablet, Rfl: 4  •  traZODone (DESYREL) 100 MG tablet, Take 1 tablet by mouth every day after a meal, Disp: 30 tablet, Rfl: 4  •  vitamin D (ERGOCALCIFEROL) 1.25 MG (67009 UT) capsule capsule, Take 1 capsule by mouth 1 (One) Time Per Week., Disp: 5 capsule, Rfl: 10  •  zolpidem (Ambien) 10 MG tablet, TAKE ONE TABLET BY MOUTH EVERY NIGHT AT  BEDTIME, Disp: 30 tablet, Rfl: 2    Drug Interactions  Mounjaro + HCTZ may decrease the effectiveness of Mounjaro  Mounjaro + Nadolol may increase risk of hypoglycemic effects    Relevant Laboratory Values  Lab Results   Component Value Date    CHOL 219 (H) 06/03/2022    CHLPL 220 (H) 02/13/2015    TRIG 133 06/03/2022    HDL 45 06/03/2022     (H) 06/03/2022     Body mass index is 41.15 kg/m².    Vaccinations:   Patient recommended to keep up with routine vaccinations.     Goals of Therapy  • Clinical Goals or Therapeutic Targets: 10% weight loss goal      Date 8/23/22       Weight (lb) 225.0 lb       BMI kg/ 41.11       Waist Circumference (in) 46 in           Medication Assessment & Plan    Patient's current BMI is 41.11, which is considered Class III Obestity.     Will order Mounjaro 2.5 mg SC weekly. Use with HCTZ may decrease effectiveness of Mounjaro, discussed with patient that she may need increased doses to see desired effects. Use with nadolol may increase hypoglycemic effects, educated patient to watch for s/sx of hypoglycemia.     Informed patient that Mounjaro is off-label use for weight loss. Patient agreeable to starting. Patient denies currently being pregnancy/breast feeding, denies planning to become pregnant, and denies current use of oral contraceptives.     Discussed lifestyle modifications, including diet and exercise.  Patient education and Mounjaro injection technique counseling provided.     Worked with patient to create SMART Goal(s): increase water to 64 oz/day.    Will follow-up with patient in 4 weeks, or sooner if needed.       Medication Education Provided  Mounjaro® (Tirzepatide)     Mounjaro is currently FDA approved for the treatment of type 2 diabetes, using tirzepatide for weight loss is considered “off-label”     Medication Expectations   Why am I taking this medication? You are taking Mounjaro for weight loss because you have excess weight and also have weight-related  medical problems or obesity. It should be used along with a reduced calorie diet and increased physical activity.    What should I expect while on this medication? You should expect to lose approximately 15%-20% of body weight   How does the medication work? Mounjaro is an injection that addresses one of your body's natural responses to weight loss. It works like naturally produced hormones in the body called GLP-1 and GIP that regulates appetite which can lead to eating fewer calories and losing weight. GIP complements the effects of the GLP-1 increasing energy expenditure leading to weight loss. This medication also slows down food from leaving your stomach, making you feel kat for longer.   How long will I be on this medication for? The amount of time you will be on this medication will be determined by your doctor. Do not abruptly stop this medication without talking to your doctor first.    How do I take this medication? Take as directed on your prescription label. Mounjaro is injected under the skin (subcutaneously) of your stomach, thigh, or upper arm. This medication should be taken once weekly and can be given with or without food.     For each new prefilled pen, pull off the base cap on the pen. Place the base flat on the skin, then unlock. Press and hold the button for up to 10 seconds. Listen for the first click. It means the injection has started. The second click means that the injection is complete. Remove pen from skin and discard in a sharps container.     What are some possible side effects? You may notice you don't feel as hungry, especially when you first start using Mounjaro. Some common side effects include nausea, vomiting, diarrhea, vomiting, indigestion, constipation, and stomach (abdominal) pain. Redness, itching, and/or swelling can occur where the shot was given. You should also monitor for low blood sugar (hypoglycemia), especially if you are taking Mounjaro with other medications  that cause low blood sugar. Stop using Mounjaro and call your doctor immediately if you have severe pain in your stomach area that will not go away as this could be a sign of pancreatitis (inflammation of your pancreas). Gallbladder problems have happened in some people who use Mounjaro. Tell your healthcare provider right away if you get symptoms of gallbladder problems, which may include pain in your upper stomach (abdomen), fever, yellowing of skin or eyes (jaundice), and isaiah-colored stools.    What happens if I miss a dose? Missed dose should be administered as soon as possible within 4 days; resume usual schedule thereafter. If >4 days have elapsed, skip the missed dose and resume administration at the next scheduled weekly dose.          Medication Safety   What are things I should warn my doctor immediately about? Do not use Mounjaro if you or a family member have ever had medullary thyroid cancer (MTC) or Multiple Endocrine Neoplasia syndrome type 2 (MEN 2). Tell your doctor if you get a lump or swelling in your neck, hoarseness, difficulty swallowing, or feel short of breath (these may be symptoms of thyroid cancer). Talk to your doctor if you have or have had problems with your pancreas, kidneys, or liver. Tell your doctor if you have problems with digesting food or slowed emptying of your stomach (gastroparesis). Also tell your doctor if you notice any signs/symptoms of an allergic reaction (rash, hives, difficulty breathing, etc.).   What are things that I should be cautious of? Be cautious of any side effects from this medication. Talk to your doctor if any new ones develop or aren't getting better.   What are some medications that can interact with this one? Taking Mounjaro with other medications that lower your blood sugar such as insulins and glipizide/glimepiride/glyburide may increase the risk of low blood sugar. It should not be taken with other medicines called GLP-1 receptor agonists, as these  work the same way as Mounjaro. Because Mounjaro slows stomach emptying, it can affect the way some medicines work. Always tell your doctor or pharmacist immediately if you start taking any new medications, including over-the-counter medications, vitamins, and herbal supplements.      Resources/Support   How can I remind myself to take this medication? You can download reminder apps to help you manage your refills. You may also set an alarm on your phone to remind you.    Is financial support available?  [employer], the drug , can provide co-pay cards if you have commercial insurance.    Which vaccines are recommended for me? Talk to your doctor about these vaccines: Flu, Coronavirus (COVID-19), Pneumococcal (pneumonia), Tdap, Zoster (shingles).     Medication Storage/Handling   How should I handle this medication? Keep this medication out of reach of pets/children and keep the pen capped when not in use. Do not share your medicine pens with others.    How does this medication need to be stored? Store your new, unused pens in the original carton in the refrigerator. Protect it from light. Do not freeze. If needed, each single-dose pen can be stored unrefrigerated at temperatures for up to 21 days.   How should I dispose of this medication? Used Mounjaro pens should be thrown away after use. Place your used pen in an approved sharps container. If you do not have a sharps container, you may use a household container made of heavy-duty plastic with a tight-fitting lid that is leak resistant (e.g., heavy-duty plastic laundry detergent bottle). If your doctor decides to stop this medication, take to your local police station for proper disposal. Some pharmacies also have take-back bins for medication drop-off.       Females of Childbearing Age   Is there anything additionally I should know as a female of childbearing age?  Talk to your doctor if you are pregnant, planning to become pregnant, or breastfeeding.  You should not take this medication for weight loss if you are pregnant, planning to become pregnant or breastfeeding.     Tirzepatide may decrease the efficacy of oral hormonal contraception due to changes in gastric emptying; contraceptives administered by a non-oral route are not affected. Because the changes in gastric emptying are largest following the first dose, patients using an oral contraceptive should add a barrier method after starting tirzepatide treatment. Alternately, patients can be switched to non-oral contraceptive methods.        Rosalee Frank, Pharmacy Intern   Krystle Oglesby, PharmD  8/23/2022  11:20 EDT

## 2022-08-23 NOTE — PROGRESS NOTES
[facility]  Weight Management Program        Mone Amador is a 51 y.o. female referred to the [facility]ication Management Clinic by Dr. Naik for clinical pharmacy and specialty pharmacy management of [facility] for weight management.  Mone Amador has previously tried Saxenda, Ozempic, and calorie reduction for weight loss.  Saxenda was not successful for the patient and did not suppress her appetite. She stopped about the Saxenda about 1 month ago, reporting difficulty with remembering to take injection daily. She had success with Ozempic in the past.  Patient does not report doing anything for weight loss at this time. The patient denies a personal history or family history of thyroid cancer, denies a personal history of pancreatitis, and denies a diagnosis of gastroparesis.     Relevant Past Medical History and Co-morbidities  Past Medical History:   Diagnosis Date   • Anxiety    • Arthritis    • Arthritis of back 2019   • Cholecystolithiasis    • Colon polyp 12/20   • Fatty liver    • Fibromyalgia    • Fracture, foot 7/27/22   • Headache    • History of COVID-19 10/06/2021   • Hypertension    • Kidney stone    • Low back pain    • Migraine    • Wears glasses (prescription)      Social History     Socioeconomic History   • Marital status:    Tobacco Use   • Smoking status: Never Smoker   • Smokeless tobacco: Never Used   Vaping Use   • Vaping Use: Never used   Substance and Sexual Activity   • Alcohol use: No   • Drug use: No   • Sexual activity: Yes     Partners: Male     Comment: Hysterectomy       Allergies  Latex and Morphine and related    Current Medication List    Current Outpatient Medications:   •  cyanocobalamin 1000 MCG/ML injection, Inject 1 mL into the appropriate muscle as directed by prescriber Every 14 (Fourteen) Days., Disp: 30 mL, Rfl: 0  •  DULoxetine (CYMBALTA) 60 MG capsule, Take 1 capsule by mouth 2 times daily. (Patient taking differently: Take 60 mg by  mouth 2 (Two) Times a Day.), Disp: 60 capsule, Rfl: 5  •  hydroCHLOROthiazide (HYDRODIURIL) 25 MG tablet, Take 1 tablet by mouth Daily., Disp: 30 tablet, Rfl: 5  •  hydroCHLOROthiazide (HYDRODIURIL) 25 MG tablet, Take 1 tablet by mouth every day., Disp: 30 tablet, Rfl: 5  •  ibuprofen (ADVIL,MOTRIN) 800 MG tablet, Take 1 tablet by mouth 3 times every day with food (Patient taking differently: Take 800 mg by mouth Every 8 (Eight) Hours As Needed for Mild Pain .), Disp: 90 tablet, Rfl: 3  •  LORazepam (Ativan) 0.5 MG tablet, Take 1 tablet by mouth 2 times every day as needed, Disp: 60 tablet, Rfl: 2  •  nadolol (CORGARD) 40 MG tablet, Take 1 tablet by mouth twice daily., Disp: 60 tablet, Rfl: 6  •  Tirzepatide (Mounjaro) 2.5 MG/0.5ML solution pen-injector, Inject 2.5 mg under the skin into the appropriate area as directed 1 (One) Time Per Week., Disp: 2 mL, Rfl: 0  •  topiramate (Topamax) 25 MG tablet, Take 1 tablet by mouth at bedtime for 1 week then increase to 1 tablet twice per day. (Patient taking differently: Take 50 mg by mouth Every Night.), Disp: 60 tablet, Rfl: 4  •  traZODone (DESYREL) 100 MG tablet, Take 1 tablet by mouth every day after a meal., Disp: 30 tablet, Rfl: 4  •  traZODone (DESYREL) 100 MG tablet, Take 1 tablet by mouth every day after a meal, Disp: 30 tablet, Rfl: 4  •  vitamin D (ERGOCALCIFEROL) 1.25 MG (48968 UT) capsule capsule, Take 1 capsule by mouth 1 (One) Time Per Week., Disp: 5 capsule, Rfl: 10  •  zolpidem (Ambien) 10 MG tablet, TAKE ONE TABLET BY MOUTH EVERY NIGHT AT BEDTIME (Patient taking differently: Take 10 mg by mouth At Night As Needed for Sleep.), Disp: 30 tablet, Rfl: 2    Drug Interactions  Mounjaro + HCTZ may decrease the effectiveness of Mounjaro  Mounjaro + Nadolol may increase risk of hypoglycemic effects    Relevant Laboratory Values  Lab Results   Component Value Date    CHOL 219 (H) 06/03/2022    CHLPL 220 (H) 02/13/2015    TRIG 133 06/03/2022    HDL 45 06/03/2022      (H) 06/03/2022     There is no height or weight on file to calculate BMI.    Vaccinations:   Patient recommended to keep up with routine vaccinations.     Goals of Therapy  • Clinical Goals or Therapeutic Targets: 10% weight loss goal      Date 8/23/22       Weight (lb) 225.0 lb       BMI kg/ 41.11       Waist Circumference (in) 46 in           Medication Assessment & Plan    Patient's current BMI is 41.11, which is considered Class III Obestity.     Will order Mounjaro 2.5 mg SC weekly. Use with HCTZ may decrease effectiveness of Mounjaro, discussed with patient that she may need increased doses to see desired effects. Use with nadolol may increase hypoglycemic effects, educated patient to watch for s/sx of hypoglycemia.     Informed patient that Mounjaro is off-label use for weight loss. Patient agreeable to starting. Patient denies currently being pregnancy/breast feeding, denies planning to become pregnant, and denies current use of oral contraceptives.     Discussed lifestyle modifications, including diet and exercise.  Patient education and Mounjaro injection technique counseling provided.     Worked with patient to create SMART Goal(s): increase water to 64 oz/day.    Will follow-up with patient in 4 weeks, or sooner if needed.       Medication Education Provided  Mounjaro® (Tirzepatide)     Mounjaro is currently FDA approved for the treatment of type 2 diabetes, using tirzepatide for weight loss is considered “off-label”     Medication Expectations   Why am I taking this medication? You are taking Mounjaro for weight loss because you have excess weight and also have weight-related medical problems or obesity. It should be used along with a reduced calorie diet and increased physical activity.    What should I expect while on this medication? You should expect to lose approximately 15%-20% of body weight   How does the medication work? Mounjaro is an injection that addresses one of your body's natural  responses to weight loss. It works like naturally produced hormones in the body called GLP-1 and GIP that regulates appetite which can lead to eating fewer calories and losing weight. GIP complements the effects of the GLP-1 increasing energy expenditure leading to weight loss. This medication also slows down food from leaving your stomach, making you feel kat for longer.   How long will I be on this medication for? The amount of time you will be on this medication will be determined by your doctor. Do not abruptly stop this medication without talking to your doctor first.    How do I take this medication? Take as directed on your prescription label. Mounjaro is injected under the skin (subcutaneously) of your stomach, thigh, or upper arm. This medication should be taken once weekly and can be given with or without food.     For each new prefilled pen, pull off the base cap on the pen. Place the base flat on the skin, then unlock. Press and hold the button for up to 10 seconds. Listen for the first click. It means the injection has started. The second click means that the injection is complete. Remove pen from skin and discard in a sharps container.     What are some possible side effects? You may notice you don't feel as hungry, especially when you first start using Mounjaro. Some common side effects include nausea, vomiting, diarrhea, vomiting, indigestion, constipation, and stomach (abdominal) pain. Redness, itching, and/or swelling can occur where the shot was given. You should also monitor for low blood sugar (hypoglycemia), especially if you are taking Mounjaro with other medications that cause low blood sugar. Stop using Mounjaro and call your doctor immediately if you have severe pain in your stomach area that will not go away as this could be a sign of pancreatitis (inflammation of your pancreas). Gallbladder problems have happened in some people who use Mounjaro. Tell your healthcare provider right away  if you get symptoms of gallbladder problems, which may include pain in your upper stomach (abdomen), fever, yellowing of skin or eyes (jaundice), and isaiah-colored stools.    What happens if I miss a dose? Missed dose should be administered as soon as possible within 4 days; resume usual schedule thereafter. If >4 days have elapsed, skip the missed dose and resume administration at the next scheduled weekly dose.          Medication Safety   What are things I should warn my doctor immediately about? Do not use Mounjaro if you or a family member have ever had medullary thyroid cancer (MTC) or Multiple Endocrine Neoplasia syndrome type 2 (MEN 2). Tell your doctor if you get a lump or swelling in your neck, hoarseness, difficulty swallowing, or feel short of breath (these may be symptoms of thyroid cancer). Talk to your doctor if you have or have had problems with your pancreas, kidneys, or liver. Tell your doctor if you have problems with digesting food or slowed emptying of your stomach (gastroparesis). Also tell your doctor if you notice any signs/symptoms of an allergic reaction (rash, hives, difficulty breathing, etc.).   What are things that I should be cautious of? Be cautious of any side effects from this medication. Talk to your doctor if any new ones develop or aren't getting better.   What are some medications that can interact with this one? Taking Mounjaro with other medications that lower your blood sugar such as insulins and glipizide/glimepiride/glyburide may increase the risk of low blood sugar. It should not be taken with other medicines called GLP-1 receptor agonists, as these work the same way as Mounjaro. Because Mounjaro slows stomach emptying, it can affect the way some medicines work. Always tell your doctor or pharmacist immediately if you start taking any new medications, including over-the-counter medications, vitamins, and herbal supplements.      Resources/Support   How can I remind myself  to take this medication? You can download reminder apps to help you manage your refills. You may also set an alarm on your phone to remind you.    Is financial support available?  [employer], the drug , can provide co-pay cards if you have commercial insurance.    Which vaccines are recommended for me? Talk to your doctor about these vaccines: Flu, Coronavirus (COVID-19), Pneumococcal (pneumonia), Tdap, Zoster (shingles).     Medication Storage/Handling   How should I handle this medication? Keep this medication out of reach of pets/children and keep the pen capped when not in use. Do not share your medicine pens with others.    How does this medication need to be stored? Store your new, unused pens in the original carton in the refrigerator. Protect it from light. Do not freeze. If needed, each single-dose pen can be stored unrefrigerated at temperatures for up to 21 days.   How should I dispose of this medication? Used Mounjaro pens should be thrown away after use. Place your used pen in an approved sharps container. If you do not have a sharps container, you may use a household container made of heavy-duty plastic with a tight-fitting lid that is leak resistant (e.g., heavy-duty plastic laundry detergent bottle). If your doctor decides to stop this medication, take to your local police station for proper disposal. Some pharmacies also have take-back bins for medication drop-off.       Females of Childbearing Age   Is there anything additionally I should know as a female of childbearing age?  Talk to your doctor if you are pregnant, planning to become pregnant, or breastfeeding. You should not take this medication for weight loss if you are pregnant, planning to become pregnant or breastfeeding.     Tirzepatide may decrease the efficacy of oral hormonal contraception due to changes in gastric emptying; contraceptives administered by a non-oral route are not affected. Because the changes in gastric  emptying are largest following the first dose, patients using an oral contraceptive should add a barrier method after starting tirzepatide treatment. Alternately, patients can be switched to non-oral contraceptive methods.        Rosalee Frank, Pharmacy Intern   Krystle Oglesby, Joni  8/23/2022  14:10 EDT

## 2022-08-29 ENCOUNTER — TELEPHONE (OUTPATIENT)
Dept: NEUROSURGERY | Facility: CLINIC | Age: 52
End: 2022-08-29

## 2022-08-29 DIAGNOSIS — M43.16 SPONDYLOLISTHESIS OF LUMBAR REGION: Primary | ICD-10-CM

## 2022-08-29 DIAGNOSIS — Z98.1 S/P LUMBAR FUSION: ICD-10-CM

## 2022-08-29 NOTE — TELEPHONE ENCOUNTER
Provider: CATALINA ORELLANA  Caller: BRANDT ORLANDO  Relationship to Patient: SELF    Pharmacy: [facility] - [place]     Phone Number: [phone]    Reason for Call: PATIENT HAS BEEN THROUGH ORTHO @  FOR LEFT FOOT AFTER FALL IN June; EXHAUSTED TREATMENT OPTIONS; EXTENSIVE IMAGING AND DON'T HAVE EXPLANATION FOR HER. CHECKING TO MAKE SURE SHE DOESN'T NEED FOLLOW UP WITH OUR OFFICE D/T ONGOING LEFT FOOT TINGLING/NUMBNESS/LOSS OF FEELING.     When was the patient last seen: Office Visit with Lynne Sellers PA-C (01/26/2022)    Surgery with Marquis Boucher MD (11/30/2021)    ORTHO OVN'S FOR FOOT: 8/4/22; 8/19/22; MULTIPLE IMAGING INC MRI OF FOOT IN CHART. NO LUMBAR IMAGING SINCE JAN 2022.     When did it start: PT REPORTS FALL IN June 2022; DIDN'T THINK IT WAS RELATED TO BACK AT ALL BUT HAS BEEN TO ORTHO EXTENSIVELY AND HAD FOOT IMAGING; BUT THEY DON'T KNOW CAUSE OF NERVE PAIN ONGOING IN LEFT FOOT.     Where is it located: STARTS AROUND TOES ON PINKY SIDE TO BIG TOE AND WHOLE FOOT.     Characteristics of symptom/severity: NUMBNESS & TINGLING LIKE FALLEN ASLEEP; LOCALIZED TO FOOT; NO RADIATING PAIN OR NUMBNESS FROM LB/NO PAIN IN REST OF L LE.     BASELINE PAIN 2-3/10; WHEN ACTIVE AND WALKING 5-6+/10.    DENIES LOSS BOWEL BLADDER;   DENIES SADDLE ANESTHESIA;   WHEN ELEVATED, CAN FEEL LIKE TOTAL LOSS OF FEELING WHEN FOOT/LEG IS ELEVATED.     REPORTS LESSENED FEELING OF NUMBNESS/TINGLING IN FOOT WHEN AT DESK CHAIR, KNEES 90 DEGREE ANGLE BACK STRAIGHT.     Timing- Is it constant or intermittent: INTERMITTENT; COMES AND GOES.   What makes it worse: BY ACTIVITY; WORSE AFTER A WORK DAY, AT NIGHT, AFTER USE.     What makes it better: PATIENT WAS GIVEN BOOT ; TOLD TO WEAR 3 WEEKS ; GOT WORSE ONCE STOPPED WEARING.     What therapies/medications have you tried: TRIED HEAT/ICE ; NO OTC NO RX MEDS PRESCRIBED, NOTHING TOPICAL. NO RELIEF FROM HEAT/ICE.     PLEASE CONTACT PATIENT DIRECTLY FOR FURTHER  CLINICAL INTAKE/EVAL OR IF IMAGING/FU IS NEEDED.     THANK YOU VERY MUCH.

## 2022-08-29 NOTE — TELEPHONE ENCOUNTER
She was last seen in the office here in January of this year. She had a lumbar fusion in Nov 2021. Has she had xrays of her back since her fall and when this foot drop started?   I've put in orders for a lumbar xray and she should follow up with a PA in the office in the next 2-3 weeks to review it. Further imaging of her back will be ordered after her physical exam if needed.

## 2022-08-30 NOTE — TELEPHONE ENCOUNTER
XR has been ordered. Please have pt get XR and schedule an appointment with a PA in the next 2-3 weeks.

## 2022-09-16 ENCOUNTER — DISEASE STATE MANAGEMENT VISIT (OUTPATIENT)
Dept: PHARMACY | Facility: HOSPITAL | Age: 52
End: 2022-09-16

## 2022-09-16 VITALS
HEART RATE: 85 BPM | BODY MASS INDEX: 39.21 KG/M2 | DIASTOLIC BLOOD PRESSURE: 92 MMHG | WEIGHT: 214.4 LBS | SYSTOLIC BLOOD PRESSURE: 126 MMHG

## 2022-09-16 RX ORDER — TIRZEPATIDE 5 MG/.5ML
5 INJECTION, SOLUTION SUBCUTANEOUS WEEKLY
Qty: 2 ML | Refills: 0 | Status: SHIPPED | OUTPATIENT
Start: 2022-09-16 | End: 2022-10-14

## 2022-09-16 NOTE — PROGRESS NOTES
Medication Management Clinic  Weight Management Program        Mone Amador is a 51 y.o. female referred to the Medication Management Clinic by Dr. Naik for clinical pharmacy and specialty pharmacy management of Mounjaro for weight management.  Mone Amador has previously tried Saxenda, Ozempic, and calorie reduction for weight loss.  Saxenda was not successful for the patient and did not suppress her appetite. She stopped about the Saxenda about 1 month prior to starting Mounjaro, reporting difficulty with remembering to take injection daily. She had success with Ozempic in the past.  Patient is currently taking Mounjaro 2.5mg at this time. The patient denies a personal history or family history of thyroid cancer, denies a personal history of pancreatitis, and denies a diagnosis of gastroparesis. Patient denies any missed doses or any trouble giving the injections. She reports that the only time she experienced any side effects was some nausea when she over ate. She she stated that she has learned from it and no long eats when she is not hungry. She denies any other side effects. Pt reports that she is doing better on her water intake, but is not currently meeting her goal. She reports that she has drank roughly 32 ounces of water every day.      Relevant Past Medical History and Co-morbidities  Past Medical History:   Diagnosis Date   • Anxiety    • Arthritis    • Arthritis of back 2019   • Cholecystolithiasis    • Colon polyp 12/20   • Fatty liver    • Fibromyalgia    • Fracture, foot 7/27/22   • Headache    • History of COVID-19 10/06/2021   • Hypertension    • Kidney stone    • Low back pain    • Migraine    • Wears glasses (prescription)      Social History     Socioeconomic History   • Marital status:    Tobacco Use   • Smoking status: Never Smoker   • Smokeless tobacco: Never Used   Vaping Use   • Vaping Use: Never used   Substance and Sexual Activity   • Alcohol use: No   • Drug  use: No   • Sexual activity: Yes     Partners: Male     Comment: Hysterectomy       Allergies  Latex and Morphine and related    Current Medication List    Current Outpatient Medications:   •  cyanocobalamin 1000 MCG/ML injection, Inject 1 mL into the appropriate muscle as directed by prescriber Every 14 (Fourteen) Days., Disp: 30 mL, Rfl: 0  •  DULoxetine (CYMBALTA) 60 MG capsule, Take 1 capsule by mouth 2 times daily. (Patient taking differently: Take 60 mg by mouth 2 (Two) Times a Day.), Disp: 60 capsule, Rfl: 5  •  hydroCHLOROthiazide (HYDRODIURIL) 25 MG tablet, Take 1 tablet by mouth Daily., Disp: 30 tablet, Rfl: 5  •  hydroCHLOROthiazide (HYDRODIURIL) 25 MG tablet, Take 1 tablet by mouth every day., Disp: 30 tablet, Rfl: 5  •  ibuprofen (ADVIL,MOTRIN) 800 MG tablet, Take 1 tablet by mouth 3 times every day with food (Patient taking differently: Take 800 mg by mouth Every 8 (Eight) Hours As Needed for Mild Pain .), Disp: 90 tablet, Rfl: 3  •  LORazepam (Ativan) 0.5 MG tablet, Take 1 tablet by mouth 2 times every day as needed, Disp: 60 tablet, Rfl: 2  •  nadolol (CORGARD) 40 MG tablet, Take 1 tablet by mouth twice daily., Disp: 60 tablet, Rfl: 6  •  Tirzepatide (Mounjaro) 2.5 MG/0.5ML solution pen-injector, Inject 2.5 mg under the skin into the appropriate area as directed 1 (One) Time Per Week., Disp: 2 mL, Rfl: 0  •  topiramate (Topamax) 25 MG tablet, Take 1 tablet by mouth at bedtime for 1 week then increase to 1 tablet twice per day. (Patient taking differently: Take 50 mg by mouth Every Night.), Disp: 60 tablet, Rfl: 4  •  traZODone (DESYREL) 100 MG tablet, Take 1 tablet by mouth every day after a meal., Disp: 30 tablet, Rfl: 4  •  traZODone (DESYREL) 100 MG tablet, Take 1 tablet by mouth every day after a meal, Disp: 30 tablet, Rfl: 4  •  vitamin D (ERGOCALCIFEROL) 1.25 MG (97453 UT) capsule capsule, Take 1 capsule by mouth 1 (One) Time Per Week., Disp: 5 capsule, Rfl: 10  •  zolpidem (Ambien) 10 MG  tablet, Take 1 tablet by mouth every night at bedtime, Disp: 30 tablet, Rfl: 2    Drug Interactions  Mounjaro + HCTZ may decrease the effectiveness of Mounjaro  Mounjaro + Nadolol may increase risk of hypoglycemic effects    Relevant Laboratory Values  Lab Results   Component Value Date    CHOL 219 (H) 06/03/2022    CHLPL 220 (H) 02/13/2015    TRIG 133 06/03/2022    HDL 45 06/03/2022     (H) 06/03/2022     There is no height or weight on file to calculate BMI.    Vaccinations:   Patient recommended to keep up with routine vaccinations.     Goals of Therapy  • Clinical Goals or Therapeutic Targets: 10% weight loss goal      Date 8/23/22 9/16/22      Weight (lb) 225.0 lb 214.4lb      BMI kg/ 41.11 39.21      Waist Circumference (in) 46 in 44.5          Medication Assessment & Plan    Patient's current BMI is 39.21, which is considered Class II Obestity. Patient has lost 10.6lb over the past month. Congratulated her on her success!!    Will order Mounjaro 5 mg SC weekly.     Use with HCTZ may decrease effectiveness of Mounjaro, discussed with patient that she may need increased doses to see desired effects. Use with nadolol may increase hypoglycemic effects, educated patient to watch for s/sx of hypoglycemia.     Discussed lifestyle modifications, including diet and exercise.     Worked with patient to create SMART Goal(s): increase water to 64 oz/day. Pt will continue to work towards this goal    Will follow-up with patient in 4 weeks, or sooner if needed.       Eugenia Cobb RPH   9/16/2022  15:48 EDT

## 2022-09-21 ENCOUNTER — HOSPITAL ENCOUNTER (OUTPATIENT)
Dept: GENERAL RADIOLOGY | Facility: HOSPITAL | Age: 52
Discharge: HOME | End: 2022-09-21
Admitting: PHYSICIAN ASSISTANT

## 2022-09-21 DIAGNOSIS — M43.16 SPONDYLOLISTHESIS OF LUMBAR REGION: ICD-10-CM

## 2022-09-21 DIAGNOSIS — Z98.1 S/P LUMBAR FUSION: ICD-10-CM

## 2022-09-21 PROCEDURE — 72114 X-RAY EXAM L-S SPINE BENDING: CPT

## 2022-09-21 PROCEDURE — 72114 X-RAY EXAM L-S SPINE BENDING: CPT | Performed by: RADIOLOGY

## 2022-09-23 ENCOUNTER — OFFICE VISIT (OUTPATIENT)
Dept: NEUROSURGERY | Facility: CLINIC | Age: 52
End: 2022-09-23

## 2022-09-23 VITALS
TEMPERATURE: 97.1 F | DIASTOLIC BLOOD PRESSURE: 72 MMHG | HEIGHT: 62 IN | BODY MASS INDEX: 39.53 KG/M2 | WEIGHT: 214.8 LBS | SYSTOLIC BLOOD PRESSURE: 110 MMHG

## 2022-09-23 DIAGNOSIS — Z98.890 HISTORY OF LUMBAR DISCECTOMY: ICD-10-CM

## 2022-09-23 DIAGNOSIS — R20.0 NUMBNESS AND TINGLING OF LEFT LOWER EXTREMITY: Primary | ICD-10-CM

## 2022-09-23 DIAGNOSIS — R20.2 NUMBNESS AND TINGLING OF LEFT LOWER EXTREMITY: Primary | ICD-10-CM

## 2022-09-23 PROCEDURE — 99214 OFFICE O/P EST MOD 30 MIN: CPT

## 2022-09-23 NOTE — PROGRESS NOTES
Subjective   Patient: Mone Amador   Age, Date of Birth: 51 y.o., 1970  Sex: female    Primary Care Provider: Christopher Wu MD    Chief Complaint: Numbness distal to left knee    History of Present Illness:  Patient is a 51-year-old female known to our practice for undergoing an L4-L5 lumbar discectomy with Dr. Kaushik Boucher on 11/30/2021.  Prior to that surgery she was experiencing a shooting pain down her right leg, that is almost entirely resolved however she still has chronic low back pain that does not radiate.    Since June of this year patient is reported a numbness distal to her left knee.  She describes it as Of a tingly sensation, but will periodically have pain.  She reports that the pain is worse at night.  The pain does not feel like the pain she was experiencing prior to her discectomy.  Whereas the tingling is relatively constant.  She does not have any weakness associated with her condition. There was an incident where she tripped however that sounds like an incident that could have taken place with anyone.  She was worked up by Ortho for her condition, they were unsure what was going on.  She has not been to PT and has not had a nerve conduction test.      Current Outpatient Medications   Medication Sig Dispense Refill   • cyanocobalamin 1000 MCG/ML injection Inject 1 mL into the appropriate muscle as directed by prescriber Every 14 (Fourteen) Days. 30 mL 0   • DULoxetine (Cymbalta) 60 MG capsule Take 1 capsule by mouth 2 times daily. 60 capsule 5   • hydroCHLOROthiazide (HYDRODIURIL) 25 MG tablet Take 1 tablet by mouth every day. 30 tablet 5   • ibuprofen (ADVIL,MOTRIN) 800 MG tablet Take 1 tablet by mouth 3 times every day with food (Patient taking differently: Take 800 mg by mouth Every 8 (Eight) Hours As Needed for Mild Pain.) 90 tablet 3   • LORazepam (Ativan) 0.5 MG tablet Take 1 tablet by mouth 2 times every day as needed 60 tablet 2   • nadolol (CORGARD) 40 MG tablet Take 1  tablet by mouth twice daily. 60 tablet 6   • Tirzepatide (Mounjaro) 5 MG/0.5ML solution pen-injector Inject 5 mg under the skin into the appropriate area as directed 1 (One) Time Per Week. 2 mL 0   • topiramate (Topamax) 25 MG tablet Take 1 tablet by mouth at bedtime for 1 week then increase to 1 tablet twice per day. (Patient taking differently: Take 50 mg by mouth Every Night.) 60 tablet 4   • traZODone (DESYREL) 100 MG tablet Take 1 tablet by mouth every day after a meal. 30 tablet 4   • traZODone (DESYREL) 100 MG tablet Take 1 tablet by mouth every day after a meal 30 tablet 4   • vitamin D (ERGOCALCIFEROL) 1.25 MG (25251 UT) capsule capsule Take 1 capsule by mouth 1 (One) Time Per Week. 5 capsule 10   • zolpidem (Ambien) 10 MG tablet Take 1 tablet by mouth every night at bedtime 30 tablet 2     No current facility-administered medications for this visit.       Allergies   Allergen Reactions   • Latex Itching and Rash   • Morphine And Related Itching and Nausea Only         Past Medical History:   Diagnosis Date   • Anxiety    • Arthritis    • Arthritis of back 2019   • Cholecystolithiasis    • Colon polyp 12/20   • Fatty liver    • Fibromyalgia    • Fracture, foot 7/27/22   • Headache    • History of COVID-19 10/06/2021   • Hypertension    • Kidney stone    • Low back pain    • Migraine    • Wears glasses (prescription)        Social History     Socioeconomic History   • Marital status:    Tobacco Use   • Smoking status: Never Smoker   • Smokeless tobacco: Never Used   Vaping Use   • Vaping Use: Never used   Substance and Sexual Activity   • Alcohol use: No   • Drug use: No   • Sexual activity: Defer     Partners: Male     Comment: Hysterectomy       Family History   Problem Relation Age of Onset   • Heart disease Mother    • Hypertension Mother    • Stroke Mother    • Anxiety disorder Mother    • Thyroid disease Father    • Glaucoma Father    • Multiple sclerosis Sister    • No Known Problems Brother     • No Known Problems Son    • No Known Problems Sister    • No Known Problems Son    • Breast cancer Neg Hx        Review of Systems   Constitutional: Negative for activity change, appetite change, chills, diaphoresis, fatigue, fever and unexpected weight change.   HENT: Negative for congestion, dental problem, drooling, ear discharge, ear pain, facial swelling, hearing loss, mouth sores, nosebleeds, postnasal drip, rhinorrhea, sinus pressure, sinus pain, sneezing, sore throat, tinnitus, trouble swallowing and voice change.    Eyes: Negative for photophobia, pain, discharge, redness, itching and visual disturbance.   Respiratory: Negative for apnea, cough, choking, chest tightness, shortness of breath, wheezing and stridor.    Cardiovascular: Negative for chest pain, palpitations and leg swelling.   Gastrointestinal: Negative for abdominal distention, abdominal pain, anal bleeding, blood in stool, constipation, diarrhea, nausea, rectal pain and vomiting.   Endocrine: Negative for cold intolerance, heat intolerance, polydipsia, polyphagia and polyuria.   Genitourinary: Negative for decreased urine volume, difficulty urinating, dysuria, enuresis, flank pain, frequency, genital sores, hematuria and urgency.   Musculoskeletal: Positive for back pain. Negative for arthralgias, gait problem, joint swelling, myalgias, neck pain and neck stiffness.   Skin: Negative for color change, pallor, rash and wound.   Allergic/Immunologic: Negative for environmental allergies, food allergies and immunocompromised state.   Neurological: Negative for dizziness, tremors, seizures, syncope, facial asymmetry, speech difficulty, weakness, light-headedness, numbness and headaches.   Hematological: Negative for adenopathy. Does not bruise/bleed easily.   Psychiatric/Behavioral: Negative for agitation, behavioral problems, confusion, decreased concentration, dysphoric mood, hallucinations, self-injury, sleep disturbance and suicidal ideas.  "The patient is not nervous/anxious and is not hyperactive.    All other systems reviewed and are negative.      Objective   Vitals:    09/23/22 0957   BP: 110/72   BP Location: Left arm   Patient Position: Sitting   Cuff Size: Adult   Temp: 97.1 °F (36.2 °C)   Weight: 97.4 kg (214 lb 12.8 oz)   Height: 157.5 cm (62\")        Physical Exam:    Physical Exam  Vitals and nursing note reviewed.   Constitutional:       Appearance: Normal appearance.   HENT:      Head: Normocephalic and atraumatic.      Right Ear: External ear normal.      Left Ear: External ear normal.   Eyes:      General: No scleral icterus.        Right eye: No discharge.         Left eye: No discharge.   Pulmonary:      Effort: Pulmonary effort is normal. No respiratory distress.   Musculoskeletal:      Right lower leg: No edema.      Left lower leg: No edema.      Comments: Patient had 5 out of 5 strength with hip flexion, plantar and dorsiflexion bilaterally.   Neurological:      Mental Status: She is alert.      Cranial Nerves: No cranial nerve deficit or facial asymmetry.      Sensory: Sensation is intact. No sensory deficit.      Motor: Motor function is intact. No weakness, tremor, atrophy, abnormal muscle tone or seizure activity.      Coordination: Romberg sign negative.      Gait: Gait is intact. Gait and tandem walk normal.      Deep Tendon Reflexes:      Reflex Scores:       Patellar reflexes are 1+ on the right side and 1+ on the left side.       Achilles reflexes are 1+ on the right side and 1+ on the left side.     Comments: Straight leg raise was negative bilaterally.    Intact vibratory and temperature sensation bilaterally.  No signs of ankle clonus bilaterally.  Was able to ambulate on heels and tiptoes free of pain.   Psychiatric:         Mood and Affect: Mood normal.         Behavior: Behavior normal.         Thought Content: Thought content normal.         Judgment: Judgment normal.         Tobacco Use: Low Risk    • Smoking " Tobacco Use: Never Smoker   • Smokeless Tobacco Use: Never Used     Class 2 Severe Obesity (BMI >=35 and <=39.9). Obesity-related health conditions include the following: none. Obesity is unchanged. BMI is is above average; BMI management plan is completed. We discussed increasing exercise.       STEADI Fall Risk Assessment has not been completed.    Assessment & Plan     Data Review:  (All imaging is independently reviewed unless stated otherwise.)  X-ray of the lumbar spine with flexion infection taken on 9/21/2022 shows intact hardware at L4-L5 and no instability.    Medical Decision Making:  Patient symptoms sound very consistent with a peroneal nerve entrapment, I will send her for nerve conduction study to further explore that possibility.  She will have that performed in 3 to 4 weeks and then follow-up with me afterwards.  At that appointment I will discuss her results with her and explore either further diagnostics to work-up her condition or discuss treatment options.  At no point was it implied that she would definitively have a surgery, it was repeatedly emphasized that we are merely in the data collection stage.  Patient encouraged to contact us if they have any changes in their condition or any concerns.     Diagnosis Plan   1. Numbness and tingling of left lower extremity  EMG & Nerve Conduction Test   2. History of lumbar discectomy      Performed on 11/30/2021 by Dr. Kaushik Boucher at [facility].       Electronically signed by:    Po Trinidad PA-C on 10:33 EDT 09/23/22

## 2022-10-03 DIAGNOSIS — I10 PRIMARY HYPERTENSION: Primary | ICD-10-CM

## 2022-10-03 DIAGNOSIS — N39.0 URINARY TRACT INFECTION WITHOUT HEMATURIA, SITE UNSPECIFIED: Primary | ICD-10-CM

## 2022-10-03 RX ORDER — METHYLPREDNISOLONE 4 MG/1
TABLET ORAL
Qty: 21 TABLET | Refills: 0 | Status: SHIPPED | OUTPATIENT
Start: 2022-10-03 | End: 2022-10-14

## 2022-10-03 RX ORDER — FLUCONAZOLE 100 MG/1
100 TABLET ORAL DAILY
Qty: 3 TABLET | Refills: 3 | Status: SHIPPED | OUTPATIENT
Start: 2022-10-03 | End: 2022-10-15

## 2022-10-04 ENCOUNTER — LAB (OUTPATIENT)
Dept: UROLOGY | Facility: CLINIC | Age: 52
End: 2022-10-04

## 2022-10-04 DIAGNOSIS — T63.301A SPIDER BITE WOUND, ACCIDENTAL OR UNINTENTIONAL, INITIAL ENCOUNTER: Primary | ICD-10-CM

## 2022-10-04 PROCEDURE — 87205 SMEAR GRAM STAIN: CPT | Performed by: UROLOGY

## 2022-10-04 PROCEDURE — 87147 CULTURE TYPE IMMUNOLOGIC: CPT | Performed by: UROLOGY

## 2022-10-04 PROCEDURE — 87186 SC STD MICRODIL/AGAR DIL: CPT | Performed by: UROLOGY

## 2022-10-04 PROCEDURE — 87070 CULTURE OTHR SPECIMN AEROBIC: CPT | Performed by: UROLOGY

## 2022-10-05 ENCOUNTER — APPOINTMENT (OUTPATIENT)
Dept: ULTRASOUND IMAGING | Facility: HOSPITAL | Age: 52
End: 2022-10-05

## 2022-10-05 ENCOUNTER — HOSPITAL ENCOUNTER (EMERGENCY)
Facility: HOSPITAL | Age: 52
Discharge: HOME | End: 2022-10-05
Attending: EMERGENCY MEDICINE | Admitting: EMERGENCY MEDICINE

## 2022-10-05 VITALS
SYSTOLIC BLOOD PRESSURE: 92 MMHG | BODY MASS INDEX: 38.64 KG/M2 | HEART RATE: 64 BPM | RESPIRATION RATE: 22 BRPM | HEIGHT: 62 IN | DIASTOLIC BLOOD PRESSURE: 61 MMHG | TEMPERATURE: 97.8 F | WEIGHT: 210 LBS | OXYGEN SATURATION: 98 %

## 2022-10-05 DIAGNOSIS — L02.414 ABSCESS OF SKIN OF LEFT WRIST: Primary | ICD-10-CM

## 2022-10-05 LAB
ALBUMIN SERPL-MCNC: 4.37 G/DL (ref 3.5–5.2)
ALBUMIN/GLOB SERPL: 1.5 G/DL
ALP SERPL-CCNC: 95 U/L (ref 39–117)
ALT SERPL W P-5'-P-CCNC: 15 U/L (ref 1–33)
ANION GAP SERPL CALCULATED.3IONS-SCNC: 14.6 MMOL/L (ref 5–15)
AST SERPL-CCNC: 14 U/L (ref 1–32)
BASOPHILS # BLD AUTO: 0.03 10*3/MM3 (ref 0–0.2)
BASOPHILS NFR BLD AUTO: 0.4 % (ref 0–1.5)
BILIRUB SERPL-MCNC: 0.3 MG/DL (ref 0–1.2)
BUN SERPL-MCNC: 18 MG/DL (ref 6–20)
BUN/CREAT SERPL: 12.9 (ref 7–25)
CALCIUM SPEC-SCNC: 9.7 MG/DL (ref 8.6–10.5)
CHLORIDE SERPL-SCNC: 94 MMOL/L (ref 98–107)
CO2 SERPL-SCNC: 23.4 MMOL/L (ref 22–29)
CREAT SERPL-MCNC: 1.4 MG/DL (ref 0.57–1)
CRP SERPL-MCNC: 2.77 MG/DL (ref 0–0.5)
D-LACTATE SERPL-SCNC: 1.2 MMOL/L (ref 0.5–2)
DEPRECATED RDW RBC AUTO: 38 FL (ref 37–54)
EGFRCR SERPLBLD CKD-EPI 2021: 45.6 ML/MIN/1.73
EOSINOPHIL # BLD AUTO: 0.01 10*3/MM3 (ref 0–0.4)
EOSINOPHIL NFR BLD AUTO: 0.1 % (ref 0.3–6.2)
ERYTHROCYTE [DISTWIDTH] IN BLOOD BY AUTOMATED COUNT: 12.5 % (ref 12.3–15.4)
ERYTHROCYTE [SEDIMENTATION RATE] IN BLOOD: 57 MM/HR (ref 0–30)
GLOBULIN UR ELPH-MCNC: 2.9 GM/DL
GLUCOSE SERPL-MCNC: 96 MG/DL (ref 65–99)
HCT VFR BLD AUTO: 42.6 % (ref 34–46.6)
HGB BLD-MCNC: 15 G/DL (ref 12–15.9)
IMM GRANULOCYTES # BLD AUTO: 0.04 10*3/MM3 (ref 0–0.05)
IMM GRANULOCYTES NFR BLD AUTO: 0.5 % (ref 0–0.5)
LYMPHOCYTES # BLD AUTO: 1.28 10*3/MM3 (ref 0.7–3.1)
LYMPHOCYTES NFR BLD AUTO: 16.8 % (ref 19.6–45.3)
MCH RBC QN AUTO: 29 PG (ref 26.6–33)
MCHC RBC AUTO-ENTMCNC: 35.2 G/DL (ref 31.5–35.7)
MCV RBC AUTO: 82.2 FL (ref 79–97)
MONOCYTES # BLD AUTO: 0.31 10*3/MM3 (ref 0.1–0.9)
MONOCYTES NFR BLD AUTO: 4.1 % (ref 5–12)
NEUTROPHILS NFR BLD AUTO: 5.94 10*3/MM3 (ref 1.7–7)
NEUTROPHILS NFR BLD AUTO: 78.1 % (ref 42.7–76)
NRBC BLD AUTO-RTO: 0 /100 WBC (ref 0–0.2)
PLATELET # BLD AUTO: 430 10*3/MM3 (ref 140–450)
PMV BLD AUTO: 9.4 FL (ref 6–12)
POTASSIUM SERPL-SCNC: 3.4 MMOL/L (ref 3.5–5.2)
PROT SERPL-MCNC: 7.3 G/DL (ref 6–8.5)
RBC # BLD AUTO: 5.18 10*6/MM3 (ref 3.77–5.28)
SODIUM SERPL-SCNC: 132 MMOL/L (ref 136–145)
WBC NRBC COR # BLD: 7.61 10*3/MM3 (ref 3.4–10.8)

## 2022-10-05 PROCEDURE — 87070 CULTURE OTHR SPECIMN AEROBIC: CPT | Performed by: EMERGENCY MEDICINE

## 2022-10-05 PROCEDURE — 25010000002 HYDROMORPHONE PER 4 MG: Performed by: SURGERY

## 2022-10-05 PROCEDURE — 36415 COLL VENOUS BLD VENIPUNCTURE: CPT

## 2022-10-05 PROCEDURE — 87186 SC STD MICRODIL/AGAR DIL: CPT | Performed by: EMERGENCY MEDICINE

## 2022-10-05 PROCEDURE — 25010000002 DALBAVANCIN 500 MG RECONSTITUTED SOLUTION 1 EACH VIAL: Performed by: EMERGENCY MEDICINE

## 2022-10-05 PROCEDURE — 80053 COMPREHEN METABOLIC PANEL: CPT | Performed by: PHYSICIAN ASSISTANT

## 2022-10-05 PROCEDURE — 85652 RBC SED RATE AUTOMATED: CPT | Performed by: PHYSICIAN ASSISTANT

## 2022-10-05 PROCEDURE — 96365 THER/PROPH/DIAG IV INF INIT: CPT

## 2022-10-05 PROCEDURE — 76999 ECHO EXAMINATION PROCEDURE: CPT

## 2022-10-05 PROCEDURE — 87147 CULTURE TYPE IMMUNOLOGIC: CPT | Performed by: EMERGENCY MEDICINE

## 2022-10-05 PROCEDURE — 10061 I&D ABSCESS COMP/MULTIPLE: CPT | Performed by: SURGERY

## 2022-10-05 PROCEDURE — 99283 EMERGENCY DEPT VISIT LOW MDM: CPT

## 2022-10-05 PROCEDURE — 99284 EMERGENCY DEPT VISIT MOD MDM: CPT

## 2022-10-05 PROCEDURE — 96375 TX/PRO/DX INJ NEW DRUG ADDON: CPT

## 2022-10-05 PROCEDURE — 85025 COMPLETE CBC W/AUTO DIFF WBC: CPT | Performed by: PHYSICIAN ASSISTANT

## 2022-10-05 PROCEDURE — 25010000002 KETOROLAC TROMETHAMINE PER 15 MG: Performed by: SURGERY

## 2022-10-05 PROCEDURE — 87205 SMEAR GRAM STAIN: CPT | Performed by: EMERGENCY MEDICINE

## 2022-10-05 PROCEDURE — 87040 BLOOD CULTURE FOR BACTERIA: CPT | Performed by: PHYSICIAN ASSISTANT

## 2022-10-05 PROCEDURE — 76882 US LMTD JT/FCL EVL NVASC XTR: CPT | Performed by: RADIOLOGY

## 2022-10-05 PROCEDURE — 83605 ASSAY OF LACTIC ACID: CPT | Performed by: PHYSICIAN ASSISTANT

## 2022-10-05 PROCEDURE — 86140 C-REACTIVE PROTEIN: CPT | Performed by: PHYSICIAN ASSISTANT

## 2022-10-05 RX ORDER — LIDOCAINE HYDROCHLORIDE AND EPINEPHRINE 10; 10 MG/ML; UG/ML
30 INJECTION, SOLUTION INFILTRATION; PERINEURAL ONCE AS NEEDED
Status: DISCONTINUED | OUTPATIENT
Start: 2022-10-05 | End: 2022-10-05 | Stop reason: HOSPADM

## 2022-10-05 RX ORDER — DEXTROSE 5 % IN WATER
10 PIGGYBACK WITH THREADED PORT (ML) INTRAVENOUS
Status: COMPLETED | OUTPATIENT
Start: 2022-10-05 | End: 2022-10-05

## 2022-10-05 RX ORDER — OXYCODONE HYDROCHLORIDE 5 MG/1
5 TABLET ORAL ONCE
Status: COMPLETED | OUTPATIENT
Start: 2022-10-05 | End: 2022-10-05

## 2022-10-05 RX ORDER — KETOROLAC TROMETHAMINE 30 MG/ML
30 INJECTION, SOLUTION INTRAMUSCULAR; INTRAVENOUS EVERY 6 HOURS PRN
Status: DISCONTINUED | OUTPATIENT
Start: 2022-10-05 | End: 2022-10-05 | Stop reason: HOSPADM

## 2022-10-05 RX ORDER — SODIUM CHLORIDE 0.9 % (FLUSH) 0.9 %
10 SYRINGE (ML) INJECTION AS NEEDED
Status: DISCONTINUED | OUTPATIENT
Start: 2022-10-05 | End: 2022-10-05 | Stop reason: HOSPADM

## 2022-10-05 RX ORDER — IBUPROFEN 600 MG/1
600 TABLET ORAL EVERY 6 HOURS PRN
Qty: 60 TABLET | Refills: 0 | Status: SHIPPED | OUTPATIENT
Start: 2022-10-05 | End: 2022-11-23

## 2022-10-05 RX ORDER — HYDROMORPHONE HYDROCHLORIDE 1 MG/ML
0.5 INJECTION, SOLUTION INTRAMUSCULAR; INTRAVENOUS; SUBCUTANEOUS ONCE
Status: COMPLETED | OUTPATIENT
Start: 2022-10-05 | End: 2022-10-05

## 2022-10-05 RX ADMIN — DEXTROSE MONOHYDRATE 10 ML: 5 INJECTION, SOLUTION INTRAVENOUS at 10:45

## 2022-10-05 RX ADMIN — OXYCODONE 5 MG: 5 TABLET ORAL at 13:06

## 2022-10-05 RX ADMIN — SODIUM CHLORIDE 1000 ML: 9 INJECTION, SOLUTION INTRAVENOUS at 08:55

## 2022-10-05 RX ADMIN — HYDROMORPHONE HYDROCHLORIDE 0.5 MG: 1 INJECTION, SOLUTION INTRAMUSCULAR; INTRAVENOUS; SUBCUTANEOUS at 13:32

## 2022-10-05 RX ADMIN — DALBAVANCIN 1500 MG: 500 INJECTION, POWDER, FOR SOLUTION INTRAVENOUS at 10:20

## 2022-10-05 RX ADMIN — KETOROLAC TROMETHAMINE 30 MG: 30 INJECTION, SOLUTION INTRAMUSCULAR at 13:07

## 2022-10-05 RX ADMIN — DEXTROSE MONOHYDRATE 10 ML: 5 INJECTION, SOLUTION INTRAVENOUS at 10:20

## 2022-10-05 RX ADMIN — Medication 10 ML: at 13:32

## 2022-10-05 NOTE — PROCEDURES
PROCEDURE NOTE    Patient Name:  Mone Amador  YOB: 1970  [id number]    10/5/2022        PREOPERATIVE DIAGNOSIS: Left volar forearm abscess      POSTOPERATIVE DIAGNOSIS: Same       PROCEDURE PERFORMED: Incision and drainage of left forearm abscess       SURGEON: Juan Gastelum MD       SPECIMENS: Fluid for culture       ANESTHESIA: Local       FINDINGS:   1.  Roughly 5 cm area of cellulitis and fluctuance along the volar radial aspect of the left forearm distally.  Purulent drainage       INDICATIONS:    The patient is a 51 y.o. female who had developed progressively worsening swelling of her volar forearm.  She had never had this before.  Drainage was attempted but was unsuccessful.  Risks and benefits of incision and drainage were discussed with the patient and she elected proceed.     DESCRIPTION OF PROCEDURE:      The left volar forearm was prepped and draped in a sterile manner.  Local anesthetic was administered to the surrounding soft tissues.  2 stab incisions were made longitudinally along the forearm along the more ulnar aspect of the abscess to avoid the radial artery.  There was significant mount of purulent drainage.  This was cultured.  The 2 counterincisions were connected with a hemostat.  There were no loculations present.  The abscess cavity was irrigated with the remaining local anesthetic.  It appeared to be well decompressed.  A vessel loop was placed between the 2 counterincisions and sutured to itself with 0 silk suture.    The patient tolerated procedure well.  No immediate complications.    Recommendations:  - Follow-up in 1 week  - Floss vessel loop twice daily  - Patient was given Dalvance in the ER  - We will follow-up culture      Juan Gastelum MD  10/5/2022  14:54 EDT

## 2022-10-05 NOTE — ED PROVIDER NOTES
Subjective   History of Present Illness  This is a 51-year-old female who presents to the emergency department chief complaint abscess located to the left wrist.  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  Patient states she is had increased swelling and tenderness to the volar aspect of her right wrist.  Patient denies any associated fever, chills, body aches.  Patient denies history of IV drug abuse.  Patient denies diabetes, hypertension, heart disease.    History provided by:  Patient   used: No    Abscess  Location:  Shoulder/arm  Shoulder/arm abscess location:  L wrist  Size:  4.0  Abscess quality: induration, painful, redness and warmth    Red streaking: no    Duration:  2 days  Progression:  Worsening  Pain details:     Quality:  Pressure    Severity:  Moderate    Duration:  2 days    Timing:  Intermittent    Progression:  Worsening  Chronicity:  New  Context: not diabetes, not immunosuppression, not injected drug use and not insect bite/sting    Relieved by:  Nothing  Worsened by:  Nothing  Ineffective treatments:  None tried  Associated symptoms: fatigue    Associated symptoms: no headaches, no nausea and no vomiting    Risk factors: no family hx of MRSA and no hx of MRSA        Review of Systems   Constitutional: Positive for fatigue. Negative for chills.   HENT: Negative.  Negative for congestion, dental problem, drooling, ear discharge, ear pain, facial swelling, hearing loss, mouth sores, nosebleeds and postnasal drip.    Eyes: Negative.  Negative for photophobia, pain and redness.   Respiratory: Negative.  Negative for apnea, cough, choking, chest tightness, shortness of breath and stridor.    Cardiovascular: Negative.  Negative for chest pain, palpitations and leg swelling.   Gastrointestinal: Negative.  Negative for nausea and vomiting.   Endocrine: Negative.  Negative for cold intolerance, heat intolerance, polydipsia, polyphagia and  polyuria.   Genitourinary: Negative.  Negative for difficulty urinating, dyspareunia, enuresis, flank pain, frequency, hematuria and menstrual problem.   Musculoskeletal: Negative.  Negative for arthralgias, back pain, gait problem, joint swelling and myalgias.   Skin: Positive for rash and wound. Negative for color change and pallor.   Neurological: Negative.  Negative for tremors, seizures, syncope, facial asymmetry, light-headedness and headaches.   Hematological: Negative.  Negative for adenopathy.   Psychiatric/Behavioral: Negative.  Negative for agitation, decreased concentration, dysphoric mood and hallucinations. The patient is not nervous/anxious and is not hyperactive.    All other systems reviewed and are negative.      Past Medical History:   Diagnosis Date   • Anxiety    • Arthritis    • Arthritis of back 2019   • Cholecystolithiasis    • Colon polyp    • Fatty liver    • Fibromyalgia    • Fracture, foot 22   • Headache    • History of COVID-19 10/06/2021   • Hypertension    • Kidney stone    • Low back pain    • Migraine    • Wears glasses (prescription)        Allergies   Allergen Reactions   • Latex Itching and Rash   • Morphine And Related Itching and Nausea Only       Past Surgical History:   Procedure Laterality Date   • ANAL SCOPE N/A 2022    Procedure: ANAL SCOPE with fistulotomy;  Surgeon: Pito Vigil MD;  [facility];  Service: General;  Laterality: N/A;   • BACK SURGERY  21   •  SECTION      ,    • CHOLECYSTECTOMY     • COLONOSCOPY N/A 2021    Procedure: COLONOSCOPY FOR SCREENING CPT CODE: ;  Surgeon: Jaron Car MD;  Location: [facility] OR;  Service: Gastroenterology;  Laterality: N/A;   • ENDOSCOPY N/A 2021    Procedure: ESOPHAGOGASTRODUODENOSCOPY WITH BIOPSY CPT CODE: 13975;  Surgeon: Jaron Car MD;  Location: [facility] OR;  Service: Gastroenterology;  Laterality: N/A;   • HYSTERECTOMY   2003    32   • LUMBAR DISCECTOMY FUSION INSTRUMENTATION N/A 11/30/2021    Procedure: posterior lumbar interbody fusion with use of bone morphogenic protein L4-5;  Surgeon: Marquis Boucher MD;  Location:  [facility] OR;  Service: Neurosurgery;  Laterality: N/A;   • RECTAL FISSURE INCISION AND DRAINAGE N/A 04/29/2022    Procedure: ANAL FISTULA REPAIR;  Surgeon: Pito Vigil MD;  Location:  [facility] OR;  Service: General;  Laterality: N/A;   • UPPER GASTROINTESTINAL ENDOSCOPY  01/18/2021       Family History   Problem Relation Age of Onset   • Heart disease Mother    • Hypertension Mother    • Stroke Mother    • Anxiety disorder Mother    • Thyroid disease Father    • Glaucoma Father    • Multiple sclerosis Sister    • No Known Problems Brother    • No Known Problems Son    • No Known Problems Sister    • No Known Problems Son    • Breast cancer Neg Hx        Social History     Socioeconomic History   • Marital status:    Tobacco Use   • Smoking status: Never Smoker   • Smokeless tobacco: Never Used   Vaping Use   • Vaping Use: Never used   Substance and Sexual Activity   • Alcohol use: No   • Drug use: No   • Sexual activity: Defer     Partners: Male     Comment: Hysterectomy           Objective   Physical Exam  Vitals and nursing note reviewed.   Constitutional:       General: She is not in acute distress.     Appearance: Normal appearance. She is normal weight. She is not ill-appearing, toxic-appearing or diaphoretic.   HENT:      Head: Normocephalic and atraumatic.      Right Ear: Tympanic membrane, ear canal and external ear normal. There is no impacted cerumen.      Left Ear: Tympanic membrane, ear canal and external ear normal. There is no impacted cerumen.      Nose: Nose normal. No congestion or rhinorrhea.      Mouth/Throat:      Mouth: Mucous membranes are moist.      Pharynx: Oropharynx is clear. No oropharyngeal exudate or posterior oropharyngeal erythema.   Eyes:      General: No scleral  icterus.        Right eye: No discharge.         Left eye: No discharge.      Extraocular Movements: Extraocular movements intact.      Conjunctiva/sclera: Conjunctivae normal.      Pupils: Pupils are equal, round, and reactive to light.   Cardiovascular:      Rate and Rhythm: Normal rate and regular rhythm.      Pulses: Normal pulses.      Heart sounds: Normal heart sounds. No murmur heard.    No friction rub. No gallop.   Pulmonary:      Effort: Pulmonary effort is normal. No respiratory distress.      Breath sounds: Normal breath sounds. No stridor. No wheezing, rhonchi or rales.   Chest:      Chest wall: No tenderness.   Abdominal:      General: Abdomen is flat. Bowel sounds are normal. There is no distension.      Palpations: Abdomen is soft. There is no mass.      Tenderness: There is no abdominal tenderness. There is no right CVA tenderness, left CVA tenderness, guarding or rebound.      Hernia: No hernia is present.   Musculoskeletal:         General: Swelling and tenderness present. No deformity or signs of injury. Normal range of motion.      Cervical back: Normal range of motion and neck supple. No rigidity or tenderness.      Right lower leg: No edema.      Left lower leg: No edema.   Lymphadenopathy:      Cervical: No cervical adenopathy.   Skin:     General: Skin is warm and dry.      Capillary Refill: Capillary refill takes less than 2 seconds.      Coloration: Skin is not jaundiced or pale.      Findings: Erythema present. No bruising, lesion or rash.   Neurological:      General: No focal deficit present.      Mental Status: She is alert and oriented to person, place, and time. Mental status is at baseline.      Cranial Nerves: No cranial nerve deficit.      Sensory: No sensory deficit.      Motor: No weakness.      Coordination: Coordination normal.      Gait: Gait normal.      Deep Tendon Reflexes: Reflexes normal.   Psychiatric:         Mood and Affect: Mood normal.         Behavior: Behavior  normal.         Thought Content: Thought content normal.         Judgment: Judgment normal.         Procedures           ED Course  ED Course as of 10/05/22 1429   Wed Oct 05, 2022   1019 Spoke with hilda Hernandez []      ED Course User Index  [] Kaushik Martinez PA-C                                           [facility]    Final diagnoses:   Abscess of skin of left wrist       ED Disposition  ED Disposition     ED Disposition   Discharge    Condition   Stable    Comment   --             Juan Gastelum MD  [address] [facility] [address] [facility] [address]  [facility] [address]  [phone]    Call in 1 day           Medication List      Changed    * ibuprofen 800 MG tablet  Commonly known as: ADVIL,MOTRIN  Take 1 tablet by mouth 3 times every day with food  What changed:   · how much to take  · how to take this  · when to take this  · reasons to take this     * ibuprofen 600 MG tablet  Commonly known as: ADVIL,MOTRIN  Take 1 tablet by mouth Every 6 (Six) Hours As Needed for Mild Pain.  What changed: You were already taking a medication with the same name, and this prescription was added. Make sure you understand how and when to take each.         * This list has 2 medication(s) that are the same as other medications prescribed for you. Read the directions carefully, and ask your doctor or other care provider to review them with you.               Where to Get Your Medications      These medications were sent to [facility] [place]  [address] [address]    Hours: 8AM-6PM Mon-Fri Phone: [phone]   · ibuprofen 600 MG tablet          Kaushik Martinez PA-C  10/05/22 1421

## 2022-10-06 NOTE — CONSULTS
Consulting physician: BARI Wallace    Referring physician: TAWNYA Ayala    Date of consultation: 10/06/22     Chief complaint Abcess    Subjective     Patient is a 51 y.o. female who presented with an abscess to left forearm. She reports that it was drained yesterday and that it immediately filled back up. She states that she has been taking bactrim. Reports that she does not have any numbness, tingling or less of sensation to left hand.       Review of Systems  Review of Systems - General ROS: positive for fatigue  Psychological ROS: negative for - behavioral disorder  Ophthalmic ROS: negative for - dry eyes  ENT ROS: negative for - vertigo or vocal changes  Hematological and Lymphatic ROS: negative for - swollen lymph nodes, DVT, PE.   Respiratory ROS: negative for - sputum changes or stridor.  Cardiovascular ROS: negative for - irregular heartbeat or murmur  Gastrointestinal ROS: negative for - blood in stools or change in stools  Genitourinary ROS: negative for - hematuria or incontinence  Musculoskeletal ROS: negative for - gait disturbance  Integumentary ROS: Positive for abscess (left forearm).     History  Past Medical History:   Diagnosis Date   • Anxiety    • Arthritis    • Arthritis of back    • Cholecystolithiasis    • Colon polyp    • Fatty liver    • Fibromyalgia    • Fracture, foot 22   • Headache    • History of COVID-19 10/06/2021   • Hypertension    • Kidney stone    • Low back pain    • Migraine    • Wears glasses (prescription)      Past Surgical History:   Procedure Laterality Date   • ANAL SCOPE N/A 2022    Procedure: ANAL SCOPE with fistulotomy;  Surgeon: Pito Vigil MD;  Location: [facility] OR;  Service: General;  Laterality: N/A;   • BACK SURGERY  21   •  SECTION      ,    • CHOLECYSTECTOMY     • COLONOSCOPY N/A 2021    Procedure: COLONOSCOPY FOR SCREENING CPT CODE: ;  Surgeon: Jaron Car MD;  Location: [facility]  OR;  Service: Gastroenterology;  Laterality: N/A;   • ENDOSCOPY N/A 01/18/2021    Procedure: ESOPHAGOGASTRODUODENOSCOPY WITH BIOPSY CPT CODE: 45397;  Surgeon: Jaron Car MD;  Location: [facility] OR;  Service: Gastroenterology;  Laterality: N/A;   • HYSTERECTOMY  2003 32   • LUMBAR DISCECTOMY FUSION INSTRUMENTATION N/A 11/30/2021    Procedure: posterior lumbar interbody fusion with use of bone morphogenic protein L4-5;  Surgeon: Marquis Boucher MD;  Location: [facility] OR;  Service: Neurosurgery;  Laterality: N/A;   • RECTAL FISSURE INCISION AND DRAINAGE N/A 04/29/2022    Procedure: ANAL FISTULA REPAIR;  Surgeon: Pito Vigil MD;  Location: [facility] OR;  Service: General;  Laterality: N/A;   • UPPER GASTROINTESTINAL ENDOSCOPY  01/18/2021     Family History   Problem Relation Age of Onset   • Heart disease Mother    • Hypertension Mother    • Stroke Mother    • Anxiety disorder Mother    • Thyroid disease Father    • Glaucoma Father    • Multiple sclerosis Sister    • No Known Problems Brother    • No Known Problems Son    • No Known Problems Sister    • No Known Problems Son    • Breast cancer Neg Hx      Social History     Tobacco Use   • Smoking status: Never Smoker   • Smokeless tobacco: Never Used   Vaping Use   • Vaping Use: Never used   Substance Use Topics   • Alcohol use: No   • Drug use: No     (Not in a hospital admission)    Allergies:  Latex and Morphine and related    Objective     Vital Signs  Temp:  [97.8 °F (36.6 °C)] 97.8 °F (36.6 °C)  Heart Rate:  [64] 64  Resp:  [22] 22  BP: ()/(57-90) 92/61    Physical Exam:  General:  This is a WD WN female in no acute distress  Vital signs: Stable, afebrile  HEENT exam:  WNL. Sclerae are anicteric.  EOMI  Neck:  Supple, FROM.  No JVD  Lungs:  Respiratory effort normal.  Heart:  Regular rate and rhythm  Abdomen: Nondistended  Musculoskeletal:  Muscle strength/tone is normal.    Psych:  Alert, oriented x 3.  Mood and affect are  appropriate  Skin:  Warm with good turgor.  There is a large abscess to the volar aspect of left forearm. No drainage noted at this time. Approximately 4 cm  Extremities:  Examination of the extremities revealed no cyanosis, clubbing or edema.    Results Review:       Results from last 7 days   Lab Units 10/05/22  0852   CRP mg/dL 2.77*   LACTATE mmol/L 1.2   WBC 10*3/mm3 7.61   HEMOGLOBIN g/dL 15.0   HEMATOCRIT % 42.6   PLATELETS 10*3/mm3 430         Results from last 7 days   Lab Units 10/05/22  0852   SODIUM mmol/L 132*   POTASSIUM mmol/L 3.4*   CHLORIDE mmol/L 94*   CO2 mmol/L 23.4   BUN mg/dL 18   CREATININE mg/dL 1.40*   CALCIUM mg/dL 9.7   GLUCOSE mg/dL 96   ALBUMIN g/dL 4.37   BILIRUBIN mg/dL 0.3   ALK PHOS U/L 95   AST (SGOT) U/L 14   ALT (SGPT) U/L 15   Estimated Creatinine Clearance: 51.2 mL/min (A) (by C-G formula based on SCr of 1.4 mg/dL (H)).  No results found for: AMMONIA      No results found for: BLOODCX  No results found for: URINECX  No results found for: WOUNDCX  No results found for: STOOLCX          Impression: 51 year old female with volar abscess to left forearm  Patient Active Problem List   Diagnosis Code   • Anxiety F41.9   • Fibromyalgia M79.7   • Hypertension I10   • Facet arthritis of lumbar region M47.816   • Intractable chronic migraine without aura and without status migrainosus G43.719   • Class 2 severe obesity due to excess calories with serious comorbidity and body mass index (BMI) of 38.0 to 38.9 in adult ([facility]) E66.01, Z68.38   • Anxiety and depression F41.9, F32.A   • Lumbar stenosis with neurogenic claudication M48.062   • Gastroesophageal reflux disease K21.9   • Encounter for colorectal cancer screening Z12.11, Z12.12   • Spondylolisthesis of lumbar region M43.16   • Lumbosacral radiculopathy at L5 M54.17   • Anal discharge R19.8   • Anal fistula K60.3       Plan:  Obtain US  I&D      Discussion:  Discussed with patient and Dr. Nirav Dubois,  CECIL  10/06/22  09:11 EDT    Time: Time spent:    Please note that portions of this note were completed with a voice recognition program.

## 2022-10-07 LAB
BACTERIA SPEC AEROBE CULT: ABNORMAL
BACTERIA SPEC AEROBE CULT: ABNORMAL
GRAM STN SPEC: ABNORMAL
GRAM STN SPEC: ABNORMAL

## 2022-10-08 LAB
BACTERIA SPEC AEROBE CULT: ABNORMAL
BACTERIA SPEC AEROBE CULT: ABNORMAL
GRAM STN SPEC: ABNORMAL

## 2022-10-10 LAB
BACTERIA SPEC AEROBE CULT: NORMAL
BACTERIA SPEC AEROBE CULT: NORMAL

## 2022-10-12 ENCOUNTER — OFFICE VISIT (OUTPATIENT)
Dept: SURGERY | Facility: CLINIC | Age: 52
End: 2022-10-12

## 2022-10-12 VITALS
HEART RATE: 78 BPM | DIASTOLIC BLOOD PRESSURE: 78 MMHG | SYSTOLIC BLOOD PRESSURE: 114 MMHG | WEIGHT: 208 LBS | HEIGHT: 62 IN | BODY MASS INDEX: 38.28 KG/M2

## 2022-10-12 DIAGNOSIS — L02.419 ABSCESS OF FOREARM: Primary | ICD-10-CM

## 2022-10-12 PROCEDURE — 99024 POSTOP FOLLOW-UP VISIT: CPT | Performed by: SURGERY

## 2022-10-12 RX ORDER — AMOXICILLIN AND CLAVULANATE POTASSIUM 875; 125 MG/1; MG/1
1 TABLET, FILM COATED ORAL 2 TIMES DAILY
Qty: 14 TABLET | Refills: 0 | Status: SHIPPED | OUTPATIENT
Start: 2022-10-12 | End: 2022-11-23

## 2022-10-12 NOTE — PROGRESS NOTES
Patient is s/p I&D of left volar forearm abscess in the ED 10/5. Patient had been sent out on bactrim but culture has grown out E coli and Strep.   She returns today for wound check. Only small amount of residual drainage    Vessel loop in place. Small amount of residual cellulitis and induration without fluctuance or drainage    Augmentin  Follow up 1 week

## 2022-10-14 ENCOUNTER — DISEASE STATE MANAGEMENT VISIT (OUTPATIENT)
Dept: PHARMACY | Facility: HOSPITAL | Age: 52
End: 2022-10-14

## 2022-10-14 ENCOUNTER — SPECIALTY PHARMACY (OUTPATIENT)
Dept: PHARMACY | Facility: HOSPITAL | Age: 52
End: 2022-10-14

## 2022-10-14 VITALS — HEIGHT: 62 IN | WEIGHT: 209.4 LBS | BODY MASS INDEX: 38.53 KG/M2

## 2022-10-14 RX ORDER — SEMAGLUTIDE 1.7 MG/.75ML
1.7 INJECTION, SOLUTION SUBCUTANEOUS WEEKLY
Qty: 3 ML | Refills: 0 | Status: SHIPPED | OUTPATIENT
Start: 2022-10-14 | End: 2022-11-18

## 2022-10-14 NOTE — PROGRESS NOTES
[facility]ication Management Clinic  Weight Management Program        Mone Amador is a 51 y.o. female referred to the Medication Management Clinic by Dr. Naik for clinical pharmacy and specialty pharmacy management of [facility] for weight management.  Mone Amador has previously tried Mounjaro, Saxenda, Ozempic, and calorie reduction for weight loss.  Saxenda was not successful for the patient and did not suppress her appetite. She stopped about the Saxenda about 1 month prior to starting Mounjaro, reporting difficulty with remembering to take injection daily. She had success with Ozempic in the past.  Patient is currently taking Mounjaro 5mg at this time. The patient denies a personal history or family history of thyroid cancer, denies a personal history of pancreatitis, and denies a diagnosis of gastroparesis. Patient denies any missed doses or any trouble giving the injections.     She reports that the only time she experienced any side effects was some nausea when she over ate. She she stated that she has learned from it and no long eats when she is not hungry. She denies any other side effects. Pt reports that she is trying to do better on her water intake, but is not currently meeting her goal.     Relevant Past Medical History and Co-morbidities  Past Medical History:   Diagnosis Date   • Anxiety    • Arthritis    • Arthritis of back 2019   • Cholecystolithiasis    • Colon polyp 12/20   • Fatty liver    • Fibromyalgia    • Fracture, foot 7/27/22   • Headache    • History of COVID-19 10/06/2021   • Hypertension    • Kidney stone    • Low back pain    • Migraine    • Wears glasses (prescription)      Social History     Socioeconomic History   • Marital status:    Tobacco Use   • Smoking status: Never   • Smokeless tobacco: Never   Vaping Use   • Vaping Use: Never used   Substance and Sexual Activity   • Alcohol use: No   • Drug use: No   • Sexual activity: Defer     Partners: Male     Comment:  Hysterectomy       Allergies  Latex and Morphine and related    Current Medication List    Current Outpatient Medications:   •  amoxicillin-clavulanate (Augmentin) 875-125 MG per tablet, Take 1 tablet by mouth 2 (Two) Times a Day., Disp: 14 tablet, Rfl: 0  •  cyanocobalamin 1000 MCG/ML injection, Inject 1 mL into the appropriate muscle as directed by prescriber Every 14 (Fourteen) Days., Disp: 30 mL, Rfl: 0  •  DULoxetine (Cymbalta) 60 MG capsule, Take 1 capsule by mouth 2 times daily., Disp: 60 capsule, Rfl: 5  •  hydroCHLOROthiazide (HYDRODIURIL) 25 MG tablet, Take 1 tablet by mouth every day., Disp: 30 tablet, Rfl: 5  •  ibuprofen (ADVIL,MOTRIN) 600 MG tablet, Take 1 tablet by mouth Every 6 (Six) Hours As Needed for Mild Pain., Disp: 60 tablet, Rfl: 0  •  ibuprofen (ADVIL,MOTRIN) 800 MG tablet, Take 1 tablet by mouth 3 times every day with food (Patient taking differently: Take 1 tablet by mouth Every 8 (Eight) Hours As Needed for Mild Pain.), Disp: 90 tablet, Rfl: 3  •  LORazepam (Ativan) 0.5 MG tablet, Take 1 tablet by mouth 2 times every day as needed, Disp: 60 tablet, Rfl: 2  •  nadolol (CORGARD) 40 MG tablet, Take 1 tablet by mouth twice daily., Disp: 60 tablet, Rfl: 6  •  Semaglutide-Weight Management (Wegovy) 1.7 MG/0.75ML solution auto-injector, Inject 0.75 mL under the skin into the appropriate area as directed 1 (One) Time Per Week., Disp: 3 mL, Rfl: 0  •  topiramate (Topamax) 25 MG tablet, Take 1 tablet by mouth at bedtime for 1 week, and then increase to 1 tablet  twice daily., Disp: 60 tablet, Rfl: 4  •  traZODone (DESYREL) 100 MG tablet, Take 1 tablet by mouth every day after a meal, Disp: 30 tablet, Rfl: 4  •  vitamin D (ERGOCALCIFEROL) 1.25 MG (86429 UT) capsule capsule, Take 1 capsule by mouth 1 (One) Time Per Week., Disp: 5 capsule, Rfl: 10  •  zolpidem (Ambien) 10 MG tablet, Take 1 tablet by mouth every night at bedtime, Disp: 30 tablet, Rfl: 2  •  fluconazole (Diflucan) 100 MG tablet, Take 1  tablet by mouth Daily., Disp: 3 tablet, Rfl: 3  •  traZODone (DESYREL) 100 MG tablet, Take 1 tablet by mouth every day after a meal., Disp: 30 tablet, Rfl: 4  No current facility-administered medications for this visit.    Drug Interactions  Wegovy + HCTZ may decrease the effectiveness of Wegovy  Wegovy + Nadolol may increase risk of hypoglycemic effects, and may decrease the effectiveness of Wegovy    Relevant Laboratory Values  Lab Results   Component Value Date    CHOL 219 (H) 06/03/2022    CHLPL 220 (H) 02/13/2015    TRIG 133 06/03/2022    HDL 45 06/03/2022     (H) 06/03/2022     Vaccinations:   Patient recommended to keep up with routine vaccinations.     Goals of Therapy  • Clinical Goals or Therapeutic Targets: 10% weight loss goal      Date 8/23/22 9/16/22 10/14/22     Weight (lb) 225.0 lb 214.4lb 209.4lb     BMI kg/ 41.11 39.21 38.29     Waist Circumference (in) 46 in 44.5 42.5         Medication Assessment & Plan    Patient's current BMI is 38.29, which is considered Class II Obestity. Patient has lost 15.6lb over the past month. Congratulated her on her success!!    Informed patient Mounjaro is no longer covered by the savings card. Discussed with patient alternate options, and risks versus benefits for each therapy. Through shared decision-making patient was interested in starting Wegovy. Since patient was previously tolerating on Mounjaro 5mg, educated about the increased risk of adverse effects with higher doses when transitioning to Wegovy/    Will order Wegovy 1.7 mg SC weekly.       Use with nadolol may increase hypoglycemic effects, educated patient to watch for s/sx of hypoglycemia.     Discussed lifestyle modifications, including diet and exercise.     Worked with patient to create SMART Goal(s):   1. Increase water to 64 oz/day. Pt will continue to work towards this goal  2. Walk 3x a week for 30 minutes    Will follow-up with patient in 4 weeks, or sooner if needed.       Medication  Patient Education    Wegovy® (semaglutide)  Medication Expectations   Why am I taking this medication? You are taking Wegovy for weight loss because you have excess weight and also have weight-related medical problems or obesity. It should be used along with a reduced calorie diet and increased physical activity.    What should I expect while on this medication? You should expect to lose at least 5% of your body weight after 3 months of therapy. It can also help keep weight lost off.    How does the medication work? Wegovy is an injection that addresses one of your body's natural responses to weight loss. It works like a naturally produced hormone in the body called GLP-1 that regulates appetite which can lead to eating fewer calories and losing weight. This medication also slows down food from leaving your stomach, making you feel kat for longer.   How long will I be on this medication for? The amount of time you will be on this medication will be determined by your doctor. Do not abruptly stop this medication without talking to your doctor first.    How do I take this medication? Take as directed on your prescription label. Wegovy is injected under the skin (subcutaneously) of your stomach, thigh, or upper arm. This medication should be taken once weekly and can be given with or without food. Rotate injection sites weekly. If changing the day of administration is necessary, allow at least 48 hours between 2 doses.     After removal of the pen cap, the needles will be hidden inside the needle cover.  To begin injection, press the needle cover firmly against the skin.  Once injected, continue to press the device against the skin until the yellow bar has stopped moving.  Then, remove the needle from the skin.    What are some possible side effects? You may notice you don't feel as hungry, especially when you first start using Wegovy. Some common side effects include nausea, vomiting, diarrhea, vomiting,  indigestion, constipation, tiredness, and headache. Redness, itching, and/or swelling can occur where the shot was given. You should also monitor for low blood sugar (hypoglycemia), especially if you are taking Wegovy with other medications that cause low blood sugar. Stop using Wegovy and call your doctor immediately if you have severe pain in your stomach area that will not go away as this could be a sign of pancreatitis (inflammation of your pancreas).     What happens if I miss a dose? If you miss a dose, take it as soon as you remember within 5 days. Resume usual schedule thereafter.  If > 5 days have elapsed, skip the missed dose and resume administration at the next scheduled weekly dose.      Medication Safety   What are things I should warn my doctor immediately about? Do not use Wegovy if you or a family member have ever had medullary thyroid cancer (MTC) or Multiple Endocrine Neoplasia syndrome type 2 (MEN 2). Tell your doctor if you get a lump or swelling in your neck, hoarseness, difficulty swallowing, or feel short of breath (these may be symptoms of thyroid cancer). Talk to your doctor if you have or have had problems with your pancreas, kidneys, or liver. Tell your doctor if you have problems with digesting food or slowed emptying of your stomach (gastroparesis). Talk to your doctor if you are pregnant, planning to become pregnant, or breastfeeding. Also tell your doctor if you notice any signs/symptoms of an allergic reaction (rash, hives, difficulty breathing, etc.).   What are things that I should be cautious of? Be cautious of any side effects from this medication. Talk to your doctor if any new ones develop or aren't getting better.   What are some medications that can interact with this one? Taking Wegovy with other medications that lower your blood sugar such as insulins and glipizide/glimepiride/glyburide may increase the risk of low blood sugar. It should not be taken with other medicines  called GLP-1 receptor agonists, as these work the same way as Wegovy. Because Wegovy slows stomach emptying, it can affect the way some medicines work. Always tell your doctor or pharmacist immediately if you start taking any new medications, including over-the-counter medications, vitamins, and herbal supplements.      Medication Storage/Handling   How should I handle this medication? Keep this medication out of reach of pets/children and keep the pen capped when not in use. Do not share your medicine pens with others.    How does this medication need to be stored? Store your new, unused pens in the original carton in the refrigerator. Protect it from light. Do not freeze. Prior to cap removal, the pen can be kept at room temperature up to 28 days.    How should I dispose of this medication? Used Wegovy pens should be thrown after each use. Place your used pen and needle in an approved sharps container. If you do not have a sharps container, you may use a household container made of heavy-duty plastic with a tight-fitting lid that is leak resistant (e.g., heavy-duty plastic laundry detergent bottle). If your doctor decides to stop this medication, take to your local police station for proper disposal. Some pharmacies also have take-back bins for medication drop-off.       Resources/Support   How can I remind myself to take this medication? You can download reminder apps to help you manage your refills. You may also set an alarm on your phone to remind you.    Is financial support available?  [email or web address] can provide co-pay cards if you have commercial insurance.    Which vaccines are recommended for me? Talk to your doctor about these vaccines: Flu, Coronavirus (COVID-19), Pneumococcal (pneumonia), Tdap, Zoster (shingles)      Shirley Lopez [facility]   10/14/2022  08:43 EDT

## 2022-10-14 NOTE — PROGRESS NOTES
[facility]  Weight Management Program        Mone Amador is a 51 y.o. female referred to the [facility] by Dr. Naik for clinical pharmacy and specialty pharmacy management of [facility] [place] for weight management.  Mone Amador has previously tried Mounjaro, Saxenda, Ozempic, and calorie reduction for weight loss.  Saxenda was not successful for the patient and did not suppress her appetite. She stopped about the Saxenda about 1 month prior to starting Mounjaro, reporting difficulty with remembering to take injection daily. She had success with Ozempic in the past.  Patient is currently taking Mounjaro 5mg at this time. The patient denies a personal history or family history of thyroid cancer, denies a personal history of pancreatitis, and denies a diagnosis of gastroparesis. Patient denies any missed doses or any trouble giving the injections.     She reports that the only time she experienced any side effects was some nausea when she over ate. She she stated that she has learned from it and no long eats when she is not hungry. She denies any other side effects. Pt reports that she is trying to do better on her water intake, but is not currently meeting her goal.     Relevant Past Medical History and Co-morbidities  Past Medical History:   Diagnosis Date   • Anxiety    • Arthritis    • Arthritis of back 2019   • Cholecystolithiasis    • Colon polyp 12/20   • Fatty liver    • Fibromyalgia    • Fracture, foot 7/27/22   • Headache    • History of COVID-19 10/06/2021   • Hypertension    • Kidney stone    • Low back pain    • Migraine    • Wears glasses (prescription)      Social History     Socioeconomic History   • Marital status:    Tobacco Use   • Smoking status: Never   • Smokeless tobacco: Never   Vaping Use   • Vaping Use: Never used   Substance and Sexual Activity   • Alcohol use: No   • Drug use: No   • Sexual activity: Defer     Partners: Male     Comment:  Hysterectomy       Allergies  Latex and Morphine and related    Current Medication List    Current Outpatient Medications:   •  amoxicillin-clavulanate (Augmentin) 875-125 MG per tablet, Take 1 tablet by mouth 2 (Two) Times a Day., Disp: 14 tablet, Rfl: 0  •  cyanocobalamin 1000 MCG/ML injection, Inject 1 mL into the appropriate muscle as directed by prescriber Every 14 (Fourteen) Days., Disp: 30 mL, Rfl: 0  •  DULoxetine (Cymbalta) 60 MG capsule, Take 1 capsule by mouth 2 times daily., Disp: 60 capsule, Rfl: 5  •  fluconazole (Diflucan) 100 MG tablet, Take 1 tablet by mouth Daily., Disp: 3 tablet, Rfl: 3  •  hydroCHLOROthiazide (HYDRODIURIL) 25 MG tablet, Take 1 tablet by mouth every day., Disp: 30 tablet, Rfl: 5  •  ibuprofen (ADVIL,MOTRIN) 600 MG tablet, Take 1 tablet by mouth Every 6 (Six) Hours As Needed for Mild Pain., Disp: 60 tablet, Rfl: 0  •  ibuprofen (ADVIL,MOTRIN) 800 MG tablet, Take 1 tablet by mouth 3 times every day with food (Patient taking differently: Take 1 tablet by mouth Every 8 (Eight) Hours As Needed for Mild Pain.), Disp: 90 tablet, Rfl: 3  •  LORazepam (Ativan) 0.5 MG tablet, Take 1 tablet by mouth 2 times every day as needed, Disp: 60 tablet, Rfl: 2  •  nadolol (CORGARD) 40 MG tablet, Take 1 tablet by mouth twice daily., Disp: 60 tablet, Rfl: 6  •  Semaglutide-Weight Management (Wegovy) 1.7 MG/0.75ML solution auto-injector, Inject 0.75 mL under the skin into the appropriate area as directed 1 (One) Time Per Week., Disp: 3 mL, Rfl: 0  •  topiramate (Topamax) 25 MG tablet, Take 1 tablet by mouth at bedtime for 1 week, and then increase to 1 tablet  twice daily., Disp: 60 tablet, Rfl: 4  •  traZODone (DESYREL) 100 MG tablet, Take 1 tablet by mouth every day after a meal., Disp: 30 tablet, Rfl: 4  •  traZODone (DESYREL) 100 MG tablet, Take 1 tablet by mouth every day after a meal, Disp: 30 tablet, Rfl: 4  •  vitamin D (ERGOCALCIFEROL) 1.25 MG (14708 UT) capsule capsule, Take 1 capsule by mouth  1 (One) Time Per Week., Disp: 5 capsule, Rfl: 10  •  zolpidem (Ambien) 10 MG tablet, Take 1 tablet by mouth every night at bedtime, Disp: 30 tablet, Rfl: 2    Drug Interactions  Wegovy + HCTZ may decrease the effectiveness of Wegovy  Wegovy + Nadolol may increase risk of hypoglycemic effects, and may decrease the effectiveness of Wegovy    Relevant Laboratory Values  Lab Results   Component Value Date    CHOL 219 (H) 06/03/2022    CHLPL 220 (H) 02/13/2015    TRIG 133 06/03/2022    HDL 45 06/03/2022     (H) 06/03/2022     Vaccinations:   Patient recommended to keep up with routine vaccinations.     Goals of Therapy  • Clinical Goals or Therapeutic Targets: 10% weight loss goal      Date 8/23/22 9/16/22 10/14/22     Weight (lb) 225.0 lb 214.4lb 209.4lb     BMI kg/ 41.11 39.21 38.29     Waist Circumference (in) 46 in 44.5 42.5         Medication Assessment & Plan    Patient's current BMI is 38.29, which is considered Class II Obesity. Patient has lost 15.6lb over the past month. Congratulated her on her success!!    Informed patient Mounjaro is no longer covered by the savings card. Discussed with patient alternate options, and risks versus benefits for each therapy. Through shared decision-making patient was interested in starting Wegovy. Since patient was previously tolerating on Mounjaro 5mg, educated about the increased risk of adverse effects with higher doses when transitioning to Wegovy/    Will order Wegovy 1.7 mg SC weekly.       Use with nadolol may increase hypoglycemic effects, educated patient to watch for s/sx of hypoglycemia.     Discussed lifestyle modifications, including diet and exercise.     Worked with patient to create SMART Goal(s):   1. Increase water to 64 oz/day. Pt will continue to work towards this goal  2. Walk 3x a week for 30 minutes    Will follow-up with patient in 4 weeks, or sooner if needed.       Medication Patient Education    Wegovy® (semaglutide)  Medication Expectations    Why am I taking this medication? You are taking Wegovy for weight loss because you have excess weight and also have weight-related medical problems or obesity. It should be used along with a reduced calorie diet and increased physical activity.    What should I expect while on this medication? You should expect to lose at least 5% of your body weight after 3 months of therapy. It can also help keep weight lost off.    How does the medication work? Wegovy is an injection that addresses one of your body's natural responses to weight loss. It works like a naturally produced hormone in the body called GLP-1 that regulates appetite which can lead to eating fewer calories and losing weight. This medication also slows down food from leaving your stomach, making you feel kat for longer.   How long will I be on this medication for? The amount of time you will be on this medication will be determined by your doctor. Do not abruptly stop this medication without talking to your doctor first.    How do I take this medication? Take as directed on your prescription label. Wegovy is injected under the skin (subcutaneously) of your stomach, thigh, or upper arm. This medication should be taken once weekly and can be given with or without food. Rotate injection sites weekly. If changing the day of administration is necessary, allow at least 48 hours between 2 doses.     After removal of the pen cap, the needles will be hidden inside the needle cover.  To begin injection, press the needle cover firmly against the skin.  Once injected, continue to press the device against the skin until the yellow bar has stopped moving.  Then, remove the needle from the skin.    What are some possible side effects? You may notice you don't feel as hungry, especially when you first start using Wegovy. Some common side effects include nausea, vomiting, diarrhea, vomiting, indigestion, constipation, tiredness, and headache. Redness, itching, and/or  swelling can occur where the shot was given. You should also monitor for low blood sugar (hypoglycemia), especially if you are taking Wegovy with other medications that cause low blood sugar. Stop using Wegovy and call your doctor immediately if you have severe pain in your stomach area that will not go away as this could be a sign of pancreatitis (inflammation of your pancreas).     What happens if I miss a dose? If you miss a dose, take it as soon as you remember within 5 days. Resume usual schedule thereafter.  If > 5 days have elapsed, skip the missed dose and resume administration at the next scheduled weekly dose.      Medication Safety   What are things I should warn my doctor immediately about? Do not use Wegovy if you or a family member have ever had medullary thyroid cancer (MTC) or Multiple Endocrine Neoplasia syndrome type 2 (MEN 2). Tell your doctor if you get a lump or swelling in your neck, hoarseness, difficulty swallowing, or feel short of breath (these may be symptoms of thyroid cancer). Talk to your doctor if you have or have had problems with your pancreas, kidneys, or liver. Tell your doctor if you have problems with digesting food or slowed emptying of your stomach (gastroparesis). Talk to your doctor if you are pregnant, planning to become pregnant, or breastfeeding. Also tell your doctor if you notice any signs/symptoms of an allergic reaction (rash, hives, difficulty breathing, etc.).   What are things that I should be cautious of? Be cautious of any side effects from this medication. Talk to your doctor if any new ones develop or aren't getting better.   What are some medications that can interact with this one? Taking Wegovy with other medications that lower your blood sugar such as insulins and glipizide/glimepiride/glyburide may increase the risk of low blood sugar. It should not be taken with other medicines called GLP-1 receptor agonists, as these work the same way as Wegovy. Because  Wegovy slows stomach emptying, it can affect the way some medicines work. Always tell your doctor or pharmacist immediately if you start taking any new medications, including over-the-counter medications, vitamins, and herbal supplements.      Medication Storage/Handling   How should I handle this medication? Keep this medication out of reach of pets/children and keep the pen capped when not in use. Do not share your medicine pens with others.    How does this medication need to be stored? Store your new, unused pens in the original carton in the refrigerator. Protect it from light. Do not freeze. Prior to cap removal, the pen can be kept at room temperature up to 28 days.    How should I dispose of this medication? Used Wegovy pens should be thrown after each use. Place your used pen and needle in an approved sharps container. If you do not have a sharps container, you may use a household container made of heavy-duty plastic with a tight-fitting lid that is leak resistant (e.g., heavy-duty plastic laundry detergent bottle). If your doctor decides to stop this medication, take to your local police station for proper disposal. Some pharmacies also have take-back bins for medication drop-off.       Resources/Support   How can I remind myself to take this medication? You can download reminder apps to help you manage your refills. You may also set an alarm on your phone to remind you.    Is financial support available?  [employer] can provide co-pay cards if you have commercial insurance.    Which vaccines are recommended for me? Talk to your doctor about these vaccines: Flu, Coronavirus (COVID-19), Pneumococcal (pneumonia), Tdap, Zoster (shingles)      Shirley Lopez [facility]   10/14/2022  08:43 EDT

## 2022-10-19 ENCOUNTER — OFFICE VISIT (OUTPATIENT)
Dept: SURGERY | Facility: CLINIC | Age: 52
End: 2022-10-19

## 2022-10-19 VITALS — BODY MASS INDEX: 38.28 KG/M2 | WEIGHT: 208 LBS | HEIGHT: 62 IN

## 2022-10-19 DIAGNOSIS — L02.414 ABSCESS OF SKIN OF LEFT WRIST: Primary | ICD-10-CM

## 2022-10-19 DIAGNOSIS — Z51.89 VISIT FOR WOUND CHECK: ICD-10-CM

## 2022-10-19 PROCEDURE — 99212 OFFICE O/P EST SF 10 MIN: CPT

## 2022-10-19 NOTE — PROGRESS NOTES
Subjective   Mone Amador is a 51 y.o. female who presents today for Follow Up    Chief Complaint:    Chief Complaint   Patient presents with   • ABSCESS OF FOREARM        History of Present Illness:    History of Present Illness Reyna is a 51-year-old female who presents status post incision and drainage of left volar forearm abscess in the emergency department on 10/5.  Patient has completed Augmentin.  States that she does not have any drainage out of wound.    The following portions of the patient's history were reviewed and updated as appropriate: allergies, current medications, past family history, past medical history, past social history, past surgical history and problem list.    Past Medical History:  Past Medical History:   Diagnosis Date   • Anxiety    • Arthritis    • Arthritis of back 2019   • Cholecystolithiasis    • Colon polyp 12/20   • Fatty liver    • Fibromyalgia    • Fracture, foot 7/27/22   • Headache    • History of COVID-19 10/06/2021   • Hypertension    • Kidney stone    • Low back pain    • Migraine    • Wears glasses (prescription)        Social History:  Social History     Socioeconomic History   • Marital status:    Tobacco Use   • Smoking status: Never   • Smokeless tobacco: Never   Vaping Use   • Vaping Use: Never used   Substance and Sexual Activity   • Alcohol use: No   • Drug use: No   • Sexual activity: Defer     Partners: Male     Comment: Hysterectomy       Family History:  Family History   Problem Relation Age of Onset   • Heart disease Mother    • Hypertension Mother    • Stroke Mother    • Anxiety disorder Mother    • Thyroid disease Father    • Glaucoma Father    • Multiple sclerosis Sister    • No Known Problems Brother    • No Known Problems Son    • No Known Problems Sister    • No Known Problems Son    • Breast cancer Neg Hx        Past Surgical History:  Past Surgical History:   Procedure Laterality Date   • ANAL SCOPE N/A 07/21/2022    Procedure: ANAL  SCOPE with fistulotomy;  Surgeon: Pito Vigil MD;  Location:  [facility];  Service: General;  Laterality: N/A;   • BACK SURGERY  21   •  SECTION      2001   • CHOLECYSTECTOMY     • COLONOSCOPY N/A 2021    Procedure: COLONOSCOPY FOR SCREENING CPT CODE: ;  Surgeon: Jaron Car MD;  Location:  [facility] OR;  Service: Gastroenterology;  Laterality: N/A;   • ENDOSCOPY N/A 2021    Procedure: ESOPHAGOGASTRODUODENOSCOPY WITH BIOPSY CPT CODE: 68077;  Surgeon: Jaron Car MD;  Location:  [facility];  Service: Gastroenterology;  Laterality: N/A;   • HYSTERECTOMY  2003   • LUMBAR DISCECTOMY FUSION INSTRUMENTATION N/A 2021    Procedure: posterior lumbar interbody fusion with use of bone morphogenic protein L4-5;  Surgeon: Marquis Boucher MD;  Location:  [facility];  Service: Neurosurgery;  Laterality: N/A;   • RECTAL FISSURE INCISION AND DRAINAGE N/A 2022    Procedure: ANAL FISTULA REPAIR;  Surgeon: Pito Vigil MD;  Location:  [facility];  Service: General;  Laterality: N/A;   • UPPER GASTROINTESTINAL ENDOSCOPY  2021       Problem List:  Patient Active Problem List   Diagnosis   • Anxiety   • Fibromyalgia   • Hypertension   • Facet arthritis of lumbar region   • Intractable chronic migraine without aura and without status migrainosus   • Class 2 severe obesity due to excess calories with serious comorbidity and body mass index (BMI) of 38.0 to 38.9 in adult (HCC)   • Anxiety and depression   • Lumbar stenosis with neurogenic claudication   • Gastroesophageal reflux disease   • Encounter for colorectal cancer screening   • Spondylolisthesis of lumbar region   • Lumbosacral radiculopathy at L5   • Anal discharge   • Anal fistula       Allergy:   Allergies   Allergen Reactions   • Latex Itching and Rash   • Morphine And Related Itching and Nausea Only        Current Medications:   Current Outpatient Medications   Medication  Sig Dispense Refill   • amoxicillin-clavulanate (Augmentin) 875-125 MG per tablet Take 1 tablet by mouth 2 (Two) Times a Day. 14 tablet 0   • cyanocobalamin 1000 MCG/ML injection Inject 1 mL into the appropriate muscle as directed by prescriber Every 14 (Fourteen) Days. 30 mL 0   • DULoxetine (Cymbalta) 60 MG capsule Take 1 capsule by mouth 2 times daily. 60 capsule 5   • hydroCHLOROthiazide (HYDRODIURIL) 25 MG tablet Take 1 tablet by mouth every day. 30 tablet 5   • ibuprofen (ADVIL,MOTRIN) 600 MG tablet Take 1 tablet by mouth Every 6 (Six) Hours As Needed for Mild Pain. 60 tablet 0   • ibuprofen (ADVIL,MOTRIN) 800 MG tablet Take 1 tablet by mouth 3 times every day with food (Patient taking differently: Take 1 tablet by mouth Every 8 (Eight) Hours As Needed for Mild Pain.) 90 tablet 3   • LORazepam (Ativan) 0.5 MG tablet Take 1 tablet by mouth 2 times every day as needed 60 tablet 2   • nadolol (CORGARD) 40 MG tablet Take 1 tablet by mouth twice daily. 60 tablet 6   • Semaglutide-Weight Management (Wegovy) 1.7 MG/0.75ML solution auto-injector Inject 0.75 mL under the skin into the appropriate area as directed 1 (One) Time Per Week. 3 mL 0   • topiramate (Topamax) 25 MG tablet Take 1 tablet by mouth at bedtime for 1 week, and then increase to 1 tablet   twice daily. 60 tablet 4   • traZODone (DESYREL) 100 MG tablet Take 1 tablet by mouth every day after a meal. 30 tablet 4   • traZODone (DESYREL) 100 MG tablet Take 1 tablet by mouth every day after a meal 30 tablet 4   • vitamin D (ERGOCALCIFEROL) 1.25 MG (39845 UT) capsule capsule Take 1 capsule by mouth 1 (One) Time Per Week. 5 capsule 10   • zolpidem (Ambien) 10 MG tablet Take 1 tablet by mouth every night at bedtime 30 tablet 2     No current facility-administered medications for this visit.       Review of Systems:    Review of Systems   Constitutional: Negative for activity change.   HENT: Negative for congestion.    Eyes: Negative for blurred vision.  "  Respiratory: Negative for shortness of breath.    Cardiovascular: Negative for chest pain.   Gastrointestinal: Negative for abdominal pain.   Endocrine: Negative for cold intolerance.   Genitourinary: Negative for flank pain.   Musculoskeletal: Negative for arthralgias.   Skin: Positive for wound.   Allergic/Immunologic: Negative for environmental allergies.   Neurological: Negative for confusion.   Hematological: Negative for adenopathy.   Psychiatric/Behavioral: Negative for agitation.         Physical Exam:   Physical Exam  Constitutional:       Appearance: Normal appearance.   HENT:      Head: Normocephalic and atraumatic.      Right Ear: External ear normal.      Left Ear: External ear normal.   Eyes:      Conjunctiva/sclera: Conjunctivae normal.   Pulmonary:      Effort: Pulmonary effort is normal.   Abdominal:      General: Abdomen is flat.   Skin:     General: Skin is warm and dry.      Capillary Refill: Capillary refill takes less than 2 seconds.      Comments: Vessel loop remains in place. Small amount of residual cellulitis and induration without fluctuance or drainage      Neurological:      General: No focal deficit present.      Mental Status: She is alert and oriented to person, place, and time.   Psychiatric:         Mood and Affect: Mood normal.         Behavior: Behavior normal.         Vitals:  Height 157.5 cm (62.01\"), weight 94.3 kg (208 lb).   Body mass index is 38.03 kg/m².         Assessment & Plan   Diagnoses and all orders for this visit:    1. Abscess of skin of left wrist (Primary)    2. Visit for wound check      Reyna is a 51-year-old female who presents with a previous abscess to left forearm.  Vesseloops remain in place.  Vessel loop was removed in office today.  Patient will follow-up on an as-needed basis.      Visit Diagnoses:    ICD-10-CM ICD-9-CM   1. Abscess of skin of left wrist  L02.414 682.4   2. Visit for wound check  Z51.89 V58.89         MEDS ORDERED DURING VISIT:  No " orders of the defined types were placed in this encounter.      No follow-ups on file.             This document has been electronically signed by CECIL Garcia  October 19, 2022 12:00 EDT    Please note that portions of this note were completed with a voice recognition program.

## 2022-10-25 DIAGNOSIS — R11.0 NAUSEA: Primary | ICD-10-CM

## 2022-10-25 RX ORDER — ONDANSETRON HYDROCHLORIDE 8 MG/1
8 TABLET, FILM COATED ORAL EVERY 6 HOURS PRN
Status: DISCONTINUED | OUTPATIENT
Start: 2022-10-25 | End: 2022-11-28

## 2022-10-25 RX ORDER — ONDANSETRON 8 MG/1
8 TABLET, ORALLY DISINTEGRATING ORAL EVERY 6 HOURS PRN
Status: DISCONTINUED | OUTPATIENT
Start: 2022-10-25 | End: 2022-11-28

## 2022-11-11 ENCOUNTER — APPOINTMENT (OUTPATIENT)
Dept: PHARMACY | Facility: HOSPITAL | Age: 52
End: 2022-11-11

## 2022-11-18 ENCOUNTER — DISEASE STATE MANAGEMENT VISIT (OUTPATIENT)
Dept: PHARMACY | Facility: HOSPITAL | Age: 52
End: 2022-11-18

## 2022-11-18 VITALS
WEIGHT: 202 LBS | DIASTOLIC BLOOD PRESSURE: 79 MMHG | SYSTOLIC BLOOD PRESSURE: 109 MMHG | BODY MASS INDEX: 36.94 KG/M2 | HEART RATE: 82 BPM

## 2022-11-18 DIAGNOSIS — E66.01 CLASS 2 SEVERE OBESITY DUE TO EXCESS CALORIES WITH SERIOUS COMORBIDITY AND BODY MASS INDEX (BMI) OF 38.0 TO 38.9 IN ADULT: Primary | ICD-10-CM

## 2022-11-18 RX ORDER — SEMAGLUTIDE 2.4 MG/.75ML
2.4 INJECTION, SOLUTION SUBCUTANEOUS WEEKLY
Qty: 3 ML | Refills: 0 | Status: SHIPPED | OUTPATIENT
Start: 2022-11-18 | End: 2022-12-16

## 2022-11-18 NOTE — PROGRESS NOTES
[facility]  Weight Management Program      Mone Amador is a 51 y.o. female referred to the [facility] by Dr. Naik for clinical pharmacy and specialty pharmacy management of Wegovy for weight management. Mone Amador has previously tried Mounjaro, Saxenda, Ozempic, and calorie reduction for weight loss.  Saxenda was not successful for the patient and did not suppress her appetite. She stopped the Saxenda about 1 month prior to starting Mounjaro, reporting difficulty with remembering to take injection daily. She had success with Ozempic in the past. Patient is currently taking Wegovy 1.7 mg at this time. The patient denies a personal history or family history of thyroid cancer, denies a personal history of pancreatitis, and denies a diagnosis of gastroparesis. Patient denies any missed doses or any trouble giving the injections. She reports occasional nausea since switching to Wegovy. She has zofran at home to use PRN. Pt reports that she is meeting her water intake and is trying to walk more.     Relevant Past Medical History and Co-morbidities  Past Medical History:   Diagnosis Date   • Anxiety    • Arthritis    • Arthritis of back 2019   • Cholecystolithiasis    • Colon polyp 12/20   • Fatty liver    • Fibromyalgia    • Fracture, foot 7/27/22   • Headache    • History of COVID-19 10/06/2021   • Hypertension    • Kidney stone    • Low back pain    • Migraine    • Wears glasses (prescription)      Social History     Socioeconomic History   • Marital status:    Tobacco Use   • Smoking status: Never   • Smokeless tobacco: Never   Vaping Use   • Vaping Use: Never used   Substance and Sexual Activity   • Alcohol use: No   • Drug use: No   • Sexual activity: Defer     Partners: Male     Comment: Hysterectomy       Allergies  Latex and Morphine and related    Current Medication List    Current Outpatient Medications:   •  amoxicillin-clavulanate (Augmentin)  875-125 MG per tablet, Take 1 tablet by mouth 2 (Two) Times a Day., Disp: 14 tablet, Rfl: 0  •  cyanocobalamin 1000 MCG/ML injection, Inject 1 mL into the appropriate muscle as directed by prescriber Every 14 (Fourteen) Days., Disp: 30 mL, Rfl: 0  •  DULoxetine (Cymbalta) 60 MG capsule, Take 1 capsule by mouth 2 times daily., Disp: 60 capsule, Rfl: 5  •  hydroCHLOROthiazide (HYDRODIURIL) 25 MG tablet, Take 1 tablet by mouth every day., Disp: 30 tablet, Rfl: 5  •  ibuprofen (ADVIL,MOTRIN) 600 MG tablet, Take 1 tablet by mouth Every 6 (Six) Hours As Needed for Mild Pain., Disp: 60 tablet, Rfl: 0  •  ibuprofen (ADVIL,MOTRIN) 800 MG tablet, Take 1 tablet by mouth 3 times every day with food (Patient taking differently: Take 1 tablet by mouth Every 8 (Eight) Hours As Needed for Mild Pain.), Disp: 90 tablet, Rfl: 3  •  LORazepam (Ativan) 0.5 MG tablet, Take 1 tablet by mouth 2 times every day as needed., Disp: 60 tablet, Rfl: 2  •  nadolol (CORGARD) 40 MG tablet, Take 1 tablet by mouth twice daily., Disp: 60 tablet, Rfl: 6  •  ondansetron (ZOFRAN) 8 MG tablet, Take 1 tablet by mouth Every 6 (Six) Hours As Needed., Disp: 20 tablet, Rfl: 0  •  Semaglutide-Weight Management (Wegovy) 1.7 MG/0.75ML solution auto-injector, Inject 0.75 mL under the skin into the appropriate area as directed 1 (One) Time Per Week., Disp: 3 mL, Rfl: 0  •  topiramate (Topamax) 25 MG tablet, Take 1 tablet by mouth at bedtime for 1 week, and then increase to 1 tablet  twice daily., Disp: 60 tablet, Rfl: 4  •  traZODone (DESYREL) 100 MG tablet, Take 1 tablet by mouth every day after a meal., Disp: 30 tablet, Rfl: 4  •  traZODone (DESYREL) 100 MG tablet, Take 1 tablet by mouth every day after a meal, Disp: 30 tablet, Rfl: 4  •  vitamin D (ERGOCALCIFEROL) 1.25 MG (05065 UT) capsule capsule, Take 1 capsule by mouth 1 (One) Time Per Week., Disp: 5 capsule, Rfl: 10  •  zolpidem (Ambien) 10 MG tablet, TAKE ONE TABLET BY MOUTH EVERY NIGHT AT BEDTIME, Disp:  30 tablet, Rfl: 2    Current Facility-Administered Medications:   •  ondansetron (ZOFRAN) tablet 8 mg, 8 mg, Oral, Q6H PRN, Rafael Naik MD  •  ondansetron ODT (ZOFRAN-ODT) disintegrating tablet 8 mg, 8 mg, Oral, Q6H PRN, Rafael Naik MD    Drug Interactions  Wegovy + HCTZ may decrease the effectiveness of Wegovy  Wegovy + Nadolol may increase risk of hypoglycemic effects, and may decrease the effectiveness of Wegovy    Relevant Laboratory Values  Lab Results   Component Value Date    CHOL 219 (H) 06/03/2022    CHLPL 220 (H) 02/13/2015    TRIG 133 06/03/2022    HDL 45 06/03/2022     (H) 06/03/2022     Vaccinations:   Patient recommended to keep up with routine vaccinations.     Goals of Therapy  • Clinical Goals or Therapeutic Targets: 10% weight loss goal      Date 8/23/22 9/16/22 10/14/22 11/18/22    Weight (lb) 225.0 lb 214.4lb 209.4lb 202 lbs     BMI kg/ 41.11 39.21 38.29 36.94    Waist Circumference (in) 46 in 44.5 42.5 40 in        Medication Assessment & Plan    Patient's current BMI is 36.94, which is considered Class II Obesity. Patient has lost 23 lbs and 6 inches overall. Congratulated her on her success!!    Will order Wegovy 2.4 mg SC weekly.       Use with nadolol may increase hypoglycemic effects, educated patient to watch for s/sx of hypoglycemia. Patient denies any issues with hypoglycemia.    Discussed lifestyle modifications, including diet and exercise. Ordered lab work to be drawn before next visit.     Worked with patient to create SMART Goal(s):   1. Increase water to 64 oz/day.  2. Walk 3x a week for 30 minutes    Will follow-up with patient in 4 weeks, or sooner if needed.     Krystle Oglesby, PharmD  10/14/2022  08:43 EDT

## 2022-11-20 DIAGNOSIS — R11.0 NAUSEA: Primary | ICD-10-CM

## 2022-11-21 RX ORDER — ONDANSETRON HYDROCHLORIDE 8 MG/1
8 TABLET, FILM COATED ORAL EVERY 6 HOURS PRN
Qty: 20 TABLET | Refills: 0 | Status: SHIPPED | OUTPATIENT
Start: 2022-11-21 | End: 2022-11-22

## 2022-11-22 DIAGNOSIS — R11.0 NAUSEA: ICD-10-CM

## 2022-11-22 RX ORDER — ONDANSETRON HYDROCHLORIDE 8 MG/1
8 TABLET, FILM COATED ORAL EVERY 6 HOURS PRN
Qty: 20 TABLET | Refills: 0 | Status: SHIPPED | OUTPATIENT
Start: 2022-11-22 | End: 2022-11-28

## 2022-11-23 ENCOUNTER — TELEPHONE (OUTPATIENT)
Dept: SURGERY | Facility: CLINIC | Age: 52
End: 2022-11-23

## 2022-11-23 ENCOUNTER — OFFICE VISIT (OUTPATIENT)
Dept: SURGERY | Facility: CLINIC | Age: 52
End: 2022-11-23

## 2022-11-23 VITALS
HEIGHT: 62 IN | DIASTOLIC BLOOD PRESSURE: 69 MMHG | WEIGHT: 202.4 LBS | HEART RATE: 80 BPM | SYSTOLIC BLOOD PRESSURE: 105 MMHG | BODY MASS INDEX: 37.25 KG/M2

## 2022-11-23 DIAGNOSIS — L02.414 ABSCESS OF SKIN OF LEFT WRIST: Primary | ICD-10-CM

## 2022-11-23 PROCEDURE — 99214 OFFICE O/P EST MOD 30 MIN: CPT | Performed by: SURGERY

## 2022-11-23 RX ORDER — AMOXICILLIN AND CLAVULANATE POTASSIUM 875; 125 MG/1; MG/1
1 TABLET, FILM COATED ORAL 2 TIMES DAILY
Qty: 20 TABLET | Refills: 0 | Status: SHIPPED | OUTPATIENT
Start: 2022-11-23 | End: 2022-12-06

## 2022-11-23 RX ORDER — SODIUM CHLORIDE 9 MG/ML
40 INJECTION, SOLUTION INTRAVENOUS AS NEEDED
Status: CANCELLED | OUTPATIENT
Start: 2022-11-23

## 2022-11-23 RX ORDER — SODIUM CHLORIDE 0.9 % (FLUSH) 0.9 %
3 SYRINGE (ML) INJECTION EVERY 12 HOURS SCHEDULED
Status: CANCELLED | OUTPATIENT
Start: 2022-11-23

## 2022-11-23 RX ORDER — SODIUM CHLORIDE 0.9 % (FLUSH) 0.9 %
10 SYRINGE (ML) INJECTION AS NEEDED
Status: CANCELLED | OUTPATIENT
Start: 2022-11-23

## 2022-11-23 NOTE — TELEPHONE ENCOUNTER
ATTEMPTED TO REACH PATIENT TO INFORM HER OF THE FOLLOWING SURGERY SCHEDULE, LEFT VOICE MESSAGE:    PAT (PRE OP TESTING) MON 11/28 @ 10:45 AM,  OUTPATIENT SURGERY CENTER AT [facility].    NOTHING TO EAT OR DRINK AFTER MIDNIGHT MON 11/28/22.    SX: TUES 11/29/22 ARRIVE AT 6:30 AM, OUTPATIENT SURGERY CENTER AT [facility].   PLEASE HAVE ADULT  WITH YOU TO DRIVE YOU HOME AFTER PROCEDURE.    IF YOU HAVE ANY QUESTIONS PLEASE FEEL FREE TO CONTACT CLINIC [phone].

## 2022-11-23 NOTE — PROGRESS NOTES
Patient Name:  Mone Amador  YOB: 1970  [id number]    Surgery Progress Note    Date of visit: 11/23/2022    Subjective   Subjective:   Patient is following up after I&D of of left volar forearm abscess in the emergency department 10/5.  She had been discharged on antibiotics but her culture then later grew out E. coli and strep species.  When she returned for her wound check on 10/12 she had already been changed over to Augmentin.  Her vessel loop had been removed at that time.  The size of the area had decreased significantly and nearly resolved until recently when it progressively increased in size and then finally drained purulence on Monday.  She denies fevers but does report that there is a firm nodule at the site.    Past Medical History:   Diagnosis Date   • Anxiety     • Arthritis     • Arthritis of back 2019   • Cholecystolithiasis     • Colon polyp 12/20   • Fatty liver     • Fibromyalgia     • Fracture, foot 7/27/22   • Headache     • History of COVID-19 10/06/2021   • Hypertension     • Kidney stone     • Low back pain     • Migraine     • Wears glasses (prescription)              Social History:  Social History   Social History            Socioeconomic History   • Marital status:    Tobacco Use   • Smoking status: Never   • Smokeless tobacco: Never   Vaping Use   • Vaping Use: Never used   Substance and Sexual Activity   • Alcohol use: No   • Drug use: No   • Sexual activity: Defer       Partners: Male       Comment: Hysterectomy            Family History:        Family History   Problem Relation Age of Onset   • Heart disease Mother     • Hypertension Mother     • Stroke Mother     • Anxiety disorder Mother     • Thyroid disease Father     • Glaucoma Father     • Multiple sclerosis Sister     • No Known Problems Brother     • No Known Problems Son     • No Known Problems Sister     • No Known Problems Son     • Breast cancer Neg Hx           Past Surgical  History:  Surgical History         Past Surgical History:   Procedure Laterality Date   • ANAL SCOPE N/A 2022     Procedure: ANAL SCOPE with fistulotomy;  Surgeon: Pito Vigil MD;  Location: [facility] OR;  Service: General;  Laterality: N/A;   • BACK SURGERY   21   •  SECTION         2001   • CHOLECYSTECTOMY      • COLONOSCOPY N/A 2021     Procedure: COLONOSCOPY FOR SCREENING CPT CODE: ;  Surgeon: Jaron Car MD;  Location: [facility] OR;  Service: Gastroenterology;  Laterality: N/A;   • ENDOSCOPY N/A 2021     Procedure: ESOPHAGOGASTRODUODENOSCOPY WITH BIOPSY CPT CODE: 96008;  Surgeon: Jaron Car MD;  Location: [facility] OR;  Service: Gastroenterology;  Laterality: N/A;   • HYSTERECTOMY   2003   • LUMBAR DISCECTOMY FUSION INSTRUMENTATION N/A 2021     Procedure: posterior lumbar interbody fusion with use of bone morphogenic protein L4-5;  Surgeon: Marquis Boucher MD;  Location: [facility] OR;  Service: Neurosurgery;  Laterality: N/A;   • RECTAL FISSURE INCISION AND DRAINAGE N/A 2022     Procedure: ANAL FISTULA REPAIR;  Surgeon: Pito Vigil MD;  Location: [facility] OR;  Service: General;  Laterality: N/A;   • UPPER GASTROINTESTINAL ENDOSCOPY   2021            Problem List:      Patient Active Problem List   Diagnosis   • Anxiety   • Fibromyalgia   • Hypertension   • Facet arthritis of lumbar region   • Intractable chronic migraine without aura and without status migrainosus   • Class 2 severe obesity due to excess calories with serious comorbidity and body mass index (BMI) of 38.0 to 38.9 in adult (HCC)   • Anxiety and depression   • Lumbar stenosis with neurogenic claudication   • Gastroesophageal reflux disease   • Encounter for colorectal cancer screening   • Spondylolisthesis of lumbar region   • Lumbosacral radiculopathy at L5   • Anal discharge   • Anal fistula         Allergy:        Allergies    Allergen Reactions   • Latex Itching and Rash   • Morphine And Related Itching and Nausea Only         Current Medications:   Current Medications          Current Outpatient Medications   Medication Sig Dispense Refill   • amoxicillin-clavulanate (Augmentin) 875-125 MG per tablet Take 1 tablet by mouth 2 (Two) Times a Day. 14 tablet 0   • cyanocobalamin 1000 MCG/ML injection Inject 1 mL into the appropriate muscle as directed by prescriber Every 14 (Fourteen) Days. 30 mL 0   • DULoxetine (Cymbalta) 60 MG capsule Take 1 capsule by mouth 2 times daily. 60 capsule 5   • hydroCHLOROthiazide (HYDRODIURIL) 25 MG tablet Take 1 tablet by mouth every day. 30 tablet 5   • ibuprofen (ADVIL,MOTRIN) 600 MG tablet Take 1 tablet by mouth Every 6 (Six) Hours As Needed for Mild Pain. 60 tablet 0   • ibuprofen (ADVIL,MOTRIN) 800 MG tablet Take 1 tablet by mouth 3 times every day with food (Patient taking differently: Take 1 tablet by mouth Every 8 (Eight) Hours As Needed for Mild Pain.) 90 tablet 3   • LORazepam (Ativan) 0.5 MG tablet Take 1 tablet by mouth 2 times every day as needed 60 tablet 2   • nadolol (CORGARD) 40 MG tablet Take 1 tablet by mouth twice daily. 60 tablet 6   • Semaglutide-Weight Management (Wegovy) 1.7 MG/0.75ML solution auto-injector Inject 0.75 mL under the skin into the appropriate area as directed 1 (One) Time Per Week. 3 mL 0   • topiramate (Topamax) 25 MG tablet Take 1 tablet by mouth at bedtime for 1 week, and then increase to 1 tablet   twice daily. 60 tablet 4   • traZODone (DESYREL) 100 MG tablet Take 1 tablet by mouth every day after a meal. 30 tablet 4   • traZODone (DESYREL) 100 MG tablet Take 1 tablet by mouth every day after a meal 30 tablet 4   • vitamin D (ERGOCALCIFEROL) 1.25 MG (79041 UT) capsule capsule Take 1 capsule by mouth 1 (One) Time Per Week. 5 capsule 10   • zolpidem (Ambien) 10 MG tablet Take 1 tablet by mouth every night at bedtime 30 tablet 2      No current facility-administered  "medications for this visit.            Review of Systems:    14 pt ROS negative     Objective     Objective:     /69   Pulse 80   Ht 157.5 cm (62\")   Wt 91.8 kg (202 lb 6.4 oz)   BMI 37.02 kg/m²     Constitution: No acute distress  CV:  Rhythm  regular and rate regular   L:  Symmetric chest expansion. No respiratory distress  Ext:  Small mount of blanching erythema at the site of the patient's prior I&D.  There is a firm and mobile nodule.  No drainage at this time           Assessment & Plan     Assessment/ Plan:    52-year-old obese female with left volar forearm nodule, possible residual abscess    I will prescribe the patient Augmentin based on prior cultures.  I recommended excision of the area in the operating room.        Juan Gastelum MD  [facility]  11/23/2022  09:57 EST    "

## 2022-11-23 NOTE — ADDENDUM NOTE
Addended by: RACHELL RIVERA on: 11/23/2022 10:05 AM     Modules accepted: Maria De Jesus, Destini

## 2022-11-24 DIAGNOSIS — R11.0 NAUSEA: ICD-10-CM

## 2022-11-28 ENCOUNTER — PRE-ADMISSION TESTING (OUTPATIENT)
Dept: PREADMISSION TESTING | Facility: HOSPITAL | Age: 52
End: 2022-11-28

## 2022-11-28 LAB
ANION GAP SERPL CALCULATED.3IONS-SCNC: 13 MMOL/L (ref 5–15)
BUN SERPL-MCNC: 13 MG/DL (ref 6–20)
BUN/CREAT SERPL: 12.1 (ref 7–25)
CALCIUM SPEC-SCNC: 10 MG/DL (ref 8.6–10.5)
CHLORIDE SERPL-SCNC: 94 MMOL/L (ref 98–107)
CO2 SERPL-SCNC: 32 MMOL/L (ref 22–29)
CREAT SERPL-MCNC: 1.07 MG/DL (ref 0.57–1)
DEPRECATED RDW RBC AUTO: 42.7 FL (ref 37–54)
EGFRCR SERPLBLD CKD-EPI 2021: 62.6 ML/MIN/1.73
ERYTHROCYTE [DISTWIDTH] IN BLOOD BY AUTOMATED COUNT: 13.8 % (ref 12.3–15.4)
GLUCOSE SERPL-MCNC: 84 MG/DL (ref 65–99)
HCT VFR BLD AUTO: 45.1 % (ref 34–46.6)
HGB BLD-MCNC: 14.9 G/DL (ref 12–15.9)
MCH RBC QN AUTO: 28.1 PG (ref 26.6–33)
MCHC RBC AUTO-ENTMCNC: 33 G/DL (ref 31.5–35.7)
MCV RBC AUTO: 84.9 FL (ref 79–97)
PLATELET # BLD AUTO: 372 10*3/MM3 (ref 140–450)
PMV BLD AUTO: 10.1 FL (ref 6–12)
POTASSIUM SERPL-SCNC: 2.8 MMOL/L (ref 3.5–5.2)
RBC # BLD AUTO: 5.31 10*6/MM3 (ref 3.77–5.28)
SODIUM SERPL-SCNC: 139 MMOL/L (ref 136–145)
WBC NRBC COR # BLD: 6.73 10*3/MM3 (ref 3.4–10.8)

## 2022-11-28 PROCEDURE — 36415 COLL VENOUS BLD VENIPUNCTURE: CPT

## 2022-11-28 PROCEDURE — 85027 COMPLETE CBC AUTOMATED: CPT

## 2022-11-28 PROCEDURE — 80048 BASIC METABOLIC PNL TOTAL CA: CPT

## 2022-11-28 RX ORDER — ONDANSETRON HYDROCHLORIDE 8 MG/1
8 TABLET, FILM COATED ORAL EVERY 6 HOURS PRN
Qty: 20 TABLET | Refills: 0 | Status: SHIPPED | OUTPATIENT
Start: 2022-11-28 | End: 2023-03-08 | Stop reason: SDUPTHER

## 2022-11-29 ENCOUNTER — HOSPITAL ENCOUNTER (OUTPATIENT)
Facility: HOSPITAL | Age: 52
Setting detail: HOSPITAL OUTPATIENT SURGERY
Discharge: HOME | End: 2022-11-29
Attending: SURGERY | Admitting: SURGERY

## 2022-11-29 ENCOUNTER — ANESTHESIA (OUTPATIENT)
Dept: PERIOP | Facility: HOSPITAL | Age: 52
End: 2022-11-29

## 2022-11-29 ENCOUNTER — ANESTHESIA EVENT (OUTPATIENT)
Dept: PERIOP | Facility: HOSPITAL | Age: 52
End: 2022-11-29

## 2022-11-29 VITALS
BODY MASS INDEX: 37.5 KG/M2 | HEART RATE: 68 BPM | HEIGHT: 62 IN | OXYGEN SATURATION: 98 % | RESPIRATION RATE: 18 BRPM | DIASTOLIC BLOOD PRESSURE: 58 MMHG | TEMPERATURE: 97.5 F | SYSTOLIC BLOOD PRESSURE: 91 MMHG | WEIGHT: 203.8 LBS

## 2022-11-29 DIAGNOSIS — L02.414 ABSCESS OF SKIN OF LEFT WRIST: Primary | ICD-10-CM

## 2022-11-29 PROCEDURE — 25010000002 MIDAZOLAM PER 1 MG: Performed by: NURSE ANESTHETIST, CERTIFIED REGISTERED

## 2022-11-29 PROCEDURE — 25010000002 ONDANSETRON PER 1 MG: Performed by: NURSE ANESTHETIST, CERTIFIED REGISTERED

## 2022-11-29 PROCEDURE — 11402 EXC TR-EXT B9+MARG 1.1-2 CM: CPT | Performed by: SURGERY

## 2022-11-29 PROCEDURE — 25010000002 CEFAZOLIN PER 500 MG: Performed by: SURGERY

## 2022-11-29 PROCEDURE — 25010000002 FENTANYL CITRATE (PF) 50 MCG/ML SOLUTION: Performed by: NURSE ANESTHETIST, CERTIFIED REGISTERED

## 2022-11-29 PROCEDURE — 0 MEPERIDINE PER 100 MG: Performed by: NURSE ANESTHETIST, CERTIFIED REGISTERED

## 2022-11-29 PROCEDURE — 88304 TISSUE EXAM BY PATHOLOGIST: CPT

## 2022-11-29 PROCEDURE — 25010000002 PROPOFOL 10 MG/ML EMULSION: Performed by: NURSE ANESTHETIST, CERTIFIED REGISTERED

## 2022-11-29 RX ORDER — SODIUM CHLORIDE 0.9 % (FLUSH) 0.9 %
3 SYRINGE (ML) INJECTION EVERY 12 HOURS SCHEDULED
Status: DISCONTINUED | OUTPATIENT
Start: 2022-11-29 | End: 2022-11-29 | Stop reason: HOSPADM

## 2022-11-29 RX ORDER — SODIUM CHLORIDE 0.9 % (FLUSH) 0.9 %
10 SYRINGE (ML) INJECTION EVERY 12 HOURS SCHEDULED
Status: DISCONTINUED | OUTPATIENT
Start: 2022-11-29 | End: 2022-11-29 | Stop reason: HOSPADM

## 2022-11-29 RX ORDER — MAGNESIUM HYDROXIDE 1200 MG/15ML
LIQUID ORAL AS NEEDED
Status: DISCONTINUED | OUTPATIENT
Start: 2022-11-29 | End: 2022-11-29 | Stop reason: HOSPADM

## 2022-11-29 RX ORDER — SODIUM CHLORIDE 0.9 % (FLUSH) 0.9 %
10 SYRINGE (ML) INJECTION AS NEEDED
Status: DISCONTINUED | OUTPATIENT
Start: 2022-11-29 | End: 2022-11-29 | Stop reason: HOSPADM

## 2022-11-29 RX ORDER — TRAMADOL HYDROCHLORIDE 50 MG/1
50 TABLET ORAL EVERY 6 HOURS PRN
Qty: 4 TABLET | Refills: 0 | Status: SHIPPED | OUTPATIENT
Start: 2022-11-29 | End: 2022-12-16

## 2022-11-29 RX ORDER — SODIUM CHLORIDE, SODIUM LACTATE, POTASSIUM CHLORIDE, CALCIUM CHLORIDE 600; 310; 30; 20 MG/100ML; MG/100ML; MG/100ML; MG/100ML
100 INJECTION, SOLUTION INTRAVENOUS ONCE AS NEEDED
Status: DISCONTINUED | OUTPATIENT
Start: 2022-11-29 | End: 2022-11-29 | Stop reason: HOSPADM

## 2022-11-29 RX ORDER — SODIUM CHLORIDE 9 MG/ML
40 INJECTION, SOLUTION INTRAVENOUS AS NEEDED
Status: DISCONTINUED | OUTPATIENT
Start: 2022-11-29 | End: 2022-11-29 | Stop reason: HOSPADM

## 2022-11-29 RX ORDER — OXYCODONE HYDROCHLORIDE AND ACETAMINOPHEN 5; 325 MG/1; MG/1
1 TABLET ORAL ONCE AS NEEDED
Status: DISCONTINUED | OUTPATIENT
Start: 2022-11-29 | End: 2022-11-29 | Stop reason: HOSPADM

## 2022-11-29 RX ORDER — MIDAZOLAM HYDROCHLORIDE 1 MG/ML
INJECTION INTRAMUSCULAR; INTRAVENOUS AS NEEDED
Status: DISCONTINUED | OUTPATIENT
Start: 2022-11-29 | End: 2022-11-29 | Stop reason: SURG

## 2022-11-29 RX ORDER — MIDAZOLAM HYDROCHLORIDE 1 MG/ML
1 INJECTION INTRAMUSCULAR; INTRAVENOUS
Status: DISCONTINUED | OUTPATIENT
Start: 2022-11-29 | End: 2022-11-29 | Stop reason: HOSPADM

## 2022-11-29 RX ORDER — BUPIVACAINE HYDROCHLORIDE AND EPINEPHRINE 2.5; 5 MG/ML; UG/ML
INJECTION, SOLUTION EPIDURAL; INFILTRATION; INTRACAUDAL; PERINEURAL AS NEEDED
Status: DISCONTINUED | OUTPATIENT
Start: 2022-11-29 | End: 2022-11-29 | Stop reason: HOSPADM

## 2022-11-29 RX ORDER — FAMOTIDINE 10 MG/ML
INJECTION, SOLUTION INTRAVENOUS AS NEEDED
Status: DISCONTINUED | OUTPATIENT
Start: 2022-11-29 | End: 2022-11-29 | Stop reason: SURG

## 2022-11-29 RX ORDER — ONDANSETRON 2 MG/ML
4 INJECTION INTRAMUSCULAR; INTRAVENOUS AS NEEDED
Status: DISCONTINUED | OUTPATIENT
Start: 2022-11-29 | End: 2022-11-29 | Stop reason: HOSPADM

## 2022-11-29 RX ORDER — SODIUM CHLORIDE, SODIUM LACTATE, POTASSIUM CHLORIDE, CALCIUM CHLORIDE 600; 310; 30; 20 MG/100ML; MG/100ML; MG/100ML; MG/100ML
125 INJECTION, SOLUTION INTRAVENOUS ONCE
Status: COMPLETED | OUTPATIENT
Start: 2022-11-29 | End: 2022-11-29

## 2022-11-29 RX ORDER — FENTANYL CITRATE 50 UG/ML
50 INJECTION, SOLUTION INTRAMUSCULAR; INTRAVENOUS
Status: DISCONTINUED | OUTPATIENT
Start: 2022-11-29 | End: 2022-11-29 | Stop reason: HOSPADM

## 2022-11-29 RX ORDER — MEPERIDINE HYDROCHLORIDE 25 MG/ML
12.5 INJECTION INTRAMUSCULAR; INTRAVENOUS; SUBCUTANEOUS
Status: COMPLETED | OUTPATIENT
Start: 2022-11-29 | End: 2022-11-29

## 2022-11-29 RX ORDER — ACETAMINOPHEN 500 MG
1000 TABLET ORAL EVERY 6 HOURS
Qty: 40 TABLET | Refills: 0 | Status: SHIPPED | OUTPATIENT
Start: 2022-11-29 | End: 2022-12-16

## 2022-11-29 RX ORDER — PROPOFOL 10 MG/ML
VIAL (ML) INTRAVENOUS AS NEEDED
Status: DISCONTINUED | OUTPATIENT
Start: 2022-11-29 | End: 2022-11-29 | Stop reason: SURG

## 2022-11-29 RX ORDER — ONDANSETRON 2 MG/ML
INJECTION INTRAMUSCULAR; INTRAVENOUS AS NEEDED
Status: DISCONTINUED | OUTPATIENT
Start: 2022-11-29 | End: 2022-11-29 | Stop reason: SURG

## 2022-11-29 RX ORDER — IPRATROPIUM BROMIDE AND ALBUTEROL SULFATE 2.5; .5 MG/3ML; MG/3ML
3 SOLUTION RESPIRATORY (INHALATION) ONCE AS NEEDED
Status: DISCONTINUED | OUTPATIENT
Start: 2022-11-29 | End: 2022-11-29 | Stop reason: HOSPADM

## 2022-11-29 RX ORDER — BACITRACIN, NEOMYCIN, POLYMYXIN B 400; 3.5; 5 [USP'U]/G; MG/G; [USP'U]/G
OINTMENT TOPICAL AS NEEDED
Status: DISCONTINUED | OUTPATIENT
Start: 2022-11-29 | End: 2022-11-29 | Stop reason: HOSPADM

## 2022-11-29 RX ORDER — FENTANYL CITRATE 50 UG/ML
INJECTION, SOLUTION INTRAMUSCULAR; INTRAVENOUS AS NEEDED
Status: DISCONTINUED | OUTPATIENT
Start: 2022-11-29 | End: 2022-11-29 | Stop reason: SURG

## 2022-11-29 RX ORDER — IBUPROFEN 800 MG/1
800 TABLET ORAL EVERY 8 HOURS PRN
Qty: 20 TABLET | Refills: 0 | Status: SHIPPED | OUTPATIENT
Start: 2022-11-29 | End: 2023-04-05

## 2022-11-29 RX ADMIN — FENTANYL CITRATE 50 MCG: 50 INJECTION, SOLUTION INTRAMUSCULAR; INTRAVENOUS at 08:35

## 2022-11-29 RX ADMIN — SODIUM CHLORIDE, POTASSIUM CHLORIDE, SODIUM LACTATE AND CALCIUM CHLORIDE: 600; 310; 30; 20 INJECTION, SOLUTION INTRAVENOUS at 07:07

## 2022-11-29 RX ADMIN — MIDAZOLAM HYDROCHLORIDE 2 MG: 1 INJECTION, SOLUTION INTRAMUSCULAR; INTRAVENOUS at 07:30

## 2022-11-29 RX ADMIN — PROPOFOL 180 MG: 10 INJECTION, EMULSION INTRAVENOUS at 07:33

## 2022-11-29 RX ADMIN — MEPERIDINE HYDROCHLORIDE 12.5 MG: 25 INJECTION INTRAMUSCULAR; INTRAVENOUS; SUBCUTANEOUS at 08:32

## 2022-11-29 RX ADMIN — ONDANSETRON 4 MG: 2 INJECTION INTRAMUSCULAR; INTRAVENOUS at 07:30

## 2022-11-29 RX ADMIN — FAMOTIDINE 20 MG: 10 INJECTION INTRAVENOUS at 07:30

## 2022-11-29 RX ADMIN — FENTANYL CITRATE 50 MCG: 50 INJECTION INTRAMUSCULAR; INTRAVENOUS at 07:39

## 2022-11-29 RX ADMIN — MEPERIDINE HYDROCHLORIDE 12.5 MG: 25 INJECTION INTRAMUSCULAR; INTRAVENOUS; SUBCUTANEOUS at 08:38

## 2022-11-29 RX ADMIN — ONDANSETRON 4 MG: 2 INJECTION INTRAMUSCULAR; INTRAVENOUS at 09:11

## 2022-11-29 RX ADMIN — FENTANYL CITRATE 50 MCG: 50 INJECTION, SOLUTION INTRAMUSCULAR; INTRAVENOUS at 08:30

## 2022-11-29 RX ADMIN — CEFAZOLIN 2 G: 2 INJECTION, POWDER, FOR SOLUTION INTRAMUSCULAR; INTRAVENOUS at 07:30

## 2022-11-29 RX ADMIN — FENTANYL CITRATE 50 MCG: 50 INJECTION INTRAMUSCULAR; INTRAVENOUS at 07:47

## 2022-11-29 NOTE — ANESTHESIA PROCEDURE NOTES
Airway  Urgency: elective    Date/Time: 11/29/2022 7:34 AM  End Time:11/29/2022 7:34 AM    General Information and Staff    Patient location during procedure: OR  CRNA/CAA: Michael Gonzalez CRNA    Indications and Patient Condition  Indications for airway management: airway protection    Preoxygenated: yes  MILS maintained throughout  Mask difficulty assessment: 0 - not attempted    Final Airway Details  Final airway type: supraglottic airway      Successful airway: unique  Size 3  Airway Seal Pressure (cm H2O): 20     Number of attempts at approach: 1  Assessment: lips, teeth, and gum same as pre-op and atraumatic intubation    Additional Comments  Atraumatic

## 2022-11-29 NOTE — ANESTHESIA POSTPROCEDURE EVALUATION
Patient: Mone mAador    Procedure Summary     Date: 11/29/22 Room / Location: [facility] OR  /  [facility] OR    Anesthesia Start: 0730 Anesthesia Stop: 0815    Procedure: EXCISION MASS UPPER EXTREMITY; Left, volar (Left) Diagnosis:       Abscess of skin of left wrist      (Abscess of skin of left wrist [L02.414])    Surgeons: Juan Gastelum MD Provider: Santos Gonzales MD    Anesthesia Type: general ASA Status: 3          Anesthesia Type: general    Vitals  Vitals Value Taken Time   /67 11/29/22 0831   Temp 97.2 °F (36.2 °C) 11/29/22 0816   Pulse 90 11/29/22 0831   Resp 16 11/29/22 0831   SpO2 100 % 11/29/22 0831           Post Anesthesia Care and Evaluation    Patient location during evaluation: bedside  Patient participation: complete - patient participated  Level of consciousness: awake and alert  Pain score: 1  Pain management: adequate    Airway patency: patent  Anesthetic complications: No anesthetic complications  PONV Status: none  Cardiovascular status: acceptable  Respiratory status: acceptable  Hydration status: acceptable

## 2022-11-30 LAB — REF LAB TEST METHOD: NORMAL

## 2022-12-06 ENCOUNTER — OFFICE VISIT (OUTPATIENT)
Dept: SURGERY | Facility: CLINIC | Age: 52
End: 2022-12-06

## 2022-12-06 VITALS — HEIGHT: 62 IN | BODY MASS INDEX: 38.13 KG/M2 | WEIGHT: 207.2 LBS

## 2022-12-06 DIAGNOSIS — Z51.89 VISIT FOR WOUND CHECK: Primary | ICD-10-CM

## 2022-12-06 PROCEDURE — 99024 POSTOP FOLLOW-UP VISIT: CPT

## 2022-12-06 NOTE — PROGRESS NOTES
Reyna is a 52-year-old female who presents status post left volar mass removal.  She reports that she has been doing well postop.  Denies any complaints.  Denies any drainage or any signs of concern.  States that this area has been itching some.    Appears to be healing well.  Sutures remain intact.    Reyna is a 52-year-old female who presents status post left volar mass removal.  It has been 7 days since procedure.  Patient will follow-up Friday for suture removal.

## 2022-12-14 ENCOUNTER — LAB (OUTPATIENT)
Dept: UROLOGY | Facility: CLINIC | Age: 52
End: 2022-12-14

## 2022-12-14 DIAGNOSIS — E66.01 CLASS 2 SEVERE OBESITY DUE TO EXCESS CALORIES WITH SERIOUS COMORBIDITY AND BODY MASS INDEX (BMI) OF 38.0 TO 38.9 IN ADULT: ICD-10-CM

## 2022-12-14 LAB
ALBUMIN SERPL-MCNC: 3.9 G/DL (ref 3.5–5.2)
ALBUMIN/GLOB SERPL: 1.3 G/DL
ALP SERPL-CCNC: 72 U/L (ref 39–117)
ALT SERPL W P-5'-P-CCNC: 26 U/L (ref 1–33)
ANION GAP SERPL CALCULATED.3IONS-SCNC: 10 MMOL/L (ref 5–15)
AST SERPL-CCNC: 24 U/L (ref 1–32)
BILIRUB SERPL-MCNC: 0.5 MG/DL (ref 0–1.2)
BUN SERPL-MCNC: 10 MG/DL (ref 6–20)
BUN/CREAT SERPL: 10.3 (ref 7–25)
CALCIUM SPEC-SCNC: 9.5 MG/DL (ref 8.6–10.5)
CHLORIDE SERPL-SCNC: 102 MMOL/L (ref 98–107)
CHOLEST SERPL-MCNC: 230 MG/DL (ref 0–200)
CO2 SERPL-SCNC: 26 MMOL/L (ref 22–29)
CREAT SERPL-MCNC: 0.97 MG/DL (ref 0.57–1)
EGFRCR SERPLBLD CKD-EPI 2021: 70.5 ML/MIN/1.73
GLOBULIN UR ELPH-MCNC: 2.9 GM/DL
GLUCOSE SERPL-MCNC: 100 MG/DL (ref 65–99)
HBA1C MFR BLD: 5.4 % (ref 4.8–5.6)
HDLC SERPL-MCNC: 42 MG/DL (ref 40–60)
LDLC SERPL CALC-MCNC: 159 MG/DL (ref 0–100)
LDLC/HDLC SERPL: 3.72 {RATIO}
POTASSIUM SERPL-SCNC: 3.9 MMOL/L (ref 3.5–5.2)
PROT SERPL-MCNC: 6.8 G/DL (ref 6–8.5)
SODIUM SERPL-SCNC: 138 MMOL/L (ref 136–145)
T-UPTAKE NFR SERPL: 1.14 TBI (ref 0.8–1.3)
T4 SERPL-MCNC: 8.01 MCG/DL (ref 4.5–11.7)
TRIGL SERPL-MCNC: 159 MG/DL (ref 0–150)
TSH SERPL DL<=0.05 MIU/L-ACNC: 4.22 UIU/ML (ref 0.27–4.2)
VLDLC SERPL-MCNC: 29 MG/DL (ref 5–40)

## 2022-12-14 PROCEDURE — 84436 ASSAY OF TOTAL THYROXINE: CPT | Performed by: UROLOGY

## 2022-12-14 PROCEDURE — 80061 LIPID PANEL: CPT | Performed by: UROLOGY

## 2022-12-14 PROCEDURE — 84443 ASSAY THYROID STIM HORMONE: CPT | Performed by: UROLOGY

## 2022-12-14 PROCEDURE — 84479 ASSAY OF THYROID (T3 OR T4): CPT | Performed by: UROLOGY

## 2022-12-14 PROCEDURE — 83036 HEMOGLOBIN GLYCOSYLATED A1C: CPT | Performed by: UROLOGY

## 2022-12-14 PROCEDURE — 80053 COMPREHEN METABOLIC PANEL: CPT | Performed by: UROLOGY

## 2022-12-16 ENCOUNTER — DISEASE STATE MANAGEMENT VISIT (OUTPATIENT)
Dept: PHARMACY | Facility: HOSPITAL | Age: 52
End: 2022-12-16

## 2022-12-16 ENCOUNTER — SPECIALTY PHARMACY (OUTPATIENT)
Dept: PHARMACY | Facility: HOSPITAL | Age: 52
End: 2022-12-16

## 2022-12-16 VITALS — HEIGHT: 62 IN | WEIGHT: 206.4 LBS | BODY MASS INDEX: 37.98 KG/M2

## 2022-12-16 NOTE — PROGRESS NOTES
Medication Management Clinic  Weight Management Program      Mone Amador is a 52 y.o. female referred to the Medication Management Clinic by Dr. Naik for clinical pharmacy and specialty pharmacy management of Wegovy for weight management. Mone Amador has previously tried Mounjaro, Saxenda, Ozempic, and calorie reduction for weight loss.  Saxenda was not successful for the patient and did not suppress her appetite. She stopped the Saxenda about 1 month prior to starting Mounjaro, reporting difficulty with remembering to take injection daily. She had success with Ozempic in the past.The patient denies a personal history or family history of thyroid cancer, denies a personal history of pancreatitis, and denies a diagnosis of gastroparesis.     Patient is currently on Wegovy 2.4 mg daily. She reported occasional nausea since switching to Wegovy 1.7. However, after increasing to 2.4 mg patient reported to have severe stomach cramping, constipation, and nausea. She used zofran which helped nausea symptoms. After first dose of 2.4 mg on 11/21/22, patient discontinued Wegovy completely and has not taking any weight loss medication since that time. Regarding her SMART goals, Pt reports that she is meeting her water intake and is trying to walk more.    Relevant Past Medical History and Co-morbidities  Past Medical History:   Diagnosis Date   • Anxiety    • Arthritis    • Arthritis of back 2019   • Cholecystolithiasis    • Colon polyp 12/20   • Fatty liver    • Fibromyalgia    • Fracture, foot 7/27/22    left   • Headache    • History of COVID-19 10/06/2021   • Hypertension    • Kidney stone    • Low back pain    • Migraine    • Obese    • Wears glasses (prescription)      Social History     Socioeconomic History   • Marital status:    Tobacco Use   • Smoking status: Never   • Smokeless tobacco: Never   Vaping Use   • Vaping Use: Never used   Substance and Sexual Activity   • Alcohol use: No   •  Drug use: No   • Sexual activity: Defer     Partners: Male     Birth control/protection: Hysterectomy     Comment: Hysterectomy       Allergies  Latex and Morphine and related    Current Medication List    Current Outpatient Medications:   •  cyanocobalamin 1000 MCG/ML injection, Inject 1 mL into the appropriate muscle as directed by prescriber Every 14 (Fourteen) Days., Disp: 30 mL, Rfl: 0  •  DULoxetine (Cymbalta) 60 MG capsule, Take 1 capsule by mouth 2 times daily., Disp: 60 capsule, Rfl: 5  •  hydroCHLOROthiazide (HYDRODIURIL) 25 MG tablet, Take 1 tablet by mouth every day., Disp: 30 tablet, Rfl: 5  •  ibuprofen (ADVIL,MOTRIN) 800 MG tablet, Take 1 tablet by mouth Every 8 (Eight) Hours As Needed for Mild Pain., Disp: 20 tablet, Rfl: 0  •  LORazepam (Ativan) 0.5 MG tablet, Take 1 tablet by mouth 2 times every day as needed., Disp: 60 tablet, Rfl: 2  •  nadolol (CORGARD) 40 MG tablet, Take 1 tablet by mouth twice daily., Disp: 60 tablet, Rfl: 6  •  ondansetron (ZOFRAN) 8 MG tablet, Take 1 tablet by mouth Every 6 (Six) Hours As Needed., Disp: 20 tablet, Rfl: 0  •  topiramate (Topamax) 25 MG tablet, Take 1 tablet by mouth at bedtime for 1 week, and then increase to 1 tablet  twice daily., Disp: 60 tablet, Rfl: 4  •  traZODone (DESYREL) 100 MG tablet, Take 1 tablet by mouth every day after a meal, Disp: 30 tablet, Rfl: 4  •  vitamin D (ERGOCALCIFEROL) 1.25 MG (21505 UT) capsule capsule, Take 1 capsule by mouth 1 (One) Time Per Week., Disp: 5 capsule, Rfl: 10  •  zolpidem (Ambien) 10 MG tablet, TAKE ONE TABLET BY MOUTH EVERY NIGHT AT BEDTIME, Disp: 30 tablet, Rfl: 2  •  Liraglutide (SAXENDA) 18 MG/3ML injection pen, Inject 0.6 mg under the skin into the appropriate area as directed Daily for 7 days, THEN 1.2 mg Daily for 7 days, THEN 1.8 mg Daily for 7 days, THEN 2.4 mg Daily for 7 days, THEN 3 mg Daily for 30 days., Disp: 15 mL, Rfl: 0  No current facility-administered medications for this visit.    Drug  Interactions  Saxenda + HCTZ may decrease the effectiveness of Saxemda  Saxenda + Nadolol may increase risk of hypoglycemic effects, and may decrease the effectiveness of Wegovy    Relevant Laboratory Values  Lab Results   Component Value Date    CHOL 230 (H) 12/14/2022    CHLPL 220 (H) 02/13/2015    TRIG 159 (H) 12/14/2022    HDL 42 12/14/2022     (H) 12/14/2022     Vaccinations:   Patient recommended to keep up with routine vaccinations.     Goals of Therapy  • Clinical Goals or Therapeutic Targets: 10% weight loss goal      Date 8/23/22 9/16/22 10/14/22 11/18/22 12/16/22   Weight (lb) 225.0 lb 214.4lb 209.4lb 202 lbs  206.4 lbs   BMI kg/ 41.11 39.21 38.29 36.94 37.74   Waist Circumference (in) 46 in 44.5 42.5 40 in 40.75 in       Medication Assessment & Plan    Patient's current BMI is 37.74, which is considered Class II Obesity. Patient has lost 18.6 lbs and 5.25 inches overall. Congratulated her on her success!!    Due to the adverse effects patient experienced with Wegovy, patient was interested in starting Saxenda. Patient reports that she is aware of side effects and declined injection training as she has been on this medication in the past. Will order Saxenda 0.6 mg SC daily x 1 week, then increase to 1.2mg SC daily x 1 week, then increase to 1.8mg SC daily x 1 week, then increase to 2.4mg SC daily x 1 week, then increase to 3mg SC daily. If at anytime the patient cannot tolerate an increased dose during dose escalation, instructed Pt to delay dose escalation for 1 week and call clinic.     Use with nadolol may increase hypoglycemic effects, educated patient to watch for s/sx of hypoglycemia. Patient denies any issues with hypoglycemia.    Patient recently had labs drawn and met with Dr. Naik to discuss results.     Discussed lifestyle modifications, including diet and exercise.     Worked with patient to create SMART Goal(s):   1. Increase water to 64 oz/day.  2. Walk 3x a week for 30  minutes    Will follow-up with patient in 4 weeks, or sooner if needed.     Thank you,    Shirley Lopez, [facility]  12/16/22  15:46 EST

## 2022-12-16 NOTE — PROGRESS NOTES
[facility]  Weight Management Program      Mone Amador is a 52 y.o. female referred to the [facility] by Dr. Naik for clinical pharmacy and specialty pharmacy management of Wegovy for weight management. Mone Amador has previously tried Mounjaro, Saxenda, Ozempic, and calorie reduction for weight loss.  Saxenda was not successful for the patient and did not suppress her appetite. She stopped the Saxenda about 1 month prior to starting Mounjaro, reporting difficulty with remembering to take injection daily. She had success with Ozempic in the past. Patient is currently taking Wegovy 1.7 mg at this time. The patient denies a personal history or family history of thyroid cancer, denies a personal history of pancreatitis, and denies a diagnosis of gastroparesis. Patient denies any missed doses or any trouble giving the injections. She reports occasional nausea since switching to Wegovy. She has zofran at home to use PRN. Pt reports that she is meeting her water intake and is trying to walk more.     2.4 mg severe stomach cramping _______, constipation and nausea which was resolved with zofran   Last dose on 11/21        Relevant Past Medical History and Co-morbidities  Past Medical History:   Diagnosis Date   • Anxiety    • Arthritis    • Arthritis of back 2019   • Cholecystolithiasis    • Colon polyp 12/20   • Fatty liver    • Fibromyalgia    • Fracture, foot 7/27/22    left   • Headache    • History of COVID-19 10/06/2021   • Hypertension    • Kidney stone    • Low back pain    • Migraine    • Obese    • Wears glasses (prescription)      Social History     Socioeconomic History   • Marital status:    Tobacco Use   • Smoking status: Never   • Smokeless tobacco: Never   Vaping Use   • Vaping Use: Never used   Substance and Sexual Activity   • Alcohol use: No   • Drug use: No   • Sexual activity: Defer     Partners: Male     Birth control/protection:  Hysterectomy     Comment: Hysterectomy       Allergies  Latex and Morphine and related    Current Medication List    Current Outpatient Medications:   •  cyanocobalamin 1000 MCG/ML injection, Inject 1 mL into the appropriate muscle as directed by prescriber Every 14 (Fourteen) Days., Disp: 30 mL, Rfl: 0  •  DULoxetine (Cymbalta) 60 MG capsule, Take 1 capsule by mouth 2 times daily., Disp: 60 capsule, Rfl: 5  •  hydroCHLOROthiazide (HYDRODIURIL) 25 MG tablet, Take 1 tablet by mouth every day., Disp: 30 tablet, Rfl: 5  •  ibuprofen (ADVIL,MOTRIN) 800 MG tablet, Take 1 tablet by mouth Every 8 (Eight) Hours As Needed for Mild Pain., Disp: 20 tablet, Rfl: 0  •  LORazepam (Ativan) 0.5 MG tablet, Take 1 tablet by mouth 2 times every day as needed., Disp: 60 tablet, Rfl: 2  •  nadolol (CORGARD) 40 MG tablet, Take 1 tablet by mouth twice daily., Disp: 60 tablet, Rfl: 6  •  ondansetron (ZOFRAN) 8 MG tablet, Take 1 tablet by mouth Every 6 (Six) Hours As Needed., Disp: 20 tablet, Rfl: 0  •  Semaglutide-Weight Management (Wegovy) 2.4 MG/0.75ML solution auto-injector, Inject 2.4 mg under the skin into the appropriate area as directed 1 (One) Time Per Week., Disp: 3 mL, Rfl: 0  •  topiramate (Topamax) 25 MG tablet, Take 1 tablet by mouth at bedtime for 1 week, and then increase to 1 tablet  twice daily., Disp: 60 tablet, Rfl: 4  •  traMADol (Ultram) 50 MG tablet, Take 1 tablet by mouth Every 6 (Six) Hours As Needed for Moderate Pain., Disp: 4 tablet, Rfl: 0  •  traZODone (DESYREL) 100 MG tablet, Take 1 tablet by mouth every day after a meal, Disp: 30 tablet, Rfl: 4  •  vitamin D (ERGOCALCIFEROL) 1.25 MG (17510 UT) capsule capsule, Take 1 capsule by mouth 1 (One) Time Per Week., Disp: 5 capsule, Rfl: 10  •  zolpidem (Ambien) 10 MG tablet, TAKE ONE TABLET BY MOUTH EVERY NIGHT AT BEDTIME, Disp: 30 tablet, Rfl: 2    Drug Interactions  Wegovy + HCTZ may decrease the effectiveness of Wegovy  Wegovy + Nadolol may increase risk of  hypoglycemic effects, and may decrease the effectiveness of Wegovy    Relevant Laboratory Values  Lab Results   Component Value Date    CHOL 230 (H) 12/14/2022    CHLPL 220 (H) 02/13/2015    TRIG 159 (H) 12/14/2022    HDL 42 12/14/2022     (H) 12/14/2022     Vaccinations:   Patient recommended to keep up with routine vaccinations.     Goals of Therapy  • Clinical Goals or Therapeutic Targets: 10% weight loss goal      Date 8/23/22 9/16/22 10/14/22 11/18/22 12/16/22   Weight (lb) 225.0 lb 214.4lb 209.4lb 202 lbs  206.4 lbs   BMI kg/ 41.11 39.21 38.29 36.94 37.74   Waist Circumference (in) 46 in 44.5 42.5 40 in 40.75 in       Medication Assessment & Plan    Patient's current BMI is 36.94, which is considered Class II Obesity. Patient has lost 23 lbs and 6 inches overall. Congratulated her on her success!!    Will order Wegovy 2.4 mg SC weekly.       Use with nadolol may increase hypoglycemic effects, educated patient to watch for s/sx of hypoglycemia. Patient denies any issues with hypoglycemia.    Discussed lifestyle modifications, including diet and exercise. Ordered lab work to be drawn before next visit.     Worked with patient to create SMART Goal(s):   1. Increase water to 64 oz/day.  2. Walk 3x a week for 30 minutes    Will follow-up with patient in 4 weeks, or sooner if needed.     Thank you,    Shirley Lopez [facility]  12/16/22  15:33 EST

## 2022-12-16 NOTE — PROGRESS NOTES
Medication Management Clinic  Weight Management Program      Mone Amador is a 52 y.o. female referred to the Medication Management Clinic by Dr. Naik for clinical pharmacy and specialty pharmacy management of Wegovy for weight management. Mone Amador has previously tried Mounjaro, Saxenda, Ozempic, and calorie reduction for weight loss.  Saxenda was not successful for the patient and did not suppress her appetite. She stopped the Saxenda about 1 month prior to starting Mounjaro, reporting difficulty with remembering to take injection daily. She had success with Ozempic in the past.The patient denies a personal history or family history of thyroid cancer, denies a personal history of pancreatitis, and denies a diagnosis of gastroparesis.     Patient is currently on Wegovy 2.4 mg daily. She reported occasional nausea since switching to Wegovy 1.7. However, after increasing to 2.4 mg patient reported to have severe stomach cramping, constipation, and nausea. She used zofran which helped nausea symptoms. After first dose of 2.4 mg on 11/21/22, patient discontinued Wegovy completely and has not taking any weight loss medication since that time. Regarding her SMART goals, Pt reports that she is meeting her water intake and is trying to walk more.    Relevant Past Medical History and Co-morbidities  Past Medical History:   Diagnosis Date   • Anxiety    • Arthritis    • Arthritis of back 2019   • Cholecystolithiasis    • Colon polyp 12/20   • Fatty liver    • Fibromyalgia    • Fracture, foot 7/27/22    left   • Headache    • History of COVID-19 10/06/2021   • Hypertension    • Kidney stone    • Low back pain    • Migraine    • Obese    • Wears glasses (prescription)      Social History     Socioeconomic History   • Marital status:    Tobacco Use   • Smoking status: Never   • Smokeless tobacco: Never   Vaping Use   • Vaping Use: Never used   Substance and Sexual Activity   • Alcohol use: No   •  Drug use: No   • Sexual activity: Defer     Partners: Male     Birth control/protection: Hysterectomy     Comment: Hysterectomy       Allergies  Latex and Morphine and related    Current Medication List    Current Outpatient Medications:   •  cyanocobalamin 1000 MCG/ML injection, Inject 1 mL into the appropriate muscle as directed by prescriber Every 14 (Fourteen) Days., Disp: 30 mL, Rfl: 0  •  DULoxetine (Cymbalta) 60 MG capsule, Take 1 capsule by mouth 2 times daily., Disp: 60 capsule, Rfl: 5  •  hydroCHLOROthiazide (HYDRODIURIL) 25 MG tablet, Take 1 tablet by mouth every day., Disp: 30 tablet, Rfl: 5  •  ibuprofen (ADVIL,MOTRIN) 800 MG tablet, Take 1 tablet by mouth Every 8 (Eight) Hours As Needed for Mild Pain., Disp: 20 tablet, Rfl: 0  •  Liraglutide (SAXENDA) 18 MG/3ML injection pen, Inject 0.6 mg under the skin into the appropriate area as directed Daily for 7 days, THEN 1.2 mg Daily for 7 days, THEN 1.8 mg Daily for 7 days, THEN 2.4 mg Daily for 7 days, THEN 3 mg Daily for 30 days., Disp: 15 mL, Rfl: 0  •  LORazepam (Ativan) 0.5 MG tablet, Take 1 tablet by mouth 2 times every day as needed., Disp: 60 tablet, Rfl: 2  •  nadolol (CORGARD) 40 MG tablet, Take 1 tablet by mouth twice daily., Disp: 60 tablet, Rfl: 6  •  ondansetron (ZOFRAN) 8 MG tablet, Take 1 tablet by mouth Every 6 (Six) Hours As Needed., Disp: 20 tablet, Rfl: 0  •  topiramate (Topamax) 25 MG tablet, Take 1 tablet by mouth at bedtime for 1 week, and then increase to 1 tablet  twice daily., Disp: 60 tablet, Rfl: 4  •  traZODone (DESYREL) 100 MG tablet, Take 1 tablet by mouth every day after a meal, Disp: 30 tablet, Rfl: 4  •  vitamin D (ERGOCALCIFEROL) 1.25 MG (54264 UT) capsule capsule, Take 1 capsule by mouth 1 (One) Time Per Week., Disp: 5 capsule, Rfl: 10  •  zolpidem (Ambien) 10 MG tablet, TAKE ONE TABLET BY MOUTH EVERY NIGHT AT BEDTIME, Disp: 30 tablet, Rfl: 2    Drug Interactions  Saxenda + HCTZ may decrease the effectiveness of  Saxemda  Saxenda + Nadolol may increase risk of hypoglycemic effects, and may decrease the effectiveness of Wegovy    Relevant Laboratory Values  Lab Results   Component Value Date    CHOL 230 (H) 12/14/2022    CHLPL 220 (H) 02/13/2015    TRIG 159 (H) 12/14/2022    HDL 42 12/14/2022     (H) 12/14/2022     Vaccinations:   Patient recommended to keep up with routine vaccinations.     Goals of Therapy  • Clinical Goals or Therapeutic Targets: 10% weight loss goal      Date 8/23/22 9/16/22 10/14/22 11/18/22 12/16/22   Weight (lb) 225.0 lb 214.4lb 209.4lb 202 lbs  206.4 lbs   BMI kg/ 41.11 39.21 38.29 36.94 37.74   Waist Circumference (in) 46 in 44.5 42.5 40 in 40.75 in       Medication Assessment & Plan    Patient's current BMI is 37.74, which is considered Class II Obesity. Patient has lost 18.6 lbs and 5.25 inches overall. Congratulated her on her success!!    Due to the adverse effects patient experienced with Wegovy, patient was interested in starting Saxenda. Patient reports that she is aware of side effects and declined injection training as she has been on this medication in the past. Will order Saxenda 0.6 mg SC daily x 1 week, then increase to 1.2mg SC daily x 1 week, then increase to 1.8mg SC daily x 1 week, then increase to 2.4mg SC daily x 1 week, then increase to 3mg SC daily. If at anytime the patient cannot tolerate an increased dose during dose escalation, instructed Pt to delay dose escalation for 1 week and call clinic.     Use with nadolol may increase hypoglycemic effects, educated patient to watch for s/sx of hypoglycemia. Patient denies any issues with hypoglycemia.    Patient recently had labs drawn and met with Dr. Naik to discuss results.     Discussed lifestyle modifications, including diet and exercise.     Worked with patient to create SMART Goal(s):   1. Increase water to 64 oz/day.  2. Walk 3x a week for 30 minutes    Will follow-up with patient in 4 weeks, or sooner if needed.      Thank you,    Shirley Lopez, [facility]  12/16/22  16:58 EST

## 2023-01-20 DIAGNOSIS — K58.1 IRRITABLE BOWEL SYNDROME WITH CONSTIPATION: Primary | ICD-10-CM

## 2023-01-20 RX ORDER — PLECANATIDE 3 MG/1
3 TABLET ORAL DAILY
Qty: 90 TABLET | Refills: 3 | Status: SHIPPED | OUTPATIENT
Start: 2023-01-20

## 2023-01-26 RX ORDER — IBUPROFEN 800 MG/1
800 TABLET ORAL EVERY 8 HOURS PRN
Qty: 20 TABLET | Refills: 0 | OUTPATIENT
Start: 2023-01-26

## 2023-02-01 ENCOUNTER — TRANSCRIBE ORDERS (OUTPATIENT)
Dept: ADMINISTRATIVE | Facility: HOSPITAL | Age: 53
End: 2023-02-01
Payer: COMMERCIAL

## 2023-02-01 DIAGNOSIS — M54.2 CERVICALGIA: ICD-10-CM

## 2023-02-01 DIAGNOSIS — M54.6 PAIN IN THORACIC SPINE: Primary | ICD-10-CM

## 2023-02-09 ENCOUNTER — HOSPITAL ENCOUNTER (OUTPATIENT)
Dept: MAMMOGRAPHY | Facility: HOSPITAL | Age: 53
Discharge: HOME | End: 2023-02-09
Admitting: FAMILY MEDICINE
Payer: COMMERCIAL

## 2023-02-09 DIAGNOSIS — Z12.31 VISIT FOR SCREENING MAMMOGRAM: ICD-10-CM

## 2023-02-09 PROCEDURE — 77063 BREAST TOMOSYNTHESIS BI: CPT | Performed by: RADIOLOGY

## 2023-02-09 PROCEDURE — 77063 BREAST TOMOSYNTHESIS BI: CPT

## 2023-02-09 PROCEDURE — 77067 SCR MAMMO BI INCL CAD: CPT | Performed by: RADIOLOGY

## 2023-02-09 PROCEDURE — 77067 SCR MAMMO BI INCL CAD: CPT

## 2023-02-15 ENCOUNTER — HOSPITAL ENCOUNTER (OUTPATIENT)
Dept: MRI IMAGING | Facility: HOSPITAL | Age: 53
Discharge: HOME | End: 2023-02-15
Payer: COMMERCIAL

## 2023-02-15 DIAGNOSIS — M54.2 CERVICALGIA: ICD-10-CM

## 2023-02-15 DIAGNOSIS — M54.6 PAIN IN THORACIC SPINE: ICD-10-CM

## 2023-02-15 PROCEDURE — 72146 MRI CHEST SPINE W/O DYE: CPT

## 2023-02-15 PROCEDURE — 72141 MRI NECK SPINE W/O DYE: CPT

## 2023-02-15 PROCEDURE — 72141 MRI NECK SPINE W/O DYE: CPT | Performed by: RADIOLOGY

## 2023-02-15 PROCEDURE — 72146 MRI CHEST SPINE W/O DYE: CPT | Performed by: RADIOLOGY

## 2023-02-22 DIAGNOSIS — J01.80 OTHER SUBACUTE SINUSITIS: Primary | ICD-10-CM

## 2023-02-22 RX ORDER — AZITHROMYCIN 250 MG/1
TABLET, FILM COATED ORAL
Qty: 6 TABLET | Refills: 0 | Status: SHIPPED | OUTPATIENT
Start: 2023-02-22 | End: 2023-02-27

## 2023-03-08 DIAGNOSIS — R11.0 NAUSEA: ICD-10-CM

## 2023-03-08 RX ORDER — ONDANSETRON HYDROCHLORIDE 8 MG/1
8 TABLET, FILM COATED ORAL EVERY 6 HOURS PRN
Qty: 20 TABLET | Refills: 3 | Status: SHIPPED | OUTPATIENT
Start: 2023-03-08

## 2023-03-21 DIAGNOSIS — E66.01 CLASS 2 SEVERE OBESITY DUE TO EXCESS CALORIES WITH SERIOUS COMORBIDITY AND BODY MASS INDEX (BMI) OF 38.0 TO 38.9 IN ADULT: Primary | ICD-10-CM

## 2023-04-05 ENCOUNTER — SPECIALTY PHARMACY (OUTPATIENT)
Dept: PHARMACY | Facility: HOSPITAL | Age: 53
End: 2023-04-05
Payer: COMMERCIAL

## 2023-04-05 ENCOUNTER — TELEPHONE (OUTPATIENT)
Dept: UROLOGY | Facility: CLINIC | Age: 53
End: 2023-04-05
Payer: COMMERCIAL

## 2023-04-05 ENCOUNTER — DISEASE STATE MANAGEMENT VISIT (OUTPATIENT)
Dept: PHARMACY | Facility: HOSPITAL | Age: 53
End: 2023-04-05
Payer: COMMERCIAL

## 2023-04-05 VITALS
HEIGHT: 62 IN | HEART RATE: 73 BPM | DIASTOLIC BLOOD PRESSURE: 93 MMHG | WEIGHT: 220.8 LBS | SYSTOLIC BLOOD PRESSURE: 126 MMHG | BODY MASS INDEX: 40.63 KG/M2

## 2023-04-05 RX ORDER — SEMAGLUTIDE 0.25 MG/.5ML
0.25 INJECTION, SOLUTION SUBCUTANEOUS WEEKLY
Qty: 2 ML | Refills: 0 | Status: SHIPPED | OUTPATIENT
Start: 2023-04-05

## 2023-04-05 NOTE — PROGRESS NOTES
Medication Management Clinic  Weight Management Program      Mone Amador is a 52 y.o. female referred to the Medication Management Clinic by Dr. Naik for clinical pharmacy and specialty pharmacy management of Wegovy for weight management. Mone Amador has previously tried Mounjaro, Saxenda, Ozempic, and calorie reduction for weight loss. Saxenda was not successful for the patient and did not suppress her appetite. She stopped the Saxenda about 1 month prior to starting Mounjaro, reporting difficulty with remembering to take injection daily. She had success with Ozempic in the past.The patient denies a personal history or family history of thyroid cancer, denies a personal history of pancreatitis, and denies a diagnosis of gastroparesis.     Per 12/16/22 note: She reported occasional nausea since switching to Wegovy 1.7. However, after increasing to 2.4 mg patient reported to have severe stomach cramping, constipation, and nausea. She used zofran which helped nausea symptoms. After first dose of 2.4 mg on 11/21/22, patient discontinued Wegovy completely and has not taking any weight loss medication since that time. Then patient was switched over to Saxenda and she reached max dose 3 mg dose. She had no adverse effects with Saxenda however she felt her weight was stagnant with the medication. She discontinued the Saxenda on her own a few months ago. Patient is interested in starting the Wegovy again at a lower dose to manage her weight.     Relevant Past Medical History and Co-morbidities  Past Medical History:   Diagnosis Date   • Anxiety    • Arthritis    • Arthritis of back 2019   • Cholecystolithiasis    • Colon polyp 12/20   • Fatty liver    • Fibromyalgia    • Fracture, foot 7/27/22    left   • Headache    • History of COVID-19 10/06/2021   • Hypertension    • Kidney stone    • Low back pain    • Migraine    • Obese    • Wears glasses (prescription)      Social History     Socioeconomic  History   • Marital status:    Tobacco Use   • Smoking status: Never   • Smokeless tobacco: Never   Vaping Use   • Vaping Use: Never used   Substance and Sexual Activity   • Alcohol use: No   • Drug use: No   • Sexual activity: Defer     Partners: Male     Birth control/protection: Hysterectomy     Comment: Hysterectomy       Allergies  Latex and Morphine and related    Current Medication List    Current Outpatient Medications:   •  cyanocobalamin 1000 MCG/ML injection, Inject 1 mL into the appropriate muscle as directed by prescriber Every 14 (Fourteen) Days., Disp: 30 mL, Rfl: 0  •  cyclobenzaprine (FLEXERIL) 10 MG tablet, Take 1 tablet by mouth twice daily. (Patient taking differently: Take 1 tablet by mouth 2 (Two) Times a Day As Needed.), Disp: 60 tablet, Rfl: 2  •  DULoxetine (Cymbalta) 60 MG capsule, Take 1 capsule by mouth 2 times daily., Disp: 60 capsule, Rfl: 5  •  DULoxetine (Cymbalta) 60 MG capsule, Take 1 capsule by mouth twice daily. (Patient not taking: Reported on 4/5/2023), Disp: 60 capsule, Rfl: 5  •  hydroCHLOROthiazide (HYDRODIURIL) 25 MG tablet, Take 1 tablet by mouth every day. (Patient not taking: Reported on 4/5/2023), Disp: 30 tablet, Rfl: 5  •  hydroCHLOROthiazide (HYDRODIURIL) 25 MG tablet, Take 1 tablet by mouth every day., Disp: 30 tablet, Rfl: 5  •  ibuprofen (ADVIL,MOTRIN) 800 MG tablet, Take 1 tablet by mouth 3 times daily with food., Disp: 90 tablet, Rfl: 2  •  LORazepam (Ativan) 0.5 MG tablet, Take 1 tablet by mouth 2 times every day as needed., Disp: 60 tablet, Rfl: 2  •  LORazepam (Ativan) 0.5 MG tablet, Take 1 tablet by mouth twice daily as needed. (Patient not taking: Reported on 4/5/2023), Disp: 60 tablet, Rfl: 2  •  nadolol (CORGARD) 40 MG tablet, Take 1 tablet by mouth twice daily. (Patient not taking: Reported on 4/5/2023), Disp: 60 tablet, Rfl: 6  •  nadolol (CORGARD) 40 MG tablet, Take 1 tablet by mouth twice daily., Disp: 60 tablet, Rfl: 6  •  ondansetron (ZOFRAN)  8 MG tablet, Take 1 tablet by mouth Every 6 (Six) Hours As Needed for Nausea., Disp: 20 tablet, Rfl: 3  •  Plecanatide (Trulance) 3 MG tablet, Take 1 tablet by mouth Daily. (Patient not taking: Reported on 4/5/2023), Disp: 90 tablet, Rfl: 3  •  Semaglutide-Weight Management (Wegovy) 0.25 MG/0.5ML solution auto-injector, Inject 0.25 mg under the skin into the appropriate area as directed 1 (One) Time Per Week., Disp: 2 mL, Rfl: 0  •  topiramate (Topamax) 25 MG tablet, Take 1 tablet by mouth at bedtime for 1 week, and then increase to 1 tablet  twice daily. (Patient taking differently: 1 tablet by mouth at bedtime), Disp: 60 tablet, Rfl: 4  •  topiramate (Topamax) 25 MG tablet, Take 1 tablet by mouth at bedtime for 1 week, and then increase to 1 tablet twice per day. (Patient not taking: Reported on 4/5/2023), Disp: 60 tablet, Rfl: 4  •  traZODone (DESYREL) 100 MG tablet, Take 1 tablet by mouth every day after a meal (Patient not taking: Reported on 4/5/2023), Disp: 30 tablet, Rfl: 4  •  traZODone (DESYREL) 100 MG tablet, Take 1 tablet by mouth every day after a meal. (Patient taking differently: Take 1 tablet by mouth Every Night.), Disp: 30 tablet, Rfl: 4  •  vitamin D (ERGOCALCIFEROL) 1.25 MG (10830 UT) capsule capsule, Take 1 capsule by mouth 1 (One) Time Per Week., Disp: 5 capsule, Rfl: 10  •  zolpidem (Ambien) 10 MG tablet, Take 1 tablet by mouth at bedtime., Disp: 30 tablet, Rfl: 2    Drug Interactions:   Wegovy + HCTZ may decrease the effectiveness of Saxemda  Wegovy + Nadolol may increase risk of hypoglycemic effects, and may decrease the effectiveness of Wegovy    Relevant Laboratory Values  Lab Results   Component Value Date    CHOL 230 (H) 12/14/2022    CHLPL 220 (H) 02/13/2015    TRIG 159 (H) 12/14/2022    HDL 42 12/14/2022     (H) 12/14/2022     Vaccinations:   Patient recommended to keep up with routine vaccinations.     Goals of Therapy  • Clinical Goals or Therapeutic Targets: 10% weight loss  goal      Date 8/23/22 9/16/22 10/14/22 11/18/22 12/16/22   Weight (lb) 225.0 lb 214.4lb 209.4lb 202 lbs  206.4 lbs   BMI kg/ 41.11 39.21 38.29 36.94 37.74   Waist Circumference (in) 46 in 44.5 42.5 40 in 40.75 in    *switched to Saxenda     Date 4/5/23   Weight (lb) 220.8   BMI kg/ 40.38   Waist Circumference (in) 42 in    *restarted Wegovy lower dose     Medication Assessment & Plan    Patient's current BMI is 40.38, which is considered Class III Obesity.     Will order Wegovy 0.25 mg once weekly.    Use with nadolol may increase hypoglycemic effects, educated patient to watch for s/sx of hypoglycemia. Patient denies any issues with hypoglycemia.    Medication Education    Wegovy® (semaglutide)  Medication Expectations   Why am I taking this medication? You are taking Wegovy for weight loss because you have excess weight and also have weight-related medical problems or obesity. It should be used along with a reduced calorie diet and increased physical activity.    What should I expect while on this medication? You should expect to lose at least 5% of your body weight after 3 months of therapy. It can also help keep weight lost off.    How does the medication work? Wegovy is an injection that addresses one of your body's natural responses to weight loss. It works like a naturally produced hormone in the body called GLP-1 that regulates appetite which can lead to eating fewer calories and losing weight. This medication also slows down food from leaving your stomach, making you feel kat for longer.   How long will I be on this medication for? The amount of time you will be on this medication will be determined by your doctor. Do not abruptly stop this medication without talking to your doctor first.    How do I take this medication? Take as directed on your prescription label. Wegovy is injected under the skin (subcutaneously) of your stomach, thigh, or upper arm. This medication should be taken once weekly and  can be given with or without food. Rotate injection sites weekly. If changing the day of administration is necessary, allow at least 48 hours between 2 doses.     After removal of the pen cap, the needles will be hidden inside the needle cover.  To begin injection, press the needle cover firmly against the skin.  Once injected, continue to press the device against the skin until the yellow bar has stopped moving.  Then, remove the needle from the skin.    What are some possible side effects? You may notice you don't feel as hungry, especially when you first start using Wegovy. Some common side effects include nausea, vomiting, diarrhea, vomiting, indigestion, constipation, tiredness, and headache. Redness, itching, and/or swelling can occur where the shot was given. You should also monitor for low blood sugar (hypoglycemia), especially if you are taking Wegovy with other medications that cause low blood sugar. Stop using Wegovy and call your doctor immediately if you have severe pain in your stomach area that will not go away as this could be a sign of pancreatitis (inflammation of your pancreas).     What happens if I miss a dose? If you miss a dose, take it as soon as you remember within 5 days. Resume usual schedule thereafter.  If > 5 days have elapsed, skip the missed dose and resume administration at the next scheduled weekly dose.      Medication Safety   What are things I should warn my doctor immediately about? Do not use Wegovy if you or a family member have ever had medullary thyroid cancer (MTC) or Multiple Endocrine Neoplasia syndrome type 2 (MEN 2). Tell your doctor if you get a lump or swelling in your neck, hoarseness, difficulty swallowing, or feel short of breath (these may be symptoms of thyroid cancer). Talk to your doctor if you have or have had problems with your pancreas, kidneys, or liver. Tell your doctor if you have problems with digesting food or slowed emptying of your stomach  (gastroparesis). Talk to your doctor if you are pregnant, planning to become pregnant, or breastfeeding. Also tell your doctor if you notice any signs/symptoms of an allergic reaction (rash, hives, difficulty breathing, etc.).   What are things that I should be cautious of? Be cautious of any side effects from this medication. Talk to your doctor if any new ones develop or aren't getting better.   What are some medications that can interact with this one? Taking Wegovy with other medications that lower your blood sugar such as insulins and glipizide/glimepiride/glyburide may increase the risk of low blood sugar. It should not be taken with other medicines called GLP-1 receptor agonists, as these work the same way as Wegovy. Because Wegovy slows stomach emptying, it can affect the way some medicines work. Always tell your doctor or pharmacist immediately if you start taking any new medications, including over-the-counter medications, vitamins, and herbal supplements.      Medication Storage/Handling   How should I handle this medication? Keep this medication out of reach of pets/children and keep the pen capped when not in use. Do not share your medicine pens with others.    How does this medication need to be stored? Store your new, unused pens in the original carton in the refrigerator. Protect it from light. Do not freeze. Prior to cap removal, the pen can be kept at room temperature up to 28 days.    How should I dispose of this medication? Used Wegovy pens should be thrown after each use. Place your used pen and needle in an approved sharps container. If you do not have a sharps container, you may use a household container made of heavy-duty plastic with a tight-fitting lid that is leak resistant (e.g., heavy-duty plastic laundry detergent bottle). If your doctor decides to stop this medication, take to your local police station for proper disposal. Some pharmacies also have take-back bins for medication  drop-off.       Resources/Support   How can I remind myself to take this medication? You can download reminder apps to help you manage your refills. You may also set an alarm on your phone to remind you.    Is financial support available?  [employer] can provide co-pay cards if you have commercial insurance.    Which vaccines are recommended for me? Talk to your doctor about these vaccines: Flu, Coronavirus (COVID-19), Pneumococcal (pneumonia), Tdap, Zoster (shingles)      Discussed lifestyle modifications, including diet and exercise.     Worked with patient to create SMART Goal(s):   1. Increase water to 64 oz/day.  2. Walk 3x a week for 30 minutes    Will follow-up with patient in 4 weeks, or sooner if needed.     Thank you,    Shirley Lopez [facility]  04/05/23  12:26 EDT

## 2023-04-05 NOTE — TELEPHONE ENCOUNTER
PA for Saxsenda has been sent to insurance company. Currently waiting for a response back.                              víctor barba (Key: [id number])  Saxenda 18MG/3ML pen-injectors     Form  [employer] Electronic Prior Authorization Form (2017 [employer])

## 2023-04-05 NOTE — PROGRESS NOTES
[facility]  Weight Management Program      Mone Amador is a 52 y.o. female referred to the [facility] by Dr. Naik for clinical pharmacy and specialty pharmacy management of Wegovy for weight management. Mone Amador has previously tried Mounjaro, Saxenda, Ozempic, and calorie reduction for weight loss. Saxenda was not successful for the patient and did not suppress her appetite. She stopped the Saxenda about 1 month prior to starting Mounjaro, reporting difficulty with remembering to take injection daily. She had success with Ozempic in the past.The patient denies a personal history or family history of thyroid cancer, denies a personal history of pancreatitis, and denies a diagnosis of gastroparesis.     Per 12/16/22 note: She reported occasional nausea since switching to Wegovy 1.7. However, after increasing to 2.4 mg patient reported to have severe stomach cramping, constipation, and nausea. She used zofran which helped nausea symptoms. After first dose of 2.4 mg on 11/21/22, patient discontinued Wegovy completely and has not taking any weight loss medication since that time. Then patient was switched over to Saxenda and she reached max dose 3 mg dose. She had no adverse effects with Saxenda however she felt her weight was stagnant with the medication. She discontinued the Saxenda on her own a few months ago. Patient is interested in starting the Wegovy again at a lower dose to manage her weight.     Relevant Past Medical History and Co-morbidities  Past Medical History:   Diagnosis Date   • Anxiety    • Arthritis    • Arthritis of back 2019   • Cholecystolithiasis    • Colon polyp 12/20   • Fatty liver    • Fibromyalgia    • Fracture, foot 7/27/22    left   • Headache    • History of COVID-19 10/06/2021   • Hypertension    • Kidney stone    • Low back pain    • Migraine    • Obese    • Wears glasses (prescription)      Social History     Socioeconomic  History   • Marital status:    Tobacco Use   • Smoking status: Never   • Smokeless tobacco: Never   Vaping Use   • Vaping Use: Never used   Substance and Sexual Activity   • Alcohol use: No   • Drug use: No   • Sexual activity: Defer     Partners: Male     Birth control/protection: Hysterectomy     Comment: Hysterectomy       Allergies  Latex and Morphine and related    Current Medication List    Current Outpatient Medications:   •  cyanocobalamin 1000 MCG/ML injection, Inject 1 mL into the appropriate muscle as directed by prescriber Every 14 (Fourteen) Days., Disp: 30 mL, Rfl: 0  •  cyclobenzaprine (FLEXERIL) 10 MG tablet, Take 1 tablet by mouth twice daily., Disp: 60 tablet, Rfl: 2  •  doxycycline (ADOXA) 100 MG tablet, Take 1 tablet by mouth 2 (Two) Times a Day., Disp: 20 tablet, Rfl: 0  •  DULoxetine (Cymbalta) 60 MG capsule, Take 1 capsule by mouth 2 times daily., Disp: 60 capsule, Rfl: 5  •  DULoxetine (Cymbalta) 60 MG capsule, Take 1 capsule by mouth twice daily., Disp: 60 capsule, Rfl: 5  •  fluconazole (Diflucan) 150 MG tablet, Take 1 tablet by mouth once., Disp: 1 tablet, Rfl: 0  •  hydroCHLOROthiazide (HYDRODIURIL) 25 MG tablet, Take 1 tablet by mouth every day., Disp: 30 tablet, Rfl: 5  •  hydroCHLOROthiazide (HYDRODIURIL) 25 MG tablet, Take 1 tablet by mouth every day., Disp: 30 tablet, Rfl: 5  •  ibuprofen (ADVIL,MOTRIN) 800 MG tablet, Take 1 tablet by mouth Every 8 (Eight) Hours As Needed for Mild Pain., Disp: 20 tablet, Rfl: 0  •  ibuprofen (ADVIL,MOTRIN) 800 MG tablet, Take 1 tablet by mouth 3 times daily with food., Disp: 90 tablet, Rfl: 2  •  LORazepam (Ativan) 0.5 MG tablet, Take 1 tablet by mouth 2 times every day as needed., Disp: 60 tablet, Rfl: 2  •  LORazepam (Ativan) 0.5 MG tablet, Take 1 tablet by mouth twice daily as needed., Disp: 60 tablet, Rfl: 2  •  mupirocin (BACTROBAN) 2 % ointment, APPLY A SMALL AMOUNT TOPICALLY TO AFFECTED AREA 3 TIMES DAILY AS DIRECTED, Disp: 22 g, Rfl:  0  •  nadolol (CORGARD) 40 MG tablet, Take 1 tablet by mouth twice daily., Disp: 60 tablet, Rfl: 6  •  nadolol (CORGARD) 40 MG tablet, Take 1 tablet by mouth twice daily., Disp: 60 tablet, Rfl: 6  •  ondansetron (ZOFRAN) 8 MG tablet, Take 1 tablet by mouth Every 6 (Six) Hours As Needed for Nausea., Disp: 20 tablet, Rfl: 3  •  Plecanatide (Trulance) 3 MG tablet, Take 1 tablet by mouth Daily., Disp: 90 tablet, Rfl: 3  •  topiramate (Topamax) 25 MG tablet, Take 1 tablet by mouth at bedtime for 1 week, and then increase to 1 tablet  twice daily., Disp: 60 tablet, Rfl: 4  •  topiramate (Topamax) 25 MG tablet, Take 1 tablet by mouth at bedtime for 1 week, and then increase to 1 tablet twice per day., Disp: 60 tablet, Rfl: 4  •  traZODone (DESYREL) 100 MG tablet, Take 1 tablet by mouth every day after a meal, Disp: 30 tablet, Rfl: 4  •  traZODone (DESYREL) 100 MG tablet, Take 1 tablet by mouth every day after a meal., Disp: 30 tablet, Rfl: 4  •  vitamin D (ERGOCALCIFEROL) 1.25 MG (46474 UT) capsule capsule, Take 1 capsule by mouth 1 (One) Time Per Week., Disp: 5 capsule, Rfl: 10  •  zolpidem (Ambien) 10 MG tablet, Take 1 tablet by mouth at bedtime., Disp: 30 tablet, Rfl: 2    Drug Interactions:   Wegovy + HCTZ may decrease the effectiveness of Saxemda  Wegovy + Nadolol may increase risk of hypoglycemic effects, and may decrease the effectiveness of Wegovy    Relevant Laboratory Values  Lab Results   Component Value Date    CHOL 230 (H) 12/14/2022    CHLPL 220 (H) 02/13/2015    TRIG 159 (H) 12/14/2022    HDL 42 12/14/2022     (H) 12/14/2022     Vaccinations:   Patient recommended to keep up with routine vaccinations.     Goals of Therapy  • Clinical Goals or Therapeutic Targets: 10% weight loss goal      Date 8/23/22 9/16/22 10/14/22 11/18/22 12/16/22   Weight (lb) 225.0 lb 214.4lb 209.4lb 202 lbs  206.4 lbs   BMI kg/ 41.11 39.21 38.29 36.94 37.74   Waist Circumference (in) 46 in 44.5 42.5 40 in 40.75 in    *switched  to Randi     Date 4/5/23   Weight (lb) 220.8   BMI kg/ 40.38   Waist Circumference (in) 42 in    *restarted Wegovy lower dose     Medication Assessment & Plan    Patient's current BMI is 40.38, which is considered Class III Obesity.     Will order Wegovy 0.25 mg once weekly.    Use with nadolol may increase hypoglycemic effects, educated patient to watch for s/sx of hypoglycemia. Patient denies any issues with hypoglycemia.    Medication Education    Wegovy® (semaglutide)  Medication Expectations   Why am I taking this medication? You are taking Wegovy for weight loss because you have excess weight and also have weight-related medical problems or obesity. It should be used along with a reduced calorie diet and increased physical activity.    What should I expect while on this medication? You should expect to lose at least 5% of your body weight after 3 months of therapy. It can also help keep weight lost off.    How does the medication work? Wegovy is an injection that addresses one of your body's natural responses to weight loss. It works like a naturally produced hormone in the body called GLP-1 that regulates appetite which can lead to eating fewer calories and losing weight. This medication also slows down food from leaving your stomach, making you feel kat for longer.   How long will I be on this medication for? The amount of time you will be on this medication will be determined by your doctor. Do not abruptly stop this medication without talking to your doctor first.    How do I take this medication? Take as directed on your prescription label. Wegovy is injected under the skin (subcutaneously) of your stomach, thigh, or upper arm. This medication should be taken once weekly and can be given with or without food. Rotate injection sites weekly. If changing the day of administration is necessary, allow at least 48 hours between 2 doses.     After removal of the pen cap, the needles will be hidden inside  the needle cover.  To begin injection, press the needle cover firmly against the skin.  Once injected, continue to press the device against the skin until the yellow bar has stopped moving.  Then, remove the needle from the skin.    What are some possible side effects? You may notice you don't feel as hungry, especially when you first start using Wegovy. Some common side effects include nausea, vomiting, diarrhea, vomiting, indigestion, constipation, tiredness, and headache. Redness, itching, and/or swelling can occur where the shot was given. You should also monitor for low blood sugar (hypoglycemia), especially if you are taking Wegovy with other medications that cause low blood sugar. Stop using Wegovy and call your doctor immediately if you have severe pain in your stomach area that will not go away as this could be a sign of pancreatitis (inflammation of your pancreas).     What happens if I miss a dose? If you miss a dose, take it as soon as you remember within 5 days. Resume usual schedule thereafter.  If > 5 days have elapsed, skip the missed dose and resume administration at the next scheduled weekly dose.      Medication Safety   What are things I should warn my doctor immediately about? Do not use Wegovy if you or a family member have ever had medullary thyroid cancer (MTC) or Multiple Endocrine Neoplasia syndrome type 2 (MEN 2). Tell your doctor if you get a lump or swelling in your neck, hoarseness, difficulty swallowing, or feel short of breath (these may be symptoms of thyroid cancer). Talk to your doctor if you have or have had problems with your pancreas, kidneys, or liver. Tell your doctor if you have problems with digesting food or slowed emptying of your stomach (gastroparesis). Talk to your doctor if you are pregnant, planning to become pregnant, or breastfeeding. Also tell your doctor if you notice any signs/symptoms of an allergic reaction (rash, hives, difficulty breathing, etc.).   What are  things that I should be cautious of? Be cautious of any side effects from this medication. Talk to your doctor if any new ones develop or aren't getting better.   What are some medications that can interact with this one? Taking Wegovy with other medications that lower your blood sugar such as insulins and glipizide/glimepiride/glyburide may increase the risk of low blood sugar. It should not be taken with other medicines called GLP-1 receptor agonists, as these work the same way as Wegovy. Because Wegovy slows stomach emptying, it can affect the way some medicines work. Always tell your doctor or pharmacist immediately if you start taking any new medications, including over-the-counter medications, vitamins, and herbal supplements.      Medication Storage/Handling   How should I handle this medication? Keep this medication out of reach of pets/children and keep the pen capped when not in use. Do not share your medicine pens with others.    How does this medication need to be stored? Store your new, unused pens in the original carton in the refrigerator. Protect it from light. Do not freeze. Prior to cap removal, the pen can be kept at room temperature up to 28 days.    How should I dispose of this medication? Used Wegovy pens should be thrown after each use. Place your used pen and needle in an approved sharps container. If you do not have a sharps container, you may use a household container made of heavy-duty plastic with a tight-fitting lid that is leak resistant (e.g., heavy-duty plastic laundry detergent bottle). If your doctor decides to stop this medication, take to your local police station for proper disposal. Some pharmacies also have take-back bins for medication drop-off.       Resources/Support   How can I remind myself to take this medication? You can download reminder apps to help you manage your refills. You may also set an alarm on your phone to remind you.    Is financial support available?  [employer] can provide co-pay cards if you have commercial insurance.    Which vaccines are recommended for me? Talk to your doctor about these vaccines: Flu, Coronavirus (COVID-19), Pneumococcal (pneumonia), Tdap, Zoster (shingles)      Discussed lifestyle modifications, including diet and exercise.     Worked with patient to create SMART Goal(s):   1. Increase water to 64 oz/day.  2. Walk 3x a week for 30 minutes    Will follow-up with patient in 4 weeks, or sooner if needed.     Thank you,    Shirely Lopez [facility]  04/05/23  08:59 EDT

## 2023-04-17 ENCOUNTER — OFFICE VISIT (OUTPATIENT)
Dept: NEUROSURGERY | Facility: CLINIC | Age: 53
End: 2023-04-17
Payer: COMMERCIAL

## 2023-04-17 VITALS
HEIGHT: 62 IN | DIASTOLIC BLOOD PRESSURE: 97 MMHG | HEART RATE: 81 BPM | BODY MASS INDEX: 40.74 KG/M2 | TEMPERATURE: 97.8 F | SYSTOLIC BLOOD PRESSURE: 119 MMHG | WEIGHT: 221.4 LBS

## 2023-04-17 DIAGNOSIS — G89.29 CHRONIC BILATERAL LOW BACK PAIN WITH LEFT-SIDED SCIATICA: Primary | ICD-10-CM

## 2023-04-17 DIAGNOSIS — M54.42 CHRONIC BILATERAL LOW BACK PAIN WITH LEFT-SIDED SCIATICA: Primary | ICD-10-CM

## 2023-04-17 PROCEDURE — 99213 OFFICE O/P EST LOW 20 MIN: CPT

## 2023-04-17 RX ORDER — METHYLPREDNISOLONE 4 MG/1
TABLET ORAL
Qty: 21 TABLET | Refills: 0 | Status: SHIPPED | OUTPATIENT
Start: 2023-04-17

## 2023-04-17 NOTE — LETTER
April 17, 2023       No Recipients    Patient: Mone Amador   YOB: 1970   Date of Visit: 4/17/2023       Dear Dr. Walden Recipients:    Thank you for referring Mone Amador to me for evaluation. Below are the relevant portions of my assessment and plan of care.    If you have questions, please do not hesitate to call me. I look forward to following Mone along with you.         Sincerely,        Po Trinidad PA-C        CC:   No Recipients    Po Trinidad PA-C  04/18/23 0949  Signed  Subjective    Patient: Mone Amador   Age, Date of Birth: 52 y.o., 1970  Sex: female    Primary Care Provider: Christopher Wu MD    Chief Complaint: Chronic low back pain radiating down left leg     History of Present Illness:  Patient is a 51-year-old female known to our practice for undergoing an L4-L5 PLIF with Dr. Kaushik Boucher on 11/30/2021.  Preoperatively she was experiencing a low back pain radiating down her right leg, postoperatively she had resolution of those symptoms and was symptom-free for some time.  She was previously evaluated by myself on 9/23/2022 where she was complaining of distal left leg numbness that would begin in her knee and radiate into her foot.  At that appointment I gave her an EMG referral, but due to some social issues she was lost to follow-up.  Patient had her EMG performed yesterday and follow-up with me.  Patient reports that she went to [place] 2 weeks ago and is now having low back pain radiating down the posterior portion of her left leg.  The pain is predominantly in her lower back.  Walking extensively and going upstairs exacerbates her pain, leaning forward and going downstairs alleviates her pain.  She currently is managing herself with ibuprofen and intermittent rest which provides modest benefit for her.  She has not been to physical therapy or pain management for her most recent reiteration of her pain.  Patient denies urinary or bowel  incontinence, saddle anesthesia.      Current Outpatient Medications   Medication Sig Dispense Refill   • cyanocobalamin 1000 MCG/ML injection Inject 1 mL into the appropriate muscle as directed by prescriber Every 14 (Fourteen) Days. 30 mL 0   • cyclobenzaprine (FLEXERIL) 10 MG tablet Take 1 tablet by mouth twice daily. (Patient taking differently: Take 1 tablet by mouth 2 (Two) Times a Day As Needed.) 60 tablet 2   • DULoxetine (Cymbalta) 60 MG capsule Take 1 capsule by mouth 2 times daily. 60 capsule 5   • DULoxetine (Cymbalta) 60 MG capsule Take 1 capsule by mouth twice daily. 60 capsule 5   • hydroCHLOROthiazide (HYDRODIURIL) 25 MG tablet Take 1 tablet by mouth every day. 30 tablet 5   • hydroCHLOROthiazide (HYDRODIURIL) 25 MG tablet Take 1 tablet by mouth every day. 30 tablet 5   • ibuprofen (ADVIL,MOTRIN) 800 MG tablet Take 1 tablet by mouth 3 times daily with food. 90 tablet 2   • LORazepam (Ativan) 0.5 MG tablet Take 1 tablet by mouth 2 times every day as needed. 60 tablet 2   • LORazepam (Ativan) 0.5 MG tablet Take 1 tablet by mouth twice daily as needed. 60 tablet 2   • nadolol (CORGARD) 40 MG tablet Take 1 tablet by mouth twice daily. 60 tablet 6   • nadolol (CORGARD) 40 MG tablet Take 1 tablet by mouth twice daily. 60 tablet 6   • Semaglutide-Weight Management (Wegovy) 0.25 MG/0.5ML solution auto-injector Inject 0.25 mg under the skin into the appropriate area as directed 1 (One) Time Per Week. 2 mL 0   • traZODone (DESYREL) 100 MG tablet Take 1 tablet by mouth every day after a meal 30 tablet 4   • traZODone (DESYREL) 100 MG tablet Take 1 tablet by mouth every day after a meal. (Patient taking differently: Take 1 tablet by mouth Every Night.) 30 tablet 4   • vitamin D (ERGOCALCIFEROL) 1.25 MG (29592 UT) capsule capsule Take 1 capsule by mouth 1 (One) Time Per Week. 5 capsule 10   • zolpidem (Ambien) 10 MG tablet Take 1 tablet by mouth at bedtime. 30 tablet 2   • methylPREDNISolone (MEDROL) 4 MG dose  pack TAKE BY MOUTH AS DIRECTED ON PACKAGE. 21 tablet 0     No current facility-administered medications for this visit.       Past Medical History:   Diagnosis Date   • Anxiety    • Arthritis    • Arthritis of back 2019   • Cholecystolithiasis    • Colon polyp 12/20   • Fatty liver    • Fibromyalgia    • Fracture, foot 7/27/22    left   • Headache    • History of COVID-19 10/06/2021   • Hypertension    • Kidney stone    • Low back pain    • Migraine    • Obese    • Wears glasses (prescription)        Review of Systems   Constitutional: Negative for activity change, appetite change, chills, diaphoresis, fatigue, fever and unexpected weight change.   HENT: Negative for congestion, dental problem, drooling, ear discharge, ear pain, facial swelling, hearing loss, mouth sores, nosebleeds, postnasal drip, rhinorrhea, sinus pressure, sinus pain, sneezing, sore throat, tinnitus, trouble swallowing and voice change.    Eyes: Negative for photophobia, pain, discharge, redness, itching and visual disturbance.   Respiratory: Negative for apnea, cough, choking, chest tightness, shortness of breath, wheezing and stridor.    Cardiovascular: Negative for chest pain, palpitations and leg swelling.   Gastrointestinal: Negative for abdominal distention, abdominal pain, anal bleeding, blood in stool, constipation, diarrhea, nausea, rectal pain and vomiting.   Endocrine: Negative for cold intolerance, heat intolerance, polydipsia, polyphagia and polyuria.   Genitourinary: Negative for decreased urine volume, difficulty urinating, dysuria, enuresis, flank pain, frequency, genital sores, hematuria and urgency.   Musculoskeletal: Positive for back pain. Negative for arthralgias, gait problem, joint swelling, myalgias, neck pain and neck stiffness.   Skin: Negative for color change, pallor, rash and wound.   Allergic/Immunologic: Negative for environmental allergies, food allergies and immunocompromised state.   Neurological: Positive for  "numbness. Negative for dizziness, tremors, seizures, syncope, facial asymmetry, speech difficulty, weakness, light-headedness and headaches.   Hematological: Negative for adenopathy. Does not bruise/bleed easily.   Psychiatric/Behavioral: Negative for agitation, behavioral problems, confusion, decreased concentration, dysphoric mood, hallucinations, self-injury, sleep disturbance and suicidal ideas. The patient is not nervous/anxious and is not hyperactive.    All other systems reviewed and are negative.      Objective    Vitals:    04/17/23 1340   BP: 119/97   BP Location: Left arm   Patient Position: Sitting   Cuff Size: Adult   Pulse: 81   Temp: 97.8 °F (36.6 °C)   TempSrc: Infrared   Weight: 100 kg (221 lb 6.4 oz)   Height: 157.5 cm (62.01\")        Physical Exam:    Physical Exam  Vitals and nursing note reviewed.   Constitutional:       Appearance: Normal appearance.   HENT:      Head: Normocephalic and atraumatic.   Musculoskeletal:      Right lower leg: No edema.      Left lower leg: No edema.        Legs:       Comments: Patient had 5 out of 5 strength with hip flexion, plantar and dorsiflexion bilaterally.    Patient's pain distribution is noted above and red.   Neurological:      Mental Status: She is alert.      Cranial Nerves: No cranial nerve deficit or facial asymmetry.      Sensory: Sensation is intact. No sensory deficit.      Motor: Motor function is intact. No weakness, tremor, atrophy, abnormal muscle tone or seizure activity.      Coordination: Romberg sign negative.      Gait: Tandem walk abnormal. Gait normal.      Deep Tendon Reflexes:      Reflex Scores:       Patellar reflexes are 2+ on the right side and 2+ on the left side.       Achilles reflexes are 2+ on the right side and 2+ on the left side.     Comments: Straight leg raise on left side reproduces numbness going down the patient's left leg, negative on the right side.  Reflexes could have been slightly hyperreflexic on the left side, " but still very comparable to the right side.  Intact vibratory and temperature sensation bilaterally.  No signs of ankle clonus bilaterally.  She was unable to ambulate on her tiptoes secondary to low back pain.  She was able to ambulate on her heels free of pain.   Psychiatric:         Mood and Affect: Mood normal.         Behavior: Behavior normal.         Thought Content: Thought content normal.         Judgment: Judgment normal.         Tobacco Use: Low Risk    • Smoking Tobacco Use: Never   • Smokeless Tobacco Use: Never   • Passive Exposure: Not on file       Class 3 Severe Obesity (BMI >=40). Obesity-related health conditions include the following: none. Obesity is unchanged. BMI is is above average; BMI management plan is completed. We discussed increasing exercise.      STEADI Fall Risk Assessment has not been completed.    Assessment & Plan     Data Review:  (All imaging is independently reviewed unless stated otherwise.)    EMG from 4/17/2023 showed a left-sided chronic L5 radiculopathy    Lab Results   Component Value Date/Time    HGBA1C 5.40 12/14/2022 08:01 AM       Tobacco Use: Low Risk    • Smoking Tobacco Use: Never   • Smokeless Tobacco Use: Never   • Passive Exposure: Not on file       Body mass index is 40.48 kg/m².    Anticoagulation review (and indication): None.    Medical Decision Making:  Patient's most recent EMG shows a chronic left-sided L5 radiculopathy, this certainly could be consistent with her history of lumbar fusion at L4-L5 for radiculopathy.  However the difficulty ambulating on her tiptoes combined with her pain distribution I am concerned for a new L5-S1 disc herniation resulting in an S1 radiculopathy.  Patient's past history of fusion number also makes me concerned for adjacent level disease.  As such I have ordered an updated MRI of her lumbar spine with and without contrast.  I also have ordered x-ray flexion-extension films to evaluate her hardware and assess for  spondylolisthesis.  Patient's pain is still relatively acute in nature and as such I have given her a Medrol Dosepak in the hopes of managing her medically.  Patient will follow-up with Dr. Kaushik Boucher upon completion of her MRI of her lumbar spine for further treatment recommendations.  Patient encouraged to contact us if she has any changes in her condition or any concerns.     Diagnosis Plan   1. Chronic bilateral low back pain with left-sided sciatica  MRI Lumbar Spine With & Without Contrast    XR Spine Lumbar AP & Lateral With Flex & Ext      2. Body mass index (BMI) of 40.0 to 44.9 in adult             Electronically signed and dated:    Po Trinidad PA-C on 09:48 EDT 04/18/23

## 2023-04-17 NOTE — PROGRESS NOTES
Subjective   Patient: Mone Amador   Age, Date of Birth: 52 y.o., 1970  Sex: female    Primary Care Provider: Christopher Wu MD    Chief Complaint: Chronic low back pain radiating down left leg     History of Present Illness:  Patient is a 51-year-old female known to our practice for undergoing an L4-L5 PLIF with Dr. Kaushik Boucher on 11/30/2021.  Preoperatively she was experiencing a low back pain radiating down her right leg, postoperatively she had resolution of those symptoms and was symptom-free for some time.  She was previously evaluated by myself on 9/23/2022 where she was complaining of distal left leg numbness that would begin in her knee and radiate into her foot.  At that appointment I gave her an EMG referral, but due to some social issues she was lost to follow-up.  Patient had her EMG performed yesterday and follow-up with me.  Patient reports that she went to [place] 2 weeks ago and is now having low back pain radiating down the posterior portion of her left leg.  The pain is predominantly in her lower back.  Walking extensively and going upstairs exacerbates her pain, leaning forward and going downstairs alleviates her pain.  She currently is managing herself with ibuprofen and intermittent rest which provides modest benefit for her.  She has not been to physical therapy or pain management for her most recent reiteration of her pain.  Patient denies urinary or bowel incontinence, saddle anesthesia.      Current Outpatient Medications   Medication Sig Dispense Refill   • cyanocobalamin 1000 MCG/ML injection Inject 1 mL into the appropriate muscle as directed by prescriber Every 14 (Fourteen) Days. 30 mL 0   • cyclobenzaprine (FLEXERIL) 10 MG tablet Take 1 tablet by mouth twice daily. (Patient taking differently: Take 1 tablet by mouth 2 (Two) Times a Day As Needed.) 60 tablet 2   • DULoxetine (Cymbalta) 60 MG capsule Take 1 capsule by mouth 2 times daily. 60 capsule 5   •  DULoxetine (Cymbalta) 60 MG capsule Take 1 capsule by mouth twice daily. 60 capsule 5   • hydroCHLOROthiazide (HYDRODIURIL) 25 MG tablet Take 1 tablet by mouth every day. 30 tablet 5   • hydroCHLOROthiazide (HYDRODIURIL) 25 MG tablet Take 1 tablet by mouth every day. 30 tablet 5   • ibuprofen (ADVIL,MOTRIN) 800 MG tablet Take 1 tablet by mouth 3 times daily with food. 90 tablet 2   • LORazepam (Ativan) 0.5 MG tablet Take 1 tablet by mouth 2 times every day as needed. 60 tablet 2   • LORazepam (Ativan) 0.5 MG tablet Take 1 tablet by mouth twice daily as needed. 60 tablet 2   • nadolol (CORGARD) 40 MG tablet Take 1 tablet by mouth twice daily. 60 tablet 6   • nadolol (CORGARD) 40 MG tablet Take 1 tablet by mouth twice daily. 60 tablet 6   • Semaglutide-Weight Management (Wegovy) 0.25 MG/0.5ML solution auto-injector Inject 0.25 mg under the skin into the appropriate area as directed 1 (One) Time Per Week. 2 mL 0   • traZODone (DESYREL) 100 MG tablet Take 1 tablet by mouth every day after a meal 30 tablet 4   • traZODone (DESYREL) 100 MG tablet Take 1 tablet by mouth every day after a meal. (Patient taking differently: Take 1 tablet by mouth Every Night.) 30 tablet 4   • vitamin D (ERGOCALCIFEROL) 1.25 MG (22969 UT) capsule capsule Take 1 capsule by mouth 1 (One) Time Per Week. 5 capsule 10   • zolpidem (Ambien) 10 MG tablet Take 1 tablet by mouth at bedtime. 30 tablet 2   • methylPREDNISolone (MEDROL) 4 MG dose pack TAKE BY MOUTH AS DIRECTED ON PACKAGE. 21 tablet 0     No current facility-administered medications for this visit.       Past Medical History:   Diagnosis Date   • Anxiety    • Arthritis    • Arthritis of back 2019   • Cholecystolithiasis    • Colon polyp 12/20   • Fatty liver    • Fibromyalgia    • Fracture, foot 7/27/22    left   • Headache    • History of COVID-19 10/06/2021   • Hypertension    • Kidney stone    • Low back pain    • Migraine    • Obese    • Wears glasses (prescription)        Review of  Systems   Constitutional: Negative for activity change, appetite change, chills, diaphoresis, fatigue, fever and unexpected weight change.   HENT: Negative for congestion, dental problem, drooling, ear discharge, ear pain, facial swelling, hearing loss, mouth sores, nosebleeds, postnasal drip, rhinorrhea, sinus pressure, sinus pain, sneezing, sore throat, tinnitus, trouble swallowing and voice change.    Eyes: Negative for photophobia, pain, discharge, redness, itching and visual disturbance.   Respiratory: Negative for apnea, cough, choking, chest tightness, shortness of breath, wheezing and stridor.    Cardiovascular: Negative for chest pain, palpitations and leg swelling.   Gastrointestinal: Negative for abdominal distention, abdominal pain, anal bleeding, blood in stool, constipation, diarrhea, nausea, rectal pain and vomiting.   Endocrine: Negative for cold intolerance, heat intolerance, polydipsia, polyphagia and polyuria.   Genitourinary: Negative for decreased urine volume, difficulty urinating, dysuria, enuresis, flank pain, frequency, genital sores, hematuria and urgency.   Musculoskeletal: Positive for back pain. Negative for arthralgias, gait problem, joint swelling, myalgias, neck pain and neck stiffness.   Skin: Negative for color change, pallor, rash and wound.   Allergic/Immunologic: Negative for environmental allergies, food allergies and immunocompromised state.   Neurological: Positive for numbness. Negative for dizziness, tremors, seizures, syncope, facial asymmetry, speech difficulty, weakness, light-headedness and headaches.   Hematological: Negative for adenopathy. Does not bruise/bleed easily.   Psychiatric/Behavioral: Negative for agitation, behavioral problems, confusion, decreased concentration, dysphoric mood, hallucinations, self-injury, sleep disturbance and suicidal ideas. The patient is not nervous/anxious and is not hyperactive.    All other systems reviewed and are  "negative.      Objective   Vitals:    04/17/23 1340   BP: 119/97   BP Location: Left arm   Patient Position: Sitting   Cuff Size: Adult   Pulse: 81   Temp: 97.8 °F (36.6 °C)   TempSrc: Infrared   Weight: 100 kg (221 lb 6.4 oz)   Height: 157.5 cm (62.01\")        Physical Exam:    Physical Exam  Vitals and nursing note reviewed.   Constitutional:       Appearance: Normal appearance.   HENT:      Head: Normocephalic and atraumatic.   Musculoskeletal:      Right lower leg: No edema.      Left lower leg: No edema.        Legs:       Comments: Patient had 5 out of 5 strength with hip flexion, plantar and dorsiflexion bilaterally.    Patient's pain distribution is noted above and red.   Neurological:      Mental Status: She is alert.      Cranial Nerves: No cranial nerve deficit or facial asymmetry.      Sensory: Sensation is intact. No sensory deficit.      Motor: Motor function is intact. No weakness, tremor, atrophy, abnormal muscle tone or seizure activity.      Coordination: Romberg sign negative.      Gait: Tandem walk abnormal. Gait normal.      Deep Tendon Reflexes:      Reflex Scores:       Patellar reflexes are 2+ on the right side and 2+ on the left side.       Achilles reflexes are 2+ on the right side and 2+ on the left side.     Comments: Straight leg raise on left side reproduces numbness going down the patient's left leg, negative on the right side.  Reflexes could have been slightly hyperreflexic on the left side, but still very comparable to the right side.  Intact vibratory and temperature sensation bilaterally.  No signs of ankle clonus bilaterally.  She was unable to ambulate on her tiptoes secondary to low back pain.  She was able to ambulate on her heels free of pain.   Psychiatric:         Mood and Affect: Mood normal.         Behavior: Behavior normal.         Thought Content: Thought content normal.         Judgment: Judgment normal.         Tobacco Use: Low Risk    • Smoking Tobacco Use: Never "   • Smokeless Tobacco Use: Never   • Passive Exposure: Not on file       Class 3 Severe Obesity (BMI >=40). Obesity-related health conditions include the following: none. Obesity is unchanged. BMI is is above average; BMI management plan is completed. We discussed increasing exercise.      STEADI Fall Risk Assessment has not been completed.    Assessment & Plan     Data Review:  (All imaging is independently reviewed unless stated otherwise.)    EMG from 4/17/2023 showed a left-sided chronic L5 radiculopathy    Lab Results   Component Value Date/Time    HGBA1C 5.40 12/14/2022 08:01 AM       Tobacco Use: Low Risk    • Smoking Tobacco Use: Never   • Smokeless Tobacco Use: Never   • Passive Exposure: Not on file       Body mass index is 40.48 kg/m².    Anticoagulation review (and indication): None.    Medical Decision Making:  Patient's most recent EMG shows a chronic left-sided L5 radiculopathy, this certainly could be consistent with her history of lumbar fusion at L4-L5 for radiculopathy.  However the difficulty ambulating on her tiptoes combined with her pain distribution I am concerned for a new L5-S1 disc herniation resulting in an S1 radiculopathy.  Patient's past history of fusion number also makes me concerned for adjacent level disease.  As such I have ordered an updated MRI of her lumbar spine with and without contrast.  I also have ordered x-ray flexion-extension films to evaluate her hardware and assess for spondylolisthesis.  Patient's pain is still relatively acute in nature and as such I have given her a Medrol Dosepak in the hopes of managing her medically.  Patient will follow-up with Dr. Kaushik Boucher upon completion of her MRI of her lumbar spine for further treatment recommendations.  Patient encouraged to contact us if she has any changes in her condition or any concerns.     Diagnosis Plan   1. Chronic bilateral low back pain with left-sided sciatica  MRI Lumbar Spine With & Without Contrast     XR Spine Lumbar AP & Lateral With Flex & Ext      2. Body mass index (BMI) of 40.0 to 44.9 in adult             Electronically signed and dated:    Po Trinidad PA-C on 09:48 EDT 04/18/23

## 2023-04-23 ENCOUNTER — HOSPITAL ENCOUNTER (OUTPATIENT)
Dept: MRI IMAGING | Facility: HOSPITAL | Age: 53
Discharge: HOME | End: 2023-04-23
Payer: COMMERCIAL

## 2023-04-23 DIAGNOSIS — M54.42 CHRONIC BILATERAL LOW BACK PAIN WITH LEFT-SIDED SCIATICA: ICD-10-CM

## 2023-04-23 DIAGNOSIS — G89.29 CHRONIC BILATERAL LOW BACK PAIN WITH LEFT-SIDED SCIATICA: ICD-10-CM

## 2023-04-23 PROCEDURE — A9575 INJ GADOTERATE MEGLUMI 0.1ML: HCPCS

## 2023-04-23 PROCEDURE — 25010000002 GADOTERATE MEGLUMINE 10 MMOL/20ML SOLUTION

## 2023-04-23 PROCEDURE — 72158 MRI LUMBAR SPINE W/O & W/DYE: CPT

## 2023-04-23 PROCEDURE — 72158 MRI LUMBAR SPINE W/O & W/DYE: CPT | Performed by: RADIOLOGY

## 2023-04-23 RX ORDER — GADOTERATE MEGLUMINE 376.9 MG/ML
19 INJECTION INTRAVENOUS
Status: COMPLETED | OUTPATIENT
Start: 2023-04-23 | End: 2023-04-23

## 2023-04-23 RX ADMIN — GADOTERATE MEGLUMINE 19 ML: 376.9 INJECTION, SOLUTION INTRAVENOUS at 09:19

## 2023-05-01 ENCOUNTER — OFFICE VISIT (OUTPATIENT)
Dept: NEUROSURGERY | Facility: CLINIC | Age: 53
End: 2023-05-01
Payer: COMMERCIAL

## 2023-05-01 ENCOUNTER — HOSPITAL ENCOUNTER (OUTPATIENT)
Dept: GENERAL RADIOLOGY | Facility: HOSPITAL | Age: 53
Discharge: HOME | End: 2023-05-01
Payer: COMMERCIAL

## 2023-05-01 VITALS — BODY MASS INDEX: 40.04 KG/M2 | HEIGHT: 62 IN | TEMPERATURE: 98.4 F | WEIGHT: 217.6 LBS

## 2023-05-01 DIAGNOSIS — G89.29 CHRONIC BILATERAL LOW BACK PAIN WITH LEFT-SIDED SCIATICA: Primary | ICD-10-CM

## 2023-05-01 DIAGNOSIS — I10 PRIMARY HYPERTENSION: ICD-10-CM

## 2023-05-01 DIAGNOSIS — E66.01 CLASS 2 SEVERE OBESITY DUE TO EXCESS CALORIES WITH SERIOUS COMORBIDITY AND BODY MASS INDEX (BMI) OF 39.0 TO 39.9 IN ADULT: ICD-10-CM

## 2023-05-01 DIAGNOSIS — Z98.1 S/P LUMBAR FUSION: ICD-10-CM

## 2023-05-01 DIAGNOSIS — M54.42 CHRONIC BILATERAL LOW BACK PAIN WITH LEFT-SIDED SCIATICA: Primary | ICD-10-CM

## 2023-05-01 PROCEDURE — 72110 X-RAY EXAM L-2 SPINE 4/>VWS: CPT

## 2023-05-01 PROCEDURE — 72110 X-RAY EXAM L-2 SPINE 4/>VWS: CPT | Performed by: RADIOLOGY

## 2023-05-01 RX ORDER — DOXYCYCLINE 100 MG/1
TABLET ORAL
COMMUNITY

## 2023-05-01 RX ORDER — SEMAGLUTIDE 2.4 MG/.75ML
INJECTION, SOLUTION SUBCUTANEOUS
COMMUNITY
End: 2023-05-03

## 2023-05-01 RX ORDER — FLUCONAZOLE 150 MG/1
TABLET ORAL
COMMUNITY

## 2023-05-01 RX ORDER — PLECANATIDE 3 MG/1
TABLET ORAL
COMMUNITY

## 2023-05-01 RX ORDER — ONDANSETRON HYDROCHLORIDE 8 MG/1
TABLET, FILM COATED ORAL
COMMUNITY

## 2023-05-01 RX ORDER — LIRAGLUTIDE 6 MG/ML
INJECTION, SOLUTION SUBCUTANEOUS
COMMUNITY
End: 2023-05-03

## 2023-05-01 RX ORDER — TOPIRAMATE 25 MG/1
TABLET ORAL
COMMUNITY
Start: 2023-01-24

## 2023-05-01 RX ORDER — TRAMADOL HYDROCHLORIDE 50 MG/1
TABLET ORAL
COMMUNITY

## 2023-05-01 NOTE — PROGRESS NOTES
Subjective     Chief Complaint: Recurrent low back pain and left leg pain following PLIF    Patient ID: Mone Amador is a 52 y.o. female is here today for follow-up.    History of Present Illness    This is a 52-year-old woman in whom I performed an L4-5 PLIF several years ago.  She represented to my clinic with worsening low back pain and new radiating pain into her left leg.  Her pain distribution in the left leg is what sounds like an S1 distribution.  She has not tried any physical therapy or conservative treatment.    The following portions of the patient's history were reviewed and updated as appropriate: allergies, current medications, past family history, past medical history, past social history, past surgical history and problem list.    Family history:   Family History   Problem Relation Age of Onset   • Heart disease Mother    • Hypertension Mother    • Stroke Mother    • Anxiety disorder Mother    • Arthritis Mother    • COPD Mother    • Thyroid disease Father    • Glaucoma Father    • Hearing loss Father    • Heart disease Father    • Vision loss Father    • Multiple sclerosis Sister    • No Known Problems Brother    • No Known Problems Son    • No Known Problems Sister    • No Known Problems Son    • Breast cancer Neg Hx        Social history:   Social History     Socioeconomic History   • Marital status:    Tobacco Use   • Smoking status: Never   • Smokeless tobacco: Never   Vaping Use   • Vaping Use: Never used   Substance and Sexual Activity   • Alcohol use: No   • Drug use: No   • Sexual activity: Defer     Partners: Male     Birth control/protection: Hysterectomy     Comment: Hysterectomy       Review of Systems   Constitutional: Negative for activity change, appetite change, chills, diaphoresis, fatigue, fever and unexpected weight change.   HENT: Negative for congestion, dental problem, drooling, ear discharge, ear pain, facial swelling, hearing loss, mouth sores, nosebleeds,  "postnasal drip, rhinorrhea, sinus pressure, sinus pain, sneezing, sore throat, tinnitus, trouble swallowing and voice change.    Eyes: Negative for photophobia, pain, discharge, redness, itching and visual disturbance.   Respiratory: Negative for apnea, cough, choking, chest tightness, shortness of breath, wheezing and stridor.    Cardiovascular: Negative for chest pain, palpitations and leg swelling.   Gastrointestinal: Negative for abdominal distention, abdominal pain, anal bleeding, blood in stool, constipation, diarrhea, nausea, rectal pain and vomiting.   Endocrine: Negative for cold intolerance, heat intolerance, polydipsia, polyphagia and polyuria.   Genitourinary: Negative for decreased urine volume, difficulty urinating, dysuria, enuresis, flank pain, frequency, genital sores, hematuria and urgency.   Musculoskeletal: Positive for back pain. Negative for arthralgias, gait problem, joint swelling, myalgias, neck pain and neck stiffness.   Skin: Negative for color change, pallor, rash and wound.   Allergic/Immunologic: Negative for environmental allergies, food allergies and immunocompromised state.   Neurological: Positive for numbness. Negative for dizziness, tremors, seizures, syncope, facial asymmetry, speech difficulty, weakness, light-headedness and headaches.   Hematological: Negative for adenopathy. Does not bruise/bleed easily.   Psychiatric/Behavioral: Negative for agitation, behavioral problems, confusion, decreased concentration, dysphoric mood, hallucinations, self-injury, sleep disturbance and suicidal ideas. The patient is not nervous/anxious and is not hyperactive.    All other systems reviewed and are negative.      Objective   Temperature 98.4 °F (36.9 °C), height 157.5 cm (62\"), weight 98.7 kg (217 lb 9.6 oz).  Body mass index is 39.8 kg/m².    Physical Exam  Constitutional:       General: She is not in acute distress.     Appearance: She is well-developed. She is not diaphoretic.   HENT: "      Head: Normocephalic and atraumatic.   Pulmonary:      Effort: Pulmonary effort is normal.   Musculoskeletal:        Legs:    Skin:     General: Skin is warm and dry.   Neurological:      Mental Status: She is alert and oriented to person, place, and time.      Cranial Nerves: No cranial nerve deficit.         Assessment & Plan     Independent Review of Radiographic Studies:      Available for my review is a MRI of the lumbar spine which was performed on April 23, 2023.  A comparison study from prior to her surgery on 7/13/2021 is also available for my review.  Flexion-extension films which were performed earlier today are also available.  There is no evidence of inducible listhesis at L4-5.  There is mild, bordering on moderate adjacent level disease at L3-4 with some spurring of the facet joints and mild, bordering on moderate lateral recess stenosis.  This is worse in comparison to the study from 2021.  There is a new, broad-based, paracentral disc extrusion at L5-S1 which when superimposed on the facet arthropathy at L5-S1 does cause moderate lateral recess stenosis, slightly worse on the left as compared to the right.    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  There is no evidence of pseudoarthrosis or hardware failure.  I think she probably does have an S1 radiculopathy from the disc bulge at L5-S1.  I would like for her to try some physical therapy and pain management.  If this fails to address her symptoms then we could entertain the possibility of a PLIF extension although this would probably would need to be L3-S1.  I will follow-up with her in about 8 weeks, or sooner if clinically indicated.    Diagnoses and all orders for this visit:    1. Chronic bilateral low back pain with left-sided sciatica (Primary)  -     Ambulatory Referral to Physical Therapy Evaluate and treat, POST OP; Soft Tissue Mobilizaton; Strengthening,  Stretching  -     Ambulatory Referral to Pain Management    2. S/P lumbar fusion  -     Ambulatory Referral to Physical Therapy Evaluate and treat, POST OP; Soft Tissue Mobilizaton; Strengthening, Stretching  -     Ambulatory Referral to Pain Management    3. Class 2 severe obesity due to excess calories with serious comorbidity and body mass index (BMI) of 39.0 to 39.9 in adult    4. Primary hypertension        No follow-ups on file.           This document signed by HARISH Boucher MD May 1, 2023 12:20 EDT

## 2023-05-02 ENCOUNTER — TELEPHONE (OUTPATIENT)
Dept: PAIN MEDICINE | Facility: CLINIC | Age: 53
End: 2023-05-02
Payer: COMMERCIAL

## 2023-05-02 NOTE — TELEPHONE ENCOUNTER
Caller: BRANDT    Relationship to patient: SELF    Best call back number: [phone]    Chief complaint: Chronic bilateral low back pain with left-sided sciatica, S/P lumbar fusion     Type of visit: FOLLOW UP    Requested date: BEFORE July 14TH

## 2023-05-03 ENCOUNTER — DISEASE STATE MANAGEMENT VISIT (OUTPATIENT)
Dept: PHARMACY | Facility: HOSPITAL | Age: 53
End: 2023-05-03
Payer: COMMERCIAL

## 2023-05-03 VITALS
DIASTOLIC BLOOD PRESSURE: 80 MMHG | SYSTOLIC BLOOD PRESSURE: 112 MMHG | HEART RATE: 91 BPM | WEIGHT: 219.4 LBS | HEIGHT: 62 IN | BODY MASS INDEX: 40.37 KG/M2

## 2023-05-03 RX ORDER — SEMAGLUTIDE 0.5 MG/.5ML
0.5 INJECTION, SOLUTION SUBCUTANEOUS WEEKLY
Qty: 2 ML | Refills: 0 | Status: SHIPPED | OUTPATIENT
Start: 2023-05-03

## 2023-05-03 NOTE — PROGRESS NOTES
" [facility]  Weight Management Program      Mone Amador is a 52 y.o. female referred to the [facility] by Dr. Naik for clinical pharmacy and specialty pharmacy management of Wegovy for weight management. Mone Amador has previously tried Mounjaro, Saxenda, Ozempic, and calorie reduction for weight loss. Saxenda was not successful for the patient and did not suppress her appetite. She stopped the Saxenda about 1 month prior to starting Mounjaro, reporting difficulty with remembering to take injection daily. She had success with Ozempic in the past. Patient reported previous intolerance to Wegovy was when she was switched from Mounjaro to high dose Wegovy 1.7 mg and did not handle the transition (lower doses were not available at the time). The patient denies a personal history or family history of thyroid cancer, denies a personal history of pancreatitis, and denies a diagnosis of gastroparesis.     Patient is currently on Wegovy 0.25 mg. Patient reports tolerating the medication well without any side effects. Patient denies any missed doses or trouble giving the injection. Patient did report that she did have some medication \"on her skin\" after one injection and maybe didn't leave it on as long as she should have. Patient reports appetite control with sweets even on the low dose Wegovy. Patient would like to increase in titration today.         Relevant Past Medical History and Co-morbidities  Past Medical History:   Diagnosis Date   • Anxiety    • Arthritis    • Arthritis of back 2019   • Cholecystolithiasis    • Colon polyp 12/20   • Fatty liver    • Fibromyalgia    • Fracture, foot 7/27/22    left   • Headache    • History of COVID-19 10/06/2021   • Hypertension    • Kidney stone    • Low back pain    • Migraine    • Obese    • Wears glasses (prescription)      Social History     Socioeconomic History   • Marital status:    Tobacco Use   • Smoking " status: Never   • Smokeless tobacco: Never   Vaping Use   • Vaping Use: Never used   Substance and Sexual Activity   • Alcohol use: No   • Drug use: No   • Sexual activity: Defer     Partners: Male     Birth control/protection: Hysterectomy     Comment: Hysterectomy       Allergies  Latex and Morphine and related    Current Medication List    Current Outpatient Medications:   •  cyanocobalamin 1000 MCG/ML injection, Inject 1 mL into the appropriate muscle as directed by prescriber Every 14 (Fourteen) Days., Disp: 30 mL, Rfl: 0  •  cyclobenzaprine (FLEXERIL) 10 MG tablet, Take 1 tablet by mouth twice daily., Disp: 60 tablet, Rfl: 2  •  doxycycline (ADOXA) 100 MG tablet, doxycycline monohydrate 100 mg tablet, Disp: , Rfl:   •  DULoxetine (Cymbalta) 60 MG capsule, Take 1 capsule by mouth 2 times daily. (Patient not taking: Reported on 5/1/2023), Disp: 60 capsule, Rfl: 5  •  fluconazole (DIFLUCAN) 150 MG tablet, fluconazole 150 mg tablet, Disp: , Rfl:   •  hydroCHLOROthiazide (HYDRODIURIL) 25 MG tablet, Take 1 tablet by mouth every day., Disp: 30 tablet, Rfl: 5  •  ibuprofen (ADVIL,MOTRIN) 800 MG tablet, Take 1 tablet by mouth 3 times daily with food., Disp: 90 tablet, Rfl: 2  •  Liraglutide (Saxenda) 18 MG/3ML injection pen, Saxenda 3 mg/0.5 mL (18 mg/3 mL) subcutaneous pen injector, Disp: , Rfl:   •  LORazepam (Ativan) 0.5 MG tablet, Take 1 tablet by mouth 2 times every day as needed. (Patient not taking: Reported on 5/1/2023), Disp: 60 tablet, Rfl: 2  •  methylPREDNISolone (MEDROL) 4 MG dose pack, TAKE BY MOUTH AS DIRECTED ON PACKAGE., Disp: 21 tablet, Rfl: 0  •  nadolol (CORGARD) 40 MG tablet, Take 1 tablet by mouth twice daily. (Patient not taking: Reported on 5/1/2023), Disp: 60 tablet, Rfl: 6  •  ondansetron (ZOFRAN) 8 MG tablet, ondansetron HCl 8 mg tablet, Disp: , Rfl:   •  Plecanatide (Trulance) 3 MG tablet, Trulance 3 mg tablet, Disp: , Rfl:   •  Semaglutide-Weight Management (Wegovy) 2.4 MG/0.75ML solution  "auto-injector, Wegovy 2.4 mg/0.75 mL subcutaneous pen injector, Disp: , Rfl:   •  topiramate (TOPAMAX) 25 MG tablet, topiramate 25 mg tablet, Disp: , Rfl:   •  traMADol (ULTRAM) 50 MG tablet, tramadol 50 mg tablet, Disp: , Rfl:   •  traZODone (DESYREL) 100 MG tablet, Take 1 tablet by mouth every day after a meal (Patient not taking: Reported on 5/1/2023), Disp: 30 tablet, Rfl: 4  •  vitamin D (ERGOCALCIFEROL) 1.25 MG (01166 UT) capsule capsule, Take 1 capsule by mouth 1 (One) Time Per Week., Disp: 5 capsule, Rfl: 10  •  zolpidem (Ambien) 10 MG tablet, Take 1 tablet by mouth at bedtime., Disp: 30 tablet, Rfl: 2  No current facility-administered medications for this visit.    Drug Interactions:   Wegovy + HCTZ may decrease the effectiveness of Saxenda  Wegovy + Nadolol may increase risk of hypoglycemic effects, and may decrease the effectiveness of Wegovy    Relevant Laboratory Values  Lab Results   Component Value Date    CHOL 230 (H) 12/14/2022    CHLPL 220 (H) 02/13/2015    TRIG 159 (H) 12/14/2022    HDL 42 12/14/2022     (H) 12/14/2022     Vaccinations:   Patient recommended to keep up with routine vaccinations.     Goals of Therapy  • Clinical Goals or Therapeutic Targets: 10% weight loss goal      Date 8/23/22 9/16/22 10/14/22 11/18/22 12/16/22   Weight (lb) 225.0 lb 214.4lb 209.4lb 202 lbs  206.4 lbs   BMI kg/ 41.11 39.21 38.29 36.94 37.74   Waist Circumference (in) 46 in 44.5 42.5 40 in 40.75 in    *switched to Saxenda     Date 4/5/23 5/3/23   Weight (lb) 220.8 219.4 lbs   BMI kg/ 40.38 40.13   Waist Circumference (in) 42 in    *restarted Wegovy lower dose 45.5\"     Medication Assessment & Plan    Patient's current BMI is 40.13, which is considered Class III Obesity. Patient has lost 0.6 lbs and is not showing a loss of inches today. Patient congratulated on success thus far!    Will order Wegovy 0.5 mg once weekly.    Use with nadolol may increase hypoglycemic effects, educated patient to watch for " s/sx of hypoglycemia. Patient denies any issues with hypoglycemia.    Discussed lifestyle modifications, including diet and exercise.     Worked with patient to create SMART Goal(s):  Continue to target these goals   1. Increase water to 64 oz/day.  2. Walk 3x a week for 30 minutes    Will follow-up with patient in 4 weeks, or sooner if needed.     Thank you,    Shirley Bland. William, Joni  05/03/23  15:43 EDT

## 2023-05-03 NOTE — TELEPHONE ENCOUNTER
Spoke with pt and advised that we don't have anything any sooner than July 14th prior to Dr. Boucher. I advised unfortunately we're booked out. Pt understood. No further needs expressed.

## 2023-05-15 ENCOUNTER — TELEPHONE (OUTPATIENT)
Dept: NEUROSURGERY | Facility: CLINIC | Age: 53
End: 2023-05-15
Payer: COMMERCIAL

## 2023-05-15 ENCOUNTER — TREATMENT (OUTPATIENT)
Dept: PHYSICAL THERAPY | Facility: CLINIC | Age: 53
End: 2023-05-15
Payer: COMMERCIAL

## 2023-05-15 DIAGNOSIS — M43.16 SPONDYLOLISTHESIS OF LUMBAR REGION: ICD-10-CM

## 2023-05-15 DIAGNOSIS — Z98.1 S/P LUMBAR FUSION: ICD-10-CM

## 2023-05-15 DIAGNOSIS — G89.29 CHRONIC RIGHT-SIDED LOW BACK PAIN WITH RIGHT-SIDED SCIATICA: ICD-10-CM

## 2023-05-15 DIAGNOSIS — M54.42 CHRONIC BILATERAL LOW BACK PAIN WITH LEFT-SIDED SCIATICA: Primary | ICD-10-CM

## 2023-05-15 DIAGNOSIS — M54.41 CHRONIC RIGHT-SIDED LOW BACK PAIN WITH RIGHT-SIDED SCIATICA: ICD-10-CM

## 2023-05-15 DIAGNOSIS — G89.29 CHRONIC BILATERAL LOW BACK PAIN WITH LEFT-SIDED SCIATICA: Primary | ICD-10-CM

## 2023-05-15 PROCEDURE — 97014 ELECTRIC STIMULATION THERAPY: CPT | Performed by: PHYSICAL THERAPIST

## 2023-05-15 PROCEDURE — 97110 THERAPEUTIC EXERCISES: CPT | Performed by: PHYSICAL THERAPIST

## 2023-05-15 PROCEDURE — 97162 PT EVAL MOD COMPLEX 30 MIN: CPT | Performed by: PHYSICAL THERAPIST

## 2023-05-15 NOTE — TELEPHONE ENCOUNTER
I have called the patient and let her know that the PM referral will be mailed to [facility]. She was thankful for the call back.

## 2023-05-15 NOTE — TELEPHONE ENCOUNTER
Caller: Mone Amador    Relationship: Self    Best call back number: [phone]    What specialty or service is being requested: PAIN MANAGEMENT    What is the provider, practice or medical service name: [facility]    What is the office location:  [address] [place] [address]    What is the office phone number: [phone]    Any additional details: PATIENT CALLED IN AND STATES SHE WAS GIVEN A COUPLE OPTIONS TO GO TO FOR PAIN MANAGEMENT.  SHE HAS DECIDED WITH [facility] IN [place].  PLEASE SEND NEW REFERRAL.    THANK YOU!

## 2023-05-15 NOTE — PROGRESS NOTES
Physical Therapy Initial Evaluation and Plan of Care    Patient: Mone Amador   : 1970  Diagnosis/ICD-10 Code:  Chronic bilateral low back pain with left-sided sciatica [M54.42, G89.29]  Referring practitioner: Marquis Boucher*  Date of Initial Visit: 5/15/2023  Today's Date: 5/15/2023  Patient seen for 1 session         Visit Diagnoses:    ICD-10-CM ICD-9-CM   1. Chronic bilateral low back pain with left-sided sciatica  M54.42 724.2    G89.29 724.3     338.29   2. S/P lumbar fusion  Z98.1 V45.4         Subjective Questionnaire: Oswestry: =24% impaired      Subjective Evaluation    History of Present Illness  Onset date: 8-9 months.  Mechanism of injury: Patient reports that she has been experiencing low back pain for approximately 8-9 months.  She is unable to recall a specific mechanism of injury, but notes that she has a history of chronic low back pain; she notes a history of L4-5 fusion in 2021.  Patient reports that she experiences numbness/tingling in the left LE, which extends to the first 2 toes.  Patient denies any changes in bowel/bladder function.  Patient estimates a sitting tolerance of 1 hour, standing tolerance of 15-20 minutes, and walking tolerance of 20 minutes.      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    Shoulders  Rounded.    Lumbar Spine   Decreased lordosis.     Comments  Left lateral  shift (hips to the right, shoulders to the left)      Palpation   Left   Muscle spasm in the erector spinae, lumbar paraspinals and quadratus lumborum.   Tenderness of the erector spinae, lumbar paraspinals and quadratus lumborum.     Right   Muscle spasm in the erector spinae, lumbar paraspinals and quadratus lumborum. Tenderness of the erector spinae, lumbar paraspinals and quadratus lumborum.     Tenderness     Lumbar Spine  Tenderness in the spinous process.     Left Hip   Tenderness in the PSIS.     Active Range of Motion     Lumbar   Flexion: Active lumbar flexion: 75%   Extension: Active lumbar extension: 10%   Left rotation: Active left lumbar rotation: 25%   Right rotation: Active right lumbar rotation: 50%     Strength/Myotome Testing     Left Hip   Planes of Motion   Flexion: 4-  Abduction: 4-  Adduction: 4-    Right Hip   Planes of Motion   Flexion: 4  Abduction: 4  Adduction: 4-    Left Knee   Flexion: 4-  Extension: 4-    Right Knee   Flexion: 4  Extension: 4    Tests       Thoracic   Positive slump.     Additional Tests Details  Repeated Flexion/Extension Test: centralization with extension          Assessment & Plan     Assessment  Impairments: abnormal gait, abnormal or restricted ROM, activity intolerance, impaired physical strength, lacks appropriate home exercise program and pain with function  Functional Limitations: lifting, sleeping, walking, pulling, pushing, uncomfortable because of pain, moving in bed, sitting, standing, stooping and unable to perform repetitive tasks  Assessment details: Patient is a 52 year old female who comes to physical therapy for low back pain. The patient presents with increased pain, decreased lumbar ROM, and decreased bilateral LE strength. Positive special tests included Slump Test, which indicates adverse neural tension; patient displays an extension preference during Repeated Flexion/Extension Test.  Patient reports a 24% functional mobility impairment, based  on the patient's response to the Oswestry.  Patient will benefit from skilled PT, so that patient can achieve maximum level of function.     Prognosis: good    Goals  Plan Goals: SHORT TERM GOALS:     4 weeks  1. Patient will be independent/compliant with HEP.  2. Patient will report pain no greater than 6/10 when performing self-care activities.  3. Patient will report pain no greater than 6/10 when sitting to travel community distances.  4. Patient will be educated in proper lifting mechanics for improved back safety.    LONG TERM GOALS:   8 weeks  1. Patient will show a 25% improvement of lumbar AROM to show improved ability to perform functional activities.  2. Bilateral LE strength will improve to at least 4+/5 to allow for greater ease with daily tasks.  3. Patient to report less than 10% impairment on the Modified Oswestry for improved functional mobility.  4. Patient to report 50% decrease of pain/numbness/tingling into the left leg to show decreased nerve compression.          History # of Personal Factors and/or Comorbidities: MODERATE (1-2)  Examination of Body System(s): # of elements: MODERATE (3)  Clinical Presentation: STABLE   Clinical Decision Making: MODERATE      Timed:         Manual Therapy:         mins  20234;     Therapeutic Exercise:    13     mins  12602;     Neuromuscular Latoya:        mins  70237;    Therapeutic Activity:          mins  70171;     Gait Training:           mins  70337;     Ultrasound:          mins  36240;    Ionto                                   mins   08370  Self Care                            mins   20786      Un-Timed:  Electrical Stimulation:   10      mins  55022 ( );  Dry Needling          mins self-pay  Traction          mins 35480  Low Eval          Mins  40317  Mod Eval     33     Mins  35717  High Eval                            Mins  87786  Canalith Repos         mins 30860      Timed Treatment:   13   mins   Total Treatment:     56   mins          PT:  Elizabeth Pederson, BRENDAN     License Number: [id number]  Electronically signed by Elizabeth Pederson, PT, 05/15/23, 3:41 PM EDT    Certification Period: 5/15/2023 thru 8/12/2023  I certify that the therapy services are furnished while this patient is under my care.  The services outlined above are required by this patient, and will be reviewed every 90 days.         Physician Signature:__________________________________________________    PHYSICIAN: Marquis Boucher MD  NPI: [id number]                                      DATE:      Please sign and return via fax to [email or web address] . Thank you, [facility].

## 2023-05-22 ENCOUNTER — TREATMENT (OUTPATIENT)
Dept: PHYSICAL THERAPY | Facility: CLINIC | Age: 53
End: 2023-05-22
Payer: COMMERCIAL

## 2023-05-22 DIAGNOSIS — G89.29 CHRONIC BILATERAL LOW BACK PAIN WITH LEFT-SIDED SCIATICA: Primary | ICD-10-CM

## 2023-05-22 DIAGNOSIS — Z98.1 S/P LUMBAR FUSION: ICD-10-CM

## 2023-05-22 DIAGNOSIS — M54.42 CHRONIC BILATERAL LOW BACK PAIN WITH LEFT-SIDED SCIATICA: Primary | ICD-10-CM

## 2023-05-22 PROCEDURE — 97140 MANUAL THERAPY 1/> REGIONS: CPT | Performed by: PHYSICAL THERAPIST

## 2023-05-22 PROCEDURE — 97014 ELECTRIC STIMULATION THERAPY: CPT | Performed by: PHYSICAL THERAPIST

## 2023-05-22 PROCEDURE — 97110 THERAPEUTIC EXERCISES: CPT | Performed by: PHYSICAL THERAPIST

## 2023-05-22 NOTE — PROGRESS NOTES
Physical Therapy Daily Treatment Note      Patient: Mone Amador   : 1970  Referring practitioner: Marquis Boucher*  Date of Initial Visit: Type: THERAPY  Noted: 5/15/2023  Today's Date: 2023  Patient seen for 2 sessions       Visit Diagnoses:    ICD-10-CM ICD-9-CM   1. Chronic bilateral low back pain with left-sided sciatica  M54.42 724.2    G89.29 724.3     338.29   2. S/P lumbar fusion  Z98.1 V45.4       Subjective: Patient reports 5/10 low back and left leg pain upon arrival to therapy. Pt states she kept her grand kids over the weekend, which has increased her symptoms. Pt verbalizes compliance of initiating home program w/ good tolerance.      Objective   See Exercise, Manual, and Modality Logs for complete treatment.       Assessment/Plan: Patient responded well to today's session with decreased pain reported following, 2-3/10. Treatment initiated with manual therapy including soft tissue mobilization to bilateral lumbar paraspinals f/b therex as listed and modalities at conclusion. Exercise performed with focus on reduced radicular symptoms, improved postural awareness and stability. Pt provided with cues and demonstration during therex for good form and for max benefit. Pt noted with tightness bilaterally during manual therapy; however left greater than right. Pt continues to benefit from therapy services and will be progressed as tolerated to address goals, reduce pain and radicular pain. No adverse reactions observed during, and/ or following tx. Continue with PT's POC.       Timed:         Manual Therapy:    14     mins  98951;     Therapeutic Exercise:    25     mins  09273;     Neuromuscular Latoya:        mins  05685;    Therapeutic Activity:          mins  40199;     Gait Training:           mins  61538;     Ultrasound:          mins  30542;    Ionto                                   mins   92673  Self Care                            mins   97033      Un-Timed:  Electrical  Stimulation:    10     mins  77825 ( );  Dry Needling          mins self-pay  Traction          mins 93230  Canalith Repos         mins 56872    Timed Treatment:  39    mins   Total Treatment:     49   mins    Tamera Andrews. LIEN Del Rosario  [place] License: [id number]

## 2023-05-24 ENCOUNTER — TELEPHONE (OUTPATIENT)
Dept: PHYSICAL THERAPY | Facility: CLINIC | Age: 53
End: 2023-05-24

## 2023-05-30 ENCOUNTER — TREATMENT (OUTPATIENT)
Dept: PHYSICAL THERAPY | Facility: CLINIC | Age: 53
End: 2023-05-30

## 2023-05-30 DIAGNOSIS — M54.42 CHRONIC BILATERAL LOW BACK PAIN WITH LEFT-SIDED SCIATICA: Primary | ICD-10-CM

## 2023-05-30 DIAGNOSIS — Z98.1 S/P LUMBAR FUSION: ICD-10-CM

## 2023-05-30 DIAGNOSIS — G89.29 CHRONIC BILATERAL LOW BACK PAIN WITH LEFT-SIDED SCIATICA: Primary | ICD-10-CM

## 2023-05-30 PROCEDURE — 97110 THERAPEUTIC EXERCISES: CPT | Performed by: PHYSICAL THERAPIST

## 2023-05-30 NOTE — PROGRESS NOTES
Physical Therapy Daily Treatment Note      Patient: Mone Amador   : 1970  Referring practitioner: Marquis Boucher*  Date of Initial Visit: Type: THERAPY  Noted: 5/15/2023  Today's Date: 2023  Patient seen for 3 sessions       Visit Diagnoses:    ICD-10-CM ICD-9-CM   1. Chronic bilateral low back pain with left-sided sciatica  M54.42 724.2    G89.29 724.3     338.29   2. S/P lumbar fusion  Z98.1 V45.4       Subjective Evaluation    History of Present Illness    Subjective comment: Pt reports having 3/10 pain today.       Objective   See Exercise, Manual, and Modality Logs for complete treatment.       Assessment & Plan     Assessment    Assessment details: Tx today consisted of exercises for improved core stability and centralization of symptoms with increased reps and resistance and ended with stm and estim and mh.  Pt reported decreased pain to 2/10 post tx today.    Plan  Plan details: Will follow progressing core stability and decreased pain in order to improve ADLs.          Timed:         Manual Therapy:    12     mins  27315;     Therapeutic Exercise:    26     mins  45885;     Neuromuscular Latoya:        mins  31898;    Therapeutic Activity:          mins  53875;     Gait Training:           mins  54503;     Ultrasound:          mins  72261;    Ionto                                   mins   96381  Self Care                            mins   14753  Canalith Repos         mins 97364      Un-Timed:  Electrical Stimulation:    10     mins  06757 ( );  Dry Needling          mins self-pay  Traction          mins 57019      Timed Treatment:38      mins   Total Treatment:     48   mins    Jose Bar PT  [place] License: [id number]      Electronically signed by Jose Bar PT, 23, 8:02 AM EDT

## 2023-05-31 ENCOUNTER — DISEASE STATE MANAGEMENT VISIT (OUTPATIENT)
Dept: PHARMACY | Facility: HOSPITAL | Age: 53
End: 2023-05-31
Payer: COMMERCIAL

## 2023-05-31 VITALS
BODY MASS INDEX: 39.9 KG/M2 | HEIGHT: 62 IN | WEIGHT: 216.8 LBS | HEART RATE: 86 BPM | SYSTOLIC BLOOD PRESSURE: 113 MMHG | DIASTOLIC BLOOD PRESSURE: 82 MMHG

## 2023-05-31 RX ORDER — SEMAGLUTIDE 1 MG/.5ML
1 INJECTION, SOLUTION SUBCUTANEOUS WEEKLY
Qty: 2 ML | Refills: 0 | Status: SHIPPED | OUTPATIENT
Start: 2023-05-31

## 2023-05-31 NOTE — PROGRESS NOTES
[facility]  Weight Management Program      Mone Amador is a 52 y.o. female referred to the [facility] Management [facility] by Dr. Naik for clinical pharmacy and specialty pharmacy management of Wegovy for weight management. Mone Amador has previously tried Mounjaro, Saxenda, Ozempic, and calorie reduction for weight loss. Saxenda was not successful for the patient and did not suppress her appetite. She stopped the Saxenda about 1 month prior to starting Mounjaro, reporting difficulty with remembering to take injection daily. She had success with Ozempic in the past. Patient reported previous intolerance to Wegovy was when she was switched from Mounjaro to high dose Wegovy 1.7 mg and did not handle the transition (lower doses were not available at the time). The patient denies a personal history or family history of thyroid cancer, denies a personal history of pancreatitis, and denies a diagnosis of gastroparesis.     Patient is currently on Wegovy 0.5 mg weekly. Patient denies any lumps/swelling/hoarseness in neck/throat or abdominal pain. Patient reports tolerating medication well without any side effects. Patient denies any trouble giving injection or any missed doses. Patient wishes to titrate dose at this time. Patient is doing well with her SMART goals. She doesn't feel like she meets her 64oz of water everyday but she has definitely increased her intake. She also reports that she is meeting her walking goal. She would like to continue targeting these goals.      Relevant Past Medical History and Co-morbidities  Past Medical History:   Diagnosis Date   • Anxiety    • Arthritis    • Arthritis of back 2019   • Cholecystolithiasis    • Colon polyp 12/20   • Fatty liver    • Fibromyalgia    • Fracture, foot 7/27/22    left   • Headache    • History of COVID-19 10/06/2021   • Hypertension    • Kidney stone    • Low back pain    • Migraine    • Obese    • Wears glasses  (prescription)      Social History     Socioeconomic History   • Marital status:    Tobacco Use   • Smoking status: Never   • Smokeless tobacco: Never   Vaping Use   • Vaping Use: Never used   Substance and Sexual Activity   • Alcohol use: No   • Drug use: No   • Sexual activity: Defer     Partners: Male     Birth control/protection: Hysterectomy     Comment: Hysterectomy       Allergies  Latex and Morphine and related    Current Medication List    Current Outpatient Medications:   •  cyanocobalamin 1000 MCG/ML injection, Inject 1 mL into the appropriate muscle as directed by prescriber Every 14 (Fourteen) Days., Disp: 30 mL, Rfl: 0  •  cyclobenzaprine (FLEXERIL) 10 MG tablet, Take 1 tablet by mouth twice daily., Disp: 60 tablet, Rfl: 2  •  doxycycline (ADOXA) 100 MG tablet, doxycycline monohydrate 100 mg tablet, Disp: , Rfl:   •  DULoxetine (Cymbalta) 60 MG capsule, Take 1 capsule by mouth 2 times daily., Disp: 60 capsule, Rfl: 5  •  fluconazole (DIFLUCAN) 150 MG tablet, fluconazole 150 mg tablet, Disp: , Rfl:   •  hydroCHLOROthiazide (HYDRODIURIL) 25 MG tablet, Take 1 tablet by mouth every day., Disp: 30 tablet, Rfl: 5  •  HYDROcodone-acetaminophen (NORCO) 5-325 MG per tablet, Take 1 tablet by mouth every 12 hours as needed for pain., Disp: 60 tablet, Rfl: 0  •  ibuprofen (ADVIL,MOTRIN) 800 MG tablet, Take 1 tablet by mouth 3 times daily with food., Disp: 90 tablet, Rfl: 2  •  LORazepam (Ativan) 0.5 MG tablet, Take 1 tablet by mouth 2 times every day as needed., Disp: 60 tablet, Rfl: 2  •  methylPREDNISolone (MEDROL) 4 MG dose pack, TAKE BY MOUTH AS DIRECTED ON PACKAGE., Disp: 21 tablet, Rfl: 0  •  nadolol (CORGARD) 40 MG tablet, Take 1 tablet by mouth twice daily. (Patient not taking: Reported on 5/1/2023), Disp: 60 tablet, Rfl: 6  •  ondansetron (ZOFRAN) 8 MG tablet, ondansetron HCl 8 mg tablet, Disp: , Rfl:   •  Plecanatide (Trulance) 3 MG tablet, Trulance 3 mg tablet, Disp: , Rfl:   •   "Semaglutide-Weight Management (Wegovy) 0.5 MG/0.5ML solution auto-injector, Inject 0.5 mL under the skin into the appropriate area as directed 1 (One) Time Per Week., Disp: 2 mL, Rfl: 0  •  topiramate (TOPAMAX) 25 MG tablet, topiramate 25 mg tablet, Disp: , Rfl:   •  traMADol (ULTRAM) 50 MG tablet, tramadol 50 mg tablet, Disp: , Rfl:   •  traMADol (ULTRAM) 50 MG tablet, Take 1 tablet by mouth every 12 hours as needed, Disp: 60 tablet, Rfl: 0  •  traZODone (DESYREL) 100 MG tablet, Take 1 tablet by mouth every day after a meal (Patient not taking: Reported on 5/1/2023), Disp: 30 tablet, Rfl: 4  •  vitamin D (ERGOCALCIFEROL) 1.25 MG (92838 UT) capsule capsule, Take 1 capsule by mouth 1 (One) Time Per Week., Disp: 5 capsule, Rfl: 10  •  zolpidem (Ambien) 10 MG tablet, Take 1 tablet by mouth at bedtime., Disp: 30 tablet, Rfl: 2    Drug Interactions:   Wegovy + HCTZ may decrease the effectiveness of Wegovy  Wegovy + Nadolol may increase risk of hypoglycemic effects, and may decrease the effectiveness of Wegovy    Relevant Laboratory Values  Lab Results   Component Value Date    CHOL 230 (H) 12/14/2022    CHLPL 220 (H) 02/13/2015    TRIG 159 (H) 12/14/2022    HDL 42 12/14/2022     (H) 12/14/2022     Vaccinations:   Patient recommended to keep up with routine vaccinations.     Goals of Therapy  • Clinical Goals or Therapeutic Targets: 10% weight loss goal      Date 8/23/22 9/16/22 10/14/22 11/18/22 12/16/22   Weight (lb) 225.0 lb 214.4lb 209.4lb 202 lbs  206.4 lbs   BMI kg/ 41.11 39.21 38.29 36.94 37.74   Waist Circumference (in) 46 in 44.5 42.5 40 in 40.75 in    *switched to Saxenda     Date 4/5/23 5/3/23 5/31/23   Weight (lb) 220.8 219.4 lbs 216.8 lb   BMI kg/ 40.38 40.13 39.65   Waist Circumference (in) 42 in    *restarted Wegovy lower dose 45.5\" 46\"     Medication Assessment & Plan    Patient's current BMI is 39.65, which is considered Class II Obesity. Patient has lost 4 lbs overall since restarting Wegovy. " Patient congratulated on success thus far!    Will order Wegovy 1 mg once weekly.    Use with nadolol may increase hypoglycemic effects, educated patient to watch for s/sx of hypoglycemia. Patient denies any issues with hypoglycemia.    Discussed lifestyle modifications, including diet and exercise.     Worked with patient to create SMART Goal(s):  Continue to target these goals   1. Increase water to 64 oz/day.  2. Walk 3x a week for 30 minutes    Patient will need labs ordered at next appointment.    Will follow-up with patient in 4 weeks, or sooner if needed.     Thank you,    Elicia Kowalski, PharmD  05/31/23  08:55 EDT

## 2023-06-01 ENCOUNTER — TREATMENT (OUTPATIENT)
Dept: PHYSICAL THERAPY | Facility: CLINIC | Age: 53
End: 2023-06-01

## 2023-06-01 DIAGNOSIS — M54.42 CHRONIC BILATERAL LOW BACK PAIN WITH LEFT-SIDED SCIATICA: Primary | ICD-10-CM

## 2023-06-01 DIAGNOSIS — G89.29 CHRONIC BILATERAL LOW BACK PAIN WITH LEFT-SIDED SCIATICA: Primary | ICD-10-CM

## 2023-06-01 DIAGNOSIS — Z98.1 S/P LUMBAR FUSION: ICD-10-CM

## 2023-06-01 PROCEDURE — 97110 THERAPEUTIC EXERCISES: CPT | Performed by: PHYSICAL THERAPIST

## 2023-06-01 PROCEDURE — 97014 ELECTRIC STIMULATION THERAPY: CPT | Performed by: PHYSICAL THERAPIST

## 2023-06-01 PROCEDURE — 97140 MANUAL THERAPY 1/> REGIONS: CPT | Performed by: PHYSICAL THERAPIST

## 2023-06-01 NOTE — PROGRESS NOTES
Physical Therapy Daily Treatment Note      Patient: Mone Amador   : 1970  Referring practitioner: Marquis Boucher*  Date of Initial Visit: Type: THERAPY  Noted: 5/15/2023  Today's Date: 2023  Patient seen for 4 sessions       Visit Diagnoses:    ICD-10-CM ICD-9-CM   1. Chronic bilateral low back pain with left-sided sciatica  M54.42 724.2    G89.29 724.3     338.29   2. S/P lumbar fusion  Z98.1 V45.4       Subjective Evaluation    History of Present Illness    Subjective comment: Patient reports that she has 4/10 pain today.Pain  Current pain ratin           Objective   See Exercise, Manual, and Modality Logs for complete treatment.       Assessment & Plan     Assessment    Assessment details: Patient tolerated today's session well, noting a decrease in pain to 2/10 post-tx. Therapy session initiated with manual therapy to lumbar musculature to encourage increased mobility and decreased pain.  There ex progressed to include increased standing exercises, which focused on LE strengthening.  Patient also progressed in exercises to include the addition of PPT and increased repetitions.  Patient noted muscle fatigue with addition of new exercises, but denied pain.  Session concluded with MH/ESTIM, with no adverse reactions.  She will continue to be progressed per her tolerance and POC.          Timed:         Manual Therapy:    14     mins  63054;     Therapeutic Exercise:    18     mins  35255;     Neuromuscular Latoya:        mins  95327;    Therapeutic Activity:          mins  56499;     Gait Training:           mins  08246;     Ultrasound:          mins  16377;    Ionto                                   mins   66859  Self Care                            mins   70808      Un-Timed:  Electrical Stimulation:    10     mins  56572 ( );  Dry Needling          mins self-pay  Traction          mins 72126  Canalith Repos         mins 86174    Timed Treatment:   32   mins   Total  Treatment:    42    mins    Elizabeth Pederson, BRENDAN  [place] License: [id number]  Electronically signed by Elizabeth Pederson PT, 06/01/23, 8:54 AM EDT.

## 2023-06-06 ENCOUNTER — TREATMENT (OUTPATIENT)
Dept: PHYSICAL THERAPY | Facility: CLINIC | Age: 53
End: 2023-06-06
Payer: COMMERCIAL

## 2023-06-06 DIAGNOSIS — Z98.1 S/P LUMBAR FUSION: ICD-10-CM

## 2023-06-06 DIAGNOSIS — M54.42 CHRONIC BILATERAL LOW BACK PAIN WITH LEFT-SIDED SCIATICA: Primary | ICD-10-CM

## 2023-06-06 DIAGNOSIS — G89.29 CHRONIC BILATERAL LOW BACK PAIN WITH LEFT-SIDED SCIATICA: Primary | ICD-10-CM

## 2023-06-06 PROCEDURE — 97140 MANUAL THERAPY 1/> REGIONS: CPT | Performed by: PHYSICAL THERAPIST

## 2023-06-06 PROCEDURE — 97014 ELECTRIC STIMULATION THERAPY: CPT | Performed by: PHYSICAL THERAPIST

## 2023-06-06 PROCEDURE — 97110 THERAPEUTIC EXERCISES: CPT | Performed by: PHYSICAL THERAPIST

## 2023-06-06 NOTE — PROGRESS NOTES
Physical Therapy Daily Treatment Note      Patient: Mone Amador   : 1970  Referring practitioner: Marquis Boucher*  Date of Initial Visit: Type: THERAPY  Noted: 5/15/2023  Today's Date: 2023  Patient seen for 5 sessions       Visit Diagnoses:    ICD-10-CM ICD-9-CM   1. Chronic bilateral low back pain with left-sided sciatica  M54.42 724.2    G89.29 724.3     338.29   2. S/P lumbar fusion  Z98.1 V45.4       Subjective: Patient reports 3/10 low back pain with left LE radicular symptoms upon arrival to therapy. Pt states she has been tolerating the therapy sessions well with slight reduction in left LE numbness.     Objective   See Exercise, Manual, and Modality Logs for complete treatment.       Assessment/Plan: Patient responded well to today's session with decreased pain following, 2/10. Treatment initiated with manual therapy including soft tissue mobilization to bilateral lumbar paraspinals to address tightness f/b therex as listed and modalities at conclusion. Exercise progressed with standing hip extension initiated and step taps progressed to step ups to facilitate extension. Pt observed with good tolerance and was provided with verbal cues and demonstration for proper form. Pt continues to benefit from therapy services and will be progressed as tolerated to address goals, reduce pain and radicular symptoms. No adverse reactions observed during, and/ or following tx. Continue with PT's POC.       Timed:         Manual Therapy:    15     mins  47456;     Therapeutic Exercise:     29    mins  85372;     Neuromuscular Latoya:        mins  13503;    Therapeutic Activity:          mins  07219;     Gait Training:           mins  99986;     Ultrasound:          mins  91099;    Ionto                                   mins   02078  Self Care                            mins   15455      Un-Timed:  Electrical Stimulation:    10     mins  66847 ( );  Dry Needling          mins  self-pay  Traction          mins 02801  Canalith Repos         mins 24302    Timed Treatment:   44   mins   Total Treatment:     54   mins    Tamera Andrews. LIEN Del Rosario  [place] License: [id number]

## 2023-06-08 ENCOUNTER — TREATMENT (OUTPATIENT)
Dept: PHYSICAL THERAPY | Facility: CLINIC | Age: 53
End: 2023-06-08
Payer: COMMERCIAL

## 2023-06-08 DIAGNOSIS — Z98.1 S/P LUMBAR FUSION: ICD-10-CM

## 2023-06-08 DIAGNOSIS — G89.29 CHRONIC BILATERAL LOW BACK PAIN WITH LEFT-SIDED SCIATICA: Primary | ICD-10-CM

## 2023-06-08 DIAGNOSIS — M54.42 CHRONIC BILATERAL LOW BACK PAIN WITH LEFT-SIDED SCIATICA: Primary | ICD-10-CM

## 2023-06-08 PROCEDURE — 97140 MANUAL THERAPY 1/> REGIONS: CPT | Performed by: PHYSICAL THERAPIST

## 2023-06-08 PROCEDURE — 97110 THERAPEUTIC EXERCISES: CPT | Performed by: PHYSICAL THERAPIST

## 2023-06-08 PROCEDURE — 97014 ELECTRIC STIMULATION THERAPY: CPT | Performed by: PHYSICAL THERAPIST

## 2023-06-08 NOTE — PROGRESS NOTES
Physical Therapy Daily Treatment Note      Patient: Mone Amador   : 1970  Referring practitioner: Marquis Boucher*  Date of Initial Visit: Type: THERAPY  Noted: 5/15/2023  Today's Date: 2023  Patient seen for 6 sessions       Visit Diagnoses:    ICD-10-CM ICD-9-CM   1. Chronic bilateral low back pain with left-sided sciatica  M54.42 724.2    G89.29 724.3     338.29   2. S/P lumbar fusion  Z98.1 V45.4       Subjective Evaluation    History of Present Illness    Subjective comment: Patient reports 7/10 pain today.  She notes that her back started hurting yesterday.Pain  Current pain ratin         Objective   See Exercise, Manual, and Modality Logs for complete treatment.       Assessment & Plan     Assessment    Assessment details: Therapy session intiaited with manual therapy to the lumbar region to assist with decreasing pain and improving lumbar ROM.  There ex minimally progressed today, secondary to elevated pain levels.  Exercises focused on lumbar ROM, core strengthening, LE strengthening, and postural awareness.  Treatment concluded with Mh/ESTIM, with no adverse reactions.  She reported 5/10 pain post-tx.  She will continue to be progressed per her tolerance and POC.      Timed:         Manual Therapy:    15     mins  42977;     Therapeutic Exercise:    30     mins  56648;     Neuromuscular Latoya:        mins  27105;    Therapeutic Activity:          mins  60534;     Gait Training:           mins  78232;     Ultrasound:          mins  88157;    Ionto                                   mins   91177  Self Care                            mins   00185      Un-Timed:  Electrical Stimulation:    15     mins  60747 ( );  Dry Needling          mins self-pay  Traction          mins 33248  Canalith Repos         mins 56909    Timed Treatment:   45   mins   Total Treatment:     60   mins    Elizabeth Pederson PT  [place] License: [id number]  Electronically signed by Elizabeth Pederson PT,  06/08/23, 1:06 PM EDT.

## 2023-07-24 ENCOUNTER — OFFICE VISIT (OUTPATIENT)
Dept: NEUROSURGERY | Facility: CLINIC | Age: 53
End: 2023-07-24
Payer: COMMERCIAL

## 2023-07-24 VITALS
DIASTOLIC BLOOD PRESSURE: 86 MMHG | SYSTOLIC BLOOD PRESSURE: 120 MMHG | BODY MASS INDEX: 40.37 KG/M2 | HEIGHT: 61 IN | WEIGHT: 213.8 LBS

## 2023-07-24 DIAGNOSIS — M79.7 FIBROMYALGIA: ICD-10-CM

## 2023-07-24 DIAGNOSIS — Z98.1 S/P LUMBAR FUSION: ICD-10-CM

## 2023-07-24 DIAGNOSIS — E66.01 CLASS 3 SEVERE OBESITY DUE TO EXCESS CALORIES WITH SERIOUS COMORBIDITY AND BODY MASS INDEX (BMI) OF 40.0 TO 44.9 IN ADULT: Primary | ICD-10-CM

## 2023-07-24 PROBLEM — E66.813 CLASS 3 SEVERE OBESITY DUE TO EXCESS CALORIES WITH SERIOUS COMORBIDITY AND BODY MASS INDEX (BMI) OF 40.0 TO 44.9 IN ADULT: Status: ACTIVE | Noted: 2019-02-21

## 2023-07-24 PROCEDURE — 99214 OFFICE O/P EST MOD 30 MIN: CPT | Performed by: NEUROLOGICAL SURGERY

## 2023-07-24 NOTE — H&P (VIEW-ONLY)
"Subjective     Chief Complaint: Follow-up lumbar fusion    Patient ID: Mone Amador is a 52 y.o. female is here today for follow-up.    History of Present Illness    This is a 52-year-old woman in whom I performed an L4-5 PLIF several years ago.  She represented to my clinic with recurrent low back pain and bilateral leg pain, left greater than right.  Sent her for physical therapy and she presents today for routine follow-up.  She reports no appreciable change in her symptoms.    The following portions of the patient's history were reviewed and updated as appropriate: allergies, current medications, past family history, past medical history, past social history, past surgical history and problem list.    Family history:   Family History   Problem Relation Age of Onset    Heart disease Mother     Hypertension Mother     Stroke Mother     Anxiety disorder Mother     Arthritis Mother     COPD Mother     Thyroid disease Father     Glaucoma Father     Hearing loss Father     Heart disease Father     Vision loss Father     Multiple sclerosis Sister     No Known Problems Brother     No Known Problems Son     No Known Problems Sister     No Known Problems Son     Breast cancer Neg Hx        Social history:   Social History     Socioeconomic History    Marital status:    Tobacco Use    Smoking status: Never    Smokeless tobacco: Never   Vaping Use    Vaping Use: Never used   Substance and Sexual Activity    Alcohol use: No    Drug use: No    Sexual activity: Defer     Partners: Male     Birth control/protection: Hysterectomy     Comment: Hysterectomy       Review of Systems    Objective   Blood pressure 120/86, height 154.9 cm (61\"), weight 97 kg (213 lb 12.8 oz).  Body mass index is 40.4 kg/mý.    Physical Exam  Constitutional:       General: She is not in acute distress.     Appearance: She is well-developed. She is not diaphoretic.   HENT:      Head: Normocephalic and atraumatic.   Pulmonary:      Effort: " Pulmonary effort is normal.   Musculoskeletal:        Legs:    Skin:     General: Skin is warm and dry.   Neurological:      Mental Status: She is alert and oriented to person, place, and time.      Cranial Nerves: No cranial nerve deficit.       Assessment & Plan     Independent Review of Radiographic Studies:      I did we review the MRI of her lumbar spine from April of this year.  There is moderate lateral recess stenosis at L3-4 as well as L5-S1.    Medical Decision Making:      She has nondermatomal pain although I suspect she may have adjacent level disease which is symptomatic at L3-4.  Her BMI is 40 and she is therefore at increased risk of surgical complications given her morbid obesity and her prior lumbar spine operation.  I ordered a lumbar myelogram and I will see her back after the study has been completed to discuss her options.  I suspect she may need an L3-S1 fusion.    Diagnoses and all orders for this visit:    1. Class 3 severe obesity due to excess calories with serious comorbidity and body mass index (BMI) of 40.0 to 44.9 in adult (Primary)    2. S/P L4-5 PLIF 2021    3. Fibromyalgia        No follow-ups on file.           This document signed by HARISH Boucher MD July 24, 2023 12:48 EDT

## 2023-07-24 NOTE — PROGRESS NOTES
"Subjective     Chief Complaint: Follow-up lumbar fusion    Patient ID: Mone Amador is a 52 y.o. female is here today for follow-up.    History of Present Illness    This is a 52-year-old woman in whom I performed an L4-5 PLIF several years ago.  She represented to my clinic with recurrent low back pain and bilateral leg pain, left greater than right.  Sent her for physical therapy and she presents today for routine follow-up.  She reports no appreciable change in her symptoms.    The following portions of the patient's history were reviewed and updated as appropriate: allergies, current medications, past family history, past medical history, past social history, past surgical history and problem list.    Family history:   Family History   Problem Relation Age of Onset    Heart disease Mother     Hypertension Mother     Stroke Mother     Anxiety disorder Mother     Arthritis Mother     COPD Mother     Thyroid disease Father     Glaucoma Father     Hearing loss Father     Heart disease Father     Vision loss Father     Multiple sclerosis Sister     No Known Problems Brother     No Known Problems Son     No Known Problems Sister     No Known Problems Son     Breast cancer Neg Hx        Social history:   Social History     Socioeconomic History    Marital status:    Tobacco Use    Smoking status: Never    Smokeless tobacco: Never   Vaping Use    Vaping Use: Never used   Substance and Sexual Activity    Alcohol use: No    Drug use: No    Sexual activity: Defer     Partners: Male     Birth control/protection: Hysterectomy     Comment: Hysterectomy       Review of Systems    Objective   Blood pressure 120/86, height 154.9 cm (61\"), weight 97 kg (213 lb 12.8 oz).  Body mass index is 40.4 kg/m².    Physical Exam  Constitutional:       General: She is not in acute distress.     Appearance: She is well-developed. She is not diaphoretic.   HENT:      Head: Normocephalic and atraumatic.   Pulmonary:      Effort: " Pulmonary effort is normal.   Musculoskeletal:        Legs:    Skin:     General: Skin is warm and dry.   Neurological:      Mental Status: She is alert and oriented to person, place, and time.      Cranial Nerves: No cranial nerve deficit.       Assessment & Plan     Independent Review of Radiographic Studies:      I did we review the MRI of her lumbar spine from April of this year.  There is moderate lateral recess stenosis at L3-4 as well as L5-S1.    Medical Decision Making:      She has nondermatomal pain although I suspect she may have adjacent level disease which is symptomatic at L3-4.  Her BMI is 40 and she is therefore at increased risk of surgical complications given her morbid obesity and her prior lumbar spine operation.  I ordered a lumbar myelogram and I will see her back after the study has been completed to discuss her options.  I suspect she may need an L3-S1 fusion.    Diagnoses and all orders for this visit:    1. Class 3 severe obesity due to excess calories with serious comorbidity and body mass index (BMI) of 40.0 to 44.9 in adult (Primary)    2. S/P L4-5 PLIF 2021    3. Fibromyalgia        No follow-ups on file.           This document signed by HARISH Boucher MD July 24, 2023 12:48 EDT

## 2023-07-27 DIAGNOSIS — Z98.1 S/P LUMBAR FUSION: Primary | ICD-10-CM

## 2023-07-27 DIAGNOSIS — G89.29 CHRONIC BILATERAL LOW BACK PAIN WITH LEFT-SIDED SCIATICA: ICD-10-CM

## 2023-07-27 DIAGNOSIS — M54.42 CHRONIC BILATERAL LOW BACK PAIN WITH LEFT-SIDED SCIATICA: ICD-10-CM

## 2023-07-27 DIAGNOSIS — R20.2 NUMBNESS AND TINGLING OF LEFT LOWER EXTREMITY: ICD-10-CM

## 2023-07-27 DIAGNOSIS — R20.0 NUMBNESS AND TINGLING OF LEFT LOWER EXTREMITY: ICD-10-CM

## 2023-08-03 DIAGNOSIS — R11.0 NAUSEA: Primary | ICD-10-CM

## 2023-08-03 RX ORDER — ONDANSETRON HYDROCHLORIDE 8 MG/1
8 TABLET, FILM COATED ORAL EVERY 8 HOURS PRN
Qty: 30 TABLET | Refills: 3 | Status: SHIPPED | OUTPATIENT
Start: 2023-08-03

## 2023-08-09 ENCOUNTER — OFFICE VISIT (OUTPATIENT)
Dept: NEUROSURGERY | Facility: CLINIC | Age: 53
End: 2023-08-09
Payer: COMMERCIAL

## 2023-08-09 ENCOUNTER — HOSPITAL ENCOUNTER (OUTPATIENT)
Facility: HOSPITAL | Age: 53
Setting detail: HOSPITAL OUTPATIENT SURGERY
Discharge: HOME | End: 2023-08-09
Attending: RADIOLOGY | Admitting: RADIOLOGY
Payer: COMMERCIAL

## 2023-08-09 ENCOUNTER — APPOINTMENT (OUTPATIENT)
Dept: CT IMAGING | Facility: HOSPITAL | Age: 53
End: 2023-08-09
Payer: COMMERCIAL

## 2023-08-09 VITALS
HEIGHT: 61 IN | SYSTOLIC BLOOD PRESSURE: 110 MMHG | DIASTOLIC BLOOD PRESSURE: 79 MMHG | OXYGEN SATURATION: 92 % | RESPIRATION RATE: 18 BRPM | BODY MASS INDEX: 41.5 KG/M2 | TEMPERATURE: 97.8 F | HEART RATE: 78 BPM | WEIGHT: 219.8 LBS

## 2023-08-09 VITALS
HEIGHT: 61 IN | BODY MASS INDEX: 41.61 KG/M2 | DIASTOLIC BLOOD PRESSURE: 78 MMHG | SYSTOLIC BLOOD PRESSURE: 116 MMHG | WEIGHT: 220.4 LBS

## 2023-08-09 DIAGNOSIS — Z98.1 S/P LUMBAR FUSION: ICD-10-CM

## 2023-08-09 DIAGNOSIS — G89.29 CHRONIC BILATERAL LOW BACK PAIN WITH LEFT-SIDED SCIATICA: ICD-10-CM

## 2023-08-09 DIAGNOSIS — R20.2 NUMBNESS AND TINGLING OF LEFT LOWER EXTREMITY: ICD-10-CM

## 2023-08-09 DIAGNOSIS — R20.0 NUMBNESS AND TINGLING OF LEFT LOWER EXTREMITY: ICD-10-CM

## 2023-08-09 DIAGNOSIS — M48.062 LUMBAR STENOSIS WITH NEUROGENIC CLAUDICATION: Primary | ICD-10-CM

## 2023-08-09 DIAGNOSIS — M54.42 CHRONIC BILATERAL LOW BACK PAIN WITH LEFT-SIDED SCIATICA: ICD-10-CM

## 2023-08-09 DIAGNOSIS — M51.27 HERNIATION OF INTERVERTEBRAL DISC BETWEEN L5 AND S1: ICD-10-CM

## 2023-08-09 PROCEDURE — 99214 OFFICE O/P EST MOD 30 MIN: CPT | Performed by: NEUROLOGICAL SURGERY

## 2023-08-09 PROCEDURE — 25510000001 IOPAMIDOL 41 % SOLUTION: Performed by: RADIOLOGY

## 2023-08-09 PROCEDURE — 72132 CT LUMBAR SPINE W/DYE: CPT

## 2023-08-09 PROCEDURE — 62304 MYELOGRAPHY LUMBAR INJECTION: CPT | Performed by: RADIOLOGY

## 2023-08-09 RX ORDER — LIDOCAINE HYDROCHLORIDE 10 MG/ML
INJECTION, SOLUTION EPIDURAL; INFILTRATION; INTRACAUDAL; PERINEURAL
Status: DISCONTINUED | OUTPATIENT
Start: 2023-08-09 | End: 2023-08-09 | Stop reason: HOSPADM

## 2023-08-09 NOTE — PROGRESS NOTES
"Subjective     Chief Complaint: Lumbar stenosis with neurogenic claudication, history of prior L4-5 PLIF    Patient ID: Mone Amador is a 52 y.o. female is here today for follow-up.    History of Present Illness    This is a 52-year-old woman in whom I performed an L4-5 PLIF several months ago.  She represented to my clinic with symptoms concerning for adjacent level disease.  Given the poor state of her paraspinous musculature, and her morbid obesity, clearly I was concerned for adjacent level disease.  I ordered a lumbar myelogram and she presents today to discuss the results of the study.    The following portions of the patient's history were reviewed and updated as appropriate: allergies, current medications, past family history, past medical history, past social history, past surgical history and problem list.    Family history:   Family History   Problem Relation Age of Onset    Heart disease Mother     Hypertension Mother     Stroke Mother     Anxiety disorder Mother     Arthritis Mother     COPD Mother     Thyroid disease Father     Glaucoma Father     Hearing loss Father     Heart disease Father     Vision loss Father     Multiple sclerosis Sister     No Known Problems Brother     No Known Problems Son     No Known Problems Sister     No Known Problems Son     Breast cancer Neg Hx        Social history:   Social History     Socioeconomic History    Marital status:    Tobacco Use    Smoking status: Never    Smokeless tobacco: Never   Vaping Use    Vaping Use: Never used   Substance and Sexual Activity    Alcohol use: No    Drug use: No    Sexual activity: Yes     Partners: Male     Birth control/protection: Hysterectomy     Comment: Hysterectomy       Review of Systems    Objective   Blood pressure 116/78, height 154.9 cm (61\"), weight 100 kg (220 lb 6.4 oz).  Body mass index is 41.64 kg/mý.    Physical Exam    Assessment & Plan     Independent Review of Radiographic Studies:      Her lumbar " myelogram does demonstrate severe lateral recess and central canal stenosis at L3-4.  There is no pathological motion at L4-5.  She has worsening of the disc disease at L5-S1 with radiographic evidence of bilateral S1 nerve root compression.    Medical Decision Making:      Unfortunately, she probably needs a 3 level lumbar fusion at this point.  This is not a surgery that I perform routinely.  My recommendation is for her to visit a high-volume spine center that specializes in multilevel lumbar fusions.  I offered to make referral to the [facility] or the [facility].  She would like to travel to [place] for second opinion.  I would be happy to follow-up with her on an as-needed basis moving forward.    Diagnoses and all orders for this visit:    1. Lumbar stenosis with neurogenic claudication (Primary)    2. S/P L4-5 PLIF 2021    3. Herniation of intervertebral disc between L5 and S1        No follow-ups on file.           This document signed by HARISH Boucher MD August 9, 2023 12:56 EDT

## 2023-08-10 DIAGNOSIS — Z98.1 S/P LUMBAR FUSION: ICD-10-CM

## 2023-08-10 DIAGNOSIS — M48.062 LUMBAR STENOSIS WITH NEUROGENIC CLAUDICATION: Primary | ICD-10-CM

## 2023-08-25 DIAGNOSIS — T63.301A SPIDER BITE WOUND, ACCIDENTAL OR UNINTENTIONAL, INITIAL ENCOUNTER: Primary | ICD-10-CM

## 2023-08-25 RX ORDER — AZITHROMYCIN 250 MG/1
TABLET, FILM COATED ORAL
Qty: 6 TABLET | Refills: 0 | Status: SHIPPED | OUTPATIENT
Start: 2023-08-25 | End: 2023-08-30

## 2023-08-30 DIAGNOSIS — E53.8 VITAMIN B12 DEFICIENCY: ICD-10-CM

## 2023-08-30 RX ORDER — CYANOCOBALAMIN 1000 UG/ML
1000 INJECTION, SOLUTION INTRAMUSCULAR; SUBCUTANEOUS
Qty: 30 ML | Refills: 0 | Status: SHIPPED | OUTPATIENT
Start: 2023-08-30

## 2023-09-27 ENCOUNTER — LAB (OUTPATIENT)
Dept: UROLOGY | Facility: CLINIC | Age: 53
End: 2023-09-27
Payer: COMMERCIAL

## 2023-09-27 DIAGNOSIS — R53.83 FATIGUE, UNSPECIFIED TYPE: Primary | ICD-10-CM

## 2023-09-27 DIAGNOSIS — R73.03 PREDIABETES: ICD-10-CM

## 2023-09-27 LAB
ALBUMIN SERPL-MCNC: 4.2 G/DL (ref 3.5–5.2)
ALBUMIN/GLOB SERPL: 1.3 G/DL
ALP SERPL-CCNC: 64 U/L (ref 39–117)
ALT SERPL W P-5'-P-CCNC: 20 U/L (ref 1–33)
ANION GAP SERPL CALCULATED.3IONS-SCNC: 11.9 MMOL/L (ref 5–15)
AST SERPL-CCNC: 20 U/L (ref 1–32)
BILIRUB SERPL-MCNC: 0.5 MG/DL (ref 0–1.2)
BUN SERPL-MCNC: 11 MG/DL (ref 6–20)
BUN/CREAT SERPL: 10.3 (ref 7–25)
CALCIUM SPEC-SCNC: 9.5 MG/DL (ref 8.6–10.5)
CHLORIDE SERPL-SCNC: 102 MMOL/L (ref 98–107)
CO2 SERPL-SCNC: 24.1 MMOL/L (ref 22–29)
CREAT SERPL-MCNC: 1.07 MG/DL (ref 0.57–1)
DEPRECATED RDW RBC AUTO: 43.7 FL (ref 37–54)
EGFRCR SERPLBLD CKD-EPI 2021: 62.6 ML/MIN/1.73
ERYTHROCYTE [DISTWIDTH] IN BLOOD BY AUTOMATED COUNT: 13.9 % (ref 12.3–15.4)
GLOBULIN UR ELPH-MCNC: 3.2 GM/DL
GLUCOSE SERPL-MCNC: 94 MG/DL (ref 65–99)
HCT VFR BLD AUTO: 44.2 % (ref 34–46.6)
HGB BLD-MCNC: 15.1 G/DL (ref 12–15.9)
MCH RBC QN AUTO: 29.6 PG (ref 26.6–33)
MCHC RBC AUTO-ENTMCNC: 34.2 G/DL (ref 31.5–35.7)
MCV RBC AUTO: 86.7 FL (ref 79–97)
PLATELET # BLD AUTO: 359 10*3/MM3 (ref 140–450)
PMV BLD AUTO: 9.7 FL (ref 6–12)
POTASSIUM SERPL-SCNC: 3.7 MMOL/L (ref 3.5–5.2)
PROT SERPL-MCNC: 7.4 G/DL (ref 6–8.5)
RBC # BLD AUTO: 5.1 10*6/MM3 (ref 3.77–5.28)
SODIUM SERPL-SCNC: 138 MMOL/L (ref 136–145)
T3 SERPL-MCNC: 112 NG/DL (ref 80–200)
T4 SERPL-MCNC: 7.1 MCG/DL (ref 4.5–11.7)
TSH SERPL DL<=0.05 MIU/L-ACNC: 2.47 UIU/ML (ref 0.27–4.2)
VIT B12 BLD-MCNC: 1230 PG/ML (ref 211–946)
WBC NRBC COR # BLD: 6.52 10*3/MM3 (ref 3.4–10.8)

## 2023-09-27 PROCEDURE — 84480 ASSAY TRIIODOTHYRONINE (T3): CPT

## 2023-09-27 PROCEDURE — 84436 ASSAY OF TOTAL THYROXINE: CPT

## 2023-09-27 PROCEDURE — 80050 GENERAL HEALTH PANEL: CPT

## 2023-09-27 PROCEDURE — 83036 HEMOGLOBIN GLYCOSYLATED A1C: CPT

## 2023-09-27 PROCEDURE — 82607 VITAMIN B-12: CPT

## 2023-09-28 LAB — HBA1C MFR BLD: 5.7 % (ref 4.8–5.6)

## 2023-10-04 DIAGNOSIS — E55.9 VITAMIN D DEFICIENCY: ICD-10-CM

## 2023-10-04 DIAGNOSIS — K60.3 ANAL FISTULA: Primary | ICD-10-CM

## 2023-10-04 RX ORDER — AMOXICILLIN AND CLAVULANATE POTASSIUM 875; 125 MG/1; MG/1
1 TABLET, FILM COATED ORAL 2 TIMES DAILY
Qty: 14 TABLET | Refills: 3 | Status: SHIPPED | OUTPATIENT
Start: 2023-10-04 | End: 2023-10-12

## 2023-10-04 RX ORDER — FLUCONAZOLE 100 MG/1
100 TABLET ORAL DAILY
Qty: 3 TABLET | Refills: 0 | Status: SHIPPED | OUTPATIENT
Start: 2023-10-04 | End: 2023-10-08

## 2023-10-04 RX ORDER — ERGOCALCIFEROL 1.25 MG/1
50000 CAPSULE ORAL WEEKLY
Qty: 5 CAPSULE | Refills: 10 | Status: SHIPPED | OUTPATIENT
Start: 2023-10-04

## 2023-10-04 NOTE — TELEPHONE ENCOUNTER
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

## 2023-10-17 DIAGNOSIS — K58.1 IRRITABLE BOWEL SYNDROME WITH CONSTIPATION: Primary | ICD-10-CM

## 2023-12-01 DIAGNOSIS — J01.80 OTHER SUBACUTE SINUSITIS: Primary | ICD-10-CM

## 2023-12-01 RX ORDER — AZITHROMYCIN 250 MG/1
TABLET, FILM COATED ORAL
Qty: 6 TABLET | Refills: 0 | Status: SHIPPED | OUTPATIENT
Start: 2023-12-01 | End: 2023-12-06

## 2023-12-06 DIAGNOSIS — E66.01 CLASS 2 SEVERE OBESITY DUE TO EXCESS CALORIES WITH SERIOUS COMORBIDITY AND BODY MASS INDEX (BMI) OF 38.0 TO 38.9 IN ADULT: Primary | ICD-10-CM

## 2023-12-07 DIAGNOSIS — R11.0 NAUSEA: Primary | ICD-10-CM

## 2023-12-07 RX ORDER — ONDANSETRON 4 MG/1
4 TABLET, FILM COATED ORAL DAILY PRN
Qty: 30 TABLET | Refills: 1 | Status: SHIPPED | OUTPATIENT
Start: 2023-12-07

## 2024-01-09 ENCOUNTER — SPECIALTY PHARMACY (OUTPATIENT)
Dept: PHARMACY | Facility: HOSPITAL | Age: 54
End: 2024-01-09
Payer: COMMERCIAL

## 2024-01-09 ENCOUNTER — DISEASE STATE MANAGEMENT VISIT (OUTPATIENT)
Dept: PHARMACY | Facility: HOSPITAL | Age: 54
End: 2024-01-09
Payer: COMMERCIAL

## 2024-01-09 VITALS
HEART RATE: 88 BPM | BODY MASS INDEX: 43.08 KG/M2 | DIASTOLIC BLOOD PRESSURE: 94 MMHG | SYSTOLIC BLOOD PRESSURE: 132 MMHG | WEIGHT: 228.2 LBS | HEIGHT: 61 IN

## 2024-01-09 NOTE — PROGRESS NOTES
[facility]  Weight Management Program      Mone Amador is a 53 y.o. female referred to the [facility] by Dr. Naik for clinical pharmacy and specialty pharmacy management of Wegovy for weight management. Mone Amador has previously tried Mounjaro, Saxenda, Ozempic, Wegovy, and calorie reduction for weight loss.  She stopped the Saxenda about 1 month prior to starting Mounjaro, reporting difficulty with remembering to take injection daily. She returns to clinic today reporting that she ultimately feels that she didn't give Saxenda enough time to work. Patient reported previous intolerance to Wegovy due to severe constipation and abdominal pain.  The patient denies a personal history or family history of thyroid cancer, denies a personal history of pancreatitis, and denies a diagnosis of gastroparesis.  Patient presents today and would like to start on Saxenda.         Relevant Past Medical History and Co-morbidities  Past Medical History:   Diagnosis Date    Anxiety     Arthritis     Arthritis of back 2019    Cholecystolithiasis     Colon polyp 12/20    Fatty liver     Fibromyalgia     Fracture, foot 7/27/22    left    Headache     History of COVID-19 10/06/2021    Hypertension     Kidney stone     Low back pain     Lumbosacral disc disease     Migraine     Obese     Wears glasses (prescription)      Social History     Socioeconomic History    Marital status:    Tobacco Use    Smoking status: Never    Smokeless tobacco: Never   Vaping Use    Vaping Use: Never used   Substance and Sexual Activity    Alcohol use: No    Drug use: No    Sexual activity: Yes     Partners: Male     Birth control/protection: Hysterectomy     Comment: Hysterectomy       Allergies  Latex and Morphine and related    Current Medication List    Current Outpatient Medications:     cyanocobalamin 1000 MCG/ML injection, Inject 1 mL into the appropriate muscle as directed by prescriber  Every 14 (Fourteen) Days., Disp: 30 mL, Rfl: 0    cyclobenzaprine (FLEXERIL) 10 MG tablet, Take 1 tablet by mouth twice daily., Disp: 60 tablet, Rfl: 2    cyclobenzaprine (FLEXERIL) 10 MG tablet, Take 1 tablet by mouth twice daily., Disp: 60 tablet, Rfl: 2    DULoxetine (Cymbalta) 60 MG capsule, Take 1 capsule by mouth 2 times daily., Disp: 60 capsule, Rfl: 5    DULoxetine (Cymbalta) 60 MG capsule, Take 1 capsule by mouth 2 times daily., Disp: 60 capsule, Rfl: 5    hydroCHLOROthiazide (HYDRODIURIL) 25 MG tablet, Take 1 tablet by mouth every day., Disp: 30 tablet, Rfl: 5    hydroCHLOROthiazide (HYDRODIURIL) 25 MG tablet, Take 1 tablet by mouth every day., Disp: 30 tablet, Rfl: 5    HYDROcodone-acetaminophen (NORCO) 5-325 MG per tablet, Take 1 tablet by mouth every 12 hours as needed for pain., Disp: 60 tablet, Rfl: 0    ibuprofen (ADVIL,MOTRIN) 800 MG tablet, Take 1 tablet by mouth 3 times daily with food., Disp: 90 tablet, Rfl: 2    LORazepam (Ativan) 0.5 MG tablet, Take 1 tablet by mouth 2 times every day as needed., Disp: 60 tablet, Rfl: 2    LORazepam (Ativan) 0.5 MG tablet, Take 1 tablet by mouth 2 times every day as needed., Disp: 60 tablet, Rfl: 2    LORazepam (Ativan) 0.5 MG tablet, Take 1 tablet by mouth 2 times every day as needed., Disp: 60 tablet, Rfl: 2    meloxicam (MOBIC) 15 MG tablet, Take 1 tablet by mouth daily., Disp: 90 tablet, Rfl: 0    meloxicam (MOBIC) 7.5 MG tablet, Take 1 tablet by mouth daily., Disp: 90 tablet, Rfl: 0    nadolol (CORGARD) 40 MG tablet, Take 1 tablet by mouth twice daily., Disp: 60 tablet, Rfl: 6    nadolol (CORGARD) 40 MG tablet, Take 1 tablet by mouth twice daily., Disp: 60 tablet, Rfl: 6    ondansetron (Zofran) 4 MG tablet, Take 1 tablet by mouth Daily As Needed for Nausea or Vomiting., Disp: 30 tablet, Rfl: 1    Tenapanor HCl 50 MG tablet, Take 1 tablet by mouth 2 (Two) Times a Day., Disp: 60 tablet, Rfl: 11    traMADol (ULTRAM) 50 MG tablet, Take 1 tablet by mouth every  "12 hours as needed, Disp: 60 tablet, Rfl: 2    traMADol (ULTRAM) 50 MG tablet, Take 1 tablet by mouth every 12 hours as needed., Disp: 60 tablet, Rfl: 2    traZODone (DESYREL) 100 MG tablet, Take 1 tablet by mouth every day after a meal (Patient taking differently: Take 1 tablet by mouth Every Night.), Disp: 30 tablet, Rfl: 4    traZODone (DESYREL) 100 MG tablet, Take 1 tablet by mouth every day after a meal, Disp: 30 tablet, Rfl: 4    vitamin D (ERGOCALCIFEROL) 1.25 MG (22087 UT) capsule capsule, Take 1 capsule by mouth 1 (One) Time Per Week., Disp: 5 capsule, Rfl: 10    zolpidem (Ambien) 10 MG tablet, Take 1 tablet by mouth at bedtime., Disp: 30 tablet, Rfl: 2    zolpidem (Ambien) 10 MG tablet, Take 1 tablet by mouth at bedtime., Disp: 30 tablet, Rfl: 2    Drug Interactions:   Saxenda + HCTZ may decrease the effectiveness of Wegovy  Wegovy + Nadolol may increase risk of hypoglycemic effects, and may decrease the effectiveness of Wegovy    Relevant Laboratory Values  Lab Results   Component Value Date    CHOL 230 (H) 12/14/2022    CHLPL 220 (H) 02/13/2015    TRIG 159 (H) 12/14/2022    HDL 42 12/14/2022     (H) 12/14/2022     Vaccinations:   Patient recommended to keep up with routine vaccinations.     Goals of Therapy  Clinical Goals or Therapeutic Targets: 10% weight loss goal      Date 8/23/22 9/16/22 10/14/22 11/18/22 12/16/22   Weight (lb) 225.0 lb 214.4lb 209.4lb 202 lbs  206.4 lbs   BMI kg/ 41.11 39.21 38.29 36.94 37.74   Waist Circumference (in) 46 in 44.5 42.5 40 in 40.75 in    *switched to Saxenda     Date 4/5/23 5/3/23 5/31/23 1/9/24   Weight (lb) 220.8 219.4 lbs 216.8 lb 228.2 lb   BMI kg/ 40.38 40.13 39.65 43.12   Waist Circumference (in) 42 in    *restarted Wegovy lower dose 45.5\" 46\" 46\"     Medication Assessment & Plan    Patient's current BMI is 39.65, which is considered Class II Obesity.     Will order Saxenda 0.6 mg titrating every 7 days up to 3 mg daily. Patient aware if she is " experiencing intolerance that she can titrate back down to dose below for a few doses to see if tolerability improves. Patient has been on Saxenda previously and reported knowing exactly how to use.     Use with nadolol may increase hypoglycemic effects, educated patient to watch for s/sx of hypoglycemia. Patient aware of the rule of 15.     Discussed lifestyle modifications, including diet and exercise.     Worked with patient to create SMART Goal(s):    1. Increase water to 64 oz/day.  2. Walk 3x a week for 30 minutes    Patient will need labs ordered at next appointment.    Will follow-up with patient in 4 weeks, or sooner if needed.     Thank you,    Shirley Bland. William, PharmD  01/09/24  15:26 EST

## 2024-01-09 NOTE — PROGRESS NOTES
[employer] Pen needles NDC [address]-6    SAXENDA® (liraglutide)  Medication Expectations   Why am I taking this medication? You are taking Saxenda for weight loss because you have excess weight and also have weight-related medical problems or obesity. It should be used along with a reduced calorie diet and increased physical activity.    What should I expect while on this medication? You should expect to lose at least 4% of your body weight after 4 months of therapy. It can also help keep weight lost off.    How does the medication work? Saxenda is an injection that addresses one of your body's natural responses to weight loss. It works like a naturally produced hormone in the body called GLP-1 that regulates appetite which can lead to eating fewer calories and losing weight. This medication also slows down food from leaving your stomach, making you feel kat for longer.   How long will I be on this medication for? The amount of time you will be on this medication will be determined by your doctor. Do not abruptly stop this medication without talking to your doctor first.    How do I take this medication? Take as directed on your prescription label. Saxenda is injected under the skin (subcutaneously) of your stomach, thigh, or upper arm. This medication should be taken once daily and can be given with or without food. Use a different injection site in the same body region each day.     For each new prefilled pen, prime the needle before the first injection by turning the dose selector to the flow check symbol and injecting into the air (priming is not required for subsequent injections). Use a new needle for each injection. Once the needle is inserted, continue to press the button until the dial has returned to 0 and for an additional 6 seconds. Then remove the needle and discard.    What are some possible side effects? You may notice you don't feel as hungry, especially when you first start using Saxenda. Some  common side effects include nausea, vomiting, diarrhea, vomiting, indigestion, constipation, tiredness, and headache. Redness, itching, and/or swelling can occur where the shot was given. You should also monitor for low blood sugar (hypoglycemia), especially if you are taking Saxenda with other medications that cause low blood sugar. Stop using Saxenda and call your doctor immediately if you have severe pain in your stomach area that will not go away as this could be a sign of pancreatitis (inflammation of your pancreas).     What happens if I miss a dose? If you miss a dose, take it as soon as you remember. If it is close to your next dose, skip it and go back to your regular dosing schedule. Never take 2 doses at once. If you miss your dose for 3 days or more, call your doctor to talk about how to restart Saxenda.      Medication Safety   What are things I should warn my doctor immediately about? Do not use Saxenda if you or a family member have ever had medullary thyroid cancer (MTC) or Multiple Endocrine Neoplasia syndrome type 2 (MEN 2). Tell your doctor if you get a lump or swelling in your neck, hoarseness, difficulty swallowing, or feel short of breath (these may be symptoms of thyroid cancer). Talk to your doctor if you have or have had problems with your pancreas, kidneys, or liver. Tell your doctor if you have problems with digesting food or slowed emptying of your stomach (gastroparesis). Talk to your doctor if you are pregnant, planning to become pregnant, or breastfeeding. Also tell your doctor if you notice any signs/symptoms of an allergic reaction (rash, hives, difficulty breathing, etc.).   What are things that I should be cautious of? Be cautious of any side effects from this medication. Talk to your doctor if any new ones develop or aren't getting better.   What are some medications that can interact with this one? Taking Saxenda with other medications that lower your blood sugar such as insulins  and glipizide/glimepiride/glyburide may increase the risk of low blood sugar. It should not be taken with other medicines called GLP-1 receptor agonists, as these work the same way as Saxenda. Because Saxenda slows stomach emptying, it can affect the way some medicines work. Always tell your doctor or pharmacist immediately if you start taking any new medications, including over-the-counter medications, vitamins, and herbal supplements.      Medication Storage/Handling   How should I handle this medication? Keep this medication out of reach of pets/children and keep the pen capped when not in use. Do not share your medicine pens with others.    How does this medication need to be stored? Store your new, unused pens in the original carton in the refrigerator. Protect it from light. Do not freeze. You may store your opened pen at room temperature or in the refrigerator for up to 30 days. Always remove the needle from the pen before storing.    How should I dispose of this medication? Used Saxenda pens should be thrown away after 30 days. Place your used pen and needle in an approved sharps container. If you do not have a sharps container, you may use a household container made of heavy-duty plastic with a tight-fitting lid that is leak resistant (e.g., heavy-duty plastic laundry detergent bottle). If your doctor decides to stop this medication, take to your local police station for proper disposal. Some pharmacies also have take-back bins for medication drop-off.       Resources/Support   How can I remind myself to take this medication? You can download reminder apps to help you manage your refills. You may also set an alarm on your phone to remind you.    Is financial support available?  [employer] can provide co-pay cards if you have commercial insurance.    Which vaccines are recommended for me? Talk to your doctor about these vaccines: Flu, Coronavirus (COVID-19), Pneumococcal (pneumonia), Tdap, Zoster  (shingles)

## 2024-01-09 NOTE — PROGRESS NOTES
[facility]  Weight Management Program      Mone Amador is a 53 y.o. female referred to the [facility] by Dr. Naik for clinical pharmacy and specialty pharmacy management of Wegovy for weight management. Mone Amador has previously tried Mounjaro, Saxenda, Ozempic, Wegovy, and calorie reduction for weight loss.  She stopped the Saxenda about 1 month prior to starting Mounjaro, reporting difficulty with remembering to take injection daily. She returns to clinic today reporting that she ultimately feels that she didn't give Saxenda enough time to work. Patient reported previous intolerance to Wegovy due to severe constipation and abdominal pain.  The patient denies a personal history or family history of thyroid cancer, denies a personal history of pancreatitis, and denies a diagnosis of gastroparesis.  Patient presents today and would like to start on Saxenda.         Relevant Past Medical History and Co-morbidities  Past Medical History:   Diagnosis Date    Anxiety     Arthritis     Arthritis of back 2019    Cholecystolithiasis     Colon polyp 12/20    Fatty liver     Fibromyalgia     Fracture, foot 7/27/22    left    Headache     History of COVID-19 10/06/2021    Hypertension     Kidney stone     Low back pain     Lumbosacral disc disease     Migraine     Obese     Wears glasses (prescription)      Social History     Socioeconomic History    Marital status:    Tobacco Use    Smoking status: Never    Smokeless tobacco: Never   Vaping Use    Vaping Use: Never used   Substance and Sexual Activity    Alcohol use: No    Drug use: No    Sexual activity: Yes     Partners: Male     Birth control/protection: Hysterectomy     Comment: Hysterectomy       Allergies  Latex and Morphine and related    Current Medication List    Current Outpatient Medications:     cyanocobalamin 1000 MCG/ML injection, Inject 1 mL into the appropriate muscle as directed by prescriber  Every 14 (Fourteen) Days., Disp: 30 mL, Rfl: 0    cyclobenzaprine (FLEXERIL) 10 MG tablet, Take 1 tablet by mouth twice daily., Disp: 60 tablet, Rfl: 2    cyclobenzaprine (FLEXERIL) 10 MG tablet, Take 1 tablet by mouth twice daily., Disp: 60 tablet, Rfl: 2    DULoxetine (Cymbalta) 60 MG capsule, Take 1 capsule by mouth 2 times daily., Disp: 60 capsule, Rfl: 5    DULoxetine (Cymbalta) 60 MG capsule, Take 1 capsule by mouth 2 times daily., Disp: 60 capsule, Rfl: 5    hydroCHLOROthiazide (HYDRODIURIL) 25 MG tablet, Take 1 tablet by mouth every day., Disp: 30 tablet, Rfl: 5    hydroCHLOROthiazide (HYDRODIURIL) 25 MG tablet, Take 1 tablet by mouth every day., Disp: 30 tablet, Rfl: 5    HYDROcodone-acetaminophen (NORCO) 5-325 MG per tablet, Take 1 tablet by mouth every 12 hours as needed for pain., Disp: 60 tablet, Rfl: 0    ibuprofen (ADVIL,MOTRIN) 800 MG tablet, Take 1 tablet by mouth 3 times daily with food., Disp: 90 tablet, Rfl: 2    Liraglutide (SAXENDA) 18 MG/3ML injection pen, Inject 0.6 mg under the skin into the appropriate area as directed daily for 7 days, THEN 1.2 mg daily for 7 days, THEN 1.8 mg daily for 7 days, THEN 2.4 mg daily for 7 days, THEN 3 mg Daily for 30 days., Disp: 15 mL, Rfl: 0    LORazepam (Ativan) 0.5 MG tablet, Take 1 tablet by mouth 2 times every day as needed., Disp: 60 tablet, Rfl: 2    LORazepam (Ativan) 0.5 MG tablet, Take 1 tablet by mouth 2 times every day as needed., Disp: 60 tablet, Rfl: 2    LORazepam (Ativan) 0.5 MG tablet, Take 1 tablet by mouth 2 times every day as needed., Disp: 60 tablet, Rfl: 2    meloxicam (MOBIC) 15 MG tablet, Take 1 tablet by mouth daily., Disp: 90 tablet, Rfl: 0    nadolol (CORGARD) 40 MG tablet, Take 1 tablet by mouth twice daily., Disp: 60 tablet, Rfl: 6    nadolol (CORGARD) 40 MG tablet, Take 1 tablet by mouth twice daily., Disp: 60 tablet, Rfl: 6    ondansetron (Zofran) 4 MG tablet, Take 1 tablet by mouth Daily As Needed for Nausea or Vomiting., Disp:  "30 tablet, Rfl: 1    Tenapanor HCl 50 MG tablet, Take 1 tablet by mouth 2 (Two) Times a Day., Disp: 60 tablet, Rfl: 11    traMADol (ULTRAM) 50 MG tablet, Take 1 tablet by mouth every 12 hours as needed, Disp: 60 tablet, Rfl: 2    traMADol (ULTRAM) 50 MG tablet, Take 1 tablet by mouth every 12 hours as needed., Disp: 60 tablet, Rfl: 2    traZODone (DESYREL) 100 MG tablet, Take 1 tablet by mouth every day after a meal (Patient taking differently: Take 1 tablet by mouth Every Night.), Disp: 30 tablet, Rfl: 4    traZODone (DESYREL) 100 MG tablet, Take 1 tablet by mouth every day after a meal, Disp: 30 tablet, Rfl: 4    vitamin D (ERGOCALCIFEROL) 1.25 MG (54991 UT) capsule capsule, Take 1 capsule by mouth 1 (One) Time Per Week., Disp: 5 capsule, Rfl: 10    zolpidem (Ambien) 10 MG tablet, Take 1 tablet by mouth at bedtime., Disp: 30 tablet, Rfl: 2    zolpidem (Ambien) 10 MG tablet, Take 1 tablet by mouth at bedtime., Disp: 30 tablet, Rfl: 2    Drug Interactions:   Saxenda + HCTZ may decrease the effectiveness of Wegovy  Wegovy + Nadolol may increase risk of hypoglycemic effects, and may decrease the effectiveness of Wegovy    Relevant Laboratory Values  Lab Results   Component Value Date    CHOL 230 (H) 12/14/2022    CHLPL 220 (H) 02/13/2015    TRIG 159 (H) 12/14/2022    HDL 42 12/14/2022     (H) 12/14/2022     Vaccinations:   Patient recommended to keep up with routine vaccinations.     Goals of Therapy  Clinical Goals or Therapeutic Targets: 10% weight loss goal      Date 8/23/22 9/16/22 10/14/22 11/18/22 12/16/22   Weight (lb) 225.0 lb 214.4lb 209.4lb 202 lbs  206.4 lbs   BMI kg/ 41.11 39.21 38.29 36.94 37.74   Waist Circumference (in) 46 in 44.5 42.5 40 in 40.75 in    *switched to Saxenda     Date 4/5/23 5/3/23 5/31/23 1/9/24   Weight (lb) 220.8 219.4 lbs 216.8 lb 228.2 lb   BMI kg/ 40.38 40.13 39.65 43.12   Waist Circumference (in) 42 in    *restarted Wegovy lower dose 45.5\" 46\" 46\"     Medication Assessment & " Plan    Patient's current BMI is 39.65, which is considered Class II Obesity.     Will order Saxenda 0.6 mg titrating every 7 days up to 3 mg daily. Patient aware if she is experiencing intolerance that she can titrate back down to dose below for a few doses to see if tolerability improves. Patient has been on Saxenda previously and reported knowing exactly how to use.     Use with nadolol may increase hypoglycemic effects, educated patient to watch for s/sx of hypoglycemia. Patient aware of the rule of 15.     Discussed lifestyle modifications, including diet and exercise.     Worked with patient to create SMART Goal(s):    1. Increase water to 64 oz/day.  2. Walk 3x a week for 30 minutes    Patient will need labs ordered at next appointment.    Will follow-up with patient in 4 weeks, or sooner if needed.     Thank you,    Shirley Bland. William, PharmD  01/09/24  16:57 EST

## 2024-01-16 ENCOUNTER — TRANSCRIBE ORDERS (OUTPATIENT)
Dept: ADMINISTRATIVE | Facility: HOSPITAL | Age: 54
End: 2024-01-16
Payer: COMMERCIAL

## 2024-01-16 DIAGNOSIS — Z98.1 ARTHRODESIS STATUS: Primary | ICD-10-CM

## 2024-01-17 ENCOUNTER — TRANSCRIBE ORDERS (OUTPATIENT)
Dept: ADMINISTRATIVE | Facility: HOSPITAL | Age: 54
End: 2024-01-17
Payer: COMMERCIAL

## 2024-01-17 DIAGNOSIS — R47.81 SLURRED SPEECH: Primary | ICD-10-CM

## 2024-01-24 ENCOUNTER — HOSPITAL ENCOUNTER (OUTPATIENT)
Dept: MRI IMAGING | Facility: HOSPITAL | Age: 54
Discharge: HOME | End: 2024-01-24
Admitting: FAMILY MEDICINE
Payer: COMMERCIAL

## 2024-01-24 DIAGNOSIS — R47.81 SLURRED SPEECH: ICD-10-CM

## 2024-01-24 PROCEDURE — 70553 MRI BRAIN STEM W/O & W/DYE: CPT

## 2024-01-24 PROCEDURE — 0 GADOBENATE DIMEGLUMINE 529 MG/ML SOLUTION: Performed by: FAMILY MEDICINE

## 2024-01-24 PROCEDURE — 70553 MRI BRAIN STEM W/O & W/DYE: CPT | Performed by: RADIOLOGY

## 2024-01-24 PROCEDURE — A9577 INJ MULTIHANCE: HCPCS | Performed by: FAMILY MEDICINE

## 2024-01-24 RX ADMIN — GADOBENATE DIMEGLUMINE 20 ML: 529 INJECTION, SOLUTION INTRAVENOUS at 10:03

## 2024-02-15 DIAGNOSIS — J01.80 OTHER SUBACUTE SINUSITIS: Primary | ICD-10-CM

## 2024-02-15 RX ORDER — AZITHROMYCIN 250 MG/1
TABLET, FILM COATED ORAL
Qty: 6 TABLET | Refills: 0 | Status: SHIPPED | OUTPATIENT
Start: 2024-02-15 | End: 2024-02-20

## 2024-02-20 DIAGNOSIS — J01.80 OTHER SUBACUTE SINUSITIS: Primary | ICD-10-CM

## 2024-02-20 RX ORDER — PREDNISONE 5 MG/1
5 TABLET ORAL DAILY
Qty: 21 TABLET | Refills: 0 | Status: SHIPPED | OUTPATIENT
Start: 2024-02-20

## 2024-04-05 ENCOUNTER — HOSPITAL ENCOUNTER (OUTPATIENT)
Dept: CT IMAGING | Facility: HOSPITAL | Age: 54
Discharge: HOME | End: 2024-04-05
Admitting: STUDENT IN AN ORGANIZED HEALTH CARE EDUCATION/TRAINING PROGRAM
Payer: COMMERCIAL

## 2024-04-05 DIAGNOSIS — Z98.1 ARTHRODESIS STATUS: ICD-10-CM

## 2024-04-05 PROCEDURE — 72131 CT LUMBAR SPINE W/O DYE: CPT

## 2024-05-28 ENCOUNTER — TELEPHONE (OUTPATIENT)
Dept: NEUROSURGERY | Facility: CLINIC | Age: 54
End: 2024-05-28
Payer: COMMERCIAL

## 2024-05-28 NOTE — TELEPHONE ENCOUNTER
PT CALLED: SHE WOULD LIKE TO KNOW IF DR. ARNOLD WILL REVIEW HER RECENT CT LUMBAR IN CHART AND GIVE HIS OPINION. STATES THE [facility]-LAST SEEN 4/24/24 JUST WANTS HER TO DO MORE INJECTIONS-NOTE IN CHART. STATES SHE IS IN WORSE PAIN SINCE SHE LAST SEEN DR. ARNOLD. STATES SHE IS OKAY IF HE WANTS TO PLACE A REFERRAL TO [facility] FOR ANOTHER OPINION.  PLEASE ADVISE! THANKS!      LAST SEEN: Office Visit with Marquis Arnold MD (08/09/2023)     CT Lumbar Spine Without Contrast (04/05/2024 15:26)       PT CONTACT: [phone]  BEST TIME TO CALL: ANYTIME

## 2024-05-29 DIAGNOSIS — M48.062 LUMBAR STENOSIS WITH NEUROGENIC CLAUDICATION: Primary | ICD-10-CM

## 2024-05-30 ENCOUNTER — TELEPHONE (OUTPATIENT)
Dept: NEUROSURGERY | Facility: CLINIC | Age: 54
End: 2024-05-30
Payer: COMMERCIAL

## 2024-05-30 NOTE — TELEPHONE ENCOUNTER
MCKAY FROM  NEUROSURGERY CALLED AND IS REQUESTING DEMOGRAPHICS SHEET FOR THIS PATIENT.  PER MCKAY THIS WAS MISSING FROM REFERRAL SENT     PLEASE FAX TO:  [phone]    THANK YOU!

## 2024-06-04 ENCOUNTER — TRANSCRIBE ORDERS (OUTPATIENT)
Dept: ADMINISTRATIVE | Facility: HOSPITAL | Age: 54
End: 2024-06-04
Payer: COMMERCIAL

## 2024-06-04 DIAGNOSIS — Z12.31 BREAST CANCER SCREENING BY MAMMOGRAM: Primary | ICD-10-CM

## 2024-06-04 DIAGNOSIS — R11.0 NAUSEA: ICD-10-CM

## 2024-06-04 RX ORDER — ONDANSETRON 4 MG/1
4 TABLET, FILM COATED ORAL DAILY PRN
Qty: 30 TABLET | Refills: 1 | Status: SHIPPED | OUTPATIENT
Start: 2024-06-04

## 2024-06-17 DIAGNOSIS — B37.9 YEAST INFECTION: Primary | ICD-10-CM

## 2024-06-17 RX ORDER — FLUCONAZOLE 100 MG/1
100 TABLET ORAL DAILY
Qty: 3 TABLET | Refills: 0 | Status: SHIPPED | OUTPATIENT
Start: 2024-06-17 | End: 2024-06-21

## 2024-06-18 ENCOUNTER — TRANSCRIBE ORDERS (OUTPATIENT)
Dept: ADMINISTRATIVE | Facility: HOSPITAL | Age: 54
End: 2024-06-18
Payer: COMMERCIAL

## 2024-06-18 DIAGNOSIS — G95.9 DISEASE OF SPINAL CORD: ICD-10-CM

## 2024-06-18 DIAGNOSIS — M51.36 DEGENERATION OF LUMBAR INTERVERTEBRAL DISC: ICD-10-CM

## 2024-06-18 DIAGNOSIS — M48.062 PSEUDOCLAUDICATION SYNDROME: ICD-10-CM

## 2024-06-18 DIAGNOSIS — M50.30 DEGENERATION OF CERVICAL INTERVERTEBRAL DISC: Primary | ICD-10-CM

## 2024-06-18 DIAGNOSIS — M48.062 NEUROGENIC CLAUDICATION DUE TO LUMBAR SPINAL STENOSIS: ICD-10-CM

## 2024-06-18 DIAGNOSIS — Z98.1 ARTHRODESIS STATUS: ICD-10-CM

## 2024-06-30 ENCOUNTER — HOSPITAL ENCOUNTER (OUTPATIENT)
Dept: MRI IMAGING | Facility: HOSPITAL | Age: 54
Discharge: HOME | End: 2024-06-30
Payer: COMMERCIAL

## 2024-06-30 ENCOUNTER — HOSPITAL ENCOUNTER (OUTPATIENT)
Dept: GENERAL RADIOLOGY | Facility: HOSPITAL | Age: 54
Discharge: HOME | End: 2024-06-30
Payer: COMMERCIAL

## 2024-06-30 ENCOUNTER — TRANSCRIBE ORDERS (OUTPATIENT)
Dept: ADMINISTRATIVE | Facility: HOSPITAL | Age: 54
End: 2024-06-30
Payer: COMMERCIAL

## 2024-06-30 DIAGNOSIS — M48.062 LUMBAR STENOSIS WITH NEUROGENIC CLAUDICATION: ICD-10-CM

## 2024-06-30 DIAGNOSIS — G95.9 DISEASE OF SPINAL CORD: ICD-10-CM

## 2024-06-30 DIAGNOSIS — M51.36 DDD (DEGENERATIVE DISC DISEASE), LUMBAR: ICD-10-CM

## 2024-06-30 DIAGNOSIS — M48.062 NEUROGENIC CLAUDICATION DUE TO LUMBAR SPINAL STENOSIS: ICD-10-CM

## 2024-06-30 DIAGNOSIS — Z98.1 S/P LUMBAR SPINAL FUSION: ICD-10-CM

## 2024-06-30 DIAGNOSIS — Z98.1 ARTHRODESIS STATUS: ICD-10-CM

## 2024-06-30 DIAGNOSIS — M48.062 PSEUDOCLAUDICATION SYNDROME: ICD-10-CM

## 2024-06-30 DIAGNOSIS — M50.30 DEGENERATION OF CERVICAL INTERVERTEBRAL DISC: ICD-10-CM

## 2024-06-30 DIAGNOSIS — M51.36 DEGENERATION OF LUMBAR INTERVERTEBRAL DISC: ICD-10-CM

## 2024-06-30 DIAGNOSIS — M50.30 DDD (DEGENERATIVE DISC DISEASE), CERVICAL: ICD-10-CM

## 2024-06-30 DIAGNOSIS — M48.062 LUMBAR STENOSIS WITH NEUROGENIC CLAUDICATION: Primary | ICD-10-CM

## 2024-06-30 PROCEDURE — 72082 X-RAY EXAM ENTIRE SPI 2/3 VW: CPT

## 2024-06-30 PROCEDURE — 72148 MRI LUMBAR SPINE W/O DYE: CPT

## 2024-06-30 PROCEDURE — 72141 MRI NECK SPINE W/O DYE: CPT

## 2024-06-30 PROCEDURE — 72100 X-RAY EXAM L-S SPINE 2/3 VWS: CPT

## 2024-09-11 DIAGNOSIS — K58.1 IRRITABLE BOWEL SYNDROME WITH CONSTIPATION: ICD-10-CM

## 2024-09-16 ENCOUNTER — PATIENT OUTREACH (OUTPATIENT)
Dept: CASE MANAGEMENT | Facility: OTHER | Age: 54
End: 2024-09-16
Payer: COMMERCIAL

## 2024-10-02 RX ORDER — DULOXETIN HYDROCHLORIDE 60 MG/1
CAPSULE, DELAYED RELEASE ORAL EVERY 12 HOURS SCHEDULED
Qty: 60 CAPSULE | Refills: 5 | Status: CANCELLED | OUTPATIENT
Start: 2024-09-27

## 2024-10-16 ENCOUNTER — HOSPITAL ENCOUNTER (OUTPATIENT)
Dept: WOUND CARE | Facility: HOSPITAL | Age: 54
Discharge: HOME | End: 2024-10-16
Payer: COMMERCIAL

## 2024-10-16 VITALS
HEIGHT: 61 IN | OXYGEN SATURATION: 96 % | RESPIRATION RATE: 18 BRPM | TEMPERATURE: 98.3 F | SYSTOLIC BLOOD PRESSURE: 98 MMHG | HEART RATE: 71 BPM | DIASTOLIC BLOOD PRESSURE: 61 MMHG | WEIGHT: 230 LBS | BODY MASS INDEX: 43.43 KG/M2

## 2024-10-16 DIAGNOSIS — T14.8XXA OPEN WOUND: ICD-10-CM

## 2024-10-16 DIAGNOSIS — T81.89XA DELAYED SURGICAL WOUND HEALING, INITIAL ENCOUNTER: Primary | ICD-10-CM

## 2024-10-16 PROBLEM — S31.000A OPEN WOUND OF LUMBAR REGION: Status: ACTIVE | Noted: 2024-10-16

## 2024-10-16 LAB
ALBUMIN SERPL-MCNC: 3.9 G/DL (ref 3.5–5.2)
ALBUMIN/GLOB SERPL: 1.1 G/DL
ALP SERPL-CCNC: 113 U/L (ref 39–117)
ALT SERPL W P-5'-P-CCNC: 13 U/L (ref 1–33)
ANION GAP SERPL CALCULATED.3IONS-SCNC: 11.2 MMOL/L (ref 5–15)
AST SERPL-CCNC: 15 U/L (ref 1–32)
BASOPHILS # BLD AUTO: 0.06 10*3/MM3 (ref 0–0.2)
BASOPHILS NFR BLD AUTO: 0.7 % (ref 0–1.5)
BILIRUB SERPL-MCNC: 0.4 MG/DL (ref 0–1.2)
BUN SERPL-MCNC: 8 MG/DL (ref 6–20)
BUN/CREAT SERPL: 10 (ref 7–25)
CALCIUM SPEC-SCNC: 9.9 MG/DL (ref 8.6–10.5)
CHLORIDE SERPL-SCNC: 103 MMOL/L (ref 98–107)
CO2 SERPL-SCNC: 24.8 MMOL/L (ref 22–29)
CREAT SERPL-MCNC: 0.8 MG/DL (ref 0.57–1)
CRP SERPL-MCNC: 0.42 MG/DL (ref 0–0.5)
DEPRECATED RDW RBC AUTO: 42.5 FL (ref 37–54)
EGFRCR SERPLBLD CKD-EPI 2021: 88.2 ML/MIN/1.73
EOSINOPHIL # BLD AUTO: 0.22 10*3/MM3 (ref 0–0.4)
EOSINOPHIL NFR BLD AUTO: 2.6 % (ref 0.3–6.2)
ERYTHROCYTE [DISTWIDTH] IN BLOOD BY AUTOMATED COUNT: 13.2 % (ref 12.3–15.4)
ERYTHROCYTE [SEDIMENTATION RATE] IN BLOOD: 59 MM/HR (ref 0–30)
GLOBULIN UR ELPH-MCNC: 3.7 GM/DL
GLUCOSE SERPL-MCNC: 101 MG/DL (ref 65–99)
HBA1C MFR BLD: 5.6 % (ref 4.8–5.6)
HCT VFR BLD AUTO: 43.8 % (ref 34–46.6)
HGB BLD-MCNC: 13.7 G/DL (ref 12–15.9)
IMM GRANULOCYTES # BLD AUTO: 0.02 10*3/MM3 (ref 0–0.05)
IMM GRANULOCYTES NFR BLD AUTO: 0.2 % (ref 0–0.5)
LYMPHOCYTES # BLD AUTO: 1.89 10*3/MM3 (ref 0.7–3.1)
LYMPHOCYTES NFR BLD AUTO: 22.4 % (ref 19.6–45.3)
MCH RBC QN AUTO: 27.5 PG (ref 26.6–33)
MCHC RBC AUTO-ENTMCNC: 31.3 G/DL (ref 31.5–35.7)
MCV RBC AUTO: 88 FL (ref 79–97)
MONOCYTES # BLD AUTO: 0.46 10*3/MM3 (ref 0.1–0.9)
MONOCYTES NFR BLD AUTO: 5.4 % (ref 5–12)
NEUTROPHILS NFR BLD AUTO: 5.8 10*3/MM3 (ref 1.7–7)
NEUTROPHILS NFR BLD AUTO: 68.7 % (ref 42.7–76)
NRBC BLD AUTO-RTO: 0 /100 WBC (ref 0–0.2)
PLATELET # BLD AUTO: 362 10*3/MM3 (ref 140–450)
PMV BLD AUTO: 9.4 FL (ref 6–12)
POTASSIUM SERPL-SCNC: 3.6 MMOL/L (ref 3.5–5.2)
PREALB SERPL-MCNC: 19.7 MG/DL (ref 20–40)
PROT SERPL-MCNC: 7.6 G/DL (ref 6–8.5)
RBC # BLD AUTO: 4.98 10*6/MM3 (ref 3.77–5.28)
SODIUM SERPL-SCNC: 139 MMOL/L (ref 136–145)
WBC NRBC COR # BLD AUTO: 8.45 10*3/MM3 (ref 3.4–10.8)

## 2024-10-16 PROCEDURE — 84134 ASSAY OF PREALBUMIN: CPT | Performed by: NURSE PRACTITIONER

## 2024-10-16 PROCEDURE — 87205 SMEAR GRAM STAIN: CPT | Performed by: NURSE PRACTITIONER

## 2024-10-16 PROCEDURE — 83036 HEMOGLOBIN GLYCOSYLATED A1C: CPT | Performed by: NURSE PRACTITIONER

## 2024-10-16 PROCEDURE — 85652 RBC SED RATE AUTOMATED: CPT | Performed by: NURSE PRACTITIONER

## 2024-10-16 PROCEDURE — 87070 CULTURE OTHR SPECIMN AEROBIC: CPT | Performed by: NURSE PRACTITIONER

## 2024-10-16 PROCEDURE — 97602 WOUND(S) CARE NON-SELECTIVE: CPT

## 2024-10-16 PROCEDURE — 85025 COMPLETE CBC W/AUTO DIFF WBC: CPT | Performed by: NURSE PRACTITIONER

## 2024-10-16 PROCEDURE — 80053 COMPREHEN METABOLIC PANEL: CPT | Performed by: NURSE PRACTITIONER

## 2024-10-16 PROCEDURE — 86140 C-REACTIVE PROTEIN: CPT | Performed by: NURSE PRACTITIONER

## 2024-10-16 RX ORDER — LIDOCAINE HYDROCHLORIDE 20 MG/ML
1 JELLY TOPICAL ONCE
Status: COMPLETED | OUTPATIENT
Start: 2024-10-16 | End: 2024-10-16

## 2024-10-16 RX ORDER — LIDOCAINE HYDROCHLORIDE AND EPINEPHRINE BITARTRATE 20; .01 MG/ML; MG/ML
10 INJECTION, SOLUTION SUBCUTANEOUS ONCE
OUTPATIENT
Start: 2024-10-16 | End: 2024-10-16

## 2024-10-16 RX ORDER — DIAPER,BRIEF,INFANT-TODD,DISP
1 EACH MISCELLANEOUS ONCE
OUTPATIENT
Start: 2024-10-16 | End: 2024-10-16

## 2024-10-16 RX ORDER — LIDOCAINE HYDROCHLORIDE 20 MG/ML
JELLY TOPICAL AS NEEDED
OUTPATIENT
Start: 2024-10-16

## 2024-10-16 RX ORDER — CASTOR OIL AND BALSAM, PERU 788; 87 MG/G; MG/G
1 OINTMENT TOPICAL AS NEEDED
OUTPATIENT
Start: 2024-10-16

## 2024-10-16 RX ORDER — MUPIROCIN 20 MG/G
1 OINTMENT TOPICAL AS NEEDED
OUTPATIENT
Start: 2024-10-16

## 2024-10-16 RX ORDER — NYSTATIN 100000 [USP'U]/G
1 POWDER TOPICAL ONCE
OUTPATIENT
Start: 2024-10-16 | End: 2024-10-16

## 2024-10-16 RX ORDER — SODIUM HYPOCHLORITE 2.5 MG/ML
1 SOLUTION TOPICAL AS NEEDED
OUTPATIENT
Start: 2024-10-16

## 2024-10-16 RX ORDER — LIDOCAINE HYDROCHLORIDE 20 MG/ML
10 INJECTION, SOLUTION INFILTRATION; PERINEURAL ONCE
OUTPATIENT
Start: 2024-10-16 | End: 2024-10-16

## 2024-10-16 RX ORDER — SODIUM HYPOCHLORITE 1.25 MG/ML
1 SOLUTION TOPICAL AS NEEDED
OUTPATIENT
Start: 2024-10-16

## 2024-10-16 RX ORDER — SILVER SULFADIAZINE 10 MG/G
1 CREAM TOPICAL AS NEEDED
OUTPATIENT
Start: 2024-10-16

## 2024-10-16 RX ORDER — LIDOCAINE HYDROCHLORIDE 20 MG/ML
1 JELLY TOPICAL ONCE
OUTPATIENT
Start: 2024-10-16 | End: 2024-10-16

## 2024-10-16 RX ADMIN — LIDOCAINE HYDROCHLORIDE 1 ML: 20 JELLY TOPICAL at 10:30

## 2024-10-16 NOTE — PROGRESS NOTES
Wound Clinic Note  Patient Identification:  Name:  Mone Amador  Age:  53 y.o.  Sex:  female  :  1970  MRN:  [id number]   Visit Number:  [id number]  Primary Care Physician:  Christopher Wu MD     Subjective     Chief complaint:     Lumbar spinal surgical wound    History of presenting illness: ***  Patient is a 53 y.o. female with past medical history significant for *** that presented today for evaluation of lumbar spine. S/P fusion 2024 by      ---------------------------------------------------------------------------------------------------------------------   Review of Systems ***  ---------------------------------------------------------------------------------------------------------------------   Past Medical History:   Diagnosis Date    Anxiety     Arthritis     Arthritis of back 2019    Cholecystolithiasis     Colon polyp     Fatty liver     Fibromyalgia     Fracture, foot 22    left    Headache     History of COVID-19 10/06/2021    Hypertension     Kidney stone     Low back pain     Lumbosacral disc disease     Migraine     Obese     Wears glasses (prescription)      Past Surgical History:   Procedure Laterality Date    ANAL SCOPE N/A 2022    Procedure: ANAL SCOPE with fistulotomy;  Surgeon: Pito Vigil MD;  Location: [facility];  Service: General;  Laterality: N/A;    BACK SURGERY       SECTION      ,     CHOLECYSTECTOMY      COLONOSCOPY N/A 2021    Procedure: COLONOSCOPY FOR SCREENING CPT CODE: ;  Surgeon: Jaron Car MD;  Location:  [facility];  Service: Gastroenterology;  Laterality: N/A;    ENDOSCOPY N/A 2021    Procedure: ESOPHAGOGASTRODUODENOSCOPY WITH BIOPSY CPT CODE: 83918;  Surgeon: Jaron Car MD;  Location: [facility];  Service: Gastroenterology;  Laterality: N/A;    EPIDURAL BLOCK      EXCISION MASS ARM Left 2022    Procedure: EXCISION MASS UPPER EXTREMITY; Left,  volar;  Surgeon: Juan Gastelum MD;  Location:  [facility];  Service: General;  Laterality: Left;    HYSTERECTOMY  2003 32    INTERVENTIONAL RADIOLOGY PROCEDURE N/A 8/9/2023    Procedure: IR myelogram lumbar spine;  Surgeon: Ralph Huerta MD;  Location:  [facility];  Service: Interventional Radiology;  Laterality: N/A;    LUMBAR DISCECTOMY FUSION INSTRUMENTATION N/A 11/30/2021    Procedure: posterior lumbar interbody fusion with use of bone morphogenic protein L4-5;  Surgeon: Marquis Boucher MD;  Location:  [facility];  Service: Neurosurgery;  Laterality: N/A;    RECTAL FISSURE INCISION AND DRAINAGE N/A 04/29/2022    Procedure: ANAL FISTULA REPAIR;  Surgeon: Pito Vigil MD;  [facility];  Service: General;  Laterality: N/A;    SPINAL FUSION      UPPER GASTROINTESTINAL ENDOSCOPY  01/18/2021     Family History   Problem Relation Age of Onset    Heart disease Mother     Hypertension Mother     Stroke Mother     Anxiety disorder Mother     Arthritis Mother     COPD Mother     Thyroid disease Father     Glaucoma Father     Hearing loss Father     Heart disease Father     Vision loss Father     Multiple sclerosis Sister     No Known Problems Brother     No Known Problems Son     No Known Problems Sister     No Known Problems Son     Breast cancer Neg Hx      Social History     Socioeconomic History    Marital status:    Tobacco Use    Smoking status: Never    Smokeless tobacco: Never   Vaping Use    Vaping status: Never Used   Substance and Sexual Activity    Alcohol use: No    Drug use: No    Sexual activity: Yes     Partners: Male     Birth control/protection: Hysterectomy     Comment: Hysterectomy     ---------------------------------------------------------------------------------------------------------------------   Allergies:  Latex and Morphine and  "codeine  ---------------------------------------------------------------------------------------------------------------------  Objective     ---------------------------------------------------------------------------------------------------------------------   Vital Signs:  BP 98/61   Pulse 71   Temp 98.3 °F (36.8 °C) (Infrared)   Resp 18   Ht 154.9 cm (61\")   Wt 104 kg (230 lb)   SpO2 96%   BMI 43.46 kg/m²   Estimated body mass index is 43.46 kg/m² as calculated from the following:    Height as of this encounter: 154.9 cm (61\").    Weight as of this encounter: 104 kg (230 lb).  Body mass index is 43.46 kg/m².  Wt Readings from Last 3 Encounters:   10/16/24 104 kg (230 lb)   01/09/24 104 kg (228 lb 3.2 oz)   08/09/23 100 kg (220 lb 6.4 oz)       ---------------------------------------------------------------------------------------------------------------------   Physical Exam  Wound Assessment:  Location: {Location- traumatic wound:84491}  Wound Measurements: Length:  Width:   Depth:  Tunneling: {yes and no:56494} Undermining: {yes and no:94421}  Dressing Appearance: {dressingappearance:65684}  Closure: {wound closure:68770}  Changes since last exam: {wound changes :11958}  Etiology and classification: {wound etiology:17368}  Wound bed structures/characteristics: {wound structures:67644}  Edges {wound drainage :42073}  Periwound characteristics: {periwound characteristics:57573}  Periwound Temperature: {skin temperature :40182}  Drainage characteristics: {wound drainage :51141}  Perfusion characteristics: {perfusion characteristics:88323}    Wound Goal (s):{Blank Multiple:29876}  Assessment & Plan      Patient Active Problem List    Diagnosis     Open wound [T14.8XXA]     Chronic bilateral low back pain with left-sided sciatica [M54.42, G89.29]     Numbness and tingling of left lower extremity [R20.0, R20.2]     S/P L4-5 PLIF 2021 [Z98.1]     Abscess of skin of left wrist [L02.414]     Anal fistula [K60.30] "     Anal discharge [R19.8]     Lumbosacral radiculopathy at L5 [M54.17]     Spondylolisthesis of lumbar region [M43.16]     Gastroesophageal reflux disease [K21.9]     Encounter for colorectal cancer screening [Z12.11, Z12.12]     Class 3 severe obesity due to excess calories with serious comorbidity and body mass index (BMI) of 40.0 to 44.9 in adult [E66.813, E66.01, Z68.41]     Anxiety and depression [F41.9, F32.A]     Lumbar stenosis with neurogenic claudication [M48.062]     Intractable chronic migraine without aura and without status migrainosus [G43.719]     Facet arthritis of lumbar region [M47.816]     Anxiety [F41.9]     Fibromyalgia [M79.7]     Hypertension [I10]         Clinical Impression:{Blank Multiple:24983}    Follow-up: {CWS HEARING AID FOLLOW UP:54937}    CECIL Bojorquez,PIEDADS  [facility]  10/16/24  10:25 EDT

## 2024-10-19 ENCOUNTER — HOSPITAL ENCOUNTER (OUTPATIENT)
Dept: MRI IMAGING | Facility: HOSPITAL | Age: 54
Discharge: HOME | End: 2024-10-19
Admitting: NURSE PRACTITIONER
Payer: COMMERCIAL

## 2024-10-19 DIAGNOSIS — T81.89XA DELAYED SURGICAL WOUND HEALING, INITIAL ENCOUNTER: ICD-10-CM

## 2024-10-19 LAB
BACTERIA SPEC AEROBE CULT: NORMAL
GRAM STN SPEC: NORMAL
GRAM STN SPEC: NORMAL

## 2024-10-19 PROCEDURE — 72158 MRI LUMBAR SPINE W/O & W/DYE: CPT

## 2024-10-19 PROCEDURE — A9577 INJ MULTIHANCE: HCPCS | Performed by: NURSE PRACTITIONER

## 2024-10-19 PROCEDURE — 0 GADOBENATE DIMEGLUMINE 529 MG/ML SOLUTION: Performed by: NURSE PRACTITIONER

## 2024-10-19 RX ADMIN — GADOBENATE DIMEGLUMINE 20 ML: 529 INJECTION, SOLUTION INTRAVENOUS at 12:12

## 2024-10-22 ENCOUNTER — HOSPITAL ENCOUNTER (OUTPATIENT)
Dept: WOUND CARE | Facility: HOSPITAL | Age: 54
Discharge: HOME | End: 2024-10-22
Payer: COMMERCIAL

## 2024-10-22 VITALS
TEMPERATURE: 98.7 F | BODY MASS INDEX: 43.43 KG/M2 | DIASTOLIC BLOOD PRESSURE: 92 MMHG | SYSTOLIC BLOOD PRESSURE: 136 MMHG | RESPIRATION RATE: 20 BRPM | HEART RATE: 104 BPM | WEIGHT: 230 LBS | HEIGHT: 61 IN | OXYGEN SATURATION: 96 %

## 2024-10-22 DIAGNOSIS — T14.8XXA OPEN WOUND: Primary | ICD-10-CM

## 2024-10-22 PROCEDURE — 87070 CULTURE OTHR SPECIMN AEROBIC: CPT | Performed by: NURSE PRACTITIONER

## 2024-10-22 PROCEDURE — 87205 SMEAR GRAM STAIN: CPT | Performed by: NURSE PRACTITIONER

## 2024-10-22 PROCEDURE — 87147 CULTURE TYPE IMMUNOLOGIC: CPT | Performed by: NURSE PRACTITIONER

## 2024-10-22 PROCEDURE — 97602 WOUND(S) CARE NON-SELECTIVE: CPT

## 2024-10-22 RX ORDER — NYSTATIN 100000 [USP'U]/G
1 POWDER TOPICAL ONCE
OUTPATIENT
Start: 2024-10-22 | End: 2024-10-22

## 2024-10-22 RX ORDER — SODIUM HYPOCHLORITE 2.5 MG/ML
1 SOLUTION TOPICAL AS NEEDED
OUTPATIENT
Start: 2024-10-22

## 2024-10-22 RX ORDER — LIDOCAINE HYDROCHLORIDE 20 MG/ML
1 JELLY TOPICAL ONCE
OUTPATIENT
Start: 2024-10-22 | End: 2024-10-22

## 2024-10-22 RX ORDER — SODIUM HYPOCHLORITE 1.25 MG/ML
1 SOLUTION TOPICAL AS NEEDED
OUTPATIENT
Start: 2024-10-22

## 2024-10-22 RX ORDER — LIDOCAINE HYDROCHLORIDE 20 MG/ML
1 JELLY TOPICAL ONCE
Status: COMPLETED | OUTPATIENT
Start: 2024-10-22 | End: 2024-10-22

## 2024-10-22 RX ORDER — LIDOCAINE HYDROCHLORIDE 20 MG/ML
JELLY TOPICAL AS NEEDED
OUTPATIENT
Start: 2024-10-22

## 2024-10-22 RX ORDER — DIAPER,BRIEF,INFANT-TODD,DISP
1 EACH MISCELLANEOUS ONCE
OUTPATIENT
Start: 2024-10-22 | End: 2024-10-22

## 2024-10-22 RX ORDER — CASTOR OIL AND BALSAM, PERU 788; 87 MG/G; MG/G
1 OINTMENT TOPICAL AS NEEDED
OUTPATIENT
Start: 2024-10-22

## 2024-10-22 RX ORDER — SILVER SULFADIAZINE 10 MG/G
1 CREAM TOPICAL AS NEEDED
OUTPATIENT
Start: 2024-10-22

## 2024-10-22 RX ORDER — LIDOCAINE HYDROCHLORIDE AND EPINEPHRINE BITARTRATE 20; .01 MG/ML; MG/ML
10 INJECTION, SOLUTION SUBCUTANEOUS ONCE
OUTPATIENT
Start: 2024-10-22 | End: 2024-10-22

## 2024-10-22 RX ORDER — AMOXICILLIN AND CLAVULANATE POTASSIUM 500; 125 MG/1; MG/1
1 TABLET, FILM COATED ORAL 3 TIMES DAILY
Qty: 21 TABLET | Refills: 0 | Status: SHIPPED | OUTPATIENT
Start: 2024-10-22 | End: 2024-10-29

## 2024-10-22 RX ORDER — LIDOCAINE HYDROCHLORIDE 20 MG/ML
10 INJECTION, SOLUTION INFILTRATION; PERINEURAL ONCE
OUTPATIENT
Start: 2024-10-22 | End: 2024-10-22

## 2024-10-22 RX ORDER — MUPIROCIN 20 MG/G
1 OINTMENT TOPICAL AS NEEDED
OUTPATIENT
Start: 2024-10-22

## 2024-10-22 RX ADMIN — LIDOCAINE HYDROCHLORIDE 1 ML: 20 JELLY TOPICAL at 12:10

## 2024-10-28 PROBLEM — T81.31XA SURGICAL WOUND DEHISCENCE: Status: ACTIVE | Noted: 2024-10-28

## 2024-10-29 ENCOUNTER — HOSPITAL ENCOUNTER (OUTPATIENT)
Dept: WOUND CARE | Facility: HOSPITAL | Age: 54
Discharge: HOME | End: 2024-10-29
Admitting: NURSE PRACTITIONER
Payer: COMMERCIAL

## 2024-10-29 VITALS
TEMPERATURE: 98.5 F | DIASTOLIC BLOOD PRESSURE: 80 MMHG | HEART RATE: 69 BPM | RESPIRATION RATE: 18 BRPM | HEIGHT: 61 IN | WEIGHT: 230 LBS | BODY MASS INDEX: 43.43 KG/M2 | SYSTOLIC BLOOD PRESSURE: 128 MMHG | OXYGEN SATURATION: 92 %

## 2024-10-29 DIAGNOSIS — T14.8XXA OPEN WOUND: Primary | ICD-10-CM

## 2024-10-29 PROCEDURE — 25010000002 LIDOCAINE 2% SOLUTION: Performed by: NURSE PRACTITIONER

## 2024-10-29 RX ORDER — LIDOCAINE HYDROCHLORIDE 20 MG/ML
1 JELLY TOPICAL ONCE
OUTPATIENT
Start: 2024-10-29 | End: 2024-10-29

## 2024-10-29 RX ORDER — LIDOCAINE HYDROCHLORIDE 20 MG/ML
JELLY TOPICAL AS NEEDED
OUTPATIENT
Start: 2024-10-29

## 2024-10-29 RX ORDER — NYSTATIN 100000 [USP'U]/G
1 POWDER TOPICAL ONCE
OUTPATIENT
Start: 2024-10-29 | End: 2024-10-29

## 2024-10-29 RX ORDER — DIAPER,BRIEF,INFANT-TODD,DISP
1 EACH MISCELLANEOUS ONCE
OUTPATIENT
Start: 2024-10-29 | End: 2024-10-29

## 2024-10-29 RX ORDER — LIDOCAINE HYDROCHLORIDE 20 MG/ML
10 INJECTION, SOLUTION INFILTRATION; PERINEURAL ONCE
OUTPATIENT
Start: 2024-10-29 | End: 2024-10-29

## 2024-10-29 RX ORDER — LIDOCAINE HYDROCHLORIDE 20 MG/ML
10 INJECTION, SOLUTION INFILTRATION; PERINEURAL ONCE
Status: COMPLETED | OUTPATIENT
Start: 2024-10-29 | End: 2024-10-29

## 2024-10-29 RX ORDER — SILVER SULFADIAZINE 10 MG/G
1 CREAM TOPICAL AS NEEDED
OUTPATIENT
Start: 2024-10-29

## 2024-10-29 RX ORDER — MUPIROCIN 20 MG/G
1 OINTMENT TOPICAL AS NEEDED
OUTPATIENT
Start: 2024-10-29

## 2024-10-29 RX ORDER — LIDOCAINE HYDROCHLORIDE AND EPINEPHRINE BITARTRATE 20; .01 MG/ML; MG/ML
10 INJECTION, SOLUTION SUBCUTANEOUS ONCE
OUTPATIENT
Start: 2024-10-29 | End: 2024-10-29

## 2024-10-29 RX ORDER — LIDOCAINE HYDROCHLORIDE 20 MG/ML
1 JELLY TOPICAL ONCE
Status: COMPLETED | OUTPATIENT
Start: 2024-10-29 | End: 2024-10-29

## 2024-10-29 RX ORDER — SODIUM HYPOCHLORITE 1.25 MG/ML
1 SOLUTION TOPICAL AS NEEDED
OUTPATIENT
Start: 2024-10-29

## 2024-10-29 RX ORDER — SODIUM HYPOCHLORITE 2.5 MG/ML
1 SOLUTION TOPICAL AS NEEDED
OUTPATIENT
Start: 2024-10-29

## 2024-10-29 RX ORDER — CASTOR OIL AND BALSAM, PERU 788; 87 MG/G; MG/G
1 OINTMENT TOPICAL AS NEEDED
OUTPATIENT
Start: 2024-10-29

## 2024-10-29 RX ADMIN — LIDOCAINE HYDROCHLORIDE 1 ML: 20 JELLY TOPICAL at 11:11

## 2024-10-29 RX ADMIN — LIDOCAINE HYDROCHLORIDE 10 ML: 20 INJECTION, SOLUTION INFILTRATION; PERINEURAL at 11:25

## 2024-10-29 NOTE — PROGRESS NOTES
"    Wound Clinic Note  Patient Identification:  Name:  Mone Amador  Age:  53 y.o.  Sex:  female  :  1970  MRN:  [id number]   Visit Number:  [id number]  Primary Care Physician:  Christopher Wu MD     Subjective     Chief complaint:     Lumbar spinal surgical wound    History of presenting illness:     Patient is a 53 y.o. female with past medical history significant for obesity. that presented today for evaluation of lumbar spine. S/P fusion 2024 by Dr. Franklin. Reports area started to open at proximal end. Reports packing the area as recommended by surgical team. She is reporting mild pain to the area. Reports concerns of \"lump\" to the left lateral periwound. There is no erythema or open areas. Denies any fever or chills. Denies smoking history.     Interval History:   10/22/2024: Seen in clinic today for follow-up to lumbar spinal surgical wound. MRI was completed with following impression: Posterior fusion hardware and surgical change of the soft tissues  noted of L4-5 and L3-4 with the bilateral neural foramina difficult to evaluate at these levels. L5-S1 broad-based posterior disc bulging causing mild central canal stenosis and potentially some degree of bilateral neural foraminal stenosis but difficult to evaluate given susceptibility artifact. Will reach out to surgeon on MRI findings. She has new area to the lateral   ---------------------------------------------------------------------------------------------------------------------   Review of Systems   Constitutional:  Negative for chills and fever.   Cardiovascular:  Negative for leg swelling.   Gastrointestinal:  Negative for nausea and vomiting.   Musculoskeletal:  Negative for back pain and gait problem.   Skin:  Positive for wound.      ---------------------------------------------------------------------------------------------------------------------   Past Medical History:   Diagnosis Date    Anxiety     Arthritis     " Arthritis of back 2019    Cholecystolithiasis     Colon polyp     Fatty liver     Fibromyalgia     Fracture, foot 22    left    Headache     History of COVID-19 10/06/2021    Hypertension     Kidney stone     Low back pain     Lumbosacral disc disease     Migraine     Obese     Wears glasses (prescription)      Past Surgical History:   Procedure Laterality Date    ANAL SCOPE N/A 2022    Procedure: ANAL SCOPE with fistulotomy;  Surgeon: Pito Vigil MD;  Location:  [facility];  Service: General;  Laterality: N/A;    BACK SURGERY       SECTION      ,     CHOLECYSTECTOMY      COLONOSCOPY N/A 2021    Procedure: COLONOSCOPY FOR SCREENING CPT CODE: ;  Surgeon: Jaron Car MD;  Location:  [facility];  Service: Gastroenterology;  Laterality: N/A;    ENDOSCOPY N/A 2021    Procedure: ESOPHAGOGASTRODUODENOSCOPY WITH BIOPSY CPT CODE: 82390;  Surgeon: Jaron Car MD;  Location:  [facility];  Service: Gastroenterology;  Laterality: N/A;    EPIDURAL BLOCK      EXCISION MASS ARM Left 2022    Procedure: EXCISION MASS UPPER EXTREMITY; Left, volar;  Surgeon: Juan Gastelum MD;  Location:  [facility];  Service: General;  Laterality: Left;    HYSTERECTOMY      32    INTERVENTIONAL RADIOLOGY PROCEDURE N/A 2023    Procedure: IR myelogram lumbar spine;  Surgeon: Ralph Huerta MD;  Location:  [facility];  Service: Interventional Radiology;  Laterality: N/A;    LUMBAR DISCECTOMY FUSION INSTRUMENTATION N/A 2021    Procedure: posterior lumbar interbody fusion with use of bone morphogenic protein L4-5;  Surgeon: Marquis Boucher MD;  Location:  [facility];  Service: Neurosurgery;  Laterality: N/A;    RECTAL FISSURE INCISION AND DRAINAGE N/A 2022    Procedure: ANAL FISTULA REPAIR;  Surgeon: Pito Vigil MD;  Location:  [facility];  Service: General;  Laterality: N/A;    SPINAL FUSION      UPPER  "GASTROINTESTINAL ENDOSCOPY  01/18/2021     Family History   Problem Relation Age of Onset    Heart disease Mother     Hypertension Mother     Stroke Mother     Anxiety disorder Mother     Arthritis Mother     COPD Mother     Thyroid disease Father     Glaucoma Father     Hearing loss Father     Heart disease Father     Vision loss Father     Multiple sclerosis Sister     No Known Problems Brother     No Known Problems Son     No Known Problems Sister     No Known Problems Son     Breast cancer Neg Hx      Social History     Socioeconomic History    Marital status:    Tobacco Use    Smoking status: Never    Smokeless tobacco: Never   Vaping Use    Vaping status: Never Used   Substance and Sexual Activity    Alcohol use: No    Drug use: No    Sexual activity: Yes     Partners: Male     Birth control/protection: Hysterectomy     Comment: Hysterectomy     ---------------------------------------------------------------------------------------------------------------------   Allergies:  Latex and Morphine and codeine  ---------------------------------------------------------------------------------------------------------------------  Objective     ---------------------------------------------------------------------------------------------------------------------   Vital Signs:  /92   Pulse 104   Temp 98.7 °F (37.1 °C) (Infrared)   Resp 20   Ht 154.9 cm (61\")   Wt 104 kg (230 lb)   SpO2 96%   BMI 43.46 kg/m²   Estimated body mass index is 43.46 kg/m² as calculated from the following:    Height as of this encounter: 154.9 cm (61\").    Weight as of this encounter: 104 kg (230 lb).  Body mass index is 43.46 kg/m².  Wt Readings from Last 3 Encounters:   10/29/24 104 kg (230 lb)   10/22/24 104 kg (230 lb)   10/16/24 104 kg (230 lb)       ---------------------------------------------------------------------------------------------------------------------   Physical Exam  Wound Assessment:  Location:  Lumbar " spine  Wound Measurements: 1.6 X 0.3 X 0.7cm   Tunneling: No Undermining: No  Dressing Appearance: dressingappearance: clean  Closure: NA  Changes since last exam: this is initial exam  Etiology and classification: surgical wound dehiscence (primary closure has failed in one or more areas which are now open)  Wound bed structures/characteristics: full-thickness (subcutaneous tissue is exposed in at least a portion of the wound), moist, red  Edges moist  Periwound characteristics: intact, firmness to left lateral periwound not open at this time.  Periwound Temperature: normal turgor and temperature  Drainage characteristics: moderate, serous   Perfusion characteristics: NA    Wound Goal (s):Free of infection and No further symptoms  Assessment & Plan      Patient Active Problem List    Diagnosis     Open wound of lumbar region [S31.000A], surgical wound dehiscence  -Pack with honeygel moistened gauze and secure with silicone border dressing daily and PRN  -MRI completed, will reach out to surgeon for additional recommendations   -Area of induration to lateral aspect,  Wound culture obtained   -Recommend gunjan protein diet 0.75g/kg/day along with vitamin C 2000mg/day, vitamin A 5000 Units/day, vitamin D3 5000 Units/day, zinc 50mg/day to help promote wound healing      Obesity  -An obese person?is at greater risk for wound infection and dehiscence or evisceration.     Former Smoker  -Tissue perfusion is lower in users of tobacco and other forms of nicotine. Reduced tissue perfusion strongly inhibits wound healing, increases risk of infection, and dramatically increases risk of limb loss.        Clinical Impression:Moderate Complexity    Follow-up: 1 week    CECIL Bojorquez,CWS  [facility]  10/22/2024  1130

## 2024-10-29 NOTE — PROGRESS NOTES
"    Wound Clinic Note  Patient Identification:  Name:  Mone Amador  Age:  53 y.o.  Sex:  female  :  1970  MRN:  [id number]   Visit Number:  [id number]  Primary Care Physician:  Christopher Wu MD     Subjective     Chief complaint:     Lumbar spinal surgical wound    History of presenting illness:     Patient is a 53 y.o. female with past medical history significant for obesity. that presented today for evaluation of lumbar spine. S/P fusion 2024 by Dr. Franklin. Reports area started to open at proximal end. Reports packing the area as recommended by surgical team. She is reporting mild pain to the area. Reports concerns of \"lump\" to the left lateral periwound. There is no erythema or open areas. Denies any fever or chills. Denies smoking history.     Interval History:   10/22/2024: Seen in clinic today for follow-up to lumbar spinal surgical wound. MRI was completed with following impression: Posterior fusion hardware and surgical change of the soft tissues  noted of L4-5 and L3-4 with the bilateral neural foramina difficult to evaluate at these levels. L5-S1 broad-based posterior disc bulging causing mild central canal stenosis and potentially some degree of bilateral neural foraminal stenosis but difficult to evaluate given susceptibility artifact. Will reach out to surgeon on MRI findings. She has new area to the lateral     10/29/2024: Seen in clinic today for follow-up to lumbar spinal surgical wound. Apepars to be healed. She has area of induration to lateral aspect with erythema. She completed course of augmentin. Wound culture finalized with streptococcus. Denies any fever or chills. Does report pain to area. No other issues or concerns reported.   ---------------------------------------------------------------------------------------------------------------------   Review of Systems   Constitutional:  Negative for chills and fever.   Cardiovascular:  Negative for leg swelling. "   Gastrointestinal:  Negative for nausea and vomiting.   Musculoskeletal:  Negative for back pain and gait problem.   Skin:  Positive for wound.      ---------------------------------------------------------------------------------------------------------------------   Past Medical History:   Diagnosis Date    Anxiety     Arthritis     Arthritis of back 2019    Cholecystolithiasis     Colon polyp     Fatty liver     Fibromyalgia     Fracture, foot 22    left    Headache     History of COVID-19 10/06/2021    Hypertension     Kidney stone     Low back pain     Lumbosacral disc disease     Migraine     Obese     Wears glasses (prescription)      Past Surgical History:   Procedure Laterality Date    ANAL SCOPE N/A 2022    Procedure: ANAL SCOPE with fistulotomy;  Surgeon: Pito Vigil MD;  Location: [facility];  Service: General;  Laterality: N/A;    BACK SURGERY       SECTION      ,     CHOLECYSTECTOMY      COLONOSCOPY N/A 2021    Procedure: COLONOSCOPY FOR SCREENING CPT CODE: ;  Surgeon: Jaron Car MD;  Location: [facility];  Service: Gastroenterology;  Laterality: N/A;    ENDOSCOPY N/A 2021    Procedure: ESOPHAGOGASTRODUODENOSCOPY WITH BIOPSY CPT CODE: 04255;  Surgeon: Jaron Car MD;  Location: [facility] OR;  Service: Gastroenterology;  Laterality: N/A;    EPIDURAL BLOCK      EXCISION MASS ARM Left 2022    Procedure: EXCISION MASS UPPER EXTREMITY; Left, volar;  Surgeon: Juan Gastelum MD;  Location: [facility] OR;  Service: General;  Laterality: Left;    HYSTERECTOMY      32    INTERVENTIONAL RADIOLOGY PROCEDURE N/A 2023    Procedure: IR myelogram lumbar spine;  Surgeon: Ralph Huerta MD;  Location:  [facility];  Service: Interventional Radiology;  Laterality: N/A;    LUMBAR DISCECTOMY FUSION INSTRUMENTATION N/A 2021    Procedure: posterior lumbar interbody fusion with use of bone  "morphogenic protein L4-5;  Surgeon: Marquis Boucher MD;  Location:  [facility] OR;  Service: Neurosurgery;  Laterality: N/A;    RECTAL FISSURE INCISION AND DRAINAGE N/A 04/29/2022    Procedure: ANAL FISTULA REPAIR;  Surgeon: Pito Vigil MD;  Location:  [facility] OR;  Service: General;  Laterality: N/A;    SPINAL FUSION      UPPER GASTROINTESTINAL ENDOSCOPY  01/18/2021     Family History   Problem Relation Age of Onset    Heart disease Mother     Hypertension Mother     Stroke Mother     Anxiety disorder Mother     Arthritis Mother     COPD Mother     Thyroid disease Father     Glaucoma Father     Hearing loss Father     Heart disease Father     Vision loss Father     Multiple sclerosis Sister     No Known Problems Brother     No Known Problems Son     No Known Problems Sister     No Known Problems Son     Breast cancer Neg Hx      Social History     Socioeconomic History    Marital status:    Tobacco Use    Smoking status: Never    Smokeless tobacco: Never   Vaping Use    Vaping status: Never Used   Substance and Sexual Activity    Alcohol use: No    Drug use: No    Sexual activity: Yes     Partners: Male     Birth control/protection: Hysterectomy     Comment: Hysterectomy     ---------------------------------------------------------------------------------------------------------------------   Allergies:  Latex and Morphine and codeine  ---------------------------------------------------------------------------------------------------------------------  Objective     ---------------------------------------------------------------------------------------------------------------------   Vital Signs:  /80   Pulse 69   Temp 98.5 °F (36.9 °C) (Infrared)   Resp 18   Ht 154.9 cm (61\")   Wt 104 kg (230 lb)   SpO2 92%   BMI 43.46 kg/m²   Estimated body mass index is 43.46 kg/m² as calculated from the following:    Height as of this encounter: 154.9 cm (61\").    Weight as of this encounter: 104 kg " (230 lb).  Body mass index is 43.46 kg/m².  Wt Readings from Last 3 Encounters:   10/29/24 104 kg (230 lb)   10/22/24 104 kg (230 lb)   10/16/24 104 kg (230 lb)       ---------------------------------------------------------------------------------------------------------------------   Physical Exam  Wound Assessment:  Location:  Lumbar spine  Wound Measurements: 1 X 0.2 X 0.5cm   Tunneling: No Undermining: No  Dressing Appearance: dressingappearance: clean  Closure: NA  Changes since last exam: this is initial exam  Etiology and classification: surgical wound dehiscence (primary closure has failed in one or more areas which are now open)  Wound bed structures/characteristics: full-thickness (subcutaneous tissue is exposed in at least a portion of the wound), moist, red  Edges moist  Periwound characteristics: intact, firmness to left lateral periwound not open at this time.  Periwound Temperature: normal turgor and temperature  Drainage characteristics: moderate, serous   Perfusion characteristics: NA    Wound Goal (s):Free of infection and No further symptoms  Assessment & Plan      Patient Active Problem List    Diagnosis     Open wound of lumbar region [S31.000A], surgical wound dehiscence  -Pack with honeygel moistened gauze and secure with silicone border dressing daily and PRN  -MRI completed, will reach out to surgeon for additional recommendations   -Area of induration to lateral aspect,  Wound culture obtained   -Recommend gunjan protein diet 0.75g/kg/day along with vitamin C 2000mg/day, vitamin A 5000 Units/day, vitamin D3 5000 Units/day, zinc 50mg/day to help promote wound healing      Obesity  -An obese person?is at greater risk for wound infection and dehiscence or evisceration.     Former Smoker  -Tissue perfusion is lower in users of tobacco and other forms of nicotine. Reduced tissue perfusion strongly inhibits wound healing, increases risk of infection, and dramatically increases risk of limb  "loss.        Incision and Drainage  Today's procedure is an incision and drainage of an abscess located on back- lumbar region . The area of induration measures 2 X 2 X 0The patient scores the pain as 4 on a scale of 0 to 10. There is a moderate amount of no exudate draining from the abscess. I have informed the patient of the risks and benefits of this procedure and they have had the opportunity to ask questions. Appropriate consent has been obtained. Wound Care Team called a \"Time Out\" to verify correct patient, procedure and site. The area was prepped and draped in the usual manner. The procedure was performed in a clean field. After checking for patient allergy, anesthetic was administered with 2% injectable lidocaine. Incision was made with a #11 blade and the abscess was drained of approximately 5 cc of fluid. Bleeding was minimal and hemostasis was achieved using pressure. The patient tolerated the procedure well. The patient was educated regarding the signs and symptoms of infection, such as purulent drainage, edema, cellulitis, and significant pain, and to notify healthcare personnel if any of these things occur.    Clinical Impression:Moderate Complexity    Follow-up: 1 week    CECIL Bojorquez,CWS  [facility]  10/29/2024  1518    "

## 2024-12-13 ENCOUNTER — TELEPHONE (OUTPATIENT)
Dept: UROLOGY | Facility: CLINIC | Age: 54
End: 2024-12-13
Payer: COMMERCIAL

## 2024-12-13 DIAGNOSIS — E11.65 TYPE 2 DIABETES MELLITUS WITH HYPERGLYCEMIA, WITHOUT LONG-TERM CURRENT USE OF INSULIN: Primary | ICD-10-CM

## 2024-12-13 RX ORDER — ORAL SEMAGLUTIDE 14 MG/1
14 TABLET ORAL DAILY
Qty: 30 TABLET | Refills: 3 | Status: SHIPPED | OUTPATIENT
Start: 2024-12-13

## 2024-12-16 DIAGNOSIS — E53.8 VITAMIN B12 DEFICIENCY: ICD-10-CM

## 2024-12-16 RX ORDER — CYANOCOBALAMIN 1000 UG/ML
1000 INJECTION, SOLUTION INTRAMUSCULAR; SUBCUTANEOUS
Qty: 30 ML | Refills: 0 | Status: CANCELLED | OUTPATIENT
Start: 2024-12-12

## 2025-01-14 DIAGNOSIS — R11.0 NAUSEA: ICD-10-CM

## 2025-01-14 RX ORDER — ONDANSETRON 4 MG/1
4 TABLET, FILM COATED ORAL DAILY PRN
Qty: 30 TABLET | Refills: 1 | Status: SHIPPED | OUTPATIENT
Start: 2025-01-14

## 2025-02-03 ENCOUNTER — TRANSCRIBE ORDERS (OUTPATIENT)
Facility: HOSPITAL | Age: 55
End: 2025-02-03
Payer: COMMERCIAL

## 2025-02-03 ENCOUNTER — HOSPITAL ENCOUNTER (OUTPATIENT)
Facility: HOSPITAL | Age: 55
Discharge: HOME | End: 2025-02-03
Admitting: FAMILY MEDICINE
Payer: COMMERCIAL

## 2025-02-03 DIAGNOSIS — L02.212 ABSCESS OF BACK, EXCEPT BUTTOCK: ICD-10-CM

## 2025-02-03 DIAGNOSIS — L02.212 ABSCESS OF BACK, EXCEPT BUTTOCK: Primary | ICD-10-CM

## 2025-02-03 LAB
BASOPHILS # BLD AUTO: 0.05 10*3/MM3 (ref 0–0.2)
BASOPHILS NFR BLD AUTO: 0.4 % (ref 0–1.5)
DEPRECATED RDW RBC AUTO: 44.8 FL (ref 37–54)
EOSINOPHIL # BLD AUTO: 0.18 10*3/MM3 (ref 0–0.4)
EOSINOPHIL NFR BLD AUTO: 1.6 % (ref 0.3–6.2)
ERYTHROCYTE [DISTWIDTH] IN BLOOD BY AUTOMATED COUNT: 14.6 % (ref 12.3–15.4)
HCT VFR BLD AUTO: 42.6 % (ref 34–46.6)
HGB BLD-MCNC: 13.8 G/DL (ref 12–15.9)
IMM GRANULOCYTES # BLD AUTO: 0.06 10*3/MM3 (ref 0–0.05)
IMM GRANULOCYTES NFR BLD AUTO: 0.5 % (ref 0–0.5)
LYMPHOCYTES # BLD AUTO: 1.8 10*3/MM3 (ref 0.7–3.1)
LYMPHOCYTES NFR BLD AUTO: 16 % (ref 19.6–45.3)
MCH RBC QN AUTO: 27.5 PG (ref 26.6–33)
MCHC RBC AUTO-ENTMCNC: 32.4 G/DL (ref 31.5–35.7)
MCV RBC AUTO: 84.9 FL (ref 79–97)
MONOCYTES # BLD AUTO: 0.61 10*3/MM3 (ref 0.1–0.9)
MONOCYTES NFR BLD AUTO: 5.4 % (ref 5–12)
NEUTROPHILS NFR BLD AUTO: 76.1 % (ref 42.7–76)
NEUTROPHILS NFR BLD AUTO: 8.56 10*3/MM3 (ref 1.7–7)
NRBC BLD AUTO-RTO: 0 /100 WBC (ref 0–0.2)
PLATELET # BLD AUTO: 420 10*3/MM3 (ref 140–450)
PMV BLD AUTO: 10 FL (ref 6–12)
RBC # BLD AUTO: 5.02 10*6/MM3 (ref 3.77–5.28)
WBC NRBC COR # BLD AUTO: 11.26 10*3/MM3 (ref 3.4–10.8)

## 2025-02-03 PROCEDURE — 36415 COLL VENOUS BLD VENIPUNCTURE: CPT | Performed by: FAMILY MEDICINE

## 2025-02-03 PROCEDURE — 85025 COMPLETE CBC W/AUTO DIFF WBC: CPT | Performed by: FAMILY MEDICINE

## 2025-03-11 ENCOUNTER — TRANSCRIBE ORDERS (OUTPATIENT)
Dept: ADMINISTRATIVE | Facility: HOSPITAL | Age: 55
End: 2025-03-11
Payer: COMMERCIAL

## 2025-03-11 DIAGNOSIS — L02.212 ABSCESS OF BACK: Primary | ICD-10-CM

## 2025-03-21 ENCOUNTER — TELEPHONE (OUTPATIENT)
Dept: SURGERY | Facility: CLINIC | Age: 55
End: 2025-03-21

## 2025-03-21 NOTE — TELEPHONE ENCOUNTER
DELETE AFTER REVIEWING: Send this encounter to the appropriate pool. See your Call Action Grid or Workflows for direction.        Hub staff attempted to follow warm transfer process and was unsuccessful     Caller: Mone Amador    Relationship to patient: Self    Best call back number: [phone]    Patient is needing: PATIENT ATTEMPTED TO RETURN PHONE CALL

## 2025-03-26 ENCOUNTER — HOSPITAL ENCOUNTER (OUTPATIENT)
Dept: CT IMAGING | Facility: HOSPITAL | Age: 55
Discharge: HOME | End: 2025-03-26
Admitting: FAMILY MEDICINE
Payer: COMMERCIAL

## 2025-03-26 ENCOUNTER — OFFICE VISIT (OUTPATIENT)
Dept: SURGERY | Facility: CLINIC | Age: 55
End: 2025-03-26
Payer: COMMERCIAL

## 2025-03-26 VITALS
BODY MASS INDEX: 43.08 KG/M2 | SYSTOLIC BLOOD PRESSURE: 110 MMHG | WEIGHT: 228.2 LBS | HEIGHT: 61 IN | DIASTOLIC BLOOD PRESSURE: 70 MMHG

## 2025-03-26 DIAGNOSIS — Z01.818 PRE-OPERATIVE CLEARANCE: Primary | ICD-10-CM

## 2025-03-26 DIAGNOSIS — L02.212 ABSCESS OF BACK: ICD-10-CM

## 2025-03-26 PROCEDURE — 74178 CT ABD&PLV WO CNTR FLWD CNTR: CPT

## 2025-03-26 PROCEDURE — 25510000001 IOPAMIDOL 61 % SOLUTION: Performed by: FAMILY MEDICINE

## 2025-03-26 RX ORDER — IOPAMIDOL 612 MG/ML
100 INJECTION, SOLUTION INTRAVASCULAR
Status: COMPLETED | OUTPATIENT
Start: 2025-03-26 | End: 2025-03-26

## 2025-03-26 RX ADMIN — IOPAMIDOL 100 ML: 612 INJECTION, SOLUTION INTRAVENOUS at 08:44

## 2025-03-26 NOTE — PROGRESS NOTES
Subjective   Mone Amador is a 54 y.o. female who presents today for Initial Evaluation    Chief Complaint:    Chief Complaint   Patient presents with   • bariatric patient        History of Present Illness:    History of Present Illness Mone is a 54-year-old female presents for for evaluation with interest in gastric sleeve surgery.  She reports that she has been overweight for about 30 to 40 years.  She states that she believes some of her issues is dietary choices as well as overeating.  She is unable to be very active due to history of back issues and several back surgeries.  She did discuss with her neurosurgeon the possibility of weight loss helping her back pain in which she agreed.  Past medical history significant for back pain, hypertension and anxiety.    Past abdominal surgical history significant for total hysterectomy as well as .    Diet: She typically skips breakfast.  For lunch she will have what ever is available.  Sometimes she will eat lunch that is provided from drug reps.  Sometimes she will go to the cafeteria.  She does eat chicken often.  She also eats bread often.  For dinner she typically will cook a roast and potatoes as well as carrots.  Or hamburgers with chips.  Sometimes she will make steak and baked potatoes.  If she skips dinner she will typically snack later in the evening.  She does drink approximately 3 bottles of Diet Coke daily.    Exercise: She does not exercise much now as she has a history of 2 back surgeries.  Her and her  are going to start walking daily.    The following portions of the patient's history were reviewed and updated as appropriate: allergies, current medications, past family history, past medical history, past social history, past surgical history and problem list.    Past Medical History:  Past Medical History:   Diagnosis Date   • Anxiety    • Arthritis    • Arthritis of back    • Cholecystolithiasis    • Colon polyp    •  Fatty liver    • Fibromyalgia    • Fracture, foot 22    left   • Headache    • History of COVID-19 10/06/2021   • Hypertension    • Kidney stone    • Low back pain    • Lumbosacral disc disease    • Migraine    • Obese    • Wears glasses (prescription)        Social History:  Social History     Socioeconomic History   • Marital status:    Tobacco Use   • Smoking status: Never     Passive exposure: Never   • Smokeless tobacco: Never   Vaping Use   • Vaping status: Never Used   Substance and Sexual Activity   • Alcohol use: No   • Drug use: No   • Sexual activity: Yes     Partners: Male     Birth control/protection: Hysterectomy     Comment: Hysterectomy       Family History:  Family History   Problem Relation Age of Onset   • Heart disease Mother    • Hypertension Mother    • Stroke Mother    • Anxiety disorder Mother    • Arthritis Mother    • COPD Mother    • Thyroid disease Father    • Glaucoma Father    • Hearing loss Father    • Heart disease Father    • Vision loss Father    • Multiple sclerosis Sister    • No Known Problems Brother    • No Known Problems Son    • No Known Problems Sister    • No Known Problems Son    • Breast cancer Neg Hx        Past Surgical History:  Past Surgical History:   Procedure Laterality Date   • ANAL SCOPE N/A 2022    Procedure: ANAL SCOPE with fistulotomy;  Surgeon: Pito Vigil MD;  Location: [facility];  Service: General;  Laterality: N/A;   • BACK SURGERY     •  SECTION      2001   • CHOLECYSTECTOMY     • COLONOSCOPY N/A 2021    Procedure: COLONOSCOPY FOR SCREENING CPT CODE: ;  Surgeon: Jaron Car MD;  Location: [facility];  Service: Gastroenterology;  Laterality: N/A;   • ENDOSCOPY N/A 2021    Procedure: ESOPHAGOGASTRODUODENOSCOPY WITH BIOPSY CPT CODE: 09970;  Surgeon: Jaron Car MD;  Location: [facility];  Service: Gastroenterology;  Laterality: N/A;   • EPIDURAL BLOCK     • EXCISION  MASS ARM Left 11/29/2022    Procedure: EXCISION MASS UPPER EXTREMITY; Left, volar;  Surgeon: Juan Gastelum MD;  Location:  [facility] OR;  Service: General;  Laterality: Left;   • HYSTERECTOMY  2003 32   • INTERVENTIONAL RADIOLOGY PROCEDURE N/A 8/9/2023    Procedure: IR myelogram lumbar spine;  Surgeon: Ralph Huerta MD;  Location:  [facility];  Service: Interventional Radiology;  Laterality: N/A;   • LUMBAR DISCECTOMY FUSION INSTRUMENTATION N/A 11/30/2021    Procedure: posterior lumbar interbody fusion with use of bone morphogenic protein L4-5;  Surgeon: Marquis Boucher MD;  Location:  [facility] OR;  Service: Neurosurgery;  Laterality: N/A;   • RECTAL FISSURE INCISION AND DRAINAGE N/A 04/29/2022    Procedure: ANAL FISTULA REPAIR;  Surgeon: Pito Vigil MD;  Location:  [facility] OR;  Service: General;  Laterality: N/A;   • SPINAL FUSION     • UPPER GASTROINTESTINAL ENDOSCOPY  01/18/2021       Problem List:  Patient Active Problem List   Diagnosis   • Anxiety   • Fibromyalgia   • Hypertension   • Facet arthritis of lumbar region   • Intractable chronic migraine without aura and without status migrainosus   • Class 3 severe obesity due to excess calories with serious comorbidity and body mass index (BMI) of 40.0 to 44.9 in adult   • Anxiety and depression   • Lumbar stenosis with neurogenic claudication   • Gastroesophageal reflux disease   • Encounter for colorectal cancer screening   • Spondylolisthesis of lumbar region   • Lumbosacral radiculopathy at L5   • Anal discharge   • Anal fistula   • Abscess of skin of left wrist   • S/P L4-5 PLIF 2021   • Chronic bilateral low back pain with left-sided sciatica   • Numbness and tingling of left lower extremity   • Open wound   • Open wound of lumbar region   • Surgical wound dehiscence   • Pre-operative clearance   • BMI 40.0-44.9, adult       Allergy:   Allergies   Allergen Reactions   • Latex Itching and Rash   • Morphine And Codeine  Itching and Nausea Only        Current Medications:   Current Outpatient Medications   Medication Sig Dispense Refill   • DULoxetine (Cymbalta) 60 MG capsule Take 1 capsule by mouth 2 times daily. 60 capsule 1   • DULoxetine (Cymbalta) 60 MG capsule Take 1 capsule by mouth 2 (Two) Times a Day. 60 capsule 5   • DULoxetine (CYMBALTA) 60 MG capsule Take 1 capsule by mouth 2 (Two) Times Daily. 60 capsule 2   • DULoxetine (CYMBALTA) 60 MG capsule Take 1 capsule by mouth 2 times daily. 60 capsule 5   • hydroCHLOROthiazide 25 MG tablet Take 1 tablet by mouth every day. 30 tablet 5   • hydroCHLOROthiazide 25 MG tablet Take 1 tablet by mouth every day. 30 tablet 5   • HYDROcodone-acetaminophen (NORCO) 5-325 MG per tablet Take 1 Tablet by mouth every 8 (eight) hours as needed for Pain 90 tablet 0   • LORazepam (Ativan) 0.5 MG tablet Take 1 tablet by mouth 2 times every day as needed. 60 tablet 2   • LORazepam (Ativan) 0.5 MG tablet Take 1 tablet by mouth 2 times every day as needed. 60 tablet 2   • LORazepam (ATIVAN) 0.5 MG tablet Take 1 tablet by mouth 2 (Two) Times a Day As Needed. 60 tablet 2   • nadolol (CORGARD) 40 MG tablet Take 1 tablet by mouth twice daily. 60 tablet 6   • nadolol (CORGARD) 40 MG tablet Take 1 tablet by mouth twice daily. 60 tablet 6   • nadolol (CORGARD) 40 MG tablet Take 1 tablet by mouth twice daily. 60 tablet 6   • zolpidem (AMBIEN) 10 MG tablet Take 1 Tablet by mouth nightly at bedtime as needed for sleep 30 tablet 2   • zolpidem (AMBIEN) 10 MG tablet Take 1 tablet by mouth at bedtime. 30 tablet 2   • zolpidem (AMBIEN) 10 MG tablet Take 1 tablet by mouth At Night As Needed for Sleep. 30 tablet 2   • cyclobenzaprine (FLEXERIL) 10 MG tablet Take 1 tablet by mouth twice daily. 60 tablet 2   • cyclobenzaprine (FLEXERIL) 10 MG tablet Take 1 Tablet by mouth 2 (two) times daily 60 tablet 2   • ibuprofen (ADVIL,MOTRIN) 800 MG tablet Take 1 tablet by mouth 3 times a day with food. 90 tablet 1   •  "LORazepam (Ativan) 0.5 MG tablet Take 1 tablet by mouth 2 times every day as needed. 60 tablet 0   • ondansetron (Zofran) 4 MG tablet Take 1 tablet by mouth Daily As Needed for Nausea or Vomiting. 30 tablet 1   • Semaglutide (Rybelsus) 14 MG tablet Take 1 tablet by mouth Daily. 30 tablet 3   • Tenapanor HCl 50 MG tablet Take 1 tablet by mouth 2 (Two) Times a Day. 60 tablet 11   • traZODone (DESYREL) 100 MG tablet Take 1 tablet by mouth once every day after a meal. 30 tablet 4   • traZODone (DESYREL) 100 MG tablet Take 1 tablet by mouth every day after a meal 30 tablet 4   • vitamin D (ERGOCALCIFEROL) 1.25 MG (44349 UT) capsule capsule Take 1 capsule by mouth 1 (One) Time Per Week. 5 capsule 10   • zolpidem (Ambien) 10 MG tablet Take 1 tablet by mouth at bedtime. 30 tablet 2     No current facility-administered medications for this visit.       Review of Systems:    Review of Systems   Musculoskeletal:  Positive for back pain.         Physical Exam:   Physical Exam  Constitutional:       Appearance: Normal appearance. She is obese.   HENT:      Head: Normocephalic and atraumatic.      Right Ear: External ear normal.      Left Ear: External ear normal.   Eyes:      Conjunctiva/sclera: Conjunctivae normal.   Pulmonary:      Effort: Pulmonary effort is normal.   Abdominal:      General: Abdomen is flat.   Musculoskeletal:         General: Normal range of motion.      Cervical back: Normal range of motion and neck supple.   Skin:     General: Skin is warm and dry.      Capillary Refill: Capillary refill takes less than 2 seconds.   Neurological:      General: No focal deficit present.      Mental Status: She is alert and oriented to person, place, and time.   Psychiatric:         Mood and Affect: Mood normal.         Behavior: Behavior normal.       Vitals:  Blood pressure 110/70, height 154.9 cm (60.98\"), weight 104 kg (228 lb 3.2 oz).   Body mass index is 43.14 kg/m².      Assessment & Plan   Diagnoses and all orders " for this visit:    1. Pre-operative clearance (Primary)  -     Case Request; Standing  -     Case Request  -     FL Upper GI Single Contrast With KUB; Future  -     CBC (No Diff); Future  -     Comprehensive Metabolic Panel; Future  -     Ferritin; Future  -     Hemoglobin A1c; Future  -     Lipid Panel; Future  -     TSH; Future  -     Vitamin B12 & Folate; Future  -     Vitamin D,25-Hydroxy; Future    2. BMI 40.0-44.9, adult  -     Case Request; Standing  -     Case Request  -     FL Upper GI Single Contrast With KUB; Future  -     CBC (No Diff); Future  -     Comprehensive Metabolic Panel; Future  -     Ferritin; Future  -     Hemoglobin A1c; Future  -     Lipid Panel; Future  -     TSH; Future  -     Vitamin B12 & Folate; Future  -     Vitamin D,25-Hydroxy; Future    Other orders  -     Follow Anesthesia Guidelines / Protocol; Future  -     Follow Anesthesia Guidelines / Protocol; Standing  -     Verify / Perform Chlorhexidine Skin Prep; Standing  -     Provide NPO Instructions to Patient; Future  -     Chlorhexidine Skin Prep; Future  -     Place Sequential Compression Device; Standing  -     Maintain Sequential Compression Device; Standing    Mone is a 54-year-old morbidly obese female with past medical history significant for back pain, hypertension and anxiety.  She presents with interest in bariatric surgery.    We did discuss cutting back on breads and carbohydrates as well as monitoring portions.    I have ordered a UGI to rule out a hiatal hernia.  She does not have a history of hiatal hernia.  We did discuss that this could hander surgery if she has a large hiatal hernia.    She already has an appointment scheduled with a psychiatric nurse practitioner, we did discuss speaking with her about a psychiatric clearance for surgery.    I placed orders to undergo EGD with Dr. Gastelum.  Verbalized understanding and agrees.    She will increase physical activity.    We did discuss weight loss goal of 11  pounds with a goal weight of 217.    She will follow-up in 4 weeks.    Visit Diagnoses:    ICD-10-CM ICD-9-CM   1. Pre-operative clearance  Z01.818 V72.84   2. BMI 40.0-44.9, adult  Z68.41 V85.41         MEDS ORDERED DURING VISIT:  No orders of the defined types were placed in this encounter.      Return in about 4 weeks (around 4/23/2025).             This document has been electronically signed by CECIL Urias  March 26, 2025 10:00 EDT    Please note that portions of this note were completed with a voice recognition program.

## 2025-04-22 ENCOUNTER — TELEPHONE (OUTPATIENT)
Dept: SURGERY | Facility: CLINIC | Age: 55
End: 2025-04-22
Payer: COMMERCIAL

## 2025-05-30 ENCOUNTER — TRANSCRIBE ORDERS (OUTPATIENT)
Dept: ADMINISTRATIVE | Facility: HOSPITAL | Age: 55
End: 2025-05-30
Payer: COMMERCIAL

## 2025-05-30 DIAGNOSIS — M51.369 LUMBAR ADJACENT SEGMENT DISEASE WITH SPONDYLOLISTHESIS: ICD-10-CM

## 2025-05-30 DIAGNOSIS — Z98.1 S/P LUMBAR FUSION: Primary | ICD-10-CM

## 2025-05-30 DIAGNOSIS — M43.16 LUMBAR ADJACENT SEGMENT DISEASE WITH SPONDYLOLISTHESIS: ICD-10-CM

## 2025-05-30 DIAGNOSIS — M51.360 DEGENERATION OF INTERVERTEBRAL DISC OF LUMBAR REGION WITH DISCOGENIC BACK PAIN: ICD-10-CM

## 2025-06-10 ENCOUNTER — HOSPITAL ENCOUNTER (OUTPATIENT)
Dept: MRI IMAGING | Facility: HOSPITAL | Age: 55
Discharge: HOME | End: 2025-06-10
Admitting: CLINICAL NURSE SPECIALIST
Payer: COMMERCIAL

## 2025-06-10 DIAGNOSIS — M51.360 DEGENERATION OF INTERVERTEBRAL DISC OF LUMBAR REGION WITH DISCOGENIC BACK PAIN: ICD-10-CM

## 2025-06-10 DIAGNOSIS — Z98.1 S/P LUMBAR FUSION: ICD-10-CM

## 2025-06-10 DIAGNOSIS — M43.16 LUMBAR ADJACENT SEGMENT DISEASE WITH SPONDYLOLISTHESIS: ICD-10-CM

## 2025-06-10 DIAGNOSIS — M51.369 LUMBAR ADJACENT SEGMENT DISEASE WITH SPONDYLOLISTHESIS: ICD-10-CM

## 2025-06-10 PROCEDURE — 72148 MRI LUMBAR SPINE W/O DYE: CPT

## 2025-08-11 ENCOUNTER — TRANSCRIBE ORDERS (OUTPATIENT)
Dept: ADMINISTRATIVE | Facility: HOSPITAL | Age: 55
End: 2025-08-11
Payer: COMMERCIAL

## 2025-08-11 DIAGNOSIS — Z12.31 VISIT FOR SCREENING MAMMOGRAM: Primary | ICD-10-CM

## 2025-08-14 ENCOUNTER — HOSPITAL ENCOUNTER (EMERGENCY)
Facility: HOSPITAL | Age: 55
Discharge: HOME | End: 2025-08-14
Attending: STUDENT IN AN ORGANIZED HEALTH CARE EDUCATION/TRAINING PROGRAM
Payer: COMMERCIAL

## 2025-08-14 VITALS
DIASTOLIC BLOOD PRESSURE: 58 MMHG | TEMPERATURE: 98.1 F | BODY MASS INDEX: 38.41 KG/M2 | OXYGEN SATURATION: 96 % | SYSTOLIC BLOOD PRESSURE: 116 MMHG | RESPIRATION RATE: 15 BRPM | HEIGHT: 64 IN | HEART RATE: 67 BPM | WEIGHT: 225 LBS

## 2025-08-14 DIAGNOSIS — M54.31 SCIATICA OF RIGHT SIDE: Primary | ICD-10-CM

## 2025-08-14 PROCEDURE — 25810000003 SODIUM CHLORIDE 0.9 % SOLUTION: Performed by: STUDENT IN AN ORGANIZED HEALTH CARE EDUCATION/TRAINING PROGRAM

## 2025-08-14 PROCEDURE — 25010000002 KETOROLAC TROMETHAMINE PER 15 MG: Performed by: STUDENT IN AN ORGANIZED HEALTH CARE EDUCATION/TRAINING PROGRAM

## 2025-08-14 PROCEDURE — 25010000002 METHOCARBAMOL 1000 MG/10ML SOLUTION: Performed by: STUDENT IN AN ORGANIZED HEALTH CARE EDUCATION/TRAINING PROGRAM

## 2025-08-14 PROCEDURE — 25010000002 DEXAMETHASONE PER 1 MG: Performed by: STUDENT IN AN ORGANIZED HEALTH CARE EDUCATION/TRAINING PROGRAM

## 2025-08-14 PROCEDURE — 99283 EMERGENCY DEPT VISIT LOW MDM: CPT

## 2025-08-14 PROCEDURE — 96375 TX/PRO/DX INJ NEW DRUG ADDON: CPT

## 2025-08-14 PROCEDURE — 25010000002 HYDROMORPHONE PER 4 MG: Performed by: STUDENT IN AN ORGANIZED HEALTH CARE EDUCATION/TRAINING PROGRAM

## 2025-08-14 PROCEDURE — 96374 THER/PROPH/DIAG INJ IV PUSH: CPT

## 2025-08-14 RX ORDER — SODIUM CHLORIDE 0.9 % (FLUSH) 0.9 %
10 SYRINGE (ML) INJECTION AS NEEDED
Status: DISCONTINUED | OUTPATIENT
Start: 2025-08-14 | End: 2025-08-14 | Stop reason: HOSPADM

## 2025-08-14 RX ORDER — ACETAMINOPHEN 500 MG
1000 TABLET ORAL ONCE
Status: COMPLETED | OUTPATIENT
Start: 2025-08-14 | End: 2025-08-14

## 2025-08-14 RX ORDER — KETOROLAC TROMETHAMINE 30 MG/ML
30 INJECTION, SOLUTION INTRAMUSCULAR; INTRAVENOUS ONCE
Status: COMPLETED | OUTPATIENT
Start: 2025-08-14 | End: 2025-08-14

## 2025-08-14 RX ORDER — TRAMADOL HYDROCHLORIDE 50 MG/1
50 TABLET ORAL ONCE
Status: COMPLETED | OUTPATIENT
Start: 2025-08-14 | End: 2025-08-14

## 2025-08-14 RX ORDER — METHOCARBAMOL 750 MG/1
750 TABLET, FILM COATED ORAL 3 TIMES DAILY PRN
Qty: 45 TABLET | Refills: 0 | Status: SHIPPED | OUTPATIENT
Start: 2025-08-14

## 2025-08-14 RX ORDER — METHOCARBAMOL 100 MG/ML
1000 INJECTION, SOLUTION INTRAMUSCULAR; INTRAVENOUS ONCE
Status: COMPLETED | OUTPATIENT
Start: 2025-08-14 | End: 2025-08-14

## 2025-08-14 RX ORDER — HYDROMORPHONE HYDROCHLORIDE 1 MG/ML
0.5 INJECTION, SOLUTION INTRAMUSCULAR; INTRAVENOUS; SUBCUTANEOUS ONCE
Status: COMPLETED | OUTPATIENT
Start: 2025-08-14 | End: 2025-08-14

## 2025-08-14 RX ORDER — PREDNISONE 20 MG/1
40 TABLET ORAL DAILY
Qty: 10 TABLET | Refills: 0 | Status: SHIPPED | OUTPATIENT
Start: 2025-08-14 | End: 2025-08-19

## 2025-08-14 RX ORDER — DEXAMETHASONE SODIUM PHOSPHATE 10 MG/ML
10 INJECTION, SOLUTION INTRA-ARTICULAR; INTRALESIONAL; INTRAMUSCULAR; INTRAVENOUS; SOFT TISSUE ONCE
Status: COMPLETED | OUTPATIENT
Start: 2025-08-14 | End: 2025-08-14

## 2025-08-14 RX ORDER — IBUPROFEN 800 MG/1
800 TABLET, FILM COATED ORAL EVERY 8 HOURS PRN
Qty: 60 TABLET | Refills: 0 | Status: SHIPPED | OUTPATIENT
Start: 2025-08-14

## 2025-08-14 RX ADMIN — HYDROMORPHONE HYDROCHLORIDE 0.5 MG: 1 INJECTION, SOLUTION INTRAMUSCULAR; INTRAVENOUS; SUBCUTANEOUS at 09:36

## 2025-08-14 RX ADMIN — ACETAMINOPHEN 1000 MG: 500 TABLET, FILM COATED ORAL at 08:26

## 2025-08-14 RX ADMIN — DEXAMETHASONE SODIUM PHOSPHATE 10 MG: 10 INJECTION INTRAMUSCULAR; INTRAVENOUS at 08:25

## 2025-08-14 RX ADMIN — TRAMADOL HYDROCHLORIDE 50 MG: 50 TABLET, COATED ORAL at 08:26

## 2025-08-14 RX ADMIN — METHOCARBAMOL 1000 MG: 1000 INJECTION, SOLUTION INTRAMUSCULAR; INTRAVENOUS at 08:26

## 2025-08-14 RX ADMIN — SODIUM CHLORIDE 1000 ML: 9 INJECTION, SOLUTION INTRAVENOUS at 08:20

## 2025-08-14 RX ADMIN — KETOROLAC TROMETHAMINE 30 MG: 30 INJECTION INTRAMUSCULAR; INTRAVENOUS at 08:25

## 2025-08-18 ENCOUNTER — PATIENT OUTREACH (OUTPATIENT)
Dept: CASE MANAGEMENT | Facility: OTHER | Age: 55
End: 2025-08-18
Payer: COMMERCIAL

## (undated) DEVICE — PACK,LITHOTOMY,PK III,SIRUS: Brand: MEDLINE

## (undated) DEVICE — 4-PORT MANIFOLD: Brand: NEPTUNE 2

## (undated) DEVICE — GLV SURG PREMIERPRO MIC LTX PF SZ7.5 BRN

## (undated) DEVICE — ULTRACLEAN ACCESSORY ELECTRODE, 1 INCH COATED NEEDLE WITH EXTENDED INSULATION: Brand: ULTRACLEAN

## (undated) DEVICE — SHEET,DRAPE,70X100,STERILE: Brand: MEDLINE

## (undated) DEVICE — SPHR MARKR STEALTH STATION

## (undated) DEVICE — GLV SURG SENSICARE MICRO PF LF 7.5 STRL

## (undated) DEVICE — BASN EMESS GLD 9IN

## (undated) DEVICE — PENCL ROCKRSWCH MEGADYNE W/HOLSTR 10FT SS

## (undated) DEVICE — ANTIBACTERIAL UNDYED BRAIDED (POLYGLACTIN 910), SYNTHETIC ABSORBABLE SUTURE: Brand: COATED VICRYL

## (undated) DEVICE — DRP CLEARVIEWTEARAWAY O/ARM

## (undated) DEVICE — NDL HYPO ECLPS SFTY 25G 1 1/2IN

## (undated) DEVICE — SPNG LAP PREWSH SFTPK 18X18IN STRL PK/5

## (undated) DEVICE — TBG PENCL TELESCP MEGADYNE SMOKE EVAC 10FT

## (undated) DEVICE — TB SXN FRAZIER 10F STRL

## (undated) DEVICE — SUT VICYL 2/0 CR8 18IN DYED J726D

## (undated) DEVICE — SYR CONTRL LUERLOK 10CC

## (undated) DEVICE — ELECTRD BLD EZ CLN MOD 4IN

## (undated) DEVICE — SUT GUT CHRM 2/0 SH 27IN G123H

## (undated) DEVICE — SUCTION CANISTER, 1500CC, RIGID: Brand: DEROYAL

## (undated) DEVICE — BNDG ELAS ELITE V/CLOSE 6IN 5YD LF STRL

## (undated) DEVICE — LUBE JELLY PK/2.75GM STRL BX/144

## (undated) DEVICE — JP PERF DRN SIL FLT 10MM FULL: Brand: CARDINAL HEALTH

## (undated) DEVICE — BLANKT WARM UPPR/BDY ARM/OUT 57X196CM

## (undated) DEVICE — JELLY,LUBE,STERILE,FLIP TOP,TUBE,4-OZ: Brand: MEDLINE

## (undated) DEVICE — SYR LUERLOK 30CC

## (undated) DEVICE — GOWN,REINF,POLY,ECL,PP SLV,XL: Brand: MEDLINE

## (undated) DEVICE — Device

## (undated) DEVICE — TB SXN FRAZIER 12F STRL

## (undated) DEVICE — SPNG ENDO BEDSIDE TUB ENZYM

## (undated) DEVICE — NEEDLE, QUINCKE 22GX3.5": Brand: MEDLINE INDUSTRIES, INC.

## (undated) DEVICE — PREMIUM WET SKIN PREP TRAY: Brand: MEDLINE INDUSTRIES, INC.

## (undated) DEVICE — COR MAJOR LITHOTOMY: Brand: MEDLINE INDUSTRIES, INC.

## (undated) DEVICE — PACK,UNIVERSAL,NO GOWNS: Brand: MEDLINE

## (undated) DEVICE — SUT MNCRYL 4/0 PS2 18 IN

## (undated) DEVICE — PK BASIC 70

## (undated) DEVICE — 3M™ STERI-DRAPE™ INSTRUMENT POUCH 1018: Brand: STERI-DRAPE™

## (undated) DEVICE — CONN Y IRR DISP 1P/U

## (undated) DEVICE — NDL SPINE 22G 31/2IN BLK

## (undated) DEVICE — APPL CHLORAPREP TINTED 26ML TEAL

## (undated) DEVICE — PK NEURO DISC 10

## (undated) DEVICE — TRY L/P SFTY A/20G

## (undated) DEVICE — HOLDER: Brand: DEROYAL

## (undated) DEVICE — DISPOSABLE BIPOLAR FORCEPS 7 3/4" (19.7CM) SCOVILLE BAYONET, INSULATED, 1.5MM TIP AND 12 FT. (3.6M) CABLE: Brand: KIRWAN

## (undated) DEVICE — TOOL MR8-14MH30 MR8 14CM MATCH 3MM: Brand: MIDAS REX MR8

## (undated) DEVICE — APPL CHLORAPREP HI/LITE 26ML ORNG

## (undated) DEVICE — DRN PENRS RO SILCNE 1/4X12IN LF STRL

## (undated) DEVICE — TRAP,MUCUS SPECIMEN,40CC: Brand: MEDLINE

## (undated) DEVICE — CATH IV JELCO 18GA 10 1 3/4 IN

## (undated) DEVICE — ADHS SKIN PREMIERPRO EXOFIN TOPICAL HI/VISC .5ML

## (undated) DEVICE — SPNG VERSALON 4X4 4PLY NONSTRL LF BG/200

## (undated) DEVICE — GLV SURG BIOGEL LTX PF 8

## (undated) DEVICE — ELECTRD BLD EZ CLN MOD XLNG 2.75IN

## (undated) DEVICE — PATIENT RETURN ELECTRODE, SINGLE-USE, CONTACT QUALITY MONITORING, ADULT, WITH 9FT CORD, FOR PATIENTS WEIGING OVER 33LBS. (15KG): Brand: MEGADYNE

## (undated) DEVICE — STRAP POSTN KN/BDY FM 5X72IN DISP

## (undated) DEVICE — DRAPE,UTILTY,TAPE,15X26, 4EA/PK: Brand: MEDLINE

## (undated) DEVICE — SNAP KOVER: Brand: UNBRANDED

## (undated) DEVICE — GLV SURG PREMIERPRO MIC LTX PF SZ6.5 BRN

## (undated) DEVICE — SUT ETHLN 4/0 P3 18IN 699H

## (undated) DEVICE — THE BITE BLOCK MAXI, LATEX FREE STRAP IS USED TO PROTECT THE ENDOSCOPE INSERTION TUBE FROM BEING BITTEN BY THE PATIENT.

## (undated) DEVICE — 3M™ MEDIPORE™ H SOFT CLOTH SURGICAL TAPE, 2863, 3 IN X 10 YD, 12/CASE: Brand: 3M™ MEDIPORE™

## (undated) DEVICE — TUBING, SUCTION, 1/4" X 20', STRAIGHT: Brand: MEDLINE INDUSTRIES, INC.

## (undated) DEVICE — TOOL MR8-14BA60 MR8 14CM BALL 6MM: Brand: MIDAS REX MR8

## (undated) DEVICE — UNDERGLV SURG BIOGEL INDICAT PI SZ8.5 BLU

## (undated) DEVICE — GOWN,NON-REINFORCED,SIRUS,SET IN SLV,XXL: Brand: MEDLINE

## (undated) DEVICE — JACKSON-PRATT 100CC BULB RESERVOIR: Brand: CARDINAL HEALTH

## (undated) DEVICE — SPNG GZ WOVN 4X4IN 12PLY 10/BX STRL

## (undated) DEVICE — ENDOGATOR AUXILIARY WATER JET CONNECTOR: Brand: ENDOGATOR

## (undated) DEVICE — UNDERGLV SURG BIOGEL INDICAT PF 8 GRN

## (undated) DEVICE — GLV SURG PREMIERPRO MIC LTX PF SZ7 BRN

## (undated) DEVICE — DRSNG PAD ABD 8X10IN STRL

## (undated) DEVICE — SUT GUT CHRM 3/0 SH 27IN G122H

## (undated) DEVICE — DRAPE,LAPAROTOMY,PED,STERILE: Brand: MEDLINE

## (undated) DEVICE — UNDRPD FLUFF 23X36